# Patient Record
Sex: MALE | Race: WHITE | NOT HISPANIC OR LATINO | Employment: FULL TIME | URBAN - METROPOLITAN AREA
[De-identification: names, ages, dates, MRNs, and addresses within clinical notes are randomized per-mention and may not be internally consistent; named-entity substitution may affect disease eponyms.]

---

## 2017-01-17 ENCOUNTER — GENERIC CONVERSION - ENCOUNTER (OUTPATIENT)
Dept: OTHER | Facility: OTHER | Age: 72
End: 2017-01-17

## 2017-01-17 ENCOUNTER — APPOINTMENT (OUTPATIENT)
Dept: RADIATION ONCOLOGY | Facility: CLINIC | Age: 72
End: 2017-01-17
Attending: RADIOLOGY
Payer: COMMERCIAL

## 2017-01-17 PROCEDURE — 99213 OFFICE O/P EST LOW 20 MIN: CPT | Performed by: RADIOLOGY

## 2017-08-11 ENCOUNTER — ALLSCRIPTS OFFICE VISIT (OUTPATIENT)
Dept: OTHER | Facility: OTHER | Age: 72
End: 2017-08-11

## 2017-08-11 DIAGNOSIS — Z85.46 PERSONAL HISTORY OF MALIGNANT NEOPLASM OF PROSTATE: ICD-10-CM

## 2017-08-11 LAB
BILIRUB UR QL STRIP: NORMAL
GLUCOSE (HISTORICAL): NORMAL
HGB UR QL STRIP.AUTO: NORMAL
KETONES UR STRIP-MCNC: NORMAL MG/DL
LEUKOCYTE ESTERASE UR QL STRIP: NORMAL
NITRITE UR QL STRIP: NORMAL
PH UR STRIP.AUTO: 5.5 [PH]
PROT UR STRIP-MCNC: NORMAL MG/DL
SP GR UR STRIP.AUTO: 1.01
UROBILINOGEN UR QL STRIP.AUTO: 1

## 2018-01-13 VITALS
SYSTOLIC BLOOD PRESSURE: 142 MMHG | RESPIRATION RATE: 16 BRPM | DIASTOLIC BLOOD PRESSURE: 80 MMHG | HEART RATE: 94 BPM | BODY MASS INDEX: 29.29 KG/M2 | HEIGHT: 73 IN | WEIGHT: 221 LBS | OXYGEN SATURATION: 97 %

## 2018-01-13 VITALS — RESPIRATION RATE: 16 BRPM | WEIGHT: 215 LBS | BODY MASS INDEX: 28.49 KG/M2 | TEMPERATURE: 97.6 F | HEIGHT: 73 IN

## 2018-03-05 DIAGNOSIS — N13.9 BENIGN LOCALIZED HYPERPLASIA OF PROSTATE WITH URINARY OBSTRUCTION AND LOWER URINARY TRACT SYMPTOMS: Primary | ICD-10-CM

## 2018-03-05 DIAGNOSIS — N40.1 BENIGN LOCALIZED HYPERPLASIA OF PROSTATE WITH URINARY OBSTRUCTION AND LOWER URINARY TRACT SYMPTOMS: Primary | ICD-10-CM

## 2018-03-05 RX ORDER — SILODOSIN 8 MG/1
1 CAPSULE ORAL DAILY
Qty: 90 CAPSULE | Refills: 2 | Status: SHIPPED | OUTPATIENT
Start: 2018-03-05 | End: 2018-07-11 | Stop reason: CLARIF

## 2018-03-05 NOTE — TELEPHONE ENCOUNTER
Patient called stating his recent prescription for Rapaflo was written for 4mg instead of 8mg    A CORRECTED script was queued and forwarded to Dr Thee Brandt for approval

## 2018-03-12 ENCOUNTER — TELEPHONE (OUTPATIENT)
Dept: UROLOGY | Facility: AMBULATORY SURGERY CENTER | Age: 73
End: 2018-03-12

## 2018-03-12 NOTE — TELEPHONE ENCOUNTER
Spoke with pt who wanted to know if our office is having trouble getting gold seeds for IMRT  Told him not that we are aware of

## 2018-07-11 ENCOUNTER — OFFICE VISIT (OUTPATIENT)
Dept: CARDIOLOGY CLINIC | Facility: CLINIC | Age: 73
End: 2018-07-11
Payer: COMMERCIAL

## 2018-07-11 VITALS
HEART RATE: 72 BPM | BODY MASS INDEX: 29.03 KG/M2 | SYSTOLIC BLOOD PRESSURE: 138 MMHG | DIASTOLIC BLOOD PRESSURE: 80 MMHG | WEIGHT: 219 LBS | HEIGHT: 73 IN

## 2018-07-11 DIAGNOSIS — I48.20 CHRONIC ATRIAL FIBRILLATION (HCC): Primary | ICD-10-CM

## 2018-07-11 PROBLEM — R60.9 EDEMA: Status: ACTIVE | Noted: 2018-07-11

## 2018-07-11 PROCEDURE — 93000 ELECTROCARDIOGRAM COMPLETE: CPT | Performed by: INTERNAL MEDICINE

## 2018-07-11 PROCEDURE — 99213 OFFICE O/P EST LOW 20 MIN: CPT | Performed by: INTERNAL MEDICINE

## 2018-07-11 RX ORDER — MELOXICAM 15 MG/1
15 TABLET ORAL AS NEEDED
COMMUNITY
End: 2020-06-15

## 2018-07-11 RX ORDER — OMEPRAZOLE 20 MG/1
1 TABLET, DELAYED RELEASE ORAL DAILY
COMMUNITY

## 2018-07-11 RX ORDER — SILODOSIN 8 MG/1
8 CAPSULE ORAL DAILY
COMMUNITY
Start: 2018-06-20 | End: 2018-08-17 | Stop reason: SDUPTHER

## 2018-07-11 RX ORDER — DILTIAZEM HYDROCHLORIDE 180 MG/1
180 CAPSULE, EXTENDED RELEASE ORAL DAILY
COMMUNITY
Start: 2018-05-07 | End: 2019-01-05 | Stop reason: SDUPTHER

## 2018-07-11 NOTE — PROGRESS NOTES
Patient ID: Negrito Bryant is a 68 y o  male  Plan:      Chronic atrial fibrillation (HCC)  Heart rate controlled  Lone afib with low CHADSVASC score  Continue aspirin and diltiazem  Edema  Very mild  Always resolved by am  Likely related to diltiazem  He will call if it worsens  Follow up Plan:  1 year EKG and follow-up visit  HPI:  Bev Noble is seen in follow-up today regarding atrial fibrillation, dyspnea, and edema  To review, he has a long history of atrial fibrillation  He underwent cardioversion in 2011 but this did not stick ultimately  He is  on aspirin and rate control and is without symptoms  There is some edema at the end of the day but this resolves by the morning  Results for orders placed or performed in visit on 07/11/18   POCT ECG    Narrative    Afib  Incomplete RBBB  NSSTs  History reviewed  No pertinent surgical history  CMP: No results found for: NA, K, CL, CO2, BUN, CREATININE, GLUCOSE, EGFR    Lipid Profile: No results found for: CHOL, TRIG, HDL, LDL      Review of Systems    Complete 12  point ROS reviewed and otherwise non pertinent or negative except as per HPI or as below  Gait:  Normal        Objective:     /80 (BP Location: Left arm, Patient Position: Sitting, Cuff Size: Standard)   Pulse 72   Ht 6' 1" (1 854 m)   Wt 99 3 kg (219 lb)   BMI 28 89 kg/m²     PHYSICAL EXAM:    General:  Normal appearance in no distress  Eyes:  Anicteric  Oral mucosa:  Moist   Neck:  No JVD  Carotid upstrokes are brisk without bruits  No masses  Chest:  Clear to auscultation and percussion  Cardiac:  Normal PMI  Normal S1 and S2  No murmur gallop or rub  Irregular rate and rhythm  Abdomen:  Soft and nontender  No palpable organomegaly or aortic enlargement  Extremities:  Trace pretibial edema  Musculoskeletal:  Symmetric  Vascular:  Femoral pulses are brisk without bruits  Poplitealpulses are intact bilaterally  Pedal pulses are intact    Neuro: Grossly symmetric  Psych:  Alert and oriented x3  Current Outpatient Prescriptions:     aspirin 81 MG tablet, Take 1 tablet by mouth daily, Disp: , Rfl:     DILT- MG 24 hr capsule, Take 180 mg by mouth daily, Disp: , Rfl:     fluticasone-salmeterol (ADVAIR DISKUS) 250-50 mcg/dose inhaler, Inhale, Disp: , Rfl:     meloxicam (MOBIC) 15 mg tablet, Take 15 mg by mouth as needed, Disp: , Rfl:     omeprazole (PRILOSEC OTC) 20 MG tablet, Take 1 tablet by mouth daily, Disp: , Rfl:     PROAIR  (90 Base) MCG/ACT inhaler, , Disp: , Rfl:     RAPAFLO 8 MG CAPS, Take 8 mg by mouth daily, Disp: , Rfl:   No Known Allergies  Past Medical History:   Diagnosis Date    A[facility]fib ([facility] )     COPD (chronic obstructive pulmonary disease) ([facility] )     History of cardioversion 12/27/2011    Inital Rhythm AFIB, Final Rhythm Sinus, [facility]    History of echocardiogram 06/01/2015    Normal LV systolic function, mild concentric LV hypertrophy, mild mitral and tricuspid regurg, right atrial enlargement   EF 55%           History   Smoking Status    Former Smoker    Types: Cigarettes    Quit date: 7/11/1998   Smokeless Tobacco    Never Used

## 2018-08-09 RX ORDER — TADALAFIL 20 MG/1
TABLET ORAL
COMMUNITY
End: 2018-08-17 | Stop reason: SDUPTHER

## 2018-08-17 ENCOUNTER — OFFICE VISIT (OUTPATIENT)
Dept: UROLOGY | Facility: MEDICAL CENTER | Age: 73
End: 2018-08-17
Payer: COMMERCIAL

## 2018-08-17 VITALS
HEIGHT: 73 IN | SYSTOLIC BLOOD PRESSURE: 124 MMHG | WEIGHT: 215 LBS | BODY MASS INDEX: 28.49 KG/M2 | DIASTOLIC BLOOD PRESSURE: 74 MMHG

## 2018-08-17 DIAGNOSIS — N40.1 BENIGN PROSTATIC HYPERPLASIA WITH LOWER URINARY TRACT SYMPTOMS, SYMPTOM DETAILS UNSPECIFIED: ICD-10-CM

## 2018-08-17 DIAGNOSIS — Z92.3 PERSONAL HISTORY OF IRRADIATION: ICD-10-CM

## 2018-08-17 DIAGNOSIS — Z85.46 PERSONAL HISTORY OF MALIGNANT NEOPLASM OF PROSTATE: Primary | ICD-10-CM

## 2018-08-17 DIAGNOSIS — N52.8 OTHER MALE ERECTILE DYSFUNCTION: ICD-10-CM

## 2018-08-17 LAB
SL AMB  POCT GLUCOSE, UA: ABNORMAL
SL AMB LEUKOCYTE ESTERASE,UA: ABNORMAL
SL AMB POCT BILIRUBIN,UA: ABNORMAL
SL AMB POCT BLOOD,UA: ABNORMAL
SL AMB POCT CLARITY,UA: CLEAR
SL AMB POCT COLOR,UA: YELLOW
SL AMB POCT KETONES,UA: ABNORMAL
SL AMB POCT NITRITE,UA: ABNORMAL
SL AMB POCT PH,UA: 6.5
SL AMB POCT SPECIFIC GRAVITY,UA: 1.02
SL AMB POCT URINE PROTEIN: 30
SL AMB POCT UROBILINOGEN: 2

## 2018-08-17 PROCEDURE — 81003 URINALYSIS AUTO W/O SCOPE: CPT | Performed by: UROLOGY

## 2018-08-17 PROCEDURE — 99214 OFFICE O/P EST MOD 30 MIN: CPT | Performed by: UROLOGY

## 2018-08-17 RX ORDER — SILODOSIN 8 MG/1
8 CAPSULE ORAL DAILY
Qty: 90 CAPSULE | Refills: 3 | Status: SHIPPED | OUTPATIENT
Start: 2018-08-17 | End: 2019-07-22 | Stop reason: SDUPTHER

## 2018-08-17 RX ORDER — TADALAFIL 20 MG/1
10 TABLET ORAL AS NEEDED
Qty: 10 TABLET | Refills: 0 | Status: SHIPPED | OUTPATIENT
Start: 2018-08-17 | End: 2019-04-15

## 2018-08-17 NOTE — PROGRESS NOTES
Assessment/Plan:      Diagnoses and all orders for this visit:    Personal history of malignant neoplasm of prostate  -     POCT urine dip auto non-scope  -     RAPAFLO 8 MG CAPS; Take 1 capsule by mouth daily  -     tadalafil (CIALIS) 20 MG tablet; Take 0 5 tablets (10 mg total) by mouth as needed for erectile dysfunction    Personal history of irradiation    Benign prostatic hyperplasia with lower urinary tract symptoms, symptom details unspecified  -     RAPAFLO 8 MG CAPS; Take 1 capsule by mouth daily  -     tadalafil (CIALIS) 20 MG tablet; Take 0 5 tablets (10 mg total) by mouth as needed for erectile dysfunction    Other male erectile dysfunction  -     RAPAFLO 8 MG CAPS; Take 1 capsule by mouth daily  -     tadalafil (CIALIS) 20 MG tablet; Take 0 5 tablets (10 mg total) by mouth as needed for erectile dysfunction          Subjective:  No complaints     Patient ID: Humble Romero is a 68 y o  male  HPI  He is approaching 7 years after his radiation therapy for localized prostate cancer  He had a acceptable jax of his PSA down to below 1 0  He denies any hematuria, flank pains, weight loss, or loss of appetite  He states his bowel function is normal and not having any issues with that  He has BPH for which he takes Rapaflo, and also ED for which he takes p r n  Cialis  Review of Systems   Constitutional: Negative  HENT: Positive for hearing loss  Eyes: Negative  Respiratory: Negative  Cardiovascular: Negative  Gastrointestinal: Negative  Endocrine: Negative  Genitourinary: Negative  Musculoskeletal: Negative  Skin: Negative  Allergic/Immunologic: Negative  Neurological: Negative  Hematological: Negative  Psychiatric/Behavioral: Negative  Objective:     Physical Exam   Constitutional: He is oriented to person, place, and time  He appears well-developed and well-nourished  No distress  HENT:   Head: Normocephalic and atraumatic     Right Ear: Decreased hearing is noted  Left Ear: Decreased hearing is noted  Nose: Nose normal    Mouth/Throat: Oropharynx is clear and moist    Eyes: Conjunctivae and EOM are normal  Pupils are equal, round, and reactive to light  No scleral icterus  Neck: Normal range of motion  Neck supple  Cardiovascular: Normal rate, regular rhythm, normal heart sounds and intact distal pulses  No murmur heard  Pulmonary/Chest: Effort normal and breath sounds normal  No respiratory distress  He has no wheezes  He has no rales  Abdominal: Soft  Bowel sounds are normal  He exhibits no distension and no mass  There is no tenderness  Musculoskeletal: Normal range of motion  He exhibits no edema or tenderness  Lymphadenopathy:     He has no cervical adenopathy  Neurological: He is alert and oriented to person, place, and time  No cranial nerve deficit  Skin: Skin is warm and dry  No rash noted  No erythema  No pallor  Psychiatric: He has a normal mood and affect  His behavior is normal  Judgment and thought content normal    Nursing note and vitals reviewed        Current PSA level is pending

## 2018-08-17 NOTE — LETTER
August 17, 2018     Mike Nixon,   [address]  [address]  [address]    Patient: Alvin Em   YOB: 1945   Date of Visit: 8/17/2018       Dear Dr Franc Smallwood: Thank you for referring Denice Bullock to me for evaluation  Below are my notes for this consultation  If you have questions, please do not hesitate to call me  I look forward to following your patient along with you  Sincerely,        Aliyah Caceres MD        CC: No Recipients  Aliyah Caceres MD  8/17/2018  2:58 PM  Sign at close encounter  Assessment/Plan:      Diagnoses and all orders for this visit:    Personal history of malignant neoplasm of prostate  -     POCT urine dip auto non-scope  -     RAPAFLO 8 MG CAPS; Take 1 capsule by mouth daily  -     tadalafil (CIALIS) 20 MG tablet; Take 0 5 tablets (10 mg total) by mouth as needed for erectile dysfunction    Personal history of irradiation    Benign prostatic hyperplasia with lower urinary tract symptoms, symptom details unspecified  -     RAPAFLO 8 MG CAPS; Take 1 capsule by mouth daily  -     tadalafil (CIALIS) 20 MG tablet; Take 0 5 tablets (10 mg total) by mouth as needed for erectile dysfunction    Other male erectile dysfunction  -     RAPAFLO 8 MG CAPS; Take 1 capsule by mouth daily  -     tadalafil (CIALIS) 20 MG tablet; Take 0 5 tablets (10 mg total) by mouth as needed for erectile dysfunction          Subjective:  No complaints     Patient ID: Alvin Em is a 68 y o  male  HPI  He is approaching 7 years after his radiation therapy for localized prostate cancer  He had a acceptable jax of his PSA down to below 1 0  He denies any hematuria, flank pains, weight loss, or loss of appetite  He states his bowel function is normal and not having any issues with that  He has BPH for which he takes Rapaflo, and also ED for which he takes p r n  Cialis  Review of Systems   Constitutional: Negative      HENT: Positive for hearing loss     Eyes: Negative  Respiratory: Negative  Cardiovascular: Negative  Gastrointestinal: Negative  Endocrine: Negative  Genitourinary: Negative  Musculoskeletal: Negative  Skin: Negative  Allergic/Immunologic: Negative  Neurological: Negative  Hematological: Negative  Psychiatric/Behavioral: Negative  Objective:     Physical Exam   Constitutional: He is oriented to person, place, and time  He appears well-developed and well-nourished  No distress  HENT:   Head: Normocephalic and atraumatic  Right Ear: Decreased hearing is noted  Left Ear: Decreased hearing is noted  Nose: Nose normal    Mouth/Throat: Oropharynx is clear and moist    Eyes: Conjunctivae and EOM are normal  Pupils are equal, round, and reactive to light  No scleral icterus  Neck: Normal range of motion  Neck supple  Cardiovascular: Normal rate, regular rhythm, normal heart sounds and intact distal pulses  No murmur heard  Pulmonary/Chest: Effort normal and breath sounds normal  No respiratory distress  He has no wheezes  He has no rales  Abdominal: Soft  Bowel sounds are normal  He exhibits no distension and no mass  There is no tenderness  Musculoskeletal: Normal range of motion  He exhibits no edema or tenderness  Lymphadenopathy:     He has no cervical adenopathy  Neurological: He is alert and oriented to person, place, and time  No cranial nerve deficit  Skin: Skin is warm and dry  No rash noted  No erythema  No pallor  Psychiatric: He has a normal mood and affect  His behavior is normal  Judgment and thought content normal    Nursing note and vitals reviewed        Current PSA level is pending

## 2018-08-20 ENCOUNTER — TELEPHONE (OUTPATIENT)
Dept: UROLOGY | Facility: MEDICAL CENTER | Age: 73
End: 2018-08-20

## 2018-08-20 NOTE — TELEPHONE ENCOUNTER
Scripts for Rapaflo and Cialis were E-scribed to [facility]  Patient states he does NOT take Cialis any longer  He asked that I call the mail order pharmacy to cancel that medication so he doesn't get charged  I was able to verbally VOID the prescription for Cialis 10mg  Patient not due for refill of his Rapaflo until 8/27/18  He will NOT be charged  Patient aware of same

## 2018-12-03 ENCOUNTER — OFFICE VISIT (OUTPATIENT)
Dept: URGENT CARE | Facility: CLINIC | Age: 73
End: 2018-12-03
Payer: COMMERCIAL

## 2018-12-03 VITALS
HEART RATE: 82 BPM | WEIGHT: 215.6 LBS | TEMPERATURE: 97.6 F | RESPIRATION RATE: 16 BRPM | DIASTOLIC BLOOD PRESSURE: 82 MMHG | OXYGEN SATURATION: 95 % | HEIGHT: 73 IN | SYSTOLIC BLOOD PRESSURE: 144 MMHG | BODY MASS INDEX: 28.57 KG/M2

## 2018-12-03 DIAGNOSIS — S05.02XA ABRASION OF LEFT CORNEA, INITIAL ENCOUNTER: Primary | ICD-10-CM

## 2018-12-03 PROCEDURE — 90715 TDAP VACCINE 7 YRS/> IM: CPT

## 2018-12-03 PROCEDURE — 99203 OFFICE O/P NEW LOW 30 MIN: CPT | Performed by: PHYSICIAN ASSISTANT

## 2018-12-03 RX ORDER — MOXIFLOXACIN 5 MG/ML
1 SOLUTION/ DROPS OPHTHALMIC 3 TIMES DAILY
Qty: 3 ML | Refills: 0 | Status: SHIPPED | OUTPATIENT
Start: 2018-12-03 | End: 2019-08-16 | Stop reason: ALTCHOICE

## 2018-12-03 NOTE — PATIENT INSTRUCTIONS
Corneal Abrasion   WHAT YOU NEED TO KNOW:   A corneal abrasion is a scratch on the cornea of your eye  The cornea is the clear layer that covers the front of your eye  A small scratch may heal in 1 to 2 days  Deeper or larger scratches may take longer to heal         DISCHARGE INSTRUCTIONS:   Contact your healthcare provider if:   · Your eye pain or vision gets worse  · You have yellow or green drainage from your eye  · You have questions or concerns about your condition or care  Medicines:   · Medicines  may be given in the form of eyedrops or ointment to help prevent an eye infection  You may also be given eye drops to decrease pain  Ask how to take this medicine safely  · Take your medicine as directed  Contact your healthcare provider if you think your medicine is not helping or if you have side effects  Tell him or her if you are allergic to any medicine  Keep a list of the medicines, vitamins, and herbs you take  Include the amounts, and when and why you take them  Bring the list or the pill bottles to follow-up visits  Carry your medicine list with you in case of an emergency  Follow up with your healthcare provider as directed:  Write down your questions so you remember to ask them during your visits  Self-care:   · Do not touch or rub your eye  · Ask your healthcare provider when you can start your normal activities  · Ask your healthcare provider when you can wear your contact lenses  · Wear sunglasses in bright light until your eyes feel better  Help prevent corneal abrasions:   · Remove your contact lenses if your eyes feel dry or irritated  · Wash your hands if you need to touch your eyes or your face  · Trim your child's fingernails so he cannot scratch his eye  · Wear protective eyewear when you work with chemicals, wood, dust, or metal      · Wear protective eyewear when you play sports  · Do not wear your contacts for longer than you should       · Do not wear colored lenses or lenses with shapes on them  These lenses may cause eye damage and vision loss  · Do not wear glitter makeup  Glitter can easily get into your eyes and under contact lenses  · Do not sleep with your contacts in your eyes  © 2017 [address] [employer] Information is for End User's use only and may not be sold, redistributed or otherwise used for commercial purposes  All illustrations and images included in CareNotes® are the copyrighted property of [employer]  or Tam Andino  The above information is an  only  It is not intended as medical advice for individual conditions or treatments  Talk to your doctor, nurse or pharmacist before following any medical regimen to see if it is safe and effective for you

## 2018-12-03 NOTE — PROGRESS NOTES
[facility]        NAME: Cabrera Garcia is a 68 y o  male  : 1945    MRN: [id number]  DATE: December 3, 2018  TIME: 4:13 PM    Assessment and Plan   Abrasion of left cornea, initial encounter [S05  02XA]  1  Abrasion of left cornea, initial encounter  TDAP Vaccine greater than or equal to 8yo    moxifloxacin (VIGAMOX) 0 5 % ophthalmic solution         Patient Instructions     Patient is tetanus update in office  Use antibiotic eyedrops 3 times a day as directed  Follow up with eye doctor  Follow up with PCP in 3-5 days  Proceed to  ER if symptoms worsen  Chief Complaint     Chief Complaint   Patient presents with    Conjunctivitis     L eye redness, started Friday  States it was glued shut this morning and it watery throughout the day  Eye feels like there is something in it  History of Present Illness       This is a 71-year-old male here complaining of left eye irritation x4 days  Patient reports foreign body sensation underneath upper leg  Patient reports he does wear contacts the last 25 years  Patient denies getting anything in his eye  Patient reports his eye is pasted shut in morning  Denies any changes in his vision  Denies any history of glaucoma macular degeneration  Review of Systems   Review of Systems   Constitutional: Negative for chills, fatigue and fever  HENT: Negative for congestion, ear pain, hearing loss, postnasal drip, sinus pain, sinus pressure and sore throat  Eyes: Positive for discharge  Negative for pain  Respiratory: Negative for chest tightness and shortness of breath  Cardiovascular: Negative for chest pain  Gastrointestinal: Negative for abdominal pain, constipation, nausea and vomiting  Genitourinary: Negative for difficulty urinating  Musculoskeletal: Negative for arthralgias and myalgias  Skin: Negative for rash  Neurological: Negative for dizziness and headaches     Psychiatric/Behavioral: Negative for behavioral problems  Current Medications       Current Outpatient Prescriptions:     aspirin 81 MG tablet, Take 1 tablet by mouth daily, Disp: , Rfl:     DILT- MG 24 hr capsule, Take 180 mg by mouth daily, Disp: , Rfl:     fluticasone-salmeterol (ADVAIR DISKUS) 250-50 mcg/dose inhaler, Inhale, Disp: , Rfl:     meloxicam (MOBIC) 15 mg tablet, Take 15 mg by mouth as needed, Disp: , Rfl:     omeprazole (PRILOSEC OTC) 20 MG tablet, Take 1 tablet by mouth daily, Disp: , Rfl:     PROAIR  (90 Base) MCG/ACT inhaler, , Disp: , Rfl:     RAPAFLO 8 MG CAPS, Take 1 capsule by mouth daily, Disp: 90 capsule, Rfl: 3    moxifloxacin (VIGAMOX) 0 5 % ophthalmic solution, Administer 1 drop to both eyes 3 (three) times a day, Disp: 3 mL, Rfl: 0    tadalafil (CIALIS) 20 MG tablet, Take 0 5 tablets (10 mg total) by mouth as needed for erectile dysfunction (Patient not taking: Reported on 12/3/2018 ), Disp: 10 tablet, Rfl: 0    Current Allergies     Allergies as of 12/03/2018    (No Known Allergies)            The following portions of the patient's history were reviewed and updated as appropriate: allergies, current medications, past family history, past medical history, past social history, past surgical history and problem list      Past Medical History:   Diagnosis Date    [facility] )     BPH with obstruction/lower urinary tract symptoms     [facility]     Frequency of urination     History of cardioversion 12/27/2011    Inital Rhythm AFIB, Final Rhythm Sinus, [facility]    History of echocardiogram 06/01/2015    Normal LV systolic function, mild concentric LV hypertrophy, mild mitral and tricuspid regurg, right atrial enlargement   EF 55%    Other male erectile dysfunction     Personal history of irradiation     Personal history of malignant neoplasm of prostate        Past Surgical History:   Procedure Laterality Date    INTRAOPERATIVE RADIATION THERAPY ([facility]) 2011    PROSTATE BIOPSY      VASECTOMY  1973       Family History   Problem Relation Age of Onset    Heart disease Mother     Liver disease Father          Medications have been verified  Objective   /82   Pulse 82   Temp 97 6 °F (36 4 °C) (Temporal)   Resp 16   Ht 6' 1" (1 854 m)   Wt 97 8 kg (215 lb 9 6 oz)   SpO2 95%   BMI 28 44 kg/m²        Physical Exam     Physical Exam   Constitutional: He is oriented to person, place, and time  He appears well-developed and well-nourished  HENT:   Right Ear: Tympanic membrane and external ear normal    Left Ear: Tympanic membrane and external ear normal    Eyes: Pupils are equal, round, and reactive to light  EOM are normal  Lids are everted and swept, no foreign bodies found  Right eye exhibits no chemosis, no discharge, no exudate and no hordeolum  No foreign body present in the right eye  Left eye exhibits chemosis, discharge and exudate  Left eye exhibits no hordeolum  No foreign body present in the left eye  Left conjunctiva is injected  Fluorescein stain showed corneal abrasion at the 3:00 position measuring 1-2 mm   Neck: Normal range of motion  No edema present  Cardiovascular: Normal rate, regular rhythm, S1 normal, S2 normal and normal heart sounds  No murmur heard  Pulmonary/Chest: Effort normal and breath sounds normal  No respiratory distress  He has no wheezes  He has no rales  He exhibits no tenderness  Lymphadenopathy:     He has no cervical adenopathy  Neurological: He is alert and oriented to person, place, and time  Skin: Skin is warm, dry and intact  No rash noted  Psychiatric: He has a normal mood and affect  His speech is normal and behavior is normal    Nursing note and vitals reviewed

## 2018-12-06 ENCOUNTER — OFFICE VISIT (OUTPATIENT)
Dept: FAMILY MEDICINE CLINIC | Facility: CLINIC | Age: 73
End: 2018-12-06
Payer: COMMERCIAL

## 2018-12-06 ENCOUNTER — DOCUMENTATION (OUTPATIENT)
Dept: FAMILY MEDICINE CLINIC | Facility: CLINIC | Age: 73
End: 2018-12-06

## 2018-12-06 VITALS
BODY MASS INDEX: 28.47 KG/M2 | SYSTOLIC BLOOD PRESSURE: 122 MMHG | TEMPERATURE: 98.8 F | OXYGEN SATURATION: 98 % | HEART RATE: 82 BPM | WEIGHT: 214.8 LBS | HEIGHT: 73 IN | DIASTOLIC BLOOD PRESSURE: 80 MMHG

## 2018-12-06 DIAGNOSIS — H10.32 ACUTE BACTERIAL CONJUNCTIVITIS OF LEFT EYE: ICD-10-CM

## 2018-12-06 DIAGNOSIS — H61.23 BILATERAL IMPACTED CERUMEN: ICD-10-CM

## 2018-12-06 DIAGNOSIS — I48.20 CHRONIC ATRIAL FIBRILLATION (HCC): Primary | ICD-10-CM

## 2018-12-06 DIAGNOSIS — J45.909 MILD ASTHMA WITHOUT COMPLICATION, UNSPECIFIED WHETHER PERSISTENT: ICD-10-CM

## 2018-12-06 PROBLEM — H93.19 TINNITUS: Status: ACTIVE | Noted: 2017-01-12

## 2018-12-06 PROBLEM — K21.9 GERD (GASTROESOPHAGEAL REFLUX DISEASE): Status: ACTIVE | Noted: 2017-01-12

## 2018-12-06 PROBLEM — C61 ADENOCARCINOMA OF PROSTATE (HCC): Status: ACTIVE | Noted: 2017-01-12

## 2018-12-06 PROCEDURE — 69209 REMOVE IMPACTED EAR WAX UNI: CPT | Performed by: FAMILY MEDICINE

## 2018-12-06 PROCEDURE — 3008F BODY MASS INDEX DOCD: CPT | Performed by: FAMILY MEDICINE

## 2018-12-06 PROCEDURE — 99213 OFFICE O/P EST LOW 20 MIN: CPT | Performed by: FAMILY MEDICINE

## 2018-12-06 PROCEDURE — 1101F PT FALLS ASSESS-DOCD LE1/YR: CPT | Performed by: FAMILY MEDICINE

## 2018-12-06 NOTE — ASSESSMENT & PLAN NOTE
Patient has right cerumen impaction with underlying hearing loss ear lavaged at this time with good results

## 2018-12-06 NOTE — PATIENT INSTRUCTIONS
Hypertension   AMBULATORY CARE:   Hypertension  is high blood pressure (BP)  Your BP is the force of your blood moving against the walls of your arteries  Normal BP is less than 120/80  Prehypertension is between 120/80 and 139/89  Hypertension is 140/90 or higher  Hypertension causes your BP to get so high that your heart has to work much harder than normal  This can damage your heart  You can control hypertension with a healthy lifestyle or medicines  A controlled blood pressure helps protect your organs, such as your heart, lungs, brain, and kidneys  Common symptoms include the following:   · Headache     · Blurred vision     · Chest pain     · Dizziness or weakness     · Trouble breathing    · Nosebleeds  Call 911 for any of the following:   · You have discomfort in your chest that feels like squeezing, pressure, fullness, or pain  · You become confused or have difficulty speaking  · You suddenly feel lightheaded or have trouble breathing  · You have pain or discomfort in your back, neck, jaw, stomach, or arm  Seek care immediately if:   · You have a severe headache or vision loss  · You have weakness in an arm or leg  Contact your healthcare provider if:   · You feel faint, dizzy, confused, or drowsy  · You have been taking your BP medicine and your BP is still higher than your healthcare provider says it should be  · You have questions or concerns about your condition or care  Treatment for hypertension  may include medicine to lower your BP and lower your cholesterol level  A low cholesterol level helps prevent heart disease and makes it easier to control your blood pressure  You may also need to make lifestyle changes  Take your medicine exactly as directed  Manage hypertension:  Talk with your healthcare provider about these and other ways to manage hypertension:  · Check your BP at home  Sit and rest for 5 minutes before you take your BP   Extend your arm and support it on a flat surface  Your arm should be at the same level as your heart  Follow the directions that came with your BP monitor  If possible, take at least 2 BP readings each time  Take your BP at least twice a day at the same times each day, such as morning and evening  Keep a record of your BP readings and bring it to your follow-up visits  Ask your healthcare provider what your BP should be  · Limit sodium (salt) as directed  Too much sodium can affect your fluid balance  Check labels to find low-sodium or no-salt-added foods  Some low-sodium foods use potassium salts for flavor  Too much potassium can also cause health problems  Your healthcare provider will tell you how much sodium and potassium are safe for you to have in a day  He or she may recommend that you limit sodium to 2,300 mg a day  · Follow the meal plan recommended by your healthcare provider  A dietitian or your provider can give you more information on low-sodium plans or the DASH (Dietary Approaches to Stop Hypertension) eating plan  The DASH plan is low in sodium, unhealthy fats, and total fat  It is high in potassium, calcium, and fiber  · Exercise to maintain a healthy weight  Exercise at least 30 minutes per day, on most days of the week  This will help decrease your blood pressure  Ask your healthcare provider about the best exercise plan for you  · Decrease stress  This may help lower your BP  Learn ways to relax, such as deep breathing or listening to music  · Limit alcohol  Women should limit alcohol to 1 drink a day  Men should limit alcohol to 2 drinks a day  A drink of alcohol is 12 ounces of beer, 5 ounces of wine, or 1½ ounces of liquor  · Do not smoke  Nicotine and other chemicals in cigarettes and cigars can increase your BP and also cause lung damage  Ask your healthcare provider for information if you currently smoke and need help to quit  E-cigarettes or smokeless tobacco still contain nicotine  Talk to your healthcare provider before you use these products  · Manage any other health conditions you have  Health conditions such as diabetes can increase your risk for hypertension  Follow your healthcare provider's instructions and take all your medicines as directed  Follow up with your healthcare provider as directed: You will need to return to have your BP checked and to have other lab tests done  Write down your questions so you remember to ask them during your visits  © 2017 [employer] [employer] Information is for End User's use only and may not be sold, redistributed or otherwise used for commercial purposes  All illustrations and images included in CareNotes® are the copyrighted property of [employer]  or Tam Andino  The above information is an  only  It is not intended as medical advice for individual conditions or treatments  Talk to your doctor, nurse or pharmacist before following any medical regimen to see if it is safe and effective for you

## 2018-12-06 NOTE — PROGRESS NOTES
Per Dr Bharathi Rodrigez request contacted [address] Ashlie Oneill to see about getting patient a STAT appt for today  Per  at [facility] location she had to put a note back to the Doctor and they will call him with an appointment time for today   Patient notified

## 2018-12-06 NOTE — ASSESSMENT & PLAN NOTE
Patient has a reddened swollen left eye with conjunctival hyperemia  He has no red reflex on ophthalmological exam   He states that he started the eyedrops on Monday after the Urgent Care visit he was using a quinolone eye drop  His vision is been lost and that I had now it has become blurry  At this time we will send him directly over to Ophthalmology for urgent evaluation    He will be traveling as of tomorrow

## 2018-12-06 NOTE — PROGRESS NOTES
Assessment/Plan:       Problem List Items Addressed This Visit     Chronic atrial fibrillation ([facility] [address] ) - Primary     Patient has chronic atrial fibrillation controlled on diltiazem no symptoms of palpitations shortness of breath or chest pain he will continue current regimen         Asthma     Patient has long cysts standing history of asthma this is controlled currently without shortness of breath or wheezing no cough         Bilateral impacted cerumen     Patient has right cerumen impaction with underlying hearing loss ear lavaged at this time with good results  Acute bacterial conjunctivitis of left eye     Patient has a reddened swollen left eye with conjunctival hyperemia  He has no red reflex on ophthalmological exam   He states that he started the eyedrops on Monday after the Urgent Care visit he was using a quinolone eye drop  His vision is been lost and that I had now it has become blurry  At this time we will send him directly over to Ophthalmology for urgent evaluation  He will be traveling as of tomorrow                 Subjective:      Patient ID: Helen Esparza is a 68 y o  male  Patient has bilateral cerumen impaction underlying hearing loss  Requesting ear cleaning and lavage at this time  Recently seen at urgent care for an eye infection given eyedrops for this  Conjunctivitis    Associated symptoms include eye itching, eye pain and eye redness  Pertinent negatives include no fever, no abdominal pain, no nausea, no congestion, no headaches, no rhinorrhea, no sore throat, no cough, no rash and no eye discharge  The following portions of the patient's history were reviewed and updated as appropriate: allergies, current medications, past family history, past medical history, past social history, past surgical history and problem list     Review of Systems   Constitutional: Negative for chills, fatigue and fever     HENT: Negative for congestion, nosebleeds, rhinorrhea, sinus pressure and sore throat  Hearing loss and recent cerumen impaction wax buildup  Eyes: Positive for pain, redness and itching  Negative for discharge  Vision loss and redness and pain on the left eye since Monday  No red reflex on ophthalmological exam at this time severe conjunctivitis   Respiratory: Negative for cough and shortness of breath  Cardiovascular: Negative for chest pain, palpitations and leg swelling  Gastrointestinal: Negative for abdominal pain, blood in stool and nausea  Endocrine: Negative for cold intolerance, heat intolerance and polyuria  Genitourinary: Negative for dysuria and frequency  Musculoskeletal: Negative for arthralgias, back pain and myalgias  Skin: Negative for rash  Neurological: Negative for dizziness, weakness and headaches  Hematological: Negative for adenopathy  Psychiatric/Behavioral: Negative for behavioral problems and sleep disturbance  The patient is not nervous/anxious  Objective:      /80   Pulse 82   Temp 98 8 °F (37 1 °C)   Ht 6' 1" (1 854 m)   Wt 97 4 kg (214 lb 12 8 oz)   SpO2 98%   BMI 28 34 kg/m²        Physical Exam   Constitutional: He is oriented to person, place, and time  He appears well-developed and well-nourished  HENT:   Head: Normocephalic and atraumatic  Right Ear: External ear normal    Left Ear: External ear normal    Nose: Nose normal    Mouth/Throat: Oropharynx is clear and moist    Cerumen impaction bilaterally   Eyes: Pupils are equal, round, and reactive to light  Conjunctivae and EOM are normal  No scleral icterus  Neck: Normal range of motion  Neck supple  No JVD present  No thyromegaly present  Cardiovascular: Normal rate, regular rhythm and normal heart sounds  No murmur heard  Pulmonary/Chest: Effort normal and breath sounds normal  He has no wheezes  He has no rales  He exhibits no tenderness  Abdominal: Soft   Bowel sounds are normal  He exhibits no distension and no mass  There is no tenderness  There is no rebound and no guarding  Musculoskeletal: Normal range of motion  He exhibits no edema, tenderness or deformity  Lymphadenopathy:     He has no cervical adenopathy  Neurological: He is alert and oriented to person, place, and time  He has normal reflexes  No cranial nerve deficit  Skin: Skin is warm and dry  No rash noted  No erythema  Psychiatric: He has a normal mood and affect  His behavior is normal  Judgment and thought content normal    Nursing note and vitals reviewed  Ear cerumen removal  Date/Time: 12/6/2018 10:16 AM  Performed by: Khang Alba by: Valdez Marrero     Other Assisting Provider: No    Consent:     Consent obtained:  Verbal    Consent given by:  Patient    Risks discussed:  TM perforation    Alternatives discussed:  No treatment  Universal protocol:     Procedure explained and questions answered to patient or proxy's satisfaction: yes      Relevant documents present and verified: yes      Test results available and properly labeled: yes      Radiology Images displayed and confirmed  If images not available, report reviewed: yes      Required blood products, implants, devices and special equipment available: no      Site/side marked: yes      Immediately prior to procedure a time out was called: yes      Patient identity confirmed:  Verbally with patient  Procedure details:     Location:  L ear    Procedure type: irrigation      Approach:  External  Post-procedure details:     Complication:  TM perforation    Hearing quality:  Improved    Patient tolerance of procedure:   Tolerated well, no immediate complications      Data:    Laboratory Results:   Radiology/Other Diagnostic Testing Results:      No results found for: WBC, HGB, HCT, MCV, PLT  No results found for: NA, K, CL, CO2, ANIONGAP, BUN, CREATININE, GLUCOSE, GLUF, CALCIUM, CORRECTEDCA, AST, ALT, ALKPHOS, PROT, BILITOT, EGFR  No results found for: CHOLESTEROL  No results found for: HDL  No results found for: LDLCALC  No results found for: TRIG  No results found for: CHOLHDL  No results found for: HZQ1BCWERNLD, TSH  No results found for: HGBA1C  No results found for: PSA    Conner Lieberman, DO

## 2018-12-06 NOTE — ASSESSMENT & PLAN NOTE
Patient has long cysts standing history of asthma this is controlled currently without shortness of breath or wheezing no cough

## 2018-12-06 NOTE — ASSESSMENT & PLAN NOTE
Patient has chronic atrial fibrillation controlled on diltiazem no symptoms of palpitations shortness of breath or chest pain he will continue current regimen

## 2019-01-05 DIAGNOSIS — I48.91 ATRIAL FIBRILLATION, UNSPECIFIED TYPE (HCC): Primary | ICD-10-CM

## 2019-01-05 RX ORDER — DILTIAZEM HYDROCHLORIDE 180 MG/1
CAPSULE, EXTENDED RELEASE ORAL
Qty: 90 CAPSULE | Refills: 5 | Status: SHIPPED | OUTPATIENT
Start: 2019-01-05 | End: 2019-07-29 | Stop reason: DRUGHIGH

## 2019-02-20 ENCOUNTER — TELEPHONE (OUTPATIENT)
Dept: FAMILY MEDICINE CLINIC | Facility: CLINIC | Age: 74
End: 2019-02-20

## 2019-02-20 DIAGNOSIS — H65.01 RIGHT ACUTE SEROUS OTITIS MEDIA, RECURRENCE NOT SPECIFIED: Primary | ICD-10-CM

## 2019-02-20 RX ORDER — AMOXICILLIN AND CLAVULANATE POTASSIUM 875; 125 MG/1; MG/1
1 TABLET, FILM COATED ORAL EVERY 12 HOURS SCHEDULED
Qty: 20 TABLET | Refills: 0 | Status: SHIPPED | OUTPATIENT
Start: 2019-02-20 | End: 2019-03-02

## 2019-02-20 NOTE — TELEPHONE ENCOUNTER
I recommend an antibiotic at this point since he cannot get in because this is likely a as a recurrence of ear infections that he has had in the past notify patient antibiotic will be sent to the pharmacy

## 2019-03-14 ENCOUNTER — TELEPHONE (OUTPATIENT)
Dept: FAMILY MEDICINE CLINIC | Facility: CLINIC | Age: 74
End: 2019-03-14

## 2019-03-14 DIAGNOSIS — H66.001 ACUTE SUPPURATIVE OTITIS MEDIA OF RIGHT EAR WITHOUT SPONTANEOUS RUPTURE OF TYMPANIC MEMBRANE, RECURRENCE NOT SPECIFIED: Primary | ICD-10-CM

## 2019-03-14 RX ORDER — AMOXICILLIN AND CLAVULANATE POTASSIUM 875; 125 MG/1; MG/1
1 TABLET, FILM COATED ORAL EVERY 12 HOURS SCHEDULED
Qty: 24 TABLET | Refills: 2 | Status: SHIPPED | OUTPATIENT
Start: 2019-03-14 | End: 2019-03-24

## 2019-03-14 NOTE — TELEPHONE ENCOUNTER
Antibiotic sent in to pharmacy patient must continue the antibiotic for 2 weeks and then follow up with me after that for scheduled appointment

## 2019-04-15 ENCOUNTER — OFFICE VISIT (OUTPATIENT)
Dept: FAMILY MEDICINE CLINIC | Facility: CLINIC | Age: 74
End: 2019-04-15
Payer: COMMERCIAL

## 2019-04-15 VITALS
TEMPERATURE: 98.5 F | OXYGEN SATURATION: 96 % | DIASTOLIC BLOOD PRESSURE: 85 MMHG | WEIGHT: 219.2 LBS | SYSTOLIC BLOOD PRESSURE: 123 MMHG | HEIGHT: 73 IN | HEART RATE: 97 BPM | BODY MASS INDEX: 29.05 KG/M2

## 2019-04-15 DIAGNOSIS — R10.31 RIGHT GROIN PAIN: ICD-10-CM

## 2019-04-15 DIAGNOSIS — I48.20 CHRONIC ATRIAL FIBRILLATION (HCC): ICD-10-CM

## 2019-04-15 DIAGNOSIS — J44.9 CHRONIC OBSTRUCTIVE PULMONARY DISEASE, UNSPECIFIED COPD TYPE (HCC): ICD-10-CM

## 2019-04-15 DIAGNOSIS — H65.04 RECURRENT ACUTE SEROUS OTITIS MEDIA OF RIGHT EAR: Primary | ICD-10-CM

## 2019-04-15 PROCEDURE — 99214 OFFICE O/P EST MOD 30 MIN: CPT | Performed by: FAMILY MEDICINE

## 2019-04-15 RX ORDER — AMOXICILLIN AND CLAVULANATE POTASSIUM 562.5; 437.5; 62.5 MG/1; MG/1; MG/1
2 TABLET, FILM COATED, EXTENDED RELEASE ORAL 2 TIMES DAILY
Qty: 40 TABLET | Refills: 2 | Status: SHIPPED | OUTPATIENT
Start: 2019-04-15 | End: 2019-04-25

## 2019-04-29 ENCOUNTER — OFFICE VISIT (OUTPATIENT)
Dept: FAMILY MEDICINE CLINIC | Facility: CLINIC | Age: 74
End: 2019-04-29
Payer: COMMERCIAL

## 2019-04-29 VITALS
HEART RATE: 79 BPM | BODY MASS INDEX: 29.03 KG/M2 | OXYGEN SATURATION: 97 % | SYSTOLIC BLOOD PRESSURE: 120 MMHG | WEIGHT: 219 LBS | DIASTOLIC BLOOD PRESSURE: 80 MMHG | HEIGHT: 73 IN | TEMPERATURE: 98.3 F

## 2019-04-29 DIAGNOSIS — J44.9 CHRONIC OBSTRUCTIVE PULMONARY DISEASE, UNSPECIFIED COPD TYPE (HCC): ICD-10-CM

## 2019-04-29 DIAGNOSIS — I48.20 CHRONIC ATRIAL FIBRILLATION (HCC): ICD-10-CM

## 2019-04-29 DIAGNOSIS — H65.04 RECURRENT ACUTE SEROUS OTITIS MEDIA OF RIGHT EAR: Primary | ICD-10-CM

## 2019-04-29 DIAGNOSIS — K21.9 GASTROESOPHAGEAL REFLUX DISEASE WITHOUT ESOPHAGITIS: ICD-10-CM

## 2019-04-29 PROCEDURE — 3008F BODY MASS INDEX DOCD: CPT | Performed by: FAMILY MEDICINE

## 2019-04-29 PROCEDURE — 99214 OFFICE O/P EST MOD 30 MIN: CPT | Performed by: FAMILY MEDICINE

## 2019-04-29 RX ORDER — AMOXICILLIN AND CLAVULANATE POTASSIUM 562.5; 437.5; 62.5 MG/1; MG/1; MG/1
2 TABLET, FILM COATED, EXTENDED RELEASE ORAL 2 TIMES DAILY
Qty: 40 TABLET | Refills: 2 | Status: SHIPPED | OUTPATIENT
Start: 2019-04-29 | End: 2019-04-29 | Stop reason: SDUPTHER

## 2019-04-29 RX ORDER — AMOXICILLIN AND CLAVULANATE POTASSIUM 562.5; 437.5; 62.5 MG/1; MG/1; MG/1
2 TABLET, FILM COATED, EXTENDED RELEASE ORAL 2 TIMES DAILY
Qty: 40 TABLET | Refills: 2 | Status: SHIPPED | OUTPATIENT
Start: 2019-04-29 | End: 2019-05-09

## 2019-04-30 ENCOUNTER — TELEPHONE (OUTPATIENT)
Dept: FAMILY MEDICINE CLINIC | Facility: CLINIC | Age: 74
End: 2019-04-30

## 2019-05-01 PROCEDURE — 87186 SC STD MICRODIL/AGAR DIL: CPT | Performed by: OTOLARYNGOLOGY

## 2019-05-01 PROCEDURE — 87205 SMEAR GRAM STAIN: CPT | Performed by: OTOLARYNGOLOGY

## 2019-05-01 PROCEDURE — 88305 TISSUE EXAM BY PATHOLOGIST: CPT | Performed by: PATHOLOGY

## 2019-05-01 PROCEDURE — 87070 CULTURE OTHR SPECIMN AEROBIC: CPT | Performed by: OTOLARYNGOLOGY

## 2019-05-01 PROCEDURE — 87077 CULTURE AEROBIC IDENTIFY: CPT | Performed by: OTOLARYNGOLOGY

## 2019-07-22 DIAGNOSIS — N52.8 OTHER MALE ERECTILE DYSFUNCTION: ICD-10-CM

## 2019-07-22 DIAGNOSIS — N40.1 BENIGN PROSTATIC HYPERPLASIA WITH LOWER URINARY TRACT SYMPTOMS, SYMPTOM DETAILS UNSPECIFIED: ICD-10-CM

## 2019-07-22 DIAGNOSIS — Z85.46 PERSONAL HISTORY OF MALIGNANT NEOPLASM OF PROSTATE: ICD-10-CM

## 2019-07-22 RX ORDER — SILODOSIN 8 MG/1
8 CAPSULE ORAL DAILY
Qty: 90 CAPSULE | Refills: 3 | Status: SHIPPED | OUTPATIENT
Start: 2019-07-22 | End: 2020-04-08

## 2019-07-29 ENCOUNTER — OFFICE VISIT (OUTPATIENT)
Dept: CARDIOLOGY CLINIC | Facility: CLINIC | Age: 74
End: 2019-07-29
Payer: COMMERCIAL

## 2019-07-29 VITALS
HEART RATE: 66 BPM | HEIGHT: 73 IN | DIASTOLIC BLOOD PRESSURE: 70 MMHG | SYSTOLIC BLOOD PRESSURE: 120 MMHG | BODY MASS INDEX: 28.76 KG/M2 | WEIGHT: 217 LBS

## 2019-07-29 DIAGNOSIS — I48.20 CHRONIC ATRIAL FIBRILLATION (HCC): Primary | ICD-10-CM

## 2019-07-29 DIAGNOSIS — R00.1 BRADYCARDIA: ICD-10-CM

## 2019-07-29 PROCEDURE — 93000 ELECTROCARDIOGRAM COMPLETE: CPT | Performed by: INTERNAL MEDICINE

## 2019-07-29 PROCEDURE — 99213 OFFICE O/P EST LOW 20 MIN: CPT | Performed by: INTERNAL MEDICINE

## 2019-07-29 NOTE — PROGRESS NOTES
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  Results for orders placed or performed in visit on 07/29/19   POCT ECG    Impression    Atrial fibrillation with a slow to moderate ventricular response at 65 beats per minute  Today's date  Most recent or relevant cardiac/vascular testing:    Echocardiogram 6/1/2015:  Normal LV function  Mild LVH  Past Surgical History:   Procedure Laterality Date    INTRAOPERATIVE RADIATION THERAPY ([facility])  2011    LAMINECTOMY      three levels L3 - S1 - all at different times    PATELLA SURGERY      most of this removed after injury    PROSTATE BIOPSY      TOTAL HIP ARTHROPLASTY Bilateral     VASECTOMY  1973     CMP: No results found for: NA, K, CL, CO2, BUN, CREATININE, GLUCOSE, EGFR    Lipid Profile: No results found for: CHOL, TRIG, HDL, LDL      Review of Systems   10  point ROS  was otherwise non pertinent or negative except as per HPI or as below  Gait:  Normal         Objective:     /70   Pulse 66   Ht 6' 1" (1 854 m)   Wt 98 4 kg (217 lb)   BMI 28 63 kg/m²     PHYSICAL EXAM:    General:  Normal appearance in no distress  Eyes:  Anicteric  Oral mucosa:  Moist   Neck:  No JVD  Carotid upstrokes are brisk without bruits  No masses  Chest:  Clear to auscultation and percussion  Cardiac:  Irregularly irregular  Normal PMI  Normal S1 and S2  No murmur gallop or rub    Abdomen:  Soft and nontender  No palpable organomegaly or aortic enlargement  Extremities:  No peripheral edema  Musculoskeletal:  Symmetric  Vascular:  Femoral pulses are brisk without bruits  Popliteal pulses are intact bilaterally  Pedal pulses are intact  Neuro:  Grossly symmetric  Psych:  Alert and oriented x3  Current Outpatient Medications:     aspirin 81 MG tablet, Take 1 tablet by mouth daily, Disp: , Rfl:     fluticasone-salmeterol (ADVAIR DISKUS) 250-50 mcg/dose inhaler, Inhale, Disp: , Rfl:     meloxicam (MOBIC) 15 mg tablet, Take 15 mg by mouth as needed, Disp: , Rfl:     omeprazole (PRILOSEC OTC) 20 MG tablet, Take 1 tablet by mouth daily, Disp: , Rfl:     PROAIR  (90 Base) MCG/ACT inhaler, , Disp: , Rfl:     RAPAFLO 8 MG CAPS, TAKE 1 CAPSULE BY MOUTH  DAILY, Disp: 90 capsule, Rfl: 3    ciprofloxacin-dexamethasone (CIPRODEX) otic suspension, Administer 4 drops to the right ear 2 (two) times a day for 7 days (Patient not taking: Reported on 7/29/2019), Disp: 3 mL, Rfl: 0    diltiazem (CARDIZEM LA) 120 MG 24 hr tablet, Take 1 tablet (120 mg total) by mouth daily, Disp: 90 tablet, Rfl: 5    moxifloxacin (VIGAMOX) 0 5 % ophthalmic solution, Administer 1 drop to both eyes 3 (three) times a day (Patient not taking: Reported on 7/29/2019), Disp: 3 mL, Rfl: 0  No Known Allergies  Past Medical History:   Diagnosis Date    A-fib ([facility] )     BPH with obstruction/lower urinary tract symptoms     COPD (chronic obstructive pulmonary disease) (HCC)     Frequency of urination     History of cardioversion 12/27/2011    Inital Rhythm AFIB, Final Rhythm Sinus, [address]    History of echocardiogram 06/01/2015    Normal LV systolic function, mild concentric LV hypertrophy, mild mitral and tricuspid regurg, right atrial enlargement   EF 55%    Other male erectile dysfunction     Personal history of irradiation     Personal history of malignant neoplasm of prostate     [facility] Social History     Tobacco Use   Smoking Status Former Smoker    Types: Cigarettes    Last attempt to quit: 1998    Years since quittin 0   Smokeless Tobacco Never Used

## 2019-08-14 DIAGNOSIS — I48.20 CHRONIC A-FIB (HCC): Primary | ICD-10-CM

## 2019-08-14 RX ORDER — DILTIAZEM HYDROCHLORIDE 120 MG/1
120 CAPSULE, COATED, EXTENDED RELEASE ORAL DAILY
Qty: 90 CAPSULE | Refills: 3 | Status: SHIPPED | OUTPATIENT
Start: 2019-08-14 | End: 2020-06-03

## 2019-08-14 NOTE — TELEPHONE ENCOUNTER
Lenore ELLER  [facility]             Diltiazem 120mg was put thru [facility] on 7/29/2019 however it was put thru for the TABLET which does not come in generic form at his pharmacy   Needs to be put thru for the CAPSULE   90 with 3 refills

## 2019-08-16 ENCOUNTER — OFFICE VISIT (OUTPATIENT)
Dept: UROLOGY | Facility: MEDICAL CENTER | Age: 74
End: 2019-08-16
Payer: COMMERCIAL

## 2019-08-16 VITALS
WEIGHT: 217 LBS | SYSTOLIC BLOOD PRESSURE: 138 MMHG | HEIGHT: 73 IN | BODY MASS INDEX: 28.76 KG/M2 | DIASTOLIC BLOOD PRESSURE: 80 MMHG | HEART RATE: 85 BPM

## 2019-08-16 DIAGNOSIS — Z85.46 PERSONAL HISTORY OF MALIGNANT NEOPLASM OF PROSTATE: Primary | ICD-10-CM

## 2019-08-16 LAB
SL AMB  POCT GLUCOSE, UA: NORMAL
SL AMB LEUKOCYTE ESTERASE,UA: NORMAL
SL AMB POCT BILIRUBIN,UA: NORMAL
SL AMB POCT BLOOD,UA: NORMAL
SL AMB POCT CLARITY,UA: CLEAR
SL AMB POCT COLOR,UA: YELLOW
SL AMB POCT KETONES,UA: NORMAL
SL AMB POCT NITRITE,UA: NORMAL
SL AMB POCT PH,UA: 5.5
SL AMB POCT SPECIFIC GRAVITY,UA: 1.01
SL AMB POCT URINE PROTEIN: NORMAL
SL AMB POCT UROBILINOGEN: 0.2

## 2019-08-16 PROCEDURE — 99214 OFFICE O/P EST MOD 30 MIN: CPT | Performed by: UROLOGY

## 2019-08-16 PROCEDURE — 81003 URINALYSIS AUTO W/O SCOPE: CPT | Performed by: UROLOGY

## 2019-08-16 RX ORDER — DILTIAZEM HYDROCHLORIDE 120 MG/1
TABLET, FILM COATED ORAL
COMMUNITY
Start: 2019-08-14 | End: 2019-08-16 | Stop reason: SDUPTHER

## 2019-08-16 NOTE — PROGRESS NOTES
Assessment/Plan:      Diagnoses and all orders for this visit:    Personal history of malignant neoplasm of prostate  -     POCT urine dip auto non-scope    Other orders  -     Discontinue: diltiazem (CARDIZEM) 120 MG tablet      Personal history of irradiation     Benign prostatic hyperplasia with lower urinary tract symptoms, symptom details unspecified    Other male erectile dysfunction      Subjective:  No complaints     Patient ID: Bj Lerner is a 69 y o  male  HPI  He is approaching 7 years after his radiation therapy for localized prostate cancer  He had a acceptable jax of his PSA down to below 1 0  He denies any hematuria, flank pains, weight loss, or loss of appetite  He states his bowel function is normal and not having any issues with that      He has BPH for which he takes Rapaflo, and also ED for which he takes p r n  Cialis  Review of Systems   Constitutional: Negative  HENT: Positive for hearing loss  Eyes: Negative  Respiratory: Negative  Cardiovascular: Negative  Gastrointestinal: Negative  Endocrine: Negative  Genitourinary: Negative  Musculoskeletal: Negative  Skin: Negative  Allergic/Immunologic: Negative  Neurological: Negative  Hematological: Negative  Psychiatric/Behavioral: Negative  Objective:     Physical Exam   Constitutional: He is oriented to person, place, and time  He appears well-developed and well-nourished  No distress  HENT:   Head: Normocephalic and atraumatic  Right Ear: Decreased hearing is noted  Left Ear: Decreased hearing is noted  Nose: Nose normal    Mouth/Throat: Oropharynx is clear and moist    Eyes: Pupils are equal, round, and reactive to light  Conjunctivae and EOM are normal  No scleral icterus  Neck: Normal range of motion  Neck supple  Cardiovascular: Normal rate, regular rhythm, normal heart sounds and intact distal pulses  No murmur heard    Pulmonary/Chest: Effort normal and breath sounds normal  No respiratory distress  He has no wheezes  He has no rales  Abdominal: Soft  Bowel sounds are normal  He exhibits no distension and no mass  There is no tenderness  Musculoskeletal: Normal range of motion  He exhibits no edema or tenderness  Lymphadenopathy:     He has no cervical adenopathy  Neurological: He is alert and oriented to person, place, and time  No cranial nerve deficit  Skin: Skin is warm and dry  No rash noted  No erythema  No pallor  Psychiatric: He has a normal mood and affect  His behavior is normal  Judgment and thought content normal    Nursing note and vitals reviewed        His most recent PSA was was through the [place] and it was done 10/15/2018 and was 0 59

## 2019-08-26 ENCOUNTER — APPOINTMENT (OUTPATIENT)
Dept: RADIOLOGY | Facility: CLINIC | Age: 74
End: 2019-08-26
Payer: COMMERCIAL

## 2019-08-26 ENCOUNTER — OFFICE VISIT (OUTPATIENT)
Dept: FAMILY MEDICINE CLINIC | Facility: CLINIC | Age: 74
End: 2019-08-26
Payer: COMMERCIAL

## 2019-08-26 VITALS
OXYGEN SATURATION: 95 % | BODY MASS INDEX: 28.84 KG/M2 | DIASTOLIC BLOOD PRESSURE: 80 MMHG | SYSTOLIC BLOOD PRESSURE: 128 MMHG | HEIGHT: 73 IN | WEIGHT: 217.6 LBS | HEART RATE: 90 BPM | TEMPERATURE: 99.5 F

## 2019-08-26 DIAGNOSIS — J44.9 CHRONIC OBSTRUCTIVE PULMONARY DISEASE, UNSPECIFIED COPD TYPE (HCC): ICD-10-CM

## 2019-08-26 DIAGNOSIS — M54.2 NECK PAIN: ICD-10-CM

## 2019-08-26 DIAGNOSIS — K21.9 GASTROESOPHAGEAL REFLUX DISEASE WITHOUT ESOPHAGITIS: Primary | ICD-10-CM

## 2019-08-26 DIAGNOSIS — C61 ADENOCARCINOMA OF PROSTATE (HCC): ICD-10-CM

## 2019-08-26 DIAGNOSIS — I48.20 CHRONIC ATRIAL FIBRILLATION (HCC): ICD-10-CM

## 2019-08-26 PROCEDURE — 1036F TOBACCO NON-USER: CPT | Performed by: FAMILY MEDICINE

## 2019-08-26 PROCEDURE — 72040 X-RAY EXAM NECK SPINE 2-3 VW: CPT

## 2019-08-26 PROCEDURE — 99214 OFFICE O/P EST MOD 30 MIN: CPT | Performed by: FAMILY MEDICINE

## 2019-08-26 RX ORDER — ALBUTEROL SULFATE 90 UG/1
2 AEROSOL, METERED RESPIRATORY (INHALATION) EVERY 6 HOURS PRN
COMMUNITY

## 2019-08-26 NOTE — ASSESSMENT & PLAN NOTE
About 5 days ago the patient felt a popping sound in his neck when he looked or turned as he was walking out to the car by his garage he has had a consistent pain in that area right side of the base of the skull occipital region into the upper neck without radiation of the pain is caused some difficulty with rotation and flexion and extension  This area will need to be iced range-of-motion exercises done and physical therapy if worsening symptoms over the next 2 weeks  Order C-spine xray now

## 2019-08-26 NOTE — PATIENT INSTRUCTIONS
Neck Pain   WHAT YOU NEED TO KNOW:   You may have sudden neck pain that increases quickly  You may instead feel pain build slowly over time  Neck pain may go away in a few days or weeks, or it may continue for months  The pain may come and go, or be worse with certain movements  The pain may be only in your neck, or it may move to your arms, back, or shoulders  You may also have pain that starts in another body area and moves to your neck  Some types of neck pain are permanent  DISCHARGE INSTRUCTIONS:   Return to the emergency department if:   · You have an injury that causes neck pain and shooting pain down your arms or legs  · Your neck pain suddenly becomes severe  · You have neck pain along with numbness, tingling, or weakness in your arms or legs  · You have a stiff neck, a headache, and a fever  Contact your healthcare provider if:   · You have new or worsening symptoms  · Your symptoms continue even after treatment  · You have questions or concerns about your condition or care  Medicines: You may need any of the following:  · NSAIDs  , such as ibuprofen, help decrease swelling, pain, and fever  This medicine is available without a doctor's order  Ask your healthcare provider which medicine to take and how often to take it  Follow directions  NSAIDs can cause stomach bleeding or kidney problems if not taken correctly  If you take blood thinner medicine, always ask if NSAIDs are safe for you  · Acetaminophen  helps decrease pain and fever  Ask your healthcare provider how much to take and how often to take it  Follow directions  Acetaminophen can cause liver damage if not taken correctly  · Steroid medicine  may be used to reduce inflammation  This can help relieve pain caused by swelling  · Take your medicine as directed  Contact your healthcare provider if you think your medicine is not helping or if you have side effects  Tell him or her if you are allergic to any medicine  Keep a list of the medicines, vitamins, and herbs you take  Include the amounts, and when and why you take them  Bring the list or the pill bottles to follow-up visits  Carry your medicine list with you in case of an emergency  Manage or prevent neck pain:   · Rest your neck as directed  Do not make sudden movements, such as turning your head quickly  Your healthcare provider may recommend you wear a cervical collar for a short time  The collar will prevent you from moving your head  This will give your neck time to heal if an injury is causing your neck pain  Ask your healthcare provider when you can return to sports or other normal daily activities  · Apply heat as directed  Heat helps relieve pain and swelling  Use a heat wrap, or soak a small towel in warm water  Wring out the extra water  Apply the heat wrap or towel for 20 minutes every hour, or as directed  · Apply ice as directed  Ice helps relieve pain and swelling, and can help prevent tissue damage  Use an ice pack, or put ice in a bag  Cover the ice pack or back with a towel before you apply it to your neck  Apply the ice pack or ice for 15 minutes every hour, or as directed  Your healthcare provider can tell you how often to apply ice  · Do neck exercises as directed  Neck exercises help strengthen the muscles and increase range of motion  Your healthcare provider will tell you which exercises are right for you  He may give you instructions, or he may recommend that you work with a physical therapist  Your healthcare provider or therapist can make sure you are doing the exercises correctly  · Maintain good posture  Try to keep your head and shoulders lifted when you sit  If you work in front of a computer, make sure the monitor is at the right level  You should not need to look up down to see the screen  You should also not have to lean forward to be able to read what is on the screen   Make sure your keyboard, mouse, and other computer items are placed where you do not have to extend your shoulder to reach them  Get up often if you work in front of a computer or sit for long periods of time  Stretch or walk around to keep your neck muscles loose  Follow up with your healthcare provider as directed: Your healthcare provider may refer you to a specialist if your pain does not get better with treatment  Write down your questions so you remember to ask them during your visits  © 2017 [employer] Information is for End User's use only and may not be sold, redistributed or otherwise used for commercial purposes  All illustrations and images included in CareNotes® are the copyrighted property of A [employer]  or [employer]  The above information is an  only  It is not intended as medical advice for individual conditions or treatments  Talk to your doctor, nurse or pharmacist before following any medical regimen to see if it is safe and effective for you

## 2019-08-26 NOTE — ASSESSMENT & PLAN NOTE
Chronic atrial fibrillation continue with Cardizem CD 1 20 mg along with aspirin 81 mg and follow up with Cardiology yearly as scheduled

## 2019-08-26 NOTE — PROGRESS NOTES
Assessment/Plan:       Problem List Items Addressed This Visit        Digestive    GERD (gastroesophageal reflux disease) - Primary      GERD symptoms currently patient is taking omeprazole 20 mg he feels that it is not working as well as it did in the past I will discuss with him increasing the dose to 40 mg or change in the prescription over to another alternate product         Relevant Orders    Ambulatory referral to Gastroenterology       Respiratory    COPD (chronic obstructive pulmonary disease) ([facility] )      COPD currently controlled with Advair 250-50 inhaler along with albuterol ProAir HFA inhaler         Relevant Medications    albuterol (PROAIR HFA) 90 mcg/act inhaler       Cardiovascular and Mediastinum    Chronic atrial fibrillation (HCC)      Chronic atrial fibrillation continue with Cardizem CD 1 20 mg along with aspirin 81 mg and follow up with Cardiology yearly as scheduled            Genitourinary    Adenocarcinoma of prostate ([facility] )      Prostate cancer adenocarcinoma of the prostate follow-up with Urology continue with Rapaflo for symptomatic relief            Other    Neck pain     About 5 days ago the patient felt a popping sound in his neck when he looked or turned as he was walking out to the car by his garage he has had a consistent pain in that area right side of the base of the skull occipital region into the upper neck without radiation of the pain is caused some difficulty with rotation and flexion and extension  This area will need to be iced range-of-motion exercises done and physical therapy if worsening symptoms over the next 2 weeks  Order C-spine xray now  Relevant Orders    XR spine cervical 2 or 3 vw injury            Subjective:      Patient ID: Oscar Abraham is a 69 y o  male       Patient presents today for 4 month evaluation and recheck review of labs and medication refills and renewals he would like to discuss dyspepsia which has worsened on omeprazole it is not working as well as it did in the past he would like to possibly change the medication      The following portions of the patient's history were reviewed and updated as appropriate: allergies, current medications, past family history, past medical history, past social history, past surgical history and problem list     Review of Systems   Constitutional: Negative for chills, fatigue and fever  HENT: Negative for congestion, nosebleeds, rhinorrhea, sinus pressure and sore throat  Eyes: Negative for discharge and redness  Respiratory: Negative for cough and shortness of breath  Cardiovascular: Negative for chest pain, palpitations and leg swelling  Gastrointestinal: Positive for nausea  Negative for abdominal pain and blood in stool  Dyspepsia indigestion   Endocrine: Negative for cold intolerance, heat intolerance and polyuria  Genitourinary: Negative for dysuria and frequency  Musculoskeletal: Positive for arthralgias, back pain and neck pain  Negative for myalgias  Skin: Negative for rash  Neurological: Negative for dizziness, weakness and headaches  Hematological: Negative for adenopathy  Psychiatric/Behavioral: Negative for behavioral problems and sleep disturbance  The patient is not nervous/anxious  Objective:      /80 (BP Location: Left arm, Patient Position: Sitting)   Pulse 90   Temp 99 5 °F (37 5 °C) (Tympanic)   Ht 6' 1" (1 854 m)   Wt 98 7 kg (217 lb 9 6 oz)   SpO2 95%   BMI 28 71 kg/m²        Physical Exam   Constitutional: He is oriented to person, place, and time  He appears well-developed and well-nourished  HENT:   Head: Normocephalic and atraumatic  Right Ear: External ear normal    Left Ear: External ear normal    Nose: Nose normal    Mouth/Throat: Oropharynx is clear and moist    Eyes: Pupils are equal, round, and reactive to light  Conjunctivae and EOM are normal  No scleral icterus  Neck: Normal range of motion  Neck supple   No JVD present  No thyromegaly present  Cardiovascular: Normal rate, regular rhythm and normal heart sounds  No murmur heard  Pulmonary/Chest: Effort normal and breath sounds normal  He has no wheezes  He has no rales  He exhibits no tenderness  Abdominal: Soft  Bowel sounds are normal  He exhibits no distension and no mass  There is no tenderness  There is no rebound and no guarding  Mild epigastric pain   Musculoskeletal: Normal range of motion  He exhibits edema and tenderness  He exhibits no deformity  Lymphadenopathy:     He has no cervical adenopathy  Neurological: He is alert and oriented to person, place, and time  He has normal reflexes  He displays normal reflexes  No cranial nerve deficit  Skin: Skin is warm and dry  No rash noted  No erythema  Psychiatric: He has a normal mood and affect  His behavior is normal  Judgment and thought content normal    Nursing note and vitals reviewed  Data:    Laboratory Results: I have personally reviewed the pertinent laboratory results/reports   Radiology/Other Diagnostic Testing Results: I have personally reviewed pertinent reports         No results found for: WBC, HGB, HCT, MCV, PLT  No results found for: NA, K, CL, CO2, ANIONGAP, BUN, CREATININE, GLUCOSE, GLUF, CALCIUM, CORRECTEDCA, AST, ALT, ALKPHOS, PROT, BILITOT, EGFR  No results found for: CHOLESTEROL  No results found for: HDL  No results found for: LDLCALC  No results found for: TRIG  No results found for: CHOLHDL  No results found for: [id number], TSH  No results found for: HGBA1C  No results found for: PSA    Umair Winterss, DO

## 2019-08-26 NOTE — ASSESSMENT & PLAN NOTE
Prostate cancer adenocarcinoma of the prostate follow-up with Urology continue with Rapaflo for symptomatic relief

## 2019-08-26 NOTE — ASSESSMENT & PLAN NOTE
GERD symptoms currently patient is taking omeprazole 20 mg he feels that it is not working as well as it did in the past I will discuss with him increasing the dose to 40 mg or change in the prescription over to another alternate product

## 2019-09-23 LAB — HCV AB SER-ACNC: NEGATIVE

## 2019-10-04 ENCOUNTER — OFFICE VISIT (OUTPATIENT)
Dept: GASTROENTEROLOGY | Facility: CLINIC | Age: 74
End: 2019-10-04
Payer: COMMERCIAL

## 2019-10-04 ENCOUNTER — TELEPHONE (OUTPATIENT)
Dept: FAMILY MEDICINE CLINIC | Facility: CLINIC | Age: 74
End: 2019-10-04

## 2019-10-04 VITALS
WEIGHT: 218 LBS | HEART RATE: 69 BPM | HEIGHT: 73 IN | RESPIRATION RATE: 18 BRPM | DIASTOLIC BLOOD PRESSURE: 70 MMHG | SYSTOLIC BLOOD PRESSURE: 132 MMHG | BODY MASS INDEX: 28.89 KG/M2

## 2019-10-04 DIAGNOSIS — K21.9 GASTROESOPHAGEAL REFLUX DISEASE, ESOPHAGITIS PRESENCE NOT SPECIFIED: Primary | ICD-10-CM

## 2019-10-04 DIAGNOSIS — K21.9 GASTROESOPHAGEAL REFLUX DISEASE WITHOUT ESOPHAGITIS: ICD-10-CM

## 2019-10-04 PROCEDURE — 99204 OFFICE O/P NEW MOD 45 MIN: CPT | Performed by: INTERNAL MEDICINE

## 2019-10-04 NOTE — TELEPHONE ENCOUNTER
ALIZEI Patient bought in documentation of high dose fluzone   Faxed copy to Centralized faxing
yesterday

## 2019-10-04 NOTE — H&P (VIEW-ONLY)
[facility]    Dear Dr Maverick Jung,      I had the pleasure of seeing your patient Arnel Lyon in the office today and I thank you for this kind referral        Chief Complaint:  Reflux      HPI:  Arnel Lyon is a 69 y o  male who presents with a history of reflux going back at least 15 years  According to the patient is been taking Prilosec for that entire time  He has never had an EGD  Over the last 2-3 months he has noticed some breakthrough heartburn  He has had no change in medication or diet  There is no apparent cause of this  He increase the Prilosec to b i d  Which basically got rid of the problem  Patient denies dysphagia or odynophagia  No weight loss  No chest pain  No melena or hematochezia  No other upper GI symptomatology  No nocturnal symptomatology  No exertional symptomatology  No other definitive exacerbating or remitting factors  Patient is up-to-date with his colonoscopy  Last 1 in 2018 was done elsewhere  He has no other GI complaints         Review of Systems:   Constitutional: No fever or chills, feels well, no tiredness, no recent weight gain or weight loss  HENT: No complaints of earache, no hearing loss, no nosebleeds, no nasal discharge, no sore throat, no hoarseness  Eyes: No complaints of eye pain, no red eyes, no discharge from eyes, no itchy eyes  Cardiovascular: No complaints of slow heart rate, no fast heart rate, no chest pain, no palpitations, no leg claudication, no lower extremity edema  Respiratory: No complaints of shortness of breath, no wheezing, no cough, no SOB on exertion, no orthopnea  Gastrointestinal: As noted in HPI  Genitourinary: No complaints of dysuria, no incontinence, no hesitancy, no nocturia  Musculoskeletal: No complaints of arthralgia, no myalgias, no joint swelling or stiffness, no limb pain or swelling     Neurological: No complaints of headache, no confusion, no convulsions, no numbness or tingling, no dizziness or fainting, no limb weakness, no difficulty walking  Skin: No complaints of skin rash or skin lesions, no itching, no skin wound, no dry skin  Hematological/Lymphatic: No complaints of swollen glands, does not bleed easy  Allergic/Immunologic: No immunocompromised state  Endocrine:  No complaints of polyuria, no polydipsia  Psychiatric/Behavioral: is not suicidal, no sleep disturbances, no anxiety or depression, no change in personality, no emotional problems  Historical Information   Past Medical History:   Diagnosis Date    A-fib ([place] [address] )     BPH with obstruction/lower urinary tract symptoms     [facility]ch[facility] disease) ([facility] )     Frequency of urination     History of cardioversion 2011    Inital Rhythm AFIB, Final Rhythm Sinus, [facility]    History of echocardiogram 2015    Normal LV systolic function, mild concentric LV hypertrophy, mild mitral and tricuspid regurg, right atrial enlargement   EF 55%    Other male erectile dysfunction     Personal history of irradiation     Personal history of malignant neoplasm of prostate     Prostate cancer [facility])      Past Surgical History:   Procedure Laterality Date    INTRAOPERATIVE RADIATION THERAPY ([facility])      LAMINECTOMY      three levels L3 - S1 - all at different times    PATELLA SURGERY      most of this removed after injury    PROSTATE BIOPSY      TOTAL HIP ARTHROPLASTY Bilateral     VASECTOMY  1973     Social History   Social History     Substance and Sexual Activity   Alcohol Use Yes    Alcohol/week: 3 0 standard drinks    Types: 3 Standard drinks or equivalent per week    Frequency: 4 or more times a week     Social History     Substance and Sexual Activity   Drug Use No     Social History     Tobacco Use   Smoking Status Former Smoker    Types: Cigarettes    Last attempt to quit: 1998    Years since quittin 2   Smokeless Tobacco Never Used     Family History   Problem Relation Age of Onset    Heart disease Mother     No Known Problems Brother          Current Medications: has a current medication list which includes the following prescription(s): albuterol, aspirin, diltiazem, fluticasone-salmeterol, meloxicam, omeprazole, and rapaflo  Vital Signs: /70   Pulse 69   Resp 18   Ht 6' 1" (1 854 m)   Wt 98 9 kg (218 lb)   BMI 28 76 kg/m²     Physical Exam:   Constitutional  General Appearance: No acute distress, well appearing and well nourished  Head  Normocephalic  Eyes  Conjunctivae and lids: No swelling, erythema, or discharge  Pupils and irises: Equal, round and reactive to light  Ears, Nose, Mouth, and Throat  External inspection of ears and nose: Normal  Nasal mucosa, septum and turbinates: Normal without edema or erythema/   Oropharynx: Normal with no erythema, edema, exudate or lesions  Neck  Normal range of motion  Neck supple  Cardiovascular  Auscultation of the heart: Normal rate and rhythm, normal S1 and S2 without murmurs  Examination of the extremities for edema and/or varicosities: Normal  Pulmonary/Chest  Respiratory effort: No increased work of breathing or signs of respiratory distress  Auscultation of lungs: Clear to auscultation, equal breath sounds bilaterally, no wheezes, rales, no rhonchi  Abdomen  Abdomen: Non-tender, no masses  No succussion splash  Liver and spleen: No hepatomegaly or splenomegaly  Musculoskeletal  Gait and station: normal   Digits and Nails: normal without clubbing or cyanosis  Inspection/palpation of joints, bones, and muscles: Normal  Neurological  No nystagmus or asterixis  Skin  Skin and subcutaneous tissue: Normal without rashes or lesions  Lymphatic  Palpation of the lymph nodes in neck: No lymphadenopathy     Psychiatric  Orientation to person, place and time: Normal   Mood and affect: Normal          Labs:   No results found for: ALT, AST, BUN, CALCIUM, CL, CHOL, CO2, CREATININE, GFRAA, GFRNONAA, HDL, HCT, HGB, HGBA1C, LDL, MG, PHOS, PLT, K, PSA, NA, TRIG, WBC      X-Rays & Procedures:   No orders to display         ______________________________________________________________________      Assessment & Plan:      Sunday Giraldo was seen today for acid reflux  Diagnoses and all orders for this visit:    Gastroesophageal reflux disease, esophagitis presence not specified    Gastroesophageal reflux disease without esophagitis  -     Ambulatory referral to Gastroenterology          I have taken liberty of scheduling the patient for an EGD  He will continue his b i d  Omeprazole for the time being  Further recommendations will depend on the study results  Would like to thank you for allowing me to participate in his care  Will be happy to inform you of his results            With warmest regards,    Duarte Grant MD, [facility]

## 2019-10-04 NOTE — LETTER
October 4, 2019     Conner Lieberman DO  [address]  [address]  [address]    Patient: Jessica Nina   YOB: 1945   Date of Visit: 10/4/2019       Dear Dr Reno Aviles: Thank you for referring Donaldo Hickman to me for evaluation  Below are my notes for this consultation  If you have questions, please do not hesitate to call me  I look forward to following your patient along with you  Sincerely,        Grover Oliveira MD        CC: No Recipients  Groevr Oliveira MD  10/4/2019 10:10 AM  Incomplete  [facility]    Dear Dr Reno Aviles,      I had the pleasure of seeing your patient Jessica Nina in the office today and I thank you for this kind referral        Chief Complaint:  Reflux      HPI:  Jessica Nina is a 69 y o  male who presents with a history of reflux going back at least 15 years  According to the patient is been taking Prilosec for that entire time  He has never had an EGD  Over the last 2-3 months he has noticed some breakthrough heartburn  He has had no change in medication or diet  There is no apparent cause of this  He increase the Prilosec to b i d  Which basically got rid of the problem  Patient denies dysphagia or odynophagia  No weight loss  No chest pain  No melena or hematochezia  No other upper GI symptomatology  No nocturnal symptomatology  No exertional symptomatology  No other definitive exacerbating or remitting factors  Patient is up-to-date with his colonoscopy  Last 1 in 2018 was done elsewhere  He has no other GI complaints         Review of Systems:   Constitutional: No fever or chills, feels well, no tiredness, no recent weight gain or weight loss  HENT: No complaints of earache, no hearing loss, no nosebleeds, no nasal discharge, no sore throat, no hoarseness  Eyes: No complaints of eye pain, no red eyes, no discharge from eyes, no itchy eyes    Cardiovascular: No complaints of slow heart rate, no fast heart rate, no chest pain, no palpitations, no leg claudication, no lower extremity edema  Respiratory: No complaints of shortness of breath, no wheezing, no cough, no SOB on exertion, no orthopnea  Gastrointestinal: As noted in HPI  Genitourinary: No complaints of dysuria, no incontinence, no hesitancy, no nocturia  Musculoskeletal: No complaints of arthralgia, no myalgias, no joint swelling or stiffness, no limb pain or swelling  Neurological: No complaints of headache, no confusion, no convulsions, no numbness or tingling, no dizziness or fainting, no limb weakness, no difficulty walking  Skin: No complaints of skin rash or skin lesions, no itching, no skin wound, no dry skin  Hematological/Lymphatic: No complaints of swollen glands, does not bleed easy  Allergic/Immunologic: No immunocompromised state  Endocrine:  No complaints of polyuria, no polydipsia  Psychiatric/Behavioral: is not suicidal, no sleep disturbances, no anxiety or depression, no change in personality, no emotional problems  Historical Information   Past Medical History:   Diagnosis Date    A-fib ([place] [address] )     BPH with obstruction/lower urinary tract symptoms     [facility]ch[facility] disease) ([facility] )     Frequency of urination     History of cardioversion 12/27/2011    Inital Rhythm AFIB, Final Rhythm Sinus, [facility]    History of echocardiogram 06/01/2015    Normal LV systolic function, mild concentric LV hypertrophy, mild mitral and tricuspid regurg, right atrial enlargement   EF 55%    Other male erectile dysfunction     Personal history of irradiation     Personal history of malignant neoplasm of prostate     [facility] )      Past Surgical History:   Procedure Laterality Date    INTRAOPERATIVE RADIATION THERAPY ([facility])  2011    LAMINECTOMY      three levels L3 - S1 - all at different times    PATELLA SURGERY      most of this removed after injury    PROSTATE BIOPSY      TOTAL HIP ARTHROPLASTY Bilateral     VASECTOMY  1973     Social History   Social History     Substance and Sexual Activity   Alcohol Use Yes    Alcohol/week: 3 0 standard drinks    Types: 3 Standard drinks or equivalent per week    Frequency: 4 or more times a week     Social History     Substance and Sexual Activity   Drug Use No     Social History     Tobacco Use   Smoking Status Former Smoker    Types: Cigarettes    Last attempt to quit: 1998    Years since quittin 2   Smokeless Tobacco Never Used     Family History   Problem Relation Age of Onset    Heart disease Mother     No Known Problems Brother          Current Medications: has a current medication list which includes the following prescription(s): albuterol, aspirin, diltiazem, fluticasone-salmeterol, meloxicam, omeprazole, and rapaflo  Vital Signs: /70   Pulse 69   Resp 18   Ht 6' 1" (1 854 m)   Wt 98 9 kg (218 lb)   BMI 28 76 kg/m²      Physical Exam:   Constitutional  General Appearance: No acute distress, well appearing and well nourished  Head  Normocephalic  Eyes  Conjunctivae and lids: No swelling, erythema, or discharge  Pupils and irises: Equal, round and reactive to light  Ears, Nose, Mouth, and Throat  External inspection of ears and nose: Normal  Nasal mucosa, septum and turbinates: Normal without edema or erythema/   Oropharynx: Normal with no erythema, edema, exudate or lesions  Neck  Normal range of motion  Neck supple  Cardiovascular  Auscultation of the heart: Normal rate and rhythm, normal S1 and S2 without murmurs  Examination of the extremities for edema and/or varicosities: Normal  Pulmonary/Chest  Respiratory effort: No increased work of breathing or signs of respiratory distress  Auscultation of lungs: Clear to auscultation, equal breath sounds bilaterally, no wheezes, rales, no rhonchi  Abdomen  Abdomen: Non-tender, no masses  No succussion splash    Liver and spleen: No hepatomegaly or splenomegaly  Musculoskeletal  Gait and station: normal   Digits and Nails: normal without clubbing or cyanosis  Inspection/palpation of joints, bones, and muscles: Normal  Neurological  No nystagmus or asterixis  Skin  Skin and subcutaneous tissue: Normal without rashes or lesions  Lymphatic  Palpation of the lymph nodes in neck: No lymphadenopathy  Psychiatric  Orientation to person, place and time: Normal   Mood and affect: Normal          Labs:   No results found for: ALT, AST, BUN, CALCIUM, CL, CHOL, CO2, CREATININE, GFRAA, GFRNONAA, HDL, HCT, HGB, HGBA1C, LDL, MG, PHOS, PLT, K, PSA, NA, TRIG, WBC      X-Rays & Procedures:   No orders to display         ______________________________________________________________________      Assessment & Plan:      Sukhwinder Danielson was seen today for acid reflux  Diagnoses and all orders for this visit:    Gastroesophageal reflux disease, esophagitis presence not specified    Gastroesophageal reflux disease without esophagitis  -     Ambulatory referral to Gastroenterology          I have taken liberty of scheduling the patient for an EGD  He will continue his b i d  Omeprazole for the time being  Further recommendations will depend on the study results  Would like to thank you for allowing me to participate in his care  Will be happy to inform you of his results            With warmest regards,    Josesito Short MD, [facility]

## 2019-10-04 NOTE — PROGRESS NOTES
[facility]    Dear Dr Asad Little,      I had the pleasure of seeing your patient Get Flores in the office today and I thank you for this kind referral        Chief Complaint:  Reflux      HPI:  Get Flores is a 69 y o  male who presents with a history of reflux going back at least 15 years  According to the patient is been taking Prilosec for that entire time  He has never had an EGD  Over the last 2-3 months he has noticed some breakthrough heartburn  He has had no change in medication or diet  There is no apparent cause of this  He increase the Prilosec to b i d  Which basically got rid of the problem  Patient denies dysphagia or odynophagia  No weight loss  No chest pain  No melena or hematochezia  No other upper GI symptomatology  No nocturnal symptomatology  No exertional symptomatology  No other definitive exacerbating or remitting factors  Patient is up-to-date with his colonoscopy  Last 1 in 2018 was done elsewhere  He has no other GI complaints         Review of Systems:   Constitutional: No fever or chills, feels well, no tiredness, no recent weight gain or weight loss  HENT: No complaints of earache, no hearing loss, no nosebleeds, no nasal discharge, no sore throat, no hoarseness  Eyes: No complaints of eye pain, no red eyes, no discharge from eyes, no itchy eyes  Cardiovascular: No complaints of slow heart rate, no fast heart rate, no chest pain, no palpitations, no leg claudication, no lower extremity edema  Respiratory: No complaints of shortness of breath, no wheezing, no cough, no SOB on exertion, no orthopnea  Gastrointestinal: As noted in HPI  Genitourinary: No complaints of dysuria, no incontinence, no hesitancy, no nocturia  Musculoskeletal: No complaints of arthralgia, no myalgias, no joint swelling or stiffness, no limb pain or swelling     Neurological: No complaints of headache, no confusion, no convulsions, no numbness or tingling, no dizziness or fainting, no limb weakness, no difficulty walking  Skin: No complaints of skin rash or skin lesions, no itching, no skin wound, no dry skin  Hematological/Lymphatic: No complaints of swollen glands, does not bleed easy  Allergic/Immunologic: No immunocompromised state  Endocrine:  No complaints of polyuria, no polydipsia  Psychiatric/Behavioral: is not suicidal, no sleep disturbances, no anxiety or depression, no change in personality, no emotional problems  Historical Information   Past Medical History:   Diagnosis Date    A-fib ([place] [id number] )     BPH with obstruction/lower urinary tract symptoms     [facility]     Frequency of urination     History of cardioversion 2011    Inital Rhythm AFIB, Final Rhythm Sinus, [address]    History of echocardiogram 2015    Normal LV systolic function, mild concentric LV hypertrophy, mild mitral and tricuspid regurg, right atrial enlargement   EF 55%    Other male erectile dysfunction     Personal history of irradiation     Personal history of malignant neoplasm of prostate     Prostate cancer [facility])      Past Surgical History:   Procedure Laterality Date    INTRAOPERATIVE RADIATION THERAPY ([facility])      LAMINECTOMY      three levels L3 - S1 - all at different times    PATELLA SURGERY      most of this removed after injury    PROSTATE BIOPSY      TOTAL HIP ARTHROPLASTY Bilateral     VASECTOMY  1973     Social History   Social History     Substance and Sexual Activity   Alcohol Use Yes    Alcohol/week: 3 0 standard drinks    Types: 3 Standard drinks or equivalent per week    Frequency: 4 or more times a week     Social History     Substance and Sexual Activity   Drug Use No     Social History     Tobacco Use   Smoking Status Former Smoker    Types: Cigarettes    Last attempt to quit: 1998    Years since quittin 2   Smokeless Tobacco Never Used     Family History   Problem Relation Age of Onset    Heart disease Mother     No Known Problems Brother          Current Medications: has a current medication list which includes the following prescription(s): albuterol, aspirin, diltiazem, fluticasone-salmeterol, meloxicam, omeprazole, and rapaflo  Vital Signs: /70   Pulse 69   Resp 18   Ht 6' 1" (1 854 m)   Wt 98 9 kg (218 lb)   BMI 28 76 kg/m²     Physical Exam:   Constitutional  General Appearance: No acute distress, well appearing and well nourished  Head  Normocephalic  Eyes  Conjunctivae and lids: No swelling, erythema, or discharge  Pupils and irises: Equal, round and reactive to light  Ears, Nose, Mouth, and Throat  External inspection of ears and nose: Normal  Nasal mucosa, septum and turbinates: Normal without edema or erythema/   Oropharynx: Normal with no erythema, edema, exudate or lesions  Neck  Normal range of motion  Neck supple  Cardiovascular  Auscultation of the heart: Normal rate and rhythm, normal S1 and S2 without murmurs  Examination of the extremities for edema and/or varicosities: Normal  Pulmonary/Chest  Respiratory effort: No increased work of breathing or signs of respiratory distress  Auscultation of lungs: Clear to auscultation, equal breath sounds bilaterally, no wheezes, rales, no rhonchi  Abdomen  Abdomen: Non-tender, no masses  No succussion splash  Liver and spleen: No hepatomegaly or splenomegaly  Musculoskeletal  Gait and station: normal   Digits and Nails: normal without clubbing or cyanosis  Inspection/palpation of joints, bones, and muscles: Normal  Neurological  No nystagmus or asterixis  Skin  Skin and subcutaneous tissue: Normal without rashes or lesions  Lymphatic  Palpation of the lymph nodes in neck: No lymphadenopathy     Psychiatric  Orientation to person, place and time: Normal   Mood and affect: Normal          Labs:   No results found for: ALT, AST, BUN, CALCIUM, CL, CHOL, CO2, CREATININE, GFRAA, GFRNONAA, HDL, HCT, HGB, HGBA1C, LDL, MG, PHOS, PLT, K, PSA, NA, TRIG, WBC      X-Rays & Procedures:   No orders to display         ______________________________________________________________________      Assessment & Plan:      Essie Quezada was seen today for acid reflux  Diagnoses and all orders for this visit:    Gastroesophageal reflux disease, esophagitis presence not specified    Gastroesophageal reflux disease without esophagitis  -     Ambulatory referral to Gastroenterology          I have taken liberty of scheduling the patient for an EGD  He will continue his b i d  Omeprazole for the time being  Further recommendations will depend on the study results  Would like to thank you for allowing me to participate in his care  Will be happy to inform you of his results            With warmest regards,    Themay Collado MD, [facility]

## 2019-10-14 ENCOUNTER — TELEPHONE (OUTPATIENT)
Dept: GASTROENTEROLOGY | Facility: CLINIC | Age: 74
End: 2019-10-14

## 2019-10-15 ENCOUNTER — TELEPHONE (OUTPATIENT)
Dept: CARDIOLOGY CLINIC | Facility: CLINIC | Age: 74
End: 2019-10-15

## 2019-10-15 NOTE — TELEPHONE ENCOUNTER
Ok to hold Asprin for 5 days prior to EGD and resume day #1 post-op (day after surgery) if ok with surgeon

## 2019-10-15 NOTE — TELEPHONE ENCOUNTER
Chelsie Hernandes is having an Egd on 10/23/2019  They told him to stop his ASA a week before but he is concerned and wanted to make sure this is ok to do

## 2019-10-15 NOTE — TELEPHONE ENCOUNTER
Spoke to Yosef Davis and told him it is ok to hold the ASA 5 days prior to procedure and ok to resume day 1 after EGD as long as ok with his gastroenterologist   Yosef Davis understands the recs and thanked me

## 2019-10-20 ENCOUNTER — ANESTHESIA EVENT (OUTPATIENT)
Dept: GASTROENTEROLOGY | Facility: HOSPITAL | Age: 74
End: 2019-10-20

## 2019-10-20 RX ORDER — SODIUM CHLORIDE, SODIUM LACTATE, POTASSIUM CHLORIDE, CALCIUM CHLORIDE 600; 310; 30; 20 MG/100ML; MG/100ML; MG/100ML; MG/100ML
125 INJECTION, SOLUTION INTRAVENOUS CONTINUOUS
Status: CANCELLED | OUTPATIENT
Start: 2019-10-20

## 2019-10-20 NOTE — ANESTHESIA PREPROCEDURE EVALUATION
Review of Systems/Medical History          Cardiovascular  EKG reviewed, Hypertension , Dysrhythmias (s/p DCCV) , atrial fibrillation,   Comment: Normal LV systolic function, mild concentric LV hypertrophy, mild mitral and tricuspid regurg, right atrial enlargement  EF 55%,  Pulmonary  COPD , Asthma ,        GI/Hepatic    GERD ,             Endo/Other     GYN       Hematology   Musculoskeletal       Neurology   Psychology           Physical Exam    Airway    Mallampati score: II  TM Distance: >3 FB  Neck ROM: full     Dental       Cardiovascular  Cardiovascular exam normal    Pulmonary  Pulmonary exam normal     Other Findings        Anesthesia Plan  ASA Score- 2     Anesthesia Type- IV sedation with anesthesia with ASA Monitors  Additional Monitors:   Airway Plan:         Plan Factors-    Induction- intravenous  Postoperative Plan-     Informed Consent- Anesthetic plan and risks discussed with patient  I personally reviewed this patient with the CRNA  Discussed and agreed on the Anesthesia Plan with the CRNA  Seth Flores

## 2019-10-21 ENCOUNTER — HOSPITAL ENCOUNTER (OUTPATIENT)
Dept: GASTROENTEROLOGY | Facility: HOSPITAL | Age: 74
Setting detail: OUTPATIENT SURGERY
Discharge: HOME | End: 2019-10-21
Attending: INTERNAL MEDICINE | Admitting: INTERNAL MEDICINE
Payer: COMMERCIAL

## 2019-10-21 ENCOUNTER — ANESTHESIA (OUTPATIENT)
Dept: GASTROENTEROLOGY | Facility: HOSPITAL | Age: 74
End: 2019-10-21

## 2019-10-21 VITALS
BODY MASS INDEX: 27.87 KG/M2 | DIASTOLIC BLOOD PRESSURE: 87 MMHG | HEART RATE: 87 BPM | OXYGEN SATURATION: 94 % | HEIGHT: 73 IN | TEMPERATURE: 98.1 F | WEIGHT: 210.32 LBS | SYSTOLIC BLOOD PRESSURE: 124 MMHG | RESPIRATION RATE: 20 BRPM

## 2019-10-21 DIAGNOSIS — K21.9 GASTROESOPHAGEAL REFLUX DISEASE WITHOUT ESOPHAGITIS: ICD-10-CM

## 2019-10-21 PROCEDURE — 88305 TISSUE EXAM BY PATHOLOGIST: CPT | Performed by: PATHOLOGY

## 2019-10-21 PROCEDURE — 43239 EGD BIOPSY SINGLE/MULTIPLE: CPT | Performed by: INTERNAL MEDICINE

## 2019-10-21 RX ORDER — PROPOFOL 10 MG/ML
INJECTION, EMULSION INTRAVENOUS AS NEEDED
Status: DISCONTINUED | OUTPATIENT
Start: 2019-10-21 | End: 2019-10-21 | Stop reason: SURG

## 2019-10-21 RX ORDER — LIDOCAINE HYDROCHLORIDE 10 MG/ML
INJECTION, SOLUTION EPIDURAL; INFILTRATION; INTRACAUDAL; PERINEURAL AS NEEDED
Status: DISCONTINUED | OUTPATIENT
Start: 2019-10-21 | End: 2019-10-21 | Stop reason: SURG

## 2019-10-21 RX ORDER — SODIUM CHLORIDE, SODIUM LACTATE, POTASSIUM CHLORIDE, CALCIUM CHLORIDE 600; 310; 30; 20 MG/100ML; MG/100ML; MG/100ML; MG/100ML
INJECTION, SOLUTION INTRAVENOUS CONTINUOUS PRN
Status: DISCONTINUED | OUTPATIENT
Start: 2019-10-21 | End: 2019-10-21 | Stop reason: SURG

## 2019-10-21 RX ADMIN — LIDOCAINE HYDROCHLORIDE 50 MG: 10 INJECTION, SOLUTION EPIDURAL; INFILTRATION; INTRACAUDAL; PERINEURAL at 07:36

## 2019-10-21 RX ADMIN — SODIUM CHLORIDE, SODIUM LACTATE, POTASSIUM CHLORIDE, AND CALCIUM CHLORIDE: .6; .31; .03; .02 INJECTION, SOLUTION INTRAVENOUS at 07:33

## 2019-10-21 RX ADMIN — PROPOFOL 50 MG: 10 INJECTION, EMULSION INTRAVENOUS at 07:37

## 2019-10-21 RX ADMIN — PROPOFOL 100 MG: 10 INJECTION, EMULSION INTRAVENOUS at 07:36

## 2019-10-21 NOTE — DISCHARGE INSTRUCTIONS
Upper Endoscopy   WHAT YOU NEED TO KNOW:   An upper endoscopy is also called an upper gastrointestinal (GI) endoscopy, or an esophagogastroduodenoscopy (EGD)  You may feel bloated, gassy, or have some abdominal discomfort after your procedure  Your throat may be sore for 24 to 36 hours  You may burp or pass gas from air that is still inside your body  DISCHARGE INSTRUCTIONS:   Call 911 for any of the following:   · You have sudden chest pain or trouble breathing  Seek care immediately if:   · You feel dizzy or faint  · You have trouble swallowing  · Your bowel movements are very dark or black  · Your abdomen is hard and firm and you have severe pain  · You vomit blood  Contact your healthcare provider if:   · You feel full or bloated and cannot burp or pass gas  · You have not had a bowel movement for 3 days after your procedure  · You have neck pain  · You have a fever or chills  · You have nausea or are vomiting  · You have a rash or hives  · You have questions or concerns about your endoscopy  Relieve a sore throat:  Suck on throat lozenges or crushed ice  Gargle with a small amount of warm salt water  Mix 1 teaspoon of salt and 1 cup of warm water to make salt water  Relieve gas and discomfort from bloating:  Lie on your right side with a heating pad on your abdomen  Take short walks to help pass gas  Eat small meals until bloating is relieved  Rest after your procedure: You have been given medicine to relax you  Do not  drive or make important decisions until the day after your procedure  Return to your normal activity as directed  You can usually return to work the day after your procedure  Follow up with your healthcare provider as directed:  Write down your questions so you remember to ask them during your visits     © 2017 [address] is for End User's use only and may not be sold, redistributed or otherwise used for commercial purposes  All illustrations and images included in CareNotes® are the copyrighted property of [employer]  or [employer]  The above information is an  only  It is not intended as medical advice for individual conditions or treatments  Talk to your doctor, nurse or pharmacist before following any medical regimen to see if it is safe and effective for you  Freed Esophagus   WHAT YOU NEED TO KNOW:   Freed esophagus is a condition in which the cells that line your esophagus are damaged  The damage can cause abnormal changes in the cells  These abnormal changes increase your risk of esophageal cancer  DISCHARGE INSTRUCTIONS:   Medicines:   · Anti-reflux medicines  may be needed to help decrease the stomach acid that can irritate your esophagus and stomach  These medicines may include proton pump inhibitors (PPI) and histamine type-2 receptor (H2) blockers  You may also be given medicines to stop vomiting  · Take your medicine as directed  Contact your healthcare provider if you think your medicine is not helping or if you have side effects  Tell him if you are allergic to any medicine  Keep a list of the medicines, vitamins, and herbs you take  Include the amounts, and when and why you take them  Bring the list or the pill bottles to follow-up visits  Carry your medicine list with you in case of an emergency  Follow up with your healthcare provider as directed: Your healthcare provider may need to repeat your endoscopy and biopsy  These tests help look for early signs of esophageal cancer  Write down your questions so you remember to ask them during your visits  Nutrition:  Do not eat foods that make your symptoms worse  Examples are chocolate, garlic, onions, spicy or fatty foods, citrus fruits (oranges), and tomato-based foods (spaghetti sauce)  Do not drink alcohol, drinks that contain caffeine, or carbonated drinks, such as soda   Ask your healthcare provider if there are other foods and drinks you should not have  Maintain a healthy weight: If you are overweight, weight loss may help relieve symptoms  Ask your healthcare provider about safe ways to lose weight  Do not smoke: If you smoke, it is never too late to quit  Smoking may worsen acid reflux  Ask your healthcare provider for information if you need help quitting  Contact your healthcare provider if:   · Your symptoms do not improve with treatment  · You have questions or concerns about your condition or care  Seek care immediately or call 911 if:   · You have severe chest pain and shortness of breath  · Your bowel movements are black, bloody, or tarry  · Your vomit looks like coffee grounds or has blood in it  © 2017 [address] [facility] Information is for End User's use only and may not be sold, redistributed or otherwise used for commercial purposes  All illustrations and images included in CareNotes® are the copyrighted property of A D A M [employer] Inc  or [employer]  The above information is an  only  It is not intended as medical advice for individual conditions or treatments  Talk to your doctor, nurse or pharmacist before following any medical regimen to see if it is safe and effective for you

## 2019-10-21 NOTE — INTERVAL H&P NOTE
H&P reviewed  After examining the patient I find no changes in the patients condition since the H&P had been written      Vitals:    10/21/19 0708   BP: 132/81   Pulse: 90   Resp: 18   Temp: 98 °F (36 7 °C)   SpO2: 97%

## 2019-10-21 NOTE — ANESTHESIA POSTPROCEDURE EVALUATION
Post-Op Assessment Note    CV Status:  Stable  Pain Score: 0    Pain management: adequate     Mental Status:  Sleepy   Hydration Status:  Stable   PONV Controlled:  None   Airway Patency:  Patent and adequate   Post Op Vitals Reviewed: Yes      Staff: CRNA   Comments: 2LPM/NC          /78   Temp      Pulse  81   Resp   16   SpO2   93

## 2019-10-29 ENCOUNTER — TELEPHONE (OUTPATIENT)
Dept: GASTROENTEROLOGY | Facility: CLINIC | Age: 74
End: 2019-10-29

## 2020-01-06 ENCOUNTER — OFFICE VISIT (OUTPATIENT)
Dept: FAMILY MEDICINE CLINIC | Facility: CLINIC | Age: 75
End: 2020-01-06
Payer: COMMERCIAL

## 2020-01-06 VITALS
DIASTOLIC BLOOD PRESSURE: 84 MMHG | SYSTOLIC BLOOD PRESSURE: 120 MMHG | HEART RATE: 78 BPM | WEIGHT: 218 LBS | BODY MASS INDEX: 28.89 KG/M2 | OXYGEN SATURATION: 97 % | HEIGHT: 73 IN

## 2020-01-06 DIAGNOSIS — K21.9 GASTROESOPHAGEAL REFLUX DISEASE WITHOUT ESOPHAGITIS: Primary | ICD-10-CM

## 2020-01-06 DIAGNOSIS — I48.20 CHRONIC ATRIAL FIBRILLATION (HCC): ICD-10-CM

## 2020-01-06 DIAGNOSIS — J44.9 CHRONIC OBSTRUCTIVE PULMONARY DISEASE, UNSPECIFIED COPD TYPE (HCC): ICD-10-CM

## 2020-01-06 DIAGNOSIS — C61 ADENOCARCINOMA OF PROSTATE (HCC): ICD-10-CM

## 2020-01-06 DIAGNOSIS — H61.23 BILATERAL HEARING LOSS DUE TO CERUMEN IMPACTION: ICD-10-CM

## 2020-01-06 PROCEDURE — 1160F RVW MEDS BY RX/DR IN RCRD: CPT | Performed by: FAMILY MEDICINE

## 2020-01-06 PROCEDURE — 3008F BODY MASS INDEX DOCD: CPT | Performed by: FAMILY MEDICINE

## 2020-01-06 PROCEDURE — 69209 REMOVE IMPACTED EAR WAX UNI: CPT | Performed by: FAMILY MEDICINE

## 2020-01-06 PROCEDURE — 99214 OFFICE O/P EST MOD 30 MIN: CPT | Performed by: FAMILY MEDICINE

## 2020-01-06 NOTE — ASSESSMENT & PLAN NOTE
Bilateral cerumen impaction chronic hearing loss with hearing aids working well  Cerumen irrigated at this time with improvement in hearing loss but chronic hearing loss is persistent    Recheck in 6 months

## 2020-01-06 NOTE — ASSESSMENT & PLAN NOTE
Chronic atrial fibrillation continue with current medications at this point follow-up with Cardiology as scheduled no change in treatment plan follow up in 6 months

## 2020-01-06 NOTE — ASSESSMENT & PLAN NOTE
COPD under good control with current inhalers no flare ups or symptomatology at this time continue as directed and follow up in 6 months

## 2020-01-06 NOTE — PROGRESS NOTES
Assessment/Plan:       Problem List Items Addressed This Visit        Digestive    GERD (gastroesophageal reflux disease) - Primary     GERD symptoms under good control with current medication continue as directed and follow up with me in 6 months         Relevant Orders    Iron       Respiratory    COPD (chronic obstructive pulmonary disease) ([address] )     COPD under good control with current inhalers no flare ups or symptomatology at this time continue as directed and follow up in 6 months         Relevant Orders    Iron       Cardiovascular and Mediastinum    Chronic atrial fibrillation     Chronic atrial fibrillation continue with current medications at this point follow-up with Cardiology as scheduled no change in treatment plan follow up in 6 months         Relevant Orders    CBC and differential    Comprehensive metabolic panel    Lipid panel    Iron       Nervous and Auditory    Bilateral hearing loss due to cerumen impaction     Bilateral cerumen impaction chronic hearing loss with hearing aids working well  Cerumen irrigated at this time with improvement in hearing loss but chronic hearing loss is persistent  Recheck in 6 months         Relevant Orders    Ear cerumen removal       Genitourinary    Adenocarcinoma of prostate [facility])     Prostate cancer by history follow-up with Urology as scheduled follow-up PSA at next office visit  I ordered laboratory work to be done in accordance with the [place] lab work so that I can follow the levels as well         Relevant Orders    PSA, total and free            Subjective:      Patient ID: Yolette Cross is a 69 y o  male  Left elbow swelling bursitis chronic from driving  Iron level is low , he gets labs at the [place]  He additionally notes that is right ear has been blocked difficulty with hearing he wears hearing aids but he feels that he has another accumulation of wax building up and would like that evaluated today    He has had joint pains throughout his body which have not worsened  He notes that he sees the [place] and has his lab work done through them  His breathing has been stable with his current inhalers he is using the generic Advair along with a HFA ProAir as needed symptoms have been stable with the weather patterns this past several months      The following portions of the patient's history were reviewed and updated as appropriate: allergies, current medications, past family history, past medical history, past social history, past surgical history and problem list     Review of Systems   Constitutional: Negative for chills, fatigue and fever  HENT: Positive for hearing loss  Negative for congestion, nosebleeds, rhinorrhea, sinus pressure and sore throat  Eyes: Negative for discharge and redness  Respiratory: Positive for shortness of breath and wheezing  Negative for cough  Cardiovascular: Negative for chest pain, palpitations and leg swelling  Gastrointestinal: Negative for abdominal pain, blood in stool and nausea  Endocrine: Negative for cold intolerance, heat intolerance and polyuria  Genitourinary: Negative for dysuria and frequency  Musculoskeletal: Negative for arthralgias, back pain and myalgias  Skin: Negative for rash  Neurological: Negative for dizziness, weakness and headaches  Hematological: Negative for adenopathy  Psychiatric/Behavioral: Negative for behavioral problems and sleep disturbance  The patient is not nervous/anxious  Objective:      /84 (BP Location: Left arm, Patient Position: Sitting)   Pulse 78   Ht 6' 1" (1 854 m)   Wt 98 9 kg (218 lb)   SpO2 97%   BMI 28 76 kg/m²        Physical Exam   Constitutional: He is oriented to person, place, and time  He appears well-developed and well-nourished  HENT:   Head: Normocephalic and atraumatic     Right Ear: External ear normal    Left Ear: External ear normal    Nose: Nose normal    Mouth/Throat: Oropharynx is clear and moist    Cerumen impaction right ear moderate impaction left ear   Eyes: Pupils are equal, round, and reactive to light  Conjunctivae and EOM are normal  No scleral icterus  Neck: Normal range of motion  Neck supple  No JVD present  No thyromegaly present  Cardiovascular: Normal rate, regular rhythm and normal heart sounds  No murmur heard  Pulmonary/Chest: Effort normal and breath sounds normal  He has no wheezes  He has no rales  He exhibits no tenderness  Abdominal: Soft  Bowel sounds are normal  He exhibits no distension and no mass  There is no tenderness  There is no rebound and no guarding  Musculoskeletal: Normal range of motion  He exhibits no edema, tenderness or deformity  Lymphadenopathy:     He has no cervical adenopathy  Neurological: He is alert and oriented to person, place, and time  He has normal reflexes  He displays normal reflexes  No cranial nerve deficit  Skin: Skin is warm and dry  No rash noted  No erythema  Psychiatric: He has a normal mood and affect  His behavior is normal  Judgment and thought content normal    Nursing note and vitals reviewed  Ear cerumen removal  Date/Time: 1/6/2020 11:18 AM  Performed by: Laureen Cabral DO  Authorized by: Laureen Cabral DO     Other Assisting Provider: No    Consent:     Consent obtained:  Verbal    Consent given by:  Patient    Risks discussed:  TM perforation    Alternatives discussed:  No treatment  Universal protocol:     Procedure explained and questions answered to patient or proxy's satisfaction: yes      Relevant documents present and verified: yes      Test results available and properly labeled: yes      Radiology Images displayed and confirmed    If images not available, report reviewed: yes      Required blood products, implants, devices and special equipment available: no      Site/side marked: yes      Immediately prior to procedure a time out was called: yes      Patient identity confirmed:  Verbally with patient  Procedure details: Location:  L ear and R ear    Procedure type: irrigation only      Approach:  External  Post-procedure details:     Complication:  TM perforation    Hearing quality:  Improved    Patient tolerance of procedure: Tolerated well, no immediate complications      Data:    Laboratory Results: I have personally reviewed the pertinent laboratory results/reports   Radiology/Other Diagnostic Testing Results: I have personally reviewed pertinent reports         No results found for: WBC, HGB, HCT, MCV, PLT  No results found for: NA, K, CL, CO2, ANIONGAP, BUN, CREATININE, GLUCOSE, GLUF, CALCIUM, CORRECTEDCA, AST, ALT, ALKPHOS, PROT, BILITOT, EGFR  No results found for: CHOLESTEROL  No results found for: HDL  No results found for: LDLCALC  No results found for: TRIG  No results found for: CHOLHDL  No results found for: [id number], TSH  No results found for: HGBA1C  No results found for: PSA    Alexey Silva DO

## 2020-01-06 NOTE — ASSESSMENT & PLAN NOTE
GERD symptoms under good control with current medication continue as directed and follow up with me in 6 months

## 2020-02-10 ENCOUNTER — OFFICE VISIT (OUTPATIENT)
Dept: FAMILY MEDICINE CLINIC | Facility: CLINIC | Age: 75
End: 2020-02-10
Payer: COMMERCIAL

## 2020-02-10 VITALS
HEART RATE: 85 BPM | SYSTOLIC BLOOD PRESSURE: 128 MMHG | TEMPERATURE: 98.5 F | OXYGEN SATURATION: 95 % | HEIGHT: 73 IN | WEIGHT: 211.6 LBS | DIASTOLIC BLOOD PRESSURE: 76 MMHG | BODY MASS INDEX: 28.04 KG/M2

## 2020-02-10 DIAGNOSIS — B96.89 ACUTE BACTERIAL BRONCHITIS: ICD-10-CM

## 2020-02-10 DIAGNOSIS — I48.20 CHRONIC ATRIAL FIBRILLATION (HCC): ICD-10-CM

## 2020-02-10 DIAGNOSIS — J20.8 ACUTE BACTERIAL BRONCHITIS: ICD-10-CM

## 2020-02-10 DIAGNOSIS — J44.9 CHRONIC OBSTRUCTIVE PULMONARY DISEASE, UNSPECIFIED COPD TYPE (HCC): Primary | ICD-10-CM

## 2020-02-10 PROCEDURE — 3008F BODY MASS INDEX DOCD: CPT | Performed by: FAMILY MEDICINE

## 2020-02-10 PROCEDURE — 3074F SYST BP LT 130 MM HG: CPT | Performed by: FAMILY MEDICINE

## 2020-02-10 PROCEDURE — 99214 OFFICE O/P EST MOD 30 MIN: CPT | Performed by: FAMILY MEDICINE

## 2020-02-10 PROCEDURE — 1036F TOBACCO NON-USER: CPT | Performed by: FAMILY MEDICINE

## 2020-02-10 PROCEDURE — 3078F DIAST BP <80 MM HG: CPT | Performed by: FAMILY MEDICINE

## 2020-02-10 PROCEDURE — 4040F PNEUMOC VAC/ADMIN/RCVD: CPT | Performed by: FAMILY MEDICINE

## 2020-02-10 PROCEDURE — 1160F RVW MEDS BY RX/DR IN RCRD: CPT | Performed by: FAMILY MEDICINE

## 2020-02-10 RX ORDER — AZITHROMYCIN 500 MG/1
500 TABLET, FILM COATED ORAL DAILY
Qty: 5 TABLET | Refills: 1 | Status: SHIPPED | OUTPATIENT
Start: 2020-02-10 | End: 2020-02-15

## 2020-02-10 NOTE — ASSESSMENT & PLAN NOTE
Acute bacterial bronchitis at this time start patient on antibiotics and follow-up if not improved over 5 days will obtain chest x-ray

## 2020-02-10 NOTE — ASSESSMENT & PLAN NOTE
Atrial fibrillation stable continue current medications as directed    Patient has to avoid pseudoephedrine and other medications related to decongestants that will affect his heart we while he is sick at this time he understands this

## 2020-02-10 NOTE — PATIENT INSTRUCTIONS
Acute Cough   AMBULATORY CARE:   An acute cough  can last up to 3 weeks  Common causes of an acute cough include a cold, allergies, or a lung infection  Seek care immediately if:   · You have trouble breathing or feel short of breath  · You cough up blood, or you see blood in your mucus  · You faint or feel weak or dizzy  · You have chest pain when you cough or take a deep breath  · You have new wheezing  Contact your healthcare provider if:   · You have a fever  · Your cough lasts longer than 4 weeks  · Your symptoms do not improve with treatment  · You have questions or concerns about your condition or care  Treatment:  An acute cough usually goes away on its own  Ask your healthcare provider about medicines you can take to decrease your cough  You may need medicine to stop the cough, decrease swelling in your airways, or help open your airways  Medicine may also be given to help you cough up mucus  If you have an infection caused by bacteria, you may need antibiotics  Manage your symptoms:   · Do not smoke and stay away from others who smoke  Nicotine and other chemicals in cigarettes and cigars can cause lung damage and make your cough worse  Ask your healthcare provider for information if you currently smoke and need help to quit  E-cigarettes or smokeless tobacco still contain nicotine  Talk to your healthcare provider before you use these products  · Drink extra liquids as directed  Liquids will help thin and loosen mucus so you can cough it up  Liquids will also help prevent dehydration  Examples of good liquids to drink include water, fruit juice, and broth  Do not drink liquids that contain caffeine  Caffeine can increase your risk for dehydration  Ask your healthcare provider how much liquid to drink each day  · Rest as directed  Do not do activities that make your cough worse, such as exercise  · Use a humidifier or vaporizer    Use a cool mist humidifier or a vaporizer to increase air moisture in your home  This may make it easier for you to breathe and help decrease your cough  · Eat 2 to 5 mL of honey 2 times each day  Honey can help thin mucus and decrease your cough  · Use cough drops or lozenges  These can help decrease throat irritation and your cough  Follow up with your healthcare provider as directed:  Write down your questions so you remember to ask them during your visits  © 2017 [address] [facility] Information is for End User's use only and may not be sold, redistributed or otherwise used for commercial purposes  All illustrations and images included in CareNotes® are the copyrighted property of A D A M [employer] Inc  or Tam Andino  The above information is an  only  It is not intended as medical advice for individual conditions or treatments  Talk to your doctor, nurse or pharmacist before following any medical regimen to see if it is safe and effective for you

## 2020-02-10 NOTE — ASSESSMENT & PLAN NOTE
COPD exacerbation eggs brought out by acute bronchitis at this time patient will be placed on antibiotics and continue with his pulmonary regimen

## 2020-02-10 NOTE — PROGRESS NOTES
Assessment/Plan:       Problem List Items Addressed This Visit        Respiratory    COPD (chronic obstructive pulmonary disease) ([facility] [address] ) - Primary     COPD exacerbation eggs brought out by acute bronchitis at this time patient will be placed on antibiotics and continue with his pulmonary regimen         Acute bacterial bronchitis     Acute bacterial bronchitis at this time start patient on antibiotics and follow-up if not improved over 5 days will obtain chest x-ray         Relevant Medications    azithromycin (ZITHROMAX) 500 MG tablet       Cardiovascular and Mediastinum    Chronic atrial fibrillation     Atrial fibrillation stable continue current medications as directed  Patient has to avoid pseudoephedrine and other medications related to decongestants that will affect his heart we while he is sick at this time he understands this                 Subjective:      Patient ID: Jaguar Pisano is a 69 y o  male  Patient presents for cough and congestion along with head congestion that started several days ago worsening making it difficult to breathe in light of his COPD which is now exacerbated from the bronchitis      The following portions of the patient's history were reviewed and updated as appropriate: allergies, current medications, past family history, past medical history, past social history, past surgical history and problem list     Review of Systems   Constitutional: Negative for chills, fatigue and fever  HENT: Negative for congestion, nosebleeds, rhinorrhea, sinus pressure and sore throat  Eyes: Negative for discharge and redness  Respiratory: Positive for cough, shortness of breath and stridor  Cardiovascular: Negative for chest pain, palpitations and leg swelling  Gastrointestinal: Negative for abdominal pain, blood in stool and nausea  Endocrine: Negative for cold intolerance, heat intolerance and polyuria  Genitourinary: Negative for dysuria and frequency     Musculoskeletal: Negative for arthralgias, back pain and myalgias  Skin: Negative for rash  Neurological: Negative for dizziness, weakness and headaches  Hematological: Negative for adenopathy  Psychiatric/Behavioral: Negative for behavioral problems and sleep disturbance  The patient is not nervous/anxious  Objective:      /76   Pulse 85   Temp 98 5 °F (36 9 °C)   Ht 6' 1" (1 854 m)   Wt 96 kg (211 lb 9 6 oz)   SpO2 95%   BMI 27 92 kg/m²        Physical Exam   Constitutional: He is oriented to person, place, and time  He appears well-developed and well-nourished  HENT:   Head: Normocephalic and atraumatic  Right Ear: External ear normal    Left Ear: External ear normal    Nose: Nose normal    Mouth/Throat: Oropharynx is clear and moist    Eyes: Pupils are equal, round, and reactive to light  Conjunctivae and EOM are normal  No scleral icterus  Neck: Normal range of motion  Neck supple  No JVD present  No thyromegaly present  Cardiovascular: Normal rate, regular rhythm and normal heart sounds  No murmur heard  Pulmonary/Chest: Effort normal  Stridor present  He has wheezes  He has no rales  He exhibits no tenderness  Diffuse scattered rhonchi   Abdominal: Soft  Bowel sounds are normal  He exhibits no distension and no mass  There is no tenderness  There is no rebound and no guarding  Musculoskeletal: Normal range of motion  He exhibits no edema, tenderness or deformity  Lymphadenopathy:     He has no cervical adenopathy  Neurological: He is alert and oriented to person, place, and time  He has normal reflexes  He displays normal reflexes  No cranial nerve deficit  Skin: Skin is warm and dry  No rash noted  No erythema  Psychiatric: He has a normal mood and affect  His behavior is normal  Judgment and thought content normal    Nursing note and vitals reviewed         Data:    Laboratory Results: I have personally reviewed the pertinent laboratory results/reports   Radiology/Other Diagnostic Testing Results: I have personally reviewed pertinent reports         No results found for: WBC, HGB, HCT, MCV, PLT  No results found for: NA, K, CL, CO2, ANIONGAP, BUN, CREATININE, GLUCOSE, GLUF, CALCIUM, CORRECTEDCA, AST, ALT, ALKPHOS, PROT, BILITOT, EGFR  No results found for: CHOLESTEROL  No results found for: HDL  No results found for: LDLCALC  No results found for: TRIG  No results found for: CHOLHDL  No results found for: [id number], TSH  No results found for: HGBA1C  No results found for: PSA    Anila Mascorro DO

## 2020-02-14 ENCOUNTER — TELEPHONE (OUTPATIENT)
Dept: FAMILY MEDICINE CLINIC | Facility: CLINIC | Age: 75
End: 2020-02-14

## 2020-02-14 NOTE — TELEPHONE ENCOUNTER
Patient notified   Patient said he will call back Monday and if not better he wants to know if he should refill antibiotics

## 2020-02-14 NOTE — TELEPHONE ENCOUNTER
Patient called and was seen by you on Monday  He said he feels slightly better with antibiotic but not completely better  He wants to know if you would advise anything else

## 2020-02-14 NOTE — TELEPHONE ENCOUNTER
The recovery process may be slow in light of his underlying lung problems and I believe the antibiotic will continue to work for 5 more days after the last tablet has been taken this is the 500 mg Zithromax and should take care of the situation for him and the cough may be persistent over the next 1 or 2 weeks  As long as he is not running a fever or feeling sick with chills or a worsening productive cough then he should continue to improve over the weekend  He needs to take extra time in the shower with hot steam helping to clear out the mucus and contact me Monday if still not better I will order a chest x-ray  He can use Robitussin DM for the now    And add or change an inhaler for him if needed on Monday

## 2020-04-08 DIAGNOSIS — N13.8 BPH WITH OBSTRUCTION/LOWER URINARY TRACT SYMPTOMS: Primary | ICD-10-CM

## 2020-04-08 DIAGNOSIS — N40.1 BPH WITH OBSTRUCTION/LOWER URINARY TRACT SYMPTOMS: Primary | ICD-10-CM

## 2020-04-08 RX ORDER — ALFUZOSIN HYDROCHLORIDE 10 MG/1
10 TABLET, EXTENDED RELEASE ORAL DAILY
Qty: 90 TABLET | Refills: 3 | Status: SHIPPED | OUTPATIENT
Start: 2020-04-08 | End: 2020-04-08 | Stop reason: CLARIF

## 2020-04-08 RX ORDER — SILODOSIN 8 MG/1
8 CAPSULE ORAL DAILY
Qty: 90 CAPSULE | Refills: 3 | Status: SHIPPED | OUTPATIENT
Start: 2020-04-08 | End: 2021-04-28

## 2020-06-03 DIAGNOSIS — I48.20 CHRONIC A-FIB (HCC): ICD-10-CM

## 2020-06-03 RX ORDER — DILTIAZEM HYDROCHLORIDE 120 MG/1
CAPSULE, COATED, EXTENDED RELEASE ORAL
Qty: 90 CAPSULE | Refills: 3 | Status: SHIPPED | OUTPATIENT
Start: 2020-06-03

## 2020-06-05 ENCOUNTER — TELEPHONE (OUTPATIENT)
Dept: OBGYN CLINIC | Facility: CLINIC | Age: 75
End: 2020-06-05

## 2020-06-08 ENCOUNTER — TELEMEDICINE (OUTPATIENT)
Dept: FAMILY MEDICINE CLINIC | Facility: CLINIC | Age: 75
End: 2020-06-08
Payer: COMMERCIAL

## 2020-06-08 DIAGNOSIS — C61 ADENOCARCINOMA OF PROSTATE (HCC): ICD-10-CM

## 2020-06-08 DIAGNOSIS — K21.9 GASTROESOPHAGEAL REFLUX DISEASE WITHOUT ESOPHAGITIS: Primary | ICD-10-CM

## 2020-06-08 DIAGNOSIS — J44.9 CHRONIC OBSTRUCTIVE PULMONARY DISEASE, UNSPECIFIED COPD TYPE (HCC): ICD-10-CM

## 2020-06-08 DIAGNOSIS — I48.20 CHRONIC ATRIAL FIBRILLATION (HCC): ICD-10-CM

## 2020-06-08 DIAGNOSIS — I10 ESSENTIAL HYPERTENSION: ICD-10-CM

## 2020-06-08 PROCEDURE — 99214 OFFICE O/P EST MOD 30 MIN: CPT | Performed by: FAMILY MEDICINE

## 2020-06-08 PROCEDURE — 1160F RVW MEDS BY RX/DR IN RCRD: CPT | Performed by: FAMILY MEDICINE

## 2020-06-15 DIAGNOSIS — Z96.643 H/O TOTAL HIP ARTHROPLASTY, BILATERAL: Primary | ICD-10-CM

## 2020-06-15 RX ORDER — MELOXICAM 15 MG/1
15 TABLET ORAL DAILY
Qty: 90 TABLET | Refills: 3 | Status: SHIPPED | OUTPATIENT
Start: 2020-06-15 | End: 2021-06-21 | Stop reason: ALTCHOICE

## 2020-09-04 ENCOUNTER — OFFICE VISIT (OUTPATIENT)
Dept: UROLOGY | Facility: MEDICAL CENTER | Age: 75
End: 2020-09-04
Payer: COMMERCIAL

## 2020-09-04 ENCOUNTER — OFFICE VISIT (OUTPATIENT)
Dept: CARDIOLOGY CLINIC | Facility: CLINIC | Age: 75
End: 2020-09-04
Payer: COMMERCIAL

## 2020-09-04 VITALS
TEMPERATURE: 96.1 F | WEIGHT: 215 LBS | SYSTOLIC BLOOD PRESSURE: 128 MMHG | BODY MASS INDEX: 28.49 KG/M2 | HEIGHT: 73 IN | HEART RATE: 74 BPM | DIASTOLIC BLOOD PRESSURE: 74 MMHG

## 2020-09-04 VITALS
BODY MASS INDEX: 27.83 KG/M2 | HEART RATE: 84 BPM | WEIGHT: 210 LBS | DIASTOLIC BLOOD PRESSURE: 70 MMHG | SYSTOLIC BLOOD PRESSURE: 130 MMHG | HEIGHT: 73 IN

## 2020-09-04 DIAGNOSIS — R00.1 BRADYCARDIA: ICD-10-CM

## 2020-09-04 DIAGNOSIS — C61 PROSTATE CANCER (HCC): Primary | ICD-10-CM

## 2020-09-04 DIAGNOSIS — I48.20 CHRONIC ATRIAL FIBRILLATION (HCC): Primary | ICD-10-CM

## 2020-09-04 DIAGNOSIS — N52.8 OTHER MALE ERECTILE DYSFUNCTION: ICD-10-CM

## 2020-09-04 DIAGNOSIS — N40.1 BPH WITH OBSTRUCTION/LOWER URINARY TRACT SYMPTOMS: ICD-10-CM

## 2020-09-04 DIAGNOSIS — Z92.3 HISTORY OF EXTERNAL BEAM RADIATION THERAPY: ICD-10-CM

## 2020-09-04 DIAGNOSIS — N13.8 BPH WITH OBSTRUCTION/LOWER URINARY TRACT SYMPTOMS: ICD-10-CM

## 2020-09-04 PROCEDURE — 99213 OFFICE O/P EST LOW 20 MIN: CPT | Performed by: INTERNAL MEDICINE

## 2020-09-04 PROCEDURE — 99214 OFFICE O/P EST MOD 30 MIN: CPT | Performed by: UROLOGY

## 2020-09-04 PROCEDURE — 3078F DIAST BP <80 MM HG: CPT | Performed by: UROLOGY

## 2020-09-04 PROCEDURE — 93000 ELECTROCARDIOGRAM COMPLETE: CPT | Performed by: INTERNAL MEDICINE

## 2020-09-04 PROCEDURE — 3074F SYST BP LT 130 MM HG: CPT | Performed by: UROLOGY

## 2020-09-04 NOTE — PROGRESS NOTES
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    History of external beam radiation therapy          Subjective:  No complaints     Patient ID: Minor Lorenz is a 77 y o  male  HPI  He is approaching 8 years after his radiation therapy for localized prostate cancer  Dylan Kimble had a acceptable jax of his PSA down to below 1 0   He denies any hematuria, flank pains, weight loss, or loss of appetite   He states his bowel function is normal and not having any issues with that      He has BPH for which he takes generic Rapaflo, and also ED for which he takes p r n  Cialis      Review of Systems   Constitutional: Negative  HENT: Negative  Eyes: Negative  Respiratory: Negative  Cardiovascular: Negative  Gastrointestinal: Negative  Endocrine: Negative  Genitourinary: Negative  Musculoskeletal: Negative  Skin: Negative  Allergic/Immunologic: Negative  Neurological: Negative  Hematological: Negative  Psychiatric/Behavioral: Negative  Objective:     Physical Exam  Vitals signs and nursing note reviewed  Constitutional:       General: He is not in acute distress  Appearance: He is well-developed  HENT:      Head: Normocephalic and atraumatic  Nose: Nose normal    Eyes:      General: No scleral icterus  Conjunctiva/sclera: Conjunctivae normal       Pupils: Pupils are equal, round, and reactive to light  Neck:      Musculoskeletal: Normal range of motion and neck supple  Cardiovascular:      Rate and Rhythm: Normal rate and regular rhythm  Heart sounds: Normal heart sounds  No murmur  Pulmonary:      Effort: Pulmonary effort is normal  No respiratory distress  Breath sounds: Normal breath sounds  No wheezing or rales     Abdominal:      General: Bowel sounds are normal  There is no distension  Palpations: Abdomen is soft  There is no mass  Tenderness: There is no abdominal tenderness  Musculoskeletal: Normal range of motion  General: No tenderness  Lymphadenopathy:      Cervical: No cervical adenopathy  Skin:     General: Skin is warm and dry  Coloration: Skin is not pale  Findings: No erythema or rash  Neurological:      Mental Status: He is alert and oriented to person, place, and time  Cranial Nerves: No cranial nerve deficit  Psychiatric:         Behavior: Behavior normal          Thought Content:  Thought content normal          Judgment: Judgment normal

## 2020-09-04 NOTE — PROGRESS NOTES
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  As well he continues to donate blood as he is O negative  No recent change in exertional capacity  Results for orders placed or performed in visit on 09/04/20   POCT ECG    Impression    Atrial fibrillation with a controlled ventricular response  Leftward axis  Most recent or relevant cardiac/vascular testing:    Echocardiogram 6/1/2015:  Normal LV function  Mild LVH  Past Surgical History:   Procedure Laterality Date    BACK SURGERY      x 3    INTRAOPERATIVE RADIATION THERAPY ([facility])  2011    JOINT REPLACEMENT Bilateral     hips    LAMINECTOMY      three levels L3 - S1 - all at different times    PATELLA SURGERY      most of this removed after injury    PROSTATE BIOPSY      TOTAL HIP ARTHROPLASTY Bilateral     VASECTOMY  1973     CMP: No results found for: NA, K, CL, CO2, BUN, CREATININE, GLUCOSE, EGFR    Lipid Profile: No results found for: CHOL, TRIG, HDL, LDL      Review of Systems   10  point ROS  was otherwise non pertinent or negative except as per HPI or as below  Gait: Normal         Objective:     /70   Pulse 84   Ht 6' 1" (1 854 m)   Wt 95 3 kg (210 lb)   BMI 27 71 kg/m²     PHYSICAL EXAM:    General:  Normal appearance in no distress  Eyes:  Anicteric  Oral mucosa:  Moist   Neck:  No JVD  Carotid upstrokes are brisk without bruits  No masses    Chest:  Clear to auscultation and percussion  Cardiac:  Irregularly irregular  No palpable PMI  Normal S1 and S2  No murmur gallop or rub  Abdomen:  Soft and nontender  No palpable organomegaly or aortic enlargement  Extremities:  No peripheral edema  Musculoskeletal:  Symmetric  Vascular:  Femoral pulses are brisk without bruits  Popliteal pulses are intact bilaterally  Pedal pulses are intact  Neuro:  Grossly symmetric  Psych:  Alert and oriented x3  Current Outpatient Medications:     albuterol (PROAIR HFA) 90 mcg/act inhaler, Inhale 2 puffs every 6 (six) hours as needed for wheezing, Disp: , Rfl:     aspirin 81 MG tablet, Take 1 tablet by mouth daily, Disp: , Rfl:     diltiazem (CARDIZEM CD) 120 mg 24 hr capsule, TAKE 1 CAPSULE BY MOUTH  DAILY, Disp: 90 capsule, Rfl: 3    fluticasone-salmeterol (ADVAIR DISKUS) 250-50 mcg/dose inhaler, Inhale, Disp: , Rfl:     meloxicam (MOBIC) 15 mg tablet, Take 1 tablet (15 mg total) by mouth daily, Disp: 90 tablet, Rfl: 3    omeprazole (PRILOSEC OTC) 20 MG tablet, Take 1 tablet by mouth daily, Disp: , Rfl:     Silodosin 8 MG CAPS, Take 1 capsule by mouth daily, Disp: 90 capsule, Rfl: 3  No Known Allergies  Past Medical History:   Diagnosis Date    A-fib ([facility] )     BPH with obstruction/lower urinary tract symptoms     COPD (chronic obstructive pulmonary disease) (HCC)     Frequency of urination     GERD (gastroesophageal reflux disease)     History of cardioversion 12/27/2011    Inital Rhythm AFIB, Final Rhythm Sinus, [address]    History of echocardiogram 06/01/2015    Normal LV systolic function, mild concentric LV hypertrophy, mild mitral and tricuspid regurg, right atrial enlargement   EF 55%    Irregular heart beat     AF    Other male erectile dysfunction     Personal history of irradiation     Personal history of malignant neoplasm of prostate     Prostate cancer ([facility] [address] )            Social History     Tobacco Use   Smoking Status Former Smoker    Types: Cigarettes    Last attempt to quit: 1998    Years since quittin 1   Smokeless Tobacco Never Used

## 2020-10-12 ENCOUNTER — OFFICE VISIT (OUTPATIENT)
Dept: FAMILY MEDICINE CLINIC | Facility: CLINIC | Age: 75
End: 2020-10-12
Payer: COMMERCIAL

## 2020-10-12 VITALS
OXYGEN SATURATION: 99 % | DIASTOLIC BLOOD PRESSURE: 76 MMHG | HEIGHT: 73 IN | BODY MASS INDEX: 28.04 KG/M2 | HEART RATE: 88 BPM | SYSTOLIC BLOOD PRESSURE: 128 MMHG | WEIGHT: 211.6 LBS | TEMPERATURE: 97.5 F

## 2020-10-12 DIAGNOSIS — K58.0 IRRITABLE BOWEL SYNDROME WITH DIARRHEA: ICD-10-CM

## 2020-10-12 DIAGNOSIS — I10 ESSENTIAL HYPERTENSION: ICD-10-CM

## 2020-10-12 DIAGNOSIS — K21.9 GASTROESOPHAGEAL REFLUX DISEASE WITHOUT ESOPHAGITIS: ICD-10-CM

## 2020-10-12 DIAGNOSIS — N52.9 ERECTILE DYSFUNCTION, UNSPECIFIED ERECTILE DYSFUNCTION TYPE: ICD-10-CM

## 2020-10-12 DIAGNOSIS — I48.20 CHRONIC ATRIAL FIBRILLATION (HCC): ICD-10-CM

## 2020-10-12 DIAGNOSIS — J44.9 CHRONIC OBSTRUCTIVE PULMONARY DISEASE, UNSPECIFIED COPD TYPE (HCC): Primary | ICD-10-CM

## 2020-10-12 PROCEDURE — 1160F RVW MEDS BY RX/DR IN RCRD: CPT | Performed by: FAMILY MEDICINE

## 2020-10-12 PROCEDURE — 3288F FALL RISK ASSESSMENT DOCD: CPT | Performed by: FAMILY MEDICINE

## 2020-10-12 PROCEDURE — 99397 PER PM REEVAL EST PAT 65+ YR: CPT | Performed by: FAMILY MEDICINE

## 2020-10-12 PROCEDURE — 1101F PT FALLS ASSESS-DOCD LE1/YR: CPT | Performed by: FAMILY MEDICINE

## 2020-10-12 PROCEDURE — 1036F TOBACCO NON-USER: CPT | Performed by: FAMILY MEDICINE

## 2020-10-12 PROCEDURE — 3725F SCREEN DEPRESSION PERFORMED: CPT | Performed by: FAMILY MEDICINE

## 2020-10-12 RX ORDER — TADALAFIL 20 MG/1
20 TABLET ORAL DAILY PRN
Qty: 10 TABLET | Refills: 5 | Status: SHIPPED | OUTPATIENT
Start: 2020-10-12 | End: 2021-06-21 | Stop reason: ALTCHOICE

## 2020-10-27 ENCOUNTER — TELEPHONE (OUTPATIENT)
Dept: FAMILY MEDICINE CLINIC | Facility: CLINIC | Age: 75
End: 2020-10-27

## 2020-11-18 ENCOUNTER — TELEPHONE (OUTPATIENT)
Dept: FAMILY MEDICINE CLINIC | Facility: CLINIC | Age: 75
End: 2020-11-18

## 2020-11-18 DIAGNOSIS — E03.9 HYPOTHYROIDISM, UNSPECIFIED TYPE: Primary | ICD-10-CM

## 2020-11-20 ENCOUNTER — LAB (OUTPATIENT)
Dept: LAB | Facility: CLINIC | Age: 75
End: 2020-11-20
Payer: COMMERCIAL

## 2020-11-20 DIAGNOSIS — E03.9 HYPOTHYROIDISM, UNSPECIFIED TYPE: ICD-10-CM

## 2020-11-20 LAB
T4 FREE SERPL-MCNC: 0.78 NG/DL (ref 0.76–1.46)
TSH SERPL DL<=0.05 MIU/L-ACNC: 4.08 UIU/ML (ref 0.36–3.74)

## 2020-11-20 PROCEDURE — 84439 ASSAY OF FREE THYROXINE: CPT

## 2020-11-20 PROCEDURE — 84443 ASSAY THYROID STIM HORMONE: CPT

## 2020-11-20 PROCEDURE — 36415 COLL VENOUS BLD VENIPUNCTURE: CPT

## 2020-12-02 ENCOUNTER — TELEPHONE (OUTPATIENT)
Dept: FAMILY MEDICINE CLINIC | Facility: CLINIC | Age: 75
End: 2020-12-02

## 2021-02-22 ENCOUNTER — OFFICE VISIT (OUTPATIENT)
Dept: FAMILY MEDICINE CLINIC | Facility: CLINIC | Age: 76
End: 2021-02-22
Payer: COMMERCIAL

## 2021-02-22 VITALS
TEMPERATURE: 97.7 F | SYSTOLIC BLOOD PRESSURE: 124 MMHG | HEART RATE: 83 BPM | WEIGHT: 217 LBS | BODY MASS INDEX: 28.76 KG/M2 | HEIGHT: 73 IN | DIASTOLIC BLOOD PRESSURE: 76 MMHG | OXYGEN SATURATION: 99 %

## 2021-02-22 DIAGNOSIS — I48.20 CHRONIC ATRIAL FIBRILLATION (HCC): ICD-10-CM

## 2021-02-22 DIAGNOSIS — J44.9 CHRONIC OBSTRUCTIVE PULMONARY DISEASE, UNSPECIFIED COPD TYPE (HCC): ICD-10-CM

## 2021-02-22 DIAGNOSIS — I10 ESSENTIAL HYPERTENSION: ICD-10-CM

## 2021-02-22 DIAGNOSIS — K21.9 GASTROESOPHAGEAL REFLUX DISEASE WITHOUT ESOPHAGITIS: Primary | ICD-10-CM

## 2021-02-22 DIAGNOSIS — C61 ADENOCARCINOMA OF PROSTATE (HCC): ICD-10-CM

## 2021-02-22 PROCEDURE — 99214 OFFICE O/P EST MOD 30 MIN: CPT | Performed by: FAMILY MEDICINE

## 2021-02-22 PROCEDURE — 3008F BODY MASS INDEX DOCD: CPT | Performed by: FAMILY MEDICINE

## 2021-02-22 PROCEDURE — 3074F SYST BP LT 130 MM HG: CPT | Performed by: FAMILY MEDICINE

## 2021-02-22 PROCEDURE — 3078F DIAST BP <80 MM HG: CPT | Performed by: FAMILY MEDICINE

## 2021-02-22 PROCEDURE — 1036F TOBACCO NON-USER: CPT | Performed by: FAMILY MEDICINE

## 2021-02-22 PROCEDURE — 1160F RVW MEDS BY RX/DR IN RCRD: CPT | Performed by: FAMILY MEDICINE

## 2021-02-22 RX ORDER — BUDESONIDE AND FORMOTEROL FUMARATE DIHYDRATE 160; 4.5 UG/1; UG/1
AEROSOL RESPIRATORY (INHALATION)
COMMUNITY
Start: 2020-10-23

## 2021-02-22 NOTE — ASSESSMENT & PLAN NOTE
Chronic atrial fibrillation with stable controlled ventricular response continue with Cardizem 120 mg capsules continue aspirin 81 mg follow-up with Cardiology as scheduled

## 2021-02-22 NOTE — PROGRESS NOTES
Assessment/Plan:       Problem List Items Addressed This Visit        Digestive    GERD (gastroesophageal reflux disease) - Primary     GERD symptoms stable continue with omeprazole 20 mg tablets         Relevant Orders    CBC and differential    Comprehensive metabolic panel    Lipid panel    TSH, 3rd generation with Free T4 reflex    PSA, total and free       Respiratory    COPD (chronic obstructive pulmonary disease) (HCC)      COPD stable currently with Symbicort continue same dosage as needed continue with ProAir albuterol inhaler p r n  for breakthrough exacerbations         Relevant Medications    budesonide-formoterol (SYMBICORT) 160-4 5 mcg/act inhaler    Other Relevant Orders    CBC and differential    Comprehensive metabolic panel    Lipid panel    TSH, 3rd generation with Free T4 reflex    PSA, total and free       Cardiovascular and Mediastinum    Chronic atrial fibrillation (HCC)      Chronic atrial fibrillation with stable controlled ventricular response continue with Cardizem 120 mg capsules continue aspirin 81 mg follow-up with Cardiology as scheduled         Relevant Orders    CBC and differential    Comprehensive metabolic panel    Lipid panel    TSH, 3rd generation with Free T4 reflex    PSA, total and free    Essential hypertension      Hypertension under stable control with diltiazem continue current medication as directed         Relevant Orders    CBC and differential    Comprehensive metabolic panel    Lipid panel    TSH, 3rd generation with Free T4 reflex    PSA, total and free       Genitourinary    Adenocarcinoma of prostate [facility])      Follow-up with Urology         Relevant Orders    CBC and differential    Comprehensive metabolic panel    Lipid panel    TSH, 3rd generation with Free T4 reflex    PSA, total and free            Subjective:      Patient ID: Itzel Armando is a 77 y o  male       Patient presents for general checkup evaluation and review of medications doing relatively well currently  Patient had first Covid shot through the [facility]  The following portions of the patient's history were reviewed and updated as appropriate: allergies, current medications, past family history, past medical history, past social history, past surgical history and problem list     Review of Systems   Constitutional: Negative for chills, fatigue and fever  HENT: Negative for congestion, nosebleeds, rhinorrhea, sinus pressure and sore throat  Eyes: Negative for discharge and redness  Respiratory: Positive for cough and shortness of breath  Cardiovascular: Negative for chest pain, palpitations and leg swelling  Gastrointestinal: Negative for abdominal pain, blood in stool and nausea  Endocrine: Negative for cold intolerance, heat intolerance and polyuria  Genitourinary: Negative for dysuria and frequency  Musculoskeletal: Negative for arthralgias, back pain and myalgias  Skin: Negative for rash  Neurological: Negative for dizziness, weakness and headaches  Hematological: Negative for adenopathy  Psychiatric/Behavioral: Negative for behavioral problems and sleep disturbance  The patient is not nervous/anxious  Objective:      /76   Pulse 83   Temp 97 7 °F (36 5 °C)   Ht 6' 1" (1 854 m)   Wt 98 4 kg (217 lb)   SpO2 99%   BMI 28 63 kg/m²        Physical Exam  Vitals signs and nursing note reviewed  Constitutional:       Appearance: Normal appearance  He is well-developed and normal weight  HENT:      Head: Normocephalic and atraumatic  Right Ear: Tympanic membrane and external ear normal       Left Ear: Tympanic membrane and external ear normal       Nose: Nose normal       Mouth/Throat:      Mouth: Mucous membranes are moist       Pharynx: Oropharynx is clear  Eyes:      General: No scleral icterus  Conjunctiva/sclera: Conjunctivae normal       Pupils: Pupils are equal, round, and reactive to light     Neck:      Musculoskeletal: Normal range of motion and neck supple  Thyroid: No thyromegaly  Vascular: No JVD  Cardiovascular:      Rate and Rhythm: Normal rate and regular rhythm  Heart sounds: Normal heart sounds  No murmur  Pulmonary:      Effort: Pulmonary effort is normal       Breath sounds: Normal breath sounds  No wheezing or rales  Chest:      Chest wall: No tenderness  Abdominal:      General: Bowel sounds are normal  There is no distension  Palpations: Abdomen is soft  There is no mass  Tenderness: There is no abdominal tenderness  There is no guarding or rebound  Musculoskeletal: Normal range of motion  General: No tenderness or deformity  Lymphadenopathy:      Cervical: No cervical adenopathy  Skin:     General: Skin is warm and dry  Findings: No erythema or rash  Neurological:      Mental Status: He is alert and oriented to person, place, and time  Cranial Nerves: No cranial nerve deficit  Deep Tendon Reflexes: Reflexes are normal and symmetric  Reflexes normal    Psychiatric:         Mood and Affect: Mood normal          Behavior: Behavior normal          Thought Content: Thought content normal          Judgment: Judgment normal           Data:    Laboratory Results: I have personally reviewed the pertinent laboratory results/reports   Radiology/Other Diagnostic Testing Results: I have personally reviewed pertinent reports         No results found for: WBC, HGB, HCT, MCV, PLT  No results found for: NA, K, CL, CO2, ANIONGAP, BUN, CREATININE, GLUCOSE, GLUF, CALCIUM, CORRECTEDCA, AST, ALT, ALKPHOS, PROT, BILITOT, EGFR  No results found for: CHOLESTEROL  No results found for: HDL  No results found for: LDLCALC  No results found for: TRIG  No results found for: [place], [place]  Lab Results   Component Value Date    [id number] 4 080 (H) 11/20/2020     No results found for: HGBA1C  No results found for: PSA    Kahlil Peralta DO

## 2021-02-22 NOTE — PATIENT INSTRUCTIONS
Heart Healthy Diet   WHAT YOU NEED TO KNOW:   A heart healthy diet is an eating plan low in unhealthy fats and sodium (salt)  The plan is high in healthy fats and fiber  A heart healthy diet helps improve your cholesterol levels and lowers your risk for heart disease and stroke  A dietitian will teach you how to read and understand food labels  DISCHARGE INSTRUCTIONS:   Heart healthy diet guidelines to follow:   · Choose foods that contain healthy fats  ? Unsaturated fats  include monounsaturated and polyunsaturated fats  Unsaturated fat is found in foods such as soybean, canola, olive, corn, and safflower oils  It is also found in soft tub margarine that is made with liquid vegetable oil  ? Omega-3 fat  is found in certain fish, such as salmon, tuna, and trout, and in walnuts and flaxseed  Eat fish high in omega-3 fats at least 2 times a week  · Get 20 to 30 grams of fiber each day  Fruits, vegetables, whole-grain foods, and legumes (cooked beans) are good sources of fiber  · Limit or do not have unhealthy fats  ? Cholesterol  is found in animal foods, such as eggs and lobster, and in dairy products made from whole milk  Limit cholesterol to less than 200 mg each day  ? Saturated fat  is found in meats, such as de leon and hamburger  It is also found in chicken or turkey skin, whole milk, and butter  Limit saturated fat to less than 7% of your total daily calories  ? Trans fat  is found in packaged foods, such as potato chips and cookies  It is also in hard margarine, some fried foods, and shortening  Do not eat foods that contain trans fats  · Limit sodium as directed  You may be told to limit sodium to 2,000 to 2,300 mg each day  Choose low-sodium or no-salt-added foods  Add little or no salt to food you prepare  Use herbs and spices in place of salt         Include the following in your heart healthy plan:  Ask your dietitian or healthcare provider how many servings to have from each of the following food groups:  · Grains:      ? Whole-wheat breads, cereals, and pastas, and brown rice    ? Low-fat, low-sodium crackers and chips    · Vegetables:      ? Broccoli, green beans, green peas, and spinach    ? Collards, kale, and lima beans    ? Carrots, sweet potatoes, tomatoes, and peppers    ? Canned vegetables with no salt added    · Fruits:      ? Bananas, peaches, pears, and pineapple    ? Grapes, raisins, and dates    ? Oranges, tangerines, grapefruit, orange juice, and grapefruit juice    ? Apricots, mangoes, melons, and papaya    ? Raspberries and strawberries    ? Canned fruit with no added sugar    · Low-fat dairy:      ? Nonfat (skim) milk, 1% milk, and low-fat almond, cashew, or soy milks fortified with calcium    ? Low-fat cheese, regular or frozen yogurt, and cottage cheese    · Meats and proteins:      ? Lean cuts of beef and pork (loin, leg, round), skinless chicken and turkey    ? Legumes, soy products, egg whites, or nuts    Limit or do not include the following in your heart healthy plan:   · Unhealthy fats and oils:      ? Whole or 2% milk, cream cheese, sour cream, or cheese    ? High-fat cuts of beef (T-bone steaks, ribs), chicken or turkey with skin, and organ meats such as liver    ? Butter, stick margarine, shortening, and cooking oils such as coconut or palm oil    · Foods and liquids high in sodium:      ? Packaged foods, such as frozen dinners, cookies, macaroni and cheese, and cereals with more than 300 mg of sodium per serving    ? Vegetables with added sodium, such as instant potatoes, vegetables with added sauces, or regular canned vegetables    ? Cured or smoked meats, such as hot dogs, de leon, and sausage    ? High-sodium ketchup, barbecue sauce, salad dressing, pickles, olives, soy sauce, or miso    · Foods and liquids high in sugar:      ? Candy, cake, cookies, pies, or doughnuts    ? Soft drinks (soda), sports drinks, or sweetened tea    ?  Canned or dry mixes for cakes, soups, sauces, or gravies    Other healthy heart guidelines:   · Do not smoke  Nicotine and other chemicals in cigarettes and cigars can cause lung and heart damage  Ask your healthcare provider for information if you currently smoke and need help to quit  E-cigarettes or smokeless tobacco still contain nicotine  Talk to your healthcare provider before you use these products  · Limit or do not drink alcohol as directed  Alcohol can damage your heart and raise your blood pressure  Your healthcare provider may give you specific daily and weekly limits  The general recommended limit is 1 drink a day for women 21 or older and for men 72 or older  Do not have more than 3 drinks in a day or 7 in a week  The recommended limit is 2 drinks a day for men 24to 59years of age  Do not have more than 4 drinks in a day or 14 in a week  A drink of alcohol is 12 ounces of beer, 5 ounces of wine, or 1½ ounces of liquor  · Exercise regularly  Exercise can help you maintain a healthy weight and improve your blood pressure and cholesterol levels  Regular exercise can also decrease your risk for heart problems  Ask your healthcare provider about the best exercise plan for you  Do not start an exercise program without asking your healthcare provider  Follow up with your doctor or cardiologist as directed:  Write down your questions so you remember to ask them during your visits  © Copyright [address] Information is for End User's use only and may not be sold, redistributed or otherwise used for commercial purposes  All illustrations and images included in CareNotes® are the copyrighted property of A D A M [employer] Inc  or [address] [place] [address]  The above information is an  only  It is not intended as medical advice for individual conditions or treatments  Talk to your doctor, nurse or pharmacist before following any medical regimen to see if it is safe and effective for you

## 2021-02-22 NOTE — ASSESSMENT & PLAN NOTE
COPD stable currently with Symbicort continue same dosage as needed continue with ProAir albuterol inhaler p r n  for breakthrough exacerbations

## 2021-04-28 DIAGNOSIS — N13.8 BPH WITH OBSTRUCTION/LOWER URINARY TRACT SYMPTOMS: ICD-10-CM

## 2021-04-28 DIAGNOSIS — N40.1 BPH WITH OBSTRUCTION/LOWER URINARY TRACT SYMPTOMS: ICD-10-CM

## 2021-04-28 RX ORDER — SILODOSIN 8 MG/1
8 CAPSULE ORAL DAILY
Qty: 90 CAPSULE | Refills: 3 | Status: SHIPPED | OUTPATIENT
Start: 2021-04-28

## 2021-06-21 ENCOUNTER — OFFICE VISIT (OUTPATIENT)
Dept: FAMILY MEDICINE CLINIC | Facility: CLINIC | Age: 76
End: 2021-06-21
Payer: COMMERCIAL

## 2021-06-21 VITALS
WEIGHT: 213 LBS | SYSTOLIC BLOOD PRESSURE: 128 MMHG | BODY MASS INDEX: 28.23 KG/M2 | OXYGEN SATURATION: 98 % | HEIGHT: 73 IN | DIASTOLIC BLOOD PRESSURE: 80 MMHG | TEMPERATURE: 97.6 F | HEART RATE: 91 BPM

## 2021-06-21 DIAGNOSIS — J44.9 CHRONIC OBSTRUCTIVE PULMONARY DISEASE, UNSPECIFIED COPD TYPE (HCC): Primary | ICD-10-CM

## 2021-06-21 DIAGNOSIS — I10 ESSENTIAL HYPERTENSION: ICD-10-CM

## 2021-06-21 DIAGNOSIS — K21.9 GASTROESOPHAGEAL REFLUX DISEASE WITHOUT ESOPHAGITIS: ICD-10-CM

## 2021-06-21 DIAGNOSIS — I48.20 CHRONIC ATRIAL FIBRILLATION (HCC): ICD-10-CM

## 2021-06-21 PROCEDURE — 3725F SCREEN DEPRESSION PERFORMED: CPT | Performed by: FAMILY MEDICINE

## 2021-06-21 PROCEDURE — 99214 OFFICE O/P EST MOD 30 MIN: CPT | Performed by: FAMILY MEDICINE

## 2021-06-21 NOTE — PROGRESS NOTES
Assessment/Plan:       Problem List Items Addressed This Visit        Digestive    GERD (gastroesophageal reflux disease)      GERD symptoms stable with omeprazole 20 mg tablets no change            Respiratory    COPD (chronic obstructive pulmonary disease) ([facility] ) - Primary      COPD stable continue with Symbicort and albuterol inhaler as needed for p r n  flare ups pollen and weather now starting the summer has caused some issues for him and he is out of breath with physical exertion but generally remains about the same overall            Cardiovascular and Mediastinum    Chronic atrial fibrillation ([facility] )      Atrial fibrillation stable controlled ventricular response follow-up next visit continue diltiazem 120 mg capsule and follow-up with Cardiology         Essential hypertension      Hypertension stable doing well continue current medication no change                 Subjective:      Patient ID: Helen Esparza is a 77 y o  male  Patient is here for 4 month follow-up evaluation review of general health and lab work he is doing well overall continues to work      The following portions of the patient's history were reviewed and updated as appropriate: allergies, current medications, past family history, past medical history, past social history, past surgical history and problem list     Review of Systems   Constitutional: Negative for chills, fatigue and fever  HENT: Negative for congestion, nosebleeds, rhinorrhea, sinus pressure and sore throat  Eyes: Negative for discharge and redness  Respiratory: Negative for cough and shortness of breath  Cardiovascular: Negative for chest pain, palpitations and leg swelling  Gastrointestinal: Negative for abdominal pain, blood in stool and nausea  Endocrine: Negative for cold intolerance, heat intolerance and polyuria  Genitourinary: Negative for dysuria and frequency  Musculoskeletal: Negative for arthralgias, back pain and myalgias     Skin: Negative for rash  Neurological: Negative for dizziness, weakness and headaches  Hematological: Negative for adenopathy  Psychiatric/Behavioral: Negative for behavioral problems and sleep disturbance  The patient is not nervous/anxious  Objective:      /80 (BP Location: Left arm, Patient Position: Sitting)   Pulse 91   Temp 97 6 °F (36 4 °C)   Ht 6' 1" (1 854 m)   Wt 96 6 kg (213 lb)   SpO2 98%   BMI 28 10 kg/m²        Physical Exam  Vitals and nursing note reviewed  Constitutional:       Appearance: Normal appearance  He is well-developed and normal weight  HENT:      Head: Normocephalic and atraumatic  Right Ear: Tympanic membrane, ear canal and external ear normal       Left Ear: Tympanic membrane, ear canal and external ear normal       Nose: Nose normal       Mouth/Throat:      Mouth: Mucous membranes are moist       Pharynx: Oropharynx is clear  Eyes:      General: No scleral icterus  Conjunctiva/sclera: Conjunctivae normal       Pupils: Pupils are equal, round, and reactive to light  Neck:      Thyroid: No thyromegaly  Vascular: No JVD  Cardiovascular:      Rate and Rhythm: Normal rate and regular rhythm  Pulses: Normal pulses  Heart sounds: Normal heart sounds  No murmur heard  Pulmonary:      Effort: Pulmonary effort is normal       Breath sounds: Normal breath sounds  No wheezing or rales  Chest:      Chest wall: No tenderness  Abdominal:      General: Bowel sounds are normal  There is no distension  Palpations: Abdomen is soft  There is no mass  Tenderness: There is no abdominal tenderness  There is no guarding or rebound  Musculoskeletal:         General: No tenderness or deformity  Normal range of motion  Cervical back: Normal range of motion and neck supple  Lymphadenopathy:      Cervical: No cervical adenopathy  Skin:     General: Skin is warm and dry        Capillary Refill: Capillary refill takes less than 2 seconds  Findings: No erythema or rash  Neurological:      General: No focal deficit present  Mental Status: He is alert and oriented to person, place, and time  Mental status is at baseline  Cranial Nerves: No cranial nerve deficit  Deep Tendon Reflexes: Reflexes are normal and symmetric  Reflexes normal    Psychiatric:         Mood and Affect: Mood normal          Behavior: Behavior normal          Thought Content: Thought content normal          Judgment: Judgment normal           Data:    Laboratory Results: I have personally reviewed the pertinent laboratory results/reports   Radiology/Other Diagnostic Testing Results: I have personally reviewed pertinent reports         No results found for: WBC, HGB, HCT, MCV, PLT  No results found for: NA, K, CL, CO2, ANIONGAP, BUN, CREATININE, GLUCOSE, GLUF, CALCIUM, CORRECTEDCA, AST, ALT, ALKPHOS, PROT, BILITOT, EGFR  No results found for: CHOLESTEROL  No results found for: HDL  No results found for: LDLCALC  No results found for: TRIG  No results found for: [place], [place]  Lab Results   Component Value Date    [id number] 4 080 (H) 11/20/2020     No results found for: HGBA1C  No results found for: PSA    Conner O'Niru, DO

## 2021-06-21 NOTE — ASSESSMENT & PLAN NOTE
Atrial fibrillation stable controlled ventricular response follow-up next visit continue diltiazem 120 mg capsule and follow-up with Cardiology

## 2021-06-21 NOTE — PATIENT INSTRUCTIONS
Heart Healthy Diet   WHAT YOU NEED TO KNOW:   A heart healthy diet is an eating plan low in unhealthy fats and sodium (salt)  The plan is high in healthy fats and fiber  A heart healthy diet helps improve your cholesterol levels and lowers your risk for heart disease and stroke  A dietitian will teach you how to read and understand food labels  DISCHARGE INSTRUCTIONS:   Heart healthy diet guidelines to follow:   · Choose foods that contain healthy fats  ? Unsaturated fats  include monounsaturated and polyunsaturated fats  Unsaturated fat is found in foods such as soybean, canola, olive, corn, and safflower oils  It is also found in soft tub margarine that is made with liquid vegetable oil  ? Omega-3 fat  is found in certain fish, such as salmon, tuna, and trout, and in walnuts and flaxseed  Eat fish high in omega-3 fats at least 2 times a week  · Get 20 to 30 grams of fiber each day  Fruits, vegetables, whole-grain foods, and legumes (cooked beans) are good sources of fiber  · Limit or do not have unhealthy fats  ? Cholesterol  is found in animal foods, such as eggs and lobster, and in dairy products made from whole milk  Limit cholesterol to less than 200 mg each day  ? Saturated fat  is found in meats, such as de leon and hamburger  It is also found in chicken or turkey skin, whole milk, and butter  Limit saturated fat to less than 7% of your total daily calories  ? Trans fat  is found in packaged foods, such as potato chips and cookies  It is also in hard margarine, some fried foods, and shortening  Do not eat foods that contain trans fats  · Limit sodium as directed  You may be told to limit sodium to 2,000 to 2,300 mg each day  Choose low-sodium or no-salt-added foods  Add little or no salt to food you prepare  Use herbs and spices in place of salt         Include the following in your heart healthy plan:  Ask your dietitian or healthcare provider how many servings to have from each of the following food groups:  · Grains:      ? Whole-wheat breads, cereals, and pastas, and brown rice    ? Low-fat, low-sodium crackers and chips    · Vegetables:      ? Broccoli, green beans, green peas, and spinach    ? Collards, kale, and lima beans    ? Carrots, sweet potatoes, tomatoes, and peppers    ? Canned vegetables with no salt added    · Fruits:      ? Bananas, peaches, pears, and pineapple    ? Grapes, raisins, and dates    ? Oranges, tangerines, grapefruit, orange juice, and grapefruit juice    ? Apricots, mangoes, melons, and papaya    ? Raspberries and strawberries    ? Canned fruit with no added sugar    · Low-fat dairy:      ? Nonfat (skim) milk, 1% milk, and low-fat almond, cashew, or soy milks fortified with calcium    ? Low-fat cheese, regular or frozen yogurt, and cottage cheese    · Meats and proteins:      ? Lean cuts of beef and pork (loin, leg, round), skinless chicken and turkey    ? Legumes, soy products, egg whites, or nuts    Limit or do not include the following in your heart healthy plan:   · Unhealthy fats and oils:      ? Whole or 2% milk, cream cheese, sour cream, or cheese    ? High-fat cuts of beef (T-bone steaks, ribs), chicken or turkey with skin, and organ meats such as liver    ? Butter, stick margarine, shortening, and cooking oils such as coconut or palm oil    · Foods and liquids high in sodium:      ? Packaged foods, such as frozen dinners, cookies, macaroni and cheese, and cereals with more than 300 mg of sodium per serving    ? Vegetables with added sodium, such as instant potatoes, vegetables with added sauces, or regular canned vegetables    ? Cured or smoked meats, such as hot dogs, de leon, and sausage    ? High-sodium ketchup, barbecue sauce, salad dressing, pickles, olives, soy sauce, or miso    · Foods and liquids high in sugar:      ? Candy, cake, cookies, pies, or doughnuts    ? Soft drinks (soda), sports drinks, or sweetened tea    ?  Canned or dry mixes for cakes, soups, sauces, or gravies    Other healthy heart guidelines:   · Do not smoke  Nicotine and other chemicals in cigarettes and cigars can cause lung and heart damage  Ask your healthcare provider for information if you currently smoke and need help to quit  E-cigarettes or smokeless tobacco still contain nicotine  Talk to your healthcare provider before you use these products  · Limit or do not drink alcohol as directed  Alcohol can damage your heart and raise your blood pressure  Your healthcare provider may give you specific daily and weekly limits  The general recommended limit is 1 drink a day for women 21 or older and for men 72 or older  Do not have more than 3 drinks in a day or 7 in a week  The recommended limit is 2 drinks a day for men 24to 59years of age  Do not have more than 4 drinks in a day or 14 in a week  A drink of alcohol is 12 ounces of beer, 5 ounces of wine, or 1½ ounces of liquor  · Exercise regularly  Exercise can help you maintain a healthy weight and improve your blood pressure and cholesterol levels  Regular exercise can also decrease your risk for heart problems  Ask your healthcare provider about the best exercise plan for you  Do not start an exercise program without asking your healthcare provider  Follow up with your doctor or cardiologist as directed:  Write down your questions so you remember to ask them during your visits  © Copyright [address] Information is for End User's use only and may not be sold, redistributed or otherwise used for commercial purposes  All illustrations and images included in CareNotes® are the copyrighted property of [employer] D [employer] M [employer] Inc  or [address] [place] [address]  The above information is an  only  It is not intended as medical advice for individual conditions or treatments  Talk to your doctor, nurse or pharmacist before following any medical regimen to see if it is safe and effective for you

## 2021-06-21 NOTE — ASSESSMENT & PLAN NOTE
COPD stable continue with Symbicort and albuterol inhaler as needed for p r n  flare ups pollen and weather now starting the summer has caused some issues for him and he is out of breath with physical exertion but generally remains about the same overall

## 2021-06-21 NOTE — PROGRESS NOTES
BMI Counseling: Body mass index is 28 1 kg/m²  The BMI is above normal  Nutrition recommendations include reducing portion sizes, decreasing overall calorie intake, 3-5 servings of fruits/vegetables daily, reducing fast food intake, consuming healthier snacks, decreasing soda and/or juice intake, moderation in carbohydrate intake, increasing intake of lean protein, reducing intake of saturated fat and trans fat and reducing intake of cholesterol  Exercise recommendations include exercising 3-5 times per week

## 2021-07-07 ENCOUNTER — TELEPHONE (OUTPATIENT)
Dept: OBGYN CLINIC | Facility: HOSPITAL | Age: 76
End: 2021-07-07

## 2021-07-07 NOTE — TELEPHONE ENCOUNTER
Patient is returning a missed call from Dr Verenice Aguilar a few minutes ago  Theresa So #[phone]

## 2021-07-09 ENCOUNTER — OFFICE VISIT (OUTPATIENT)
Dept: OBGYN CLINIC | Facility: CLINIC | Age: 76
End: 2021-07-09
Payer: COMMERCIAL

## 2021-07-09 VITALS
HEART RATE: 74 BPM | SYSTOLIC BLOOD PRESSURE: 127 MMHG | DIASTOLIC BLOOD PRESSURE: 80 MMHG | BODY MASS INDEX: 28.23 KG/M2 | WEIGHT: 213 LBS | HEIGHT: 73 IN

## 2021-07-09 DIAGNOSIS — Z96.641 HISTORY OF TOTAL HIP REPLACEMENT, RIGHT: Primary | ICD-10-CM

## 2021-07-09 DIAGNOSIS — Z96.642 HISTORY OF TOTAL HIP REPLACEMENT, LEFT: ICD-10-CM

## 2021-07-09 PROCEDURE — 3079F DIAST BP 80-89 MM HG: CPT | Performed by: ORTHOPAEDIC SURGERY

## 2021-07-09 PROCEDURE — 3008F BODY MASS INDEX DOCD: CPT | Performed by: ORTHOPAEDIC SURGERY

## 2021-07-09 PROCEDURE — 1036F TOBACCO NON-USER: CPT | Performed by: ORTHOPAEDIC SURGERY

## 2021-07-09 PROCEDURE — 1160F RVW MEDS BY RX/DR IN RCRD: CPT | Performed by: ORTHOPAEDIC SURGERY

## 2021-07-09 PROCEDURE — 99213 OFFICE O/P EST LOW 20 MIN: CPT | Performed by: ORTHOPAEDIC SURGERY

## 2021-07-09 PROCEDURE — 3074F SYST BP LT 130 MM HG: CPT | Performed by: ORTHOPAEDIC SURGERY

## 2021-07-09 NOTE — PROGRESS NOTES
Assessment/Plan:    No problem-specific Assessment & Plan notes found for this encounter  Diagnoses and all orders for this visit:    History of total hip replacement, right  -     Cancel: XR hip/pelv 2-3 vws right if performed; Future    History of total hip replacement, left  -     XR hip/pelv 2-3 vws left if performed; Future           the patient is doing quite well  Continue home exercise program   Continue stretching  Follow up on an as-needed basis  Subjective:      Patient ID: Marium Christianson is a 77 y o  male  HPI      The patient has a history of bilateral total hip replacements  He is here for an annual examination  He offers no complaints of pain  He is working and flying a lot for business  He is very pleased with his results  He is walking in the office without any assistive devices and without an antalgic gait    The following portions of the patient's history were reviewed and updated as appropriate: allergies, current medications, past family history, past medical history, past social history, past surgical history and problem list     Review of Systems   Constitutional: Negative for chills, fever and unexpected weight change  HENT: Negative for hearing loss, nosebleeds and sore throat  Eyes: Negative for pain, redness and visual disturbance  Respiratory: Negative for cough, shortness of breath and wheezing  Cardiovascular: Negative for chest pain, palpitations and leg swelling  Gastrointestinal: Negative for abdominal pain, nausea and vomiting  Endocrine: Negative for polydipsia and polyuria  Genitourinary: Negative for dysuria and hematuria  Musculoskeletal: Negative for arthralgias, back pain, gait problem, joint swelling, myalgias, neck pain and neck stiffness  As noted in HPI   Skin: Negative for rash and wound  Neurological: Negative for dizziness, numbness and headaches     Psychiatric/Behavioral: Negative for decreased concentration and suicidal ideas  The patient is not nervous/anxious  Objective:      /80   Pulse 74   Ht 6' 1" (1 854 m)   Wt 96 6 kg (213 lb)   BMI 28 10 kg/m²          Physical Exam       bilateral lower extremities are neurovascular intact  Toes are pink and mobile  Compartments are soft  Incisions are clean, dry, intact  Negative Homans  There is full range of motion along both his hips  No groin pain  No trochanteric pain  Leg lengths intact  Good quadriceps strength, hip flexor strength, knee extensor strength, as well as ankle and foot strength  Neurologically intact distally     Pulses intact     x-rays show a well-seated bilateral total hip replacements without any loosening of the prosthesis

## 2021-10-19 ENCOUNTER — OFFICE VISIT (OUTPATIENT)
Dept: CARDIOLOGY CLINIC | Facility: CLINIC | Age: 76
End: 2021-10-19
Payer: COMMERCIAL

## 2021-10-19 VITALS
HEART RATE: 61 BPM | BODY MASS INDEX: 28.1 KG/M2 | WEIGHT: 212 LBS | DIASTOLIC BLOOD PRESSURE: 80 MMHG | SYSTOLIC BLOOD PRESSURE: 130 MMHG | HEIGHT: 73 IN

## 2021-10-19 DIAGNOSIS — I48.20 CHRONIC ATRIAL FIBRILLATION (HCC): Primary | ICD-10-CM

## 2021-10-19 DIAGNOSIS — R00.1 BRADYCARDIA: ICD-10-CM

## 2021-10-19 PROCEDURE — 3008F BODY MASS INDEX DOCD: CPT | Performed by: INTERNAL MEDICINE

## 2021-10-19 PROCEDURE — 1160F RVW MEDS BY RX/DR IN RCRD: CPT | Performed by: INTERNAL MEDICINE

## 2021-10-19 PROCEDURE — 3079F DIAST BP 80-89 MM HG: CPT | Performed by: INTERNAL MEDICINE

## 2021-10-19 PROCEDURE — 93000 ELECTROCARDIOGRAM COMPLETE: CPT | Performed by: INTERNAL MEDICINE

## 2021-10-19 PROCEDURE — 99214 OFFICE O/P EST MOD 30 MIN: CPT | Performed by: INTERNAL MEDICINE

## 2021-10-19 PROCEDURE — 3075F SYST BP GE 130 - 139MM HG: CPT | Performed by: INTERNAL MEDICINE

## 2021-10-19 PROCEDURE — 1036F TOBACCO NON-USER: CPT | Performed by: INTERNAL MEDICINE

## 2021-11-01 ENCOUNTER — OFFICE VISIT (OUTPATIENT)
Dept: FAMILY MEDICINE CLINIC | Facility: CLINIC | Age: 76
End: 2021-11-01
Payer: COMMERCIAL

## 2021-11-01 VITALS
DIASTOLIC BLOOD PRESSURE: 70 MMHG | SYSTOLIC BLOOD PRESSURE: 138 MMHG | TEMPERATURE: 98 F | WEIGHT: 210.2 LBS | OXYGEN SATURATION: 98 % | BODY MASS INDEX: 27.86 KG/M2 | HEIGHT: 73 IN | HEART RATE: 70 BPM | RESPIRATION RATE: 20 BRPM

## 2021-11-01 DIAGNOSIS — I48.20 CHRONIC ATRIAL FIBRILLATION (HCC): ICD-10-CM

## 2021-11-01 DIAGNOSIS — J44.9 CHRONIC OBSTRUCTIVE PULMONARY DISEASE, UNSPECIFIED COPD TYPE (HCC): ICD-10-CM

## 2021-11-01 DIAGNOSIS — C61 ADENOCARCINOMA OF PROSTATE (HCC): ICD-10-CM

## 2021-11-01 DIAGNOSIS — K21.9 GASTROESOPHAGEAL REFLUX DISEASE WITHOUT ESOPHAGITIS: Primary | ICD-10-CM

## 2021-11-01 PROCEDURE — 3078F DIAST BP <80 MM HG: CPT | Performed by: FAMILY MEDICINE

## 2021-11-01 PROCEDURE — 3075F SYST BP GE 130 - 139MM HG: CPT | Performed by: FAMILY MEDICINE

## 2021-11-01 PROCEDURE — 1160F RVW MEDS BY RX/DR IN RCRD: CPT | Performed by: FAMILY MEDICINE

## 2021-11-01 PROCEDURE — 1036F TOBACCO NON-USER: CPT | Performed by: FAMILY MEDICINE

## 2021-11-01 PROCEDURE — 1101F PT FALLS ASSESS-DOCD LE1/YR: CPT | Performed by: FAMILY MEDICINE

## 2021-11-01 PROCEDURE — 3288F FALL RISK ASSESSMENT DOCD: CPT | Performed by: FAMILY MEDICINE

## 2021-11-01 PROCEDURE — 99397 PER PM REEVAL EST PAT 65+ YR: CPT | Performed by: FAMILY MEDICINE

## 2021-11-01 PROCEDURE — 3725F SCREEN DEPRESSION PERFORMED: CPT | Performed by: FAMILY MEDICINE

## 2021-11-12 ENCOUNTER — OFFICE VISIT (OUTPATIENT)
Dept: UROLOGY | Facility: MEDICAL CENTER | Age: 76
End: 2021-11-12
Payer: COMMERCIAL

## 2021-11-12 VITALS — WEIGHT: 210 LBS | HEIGHT: 73 IN | BODY MASS INDEX: 27.83 KG/M2

## 2021-11-12 DIAGNOSIS — C61 PROSTATE CANCER (HCC): Primary | ICD-10-CM

## 2021-11-12 PROCEDURE — 3008F BODY MASS INDEX DOCD: CPT | Performed by: FAMILY MEDICINE

## 2021-11-12 PROCEDURE — 99214 OFFICE O/P EST MOD 30 MIN: CPT | Performed by: PHYSICIAN ASSISTANT

## 2021-11-12 RX ORDER — TAMSULOSIN HYDROCHLORIDE 0.4 MG/1
CAPSULE ORAL
COMMUNITY
Start: 2021-10-29

## 2022-05-02 ENCOUNTER — OFFICE VISIT (OUTPATIENT)
Dept: FAMILY MEDICINE CLINIC | Facility: CLINIC | Age: 77
End: 2022-05-02
Payer: COMMERCIAL

## 2022-05-02 VITALS
TEMPERATURE: 96.2 F | WEIGHT: 207.8 LBS | BODY MASS INDEX: 27.54 KG/M2 | SYSTOLIC BLOOD PRESSURE: 130 MMHG | HEIGHT: 73 IN | RESPIRATION RATE: 18 BRPM | DIASTOLIC BLOOD PRESSURE: 80 MMHG | OXYGEN SATURATION: 96 % | HEART RATE: 71 BPM

## 2022-05-02 DIAGNOSIS — I48.20 CHRONIC ATRIAL FIBRILLATION (HCC): ICD-10-CM

## 2022-05-02 DIAGNOSIS — K21.9 GASTROESOPHAGEAL REFLUX DISEASE WITHOUT ESOPHAGITIS: Primary | ICD-10-CM

## 2022-05-02 DIAGNOSIS — Z12.11 SCREENING FOR COLON CANCER: ICD-10-CM

## 2022-05-02 DIAGNOSIS — H61.23 BILATERAL HEARING LOSS DUE TO CERUMEN IMPACTION: ICD-10-CM

## 2022-05-02 DIAGNOSIS — J44.9 CHRONIC OBSTRUCTIVE PULMONARY DISEASE, UNSPECIFIED COPD TYPE (HCC): ICD-10-CM

## 2022-05-02 DIAGNOSIS — C61 ADENOCARCINOMA OF PROSTATE (HCC): ICD-10-CM

## 2022-05-02 PROCEDURE — 69210 REMOVE IMPACTED EAR WAX UNI: CPT | Performed by: FAMILY MEDICINE

## 2022-05-02 PROCEDURE — 3725F SCREEN DEPRESSION PERFORMED: CPT | Performed by: FAMILY MEDICINE

## 2022-05-02 PROCEDURE — 99214 OFFICE O/P EST MOD 30 MIN: CPT | Performed by: FAMILY MEDICINE

## 2022-05-02 NOTE — PROGRESS NOTES
Assessment/Plan:       Problem List Items Addressed This Visit        Digestive    GERD (gastroesophageal reflux disease) - Primary     GERD symptoms stable overall continue on omeprazole 20 milligrams         Relevant Orders    CBC and differential    Comprehensive metabolic panel    Lipid panel    TSH, 3rd generation with Free T4 reflex       Respiratory    COPD (chronic obstructive pulmonary disease) (HCC)     COPD stable continue with albuterol and use Advair twice daily as directed         Relevant Orders    CBC and differential    Comprehensive metabolic panel    Lipid panel    TSH, 3rd generation with Free T4 reflex       Cardiovascular and Mediastinum    Chronic atrial fibrillation (HCC)     Chronic atrial fibrillation stable on Cardizem 120 milligram capsules         Relevant Orders    CBC and differential    Comprehensive metabolic panel    Lipid panel    TSH, 3rd generation with Free T4 reflex       Nervous and Auditory    Bilateral hearing loss due to cerumen impaction    Relevant Orders    Ear cerumen removal       Genitourinary    Adenocarcinoma of prostate (HCC)     Stable PSA followed by Urology annually continue with Flomax 0 4 milligrams         Relevant Orders    CBC and differential    Comprehensive metabolic panel    Lipid panel    TSH, 3rd generation with Free T4 reflex      Other Visit Diagnoses     Screening for colon cancer        Relevant Orders    Ambulatory referral to Gastroenterology            Subjective:      Patient ID: Ascencion Amin is a 69 y o  male  Patient presents for six-month checkup      The following portions of the patient's history were reviewed and updated as appropriate: allergies, current medications, past family history, past medical history, past social history, past surgical history and problem list     Review of Systems   Constitutional: Negative for chills, fatigue and fever     HENT: Negative for congestion, nosebleeds, rhinorrhea, sinus pressure and sore throat  Eyes: Negative for discharge and redness  Respiratory: Negative for cough and shortness of breath  Cardiovascular: Negative for chest pain, palpitations and leg swelling  Gastrointestinal: Negative for abdominal pain, blood in stool and nausea  Endocrine: Negative for cold intolerance, heat intolerance and polyuria  Genitourinary: Negative for dysuria and frequency  Musculoskeletal: Negative for arthralgias, back pain and myalgias  Skin: Negative for rash  Neurological: Negative for dizziness, weakness and headaches  Hematological: Negative for adenopathy  Psychiatric/Behavioral: Negative for behavioral problems and sleep disturbance  The patient is not nervous/anxious  Objective:      /80 (BP Location: Left arm, Patient Position: Sitting)   Pulse 71   Temp (!) 96 2 °F (35 7 °C)   Resp 18   Ht 6' 1" (1 854 m)   Wt 94 3 kg (207 lb 12 8 oz)   SpO2 96%   BMI 27 42 kg/m²        Physical Exam  Vitals and nursing note reviewed  Constitutional:       Appearance: Normal appearance  He is well-developed and normal weight  HENT:      Head: Normocephalic and atraumatic  Right Ear: Tympanic membrane, ear canal and external ear normal       Left Ear: Tympanic membrane, ear canal and external ear normal       Nose: Nose normal       Mouth/Throat:      Mouth: Mucous membranes are moist    Eyes:      General: No scleral icterus  Conjunctiva/sclera: Conjunctivae normal       Pupils: Pupils are equal, round, and reactive to light  Neck:      Thyroid: No thyromegaly  Vascular: No JVD  Cardiovascular:      Rate and Rhythm: Normal rate and regular rhythm  Heart sounds: Normal heart sounds  No murmur heard  Pulmonary:      Effort: Pulmonary effort is normal       Breath sounds: Normal breath sounds  No wheezing or rales  Chest:      Chest wall: No tenderness  Abdominal:      General: Bowel sounds are normal  There is no distension        Palpations: Abdomen is soft  There is no mass  Tenderness: There is no abdominal tenderness  There is no guarding or rebound  Musculoskeletal:         General: No tenderness or deformity  Normal range of motion  Cervical back: Normal range of motion and neck supple  Lymphadenopathy:      Cervical: No cervical adenopathy  Skin:     General: Skin is warm and dry  Findings: No erythema or rash  Neurological:      General: No focal deficit present  Mental Status: He is alert and oriented to person, place, and time  Mental status is at baseline  Cranial Nerves: No cranial nerve deficit  Deep Tendon Reflexes: Reflexes are normal and symmetric  Reflexes normal    Psychiatric:         Mood and Affect: Mood normal          Behavior: Behavior normal          Thought Content: Thought content normal          Judgment: Judgment normal        Ear cerumen removal    Date/Time: 5/2/2022 4:19 PM  Performed by: Rose Mary Marshall DO  Authorized by: Rose Mary Marshall DO   [place] Protocol:  Consent: Verbal consent obtained  Consent given by: patient  Patient understanding: patient states understanding of the procedure being performed  Patient identity confirmed: verbally with patient      Patient location:  Clinic  Procedure details:     Local anesthetic:  None    Location:  L ear and R ear    Procedure type: irrigation with instrumentation      Instrumentation: curette      Approach:  External  Post-procedure details:     Complication:  None    Hearing quality:  Improved    Patient tolerance of procedure: Tolerated well, no immediate complications          Data:    Laboratory Results: I have personally reviewed the pertinent laboratory results/reports   Radiology/Other Diagnostic Testing Results: I have personally reviewed pertinent reports         No results found for: WBC, HGB, HCT, MCV, PLT  No results found for: NA, K, CL, CO2, ANIONGAP, BUN, CREATININE, GLUCOSE, GLUF, CALCIUM, CORRECTEDCA, AST, ALT, ALKPHOS, PROT, BILITOT, EGFR  No results found for: CHOLESTEROL  No results found for: HDL  No results found for: LDLCALC  No results found for: TRIG  No results found for: [place], [place]  Lab Results   Component Value Date    [id number] 4 080 (H) 11/20/2020     Lab Results   Component Value Date    HGBA1C 5 9 10/25/2021     No results found for: PSA    Kellen Marquis DO

## 2022-05-02 NOTE — PROGRESS NOTES
BMI Counseling: Body mass index is 27 42 kg/m²  The BMI is above normal  Nutrition recommendations include reducing portion sizes, decreasing overall calorie intake, 3-5 servings of fruits/vegetables daily, reducing fast food intake, consuming healthier snacks, decreasing soda and/or juice intake, moderation in carbohydrate intake, increasing intake of lean protein, reducing intake of saturated fat and trans fat and reducing intake of cholesterol  Exercise recommendations include exercising 3-5 times per week

## 2022-05-27 ENCOUNTER — OFFICE VISIT (OUTPATIENT)
Dept: GASTROENTEROLOGY | Facility: CLINIC | Age: 77
End: 2022-05-27
Payer: COMMERCIAL

## 2022-05-27 VITALS
SYSTOLIC BLOOD PRESSURE: 132 MMHG | DIASTOLIC BLOOD PRESSURE: 80 MMHG | BODY MASS INDEX: 28.31 KG/M2 | HEIGHT: 72 IN | WEIGHT: 209 LBS

## 2022-05-27 DIAGNOSIS — Z86.010 HISTORY OF COLON POLYPS: ICD-10-CM

## 2022-05-27 DIAGNOSIS — K22.70 BARRETT'S ESOPHAGUS WITHOUT DYSPLASIA: Primary | ICD-10-CM

## 2022-05-27 PROCEDURE — 99214 OFFICE O/P EST MOD 30 MIN: CPT | Performed by: INTERNAL MEDICINE

## 2022-05-27 PROCEDURE — 1036F TOBACCO NON-USER: CPT | Performed by: INTERNAL MEDICINE

## 2022-05-27 PROCEDURE — 1160F RVW MEDS BY RX/DR IN RCRD: CPT | Performed by: INTERNAL MEDICINE

## 2022-05-27 NOTE — PROGRESS NOTES
[facility]      Chief Complaint:  Barretts esophagus, history of polyps    HPI:  Steven Mayberry is a 69 y o   male who presents with 2 separate GI issues  The 1st is a history of Barretts esophagus  This is a short-segment at 39 cm with no dysplasia  Last EGD was 3 years ago  His GERD is under very good control on his present medical management  He has no upper GI complaints  No heartburn  No dysphagia or odynophagia  No hoarseness  Second is a history of colon polyps his last colonoscopy being 5 years ago  He is due for repeat  He denies any abdominal pain, change in bowel habits, change in stool caliber, melena, hematochezia, rectal bleeding, tenesmus, or weight loss  He has chronic atrial fibrillation and COPD  He has chronic exertional dyspnea  He has no chest pain, dizziness, loss of consciousness, seizures, or any other problems         Review of Systems:   Constitutional: No fever or chills, feels well, no tiredness, no recent weight gain or weight loss  HENT: No complaints of earache, no hearing loss, no nosebleeds, no nasal discharge, no sore throat, no hoarseness  Eyes: No complaints of eye pain, no red eyes, no discharge from eyes, no itchy eyes  Cardiovascular: No complaints of slow heart rate, no fast heart rate, no chest pain, no palpitations, no leg claudication, no lower extremity edema  Respiratory: No complaints of shortness of breath, no wheezing, no cough, positive SOB on exertion, no orthopnea  Gastrointestinal: As noted in HPI  Genitourinary: No complaints of dysuria, no incontinence, no hesitancy, no nocturia  Musculoskeletal: No complaints of arthralgia, no myalgias, no joint swelling or stiffness, no limb pain or swelling  Neurological: No complaints of headache, no confusion, no convulsions, no numbness or tingling, no dizziness or fainting, no limb weakness, no difficulty walking      Skin: No complaints of skin rash or skin lesions, no itching, no skin wound, no dry skin  Hematological/Lymphatic: No complaints of swollen glands, does not bleed easy  Allergic/Immunologic: No immunocompromised state  Endocrine:  No complaints of polyuria, no polydipsia  Psychiatric/Behavioral: is not suicidal, no sleep disturbances, no anxiety or depression, no change in personality, no emotional problems  Historical Information   Past Medical History:   Diagnosis Date    A-fib ([place] [address] )     BPH with obstruction/lower urinary tract symptoms     COPD (chronic obstructive pulmonary disease) (HCC)     Frequency of urination     GERD (gastroesophageal reflux disease)     History of cardioversion 12/27/2011    Inital Rhythm AFIB, Final Rhythm Sinus, Max Joules 75    History of echocardiogram 06/01/2015    Normal LV systolic function, mild concentric LV hypertrophy, mild mitral and tricuspid regurg, right atrial enlargement   EF 55%    Irregular heart beat     AF    Other male erectile dysfunction     Personal history of irradiation     Personal history of malignant neoplasm of prostate     Prostate cancer ([place] [address] )      Past Surgical History:   Procedure Laterality Date    BACK SURGERY      x 3    INTRAOPERATIVE RADIATION THERAPY ([facility])  2011    JOINT REPLACEMENT Bilateral     hips    LAMINECTOMY      three levels L3 - S1 - all at different times    PATELLA SURGERY      most of this removed after injury    PROSTATE BIOPSY      TOTAL HIP ARTHROPLASTY Bilateral     VASECTOMY  1973     Social History   Social History     Substance and Sexual Activity   Alcohol Use Yes    Alcohol/week: 3 0 standard drinks    Types: 3 Standard drinks or equivalent per week    Comment: 1/2 glass of wine daily     Social History     Substance and Sexual Activity   Drug Use No     Social History     Tobacco Use   Smoking Status Former Smoker    Packs/day: 2 00    Years: 35 00    Pack years: 70 00    Types: Cigarettes    Start date: 1965    Quit date: 1998    Years since quittin 8   Smokeless Tobacco Never Used     Family History   Problem Relation Age of Onset    Heart disease Mother     No Known Problems Brother          Current Medications: has a current medication list which includes the following prescription(s): albuterol, aspirin, cholecalciferol, diltiazem, fluticasone-salmeterol, omeprazole, tamsulosin, budesonide-formoterol, and silodosin  Vital Signs: /80   Ht 6' (1 829 m)   Wt 94 8 kg (209 lb)   BMI 28 35 kg/m²       Physical Exam:   Constitutional  General Appearance: No acute distress, well appearing and well nourished  Head  Normocephalic  Eyes  Conjunctivae and lids: No swelling, erythema, or discharge  Pupils and irises: Equal, round and reactive to light  Ears, Nose, Mouth, and Throat  External inspection of ears and nose: Normal  Nasal mucosa, septum and turbinates: Normal without edema or erythema/   Oropharynx: Normal with no erythema, edema, exudate or lesions  Neck  Normal range of motion  Neck supple  Cardiovascular  Auscultation of the heart: Normal rate and rhythm, irregularly irregular S1 and S2 without murmurs  Examination of the extremities for edema and/or varicosities: Normal  Pulmonary/Chest  Respiratory effort: No increased work of breathing or signs of respiratory distress  Auscultation of lungs: Clear to auscultation, equal breath sounds bilaterally, no wheezes, rales, no rhonchi  Abdomen  Abdomen: Non-tender, no masses  Liver and spleen: No hepatomegaly or splenomegaly  Musculoskeletal  Gait and station: normal   Digits and Nails: normal without clubbing or cyanosis  Inspection/palpation of joints, bones, and muscles: Normal  Neurological  No nystagmus or asterixis  Skin  Skin and subcutaneous tissue: Normal without rashes or lesions  Lymphatic  Palpation of the lymph nodes in neck: No lymphadenopathy     Psychiatric  Orientation to person, place and time: Normal   Mood and affect: Normal          Labs:  Lab Results   Component Value Date    HGBA1C 5 9 10/25/2021         X-Rays & Procedures:   No orders to display           ______________________________________________________________________      Assessment & Plan:     Diagnoses and all orders for this visit:    Freed's esophagus without dysplasia  -     EGD; Future    History of colon polyps  -     Colonoscopy; Future      Patient will be scheduled for both EGD and colonoscopy    Further recommendations will depend on study results

## 2022-05-27 NOTE — LETTER
May 27, 2022     [facility], [address] [place], [place] [id number]    Patient: Vivian Vasques   YOB: 1945   Date of Visit: 5/27/2022       Dear Dr Boyer Chol: Thank you for referring Murali Logan to me for evaluation  Below are my notes for this consultation  If you have questions, please do not hesitate to call me  I look forward to following your patient along with you  Sincerely,        Ian Gaona MD        CC: No Recipients  Ian Gaona MD  5/27/2022 10:53 AM  Incomplete  [facility]      Chief Complaint:  Barretts esophagus, history of polyps    HPI:  Vivian Vasques is a 69 y o   male who presents with 2 separate GI issues  The 1st is a history of Barretts esophagus  This is a short-segment at 39 cm with no dysplasia  Last EGD was 3 years ago  His GERD is under very good control on his present medical management  He has no upper GI complaints  No heartburn  No dysphagia or odynophagia  No hoarseness  Second is a history of colon polyps his last colonoscopy being 5 years ago  He is due for repeat  He denies any abdominal pain, change in bowel habits, change in stool caliber, melena, hematochezia, rectal bleeding, tenesmus, or weight loss  He has chronic atrial fibrillation and COPD  He has chronic exertional dyspnea  He has no chest pain, dizziness, loss of consciousness, seizures, or any other problems         Review of Systems:   Constitutional: No fever or chills, feels well, no tiredness, no recent weight gain or weight loss  HENT: No complaints of earache, no hearing loss, no nosebleeds, no nasal discharge, no sore throat, no hoarseness  Eyes: No complaints of eye pain, no red eyes, no discharge from eyes, no itchy eyes  Cardiovascular: No complaints of slow heart rate, no fast heart rate, no chest pain, no palpitations, no leg claudication, no lower extremity edema     Respiratory: No complaints of shortness of breath, no wheezing, no cough, positive SOB on exertion, no orthopnea  Gastrointestinal: As noted in HPI  Genitourinary: No complaints of dysuria, no incontinence, no hesitancy, no nocturia  Musculoskeletal: No complaints of arthralgia, no myalgias, no joint swelling or stiffness, no limb pain or swelling  Neurological: No complaints of headache, no confusion, no convulsions, no numbness or tingling, no dizziness or fainting, no limb weakness, no difficulty walking  Skin: No complaints of skin rash or skin lesions, no itching, no skin wound, no dry skin  Hematological/Lymphatic: No complaints of swollen glands, does not bleed easy  Allergic/Immunologic: No immunocompromised state  Endocrine:  No complaints of polyuria, no polydipsia  Psychiatric/Behavioral: is not suicidal, no sleep disturbances, no anxiety or depression, no change in personality, no emotional problems  Historical Information   Past Medical History:   Diagnosis Date    A-fib ([place] [address] )     BPH with obstruction/lower urinary tract symptoms     COPD (chronic obstructive pulmonary disease) (HCC)     Frequency of urination     GERD (gastroesophageal reflux disease)     History of cardioversion 12/27/2011    Inital Rhythm AFIB, Final Rhythm Sinus, Max Joules 75    History of echocardiogram 06/01/2015    Normal LV systolic function, mild concentric LV hypertrophy, mild mitral and tricuspid regurg, right atrial enlargement   EF 55%    Irregular heart beat     AF    Other male erectile dysfunction     Personal history of irradiation     Personal history of malignant neoplasm of prostate     Prostate cancer ([facility] )      Past Surgical History:   Procedure Laterality Date    BACK SURGERY      x 3    INTRAOPERATIVE RADIATION THERAPY ([facility])  2011    JOINT REPLACEMENT Bilateral     hips    LAMINECTOMY      three levels L3 - S1 - all at different times    PATELLA SURGERY      most of this removed after injury    PROSTATE BIOPSY      TOTAL HIP ARTHROPLASTY Bilateral     VASECTOMY       Social History   Social History     Substance and Sexual Activity   Alcohol Use Yes    Alcohol/week: 3 0 standard drinks    Types: 3 Standard drinks or equivalent per week    Comment: 1/2 glass of wine daily     Social History     Substance and Sexual Activity   Drug Use No     Social History     Tobacco Use   Smoking Status Former Smoker    Packs/day: 2 00    Years: 35 00    Pack years: 70 00    Types: Cigarettes    Start date:     Quit date: 1998    Years since quittin 8   Smokeless Tobacco Never Used     Family History   Problem Relation Age of Onset    Heart disease Mother     No Known Problems Brother          Current Medications: has a current medication list which includes the following prescription(s): albuterol, aspirin, cholecalciferol, diltiazem, fluticasone-salmeterol, omeprazole, tamsulosin, budesonide-formoterol, and silodosin  Vital Signs: /80   Ht 6' (1 829 m)   Wt 94 8 kg (209 lb)   BMI 28 35 kg/m²       Physical Exam:   Constitutional  General Appearance: No acute distress, well appearing and well nourished  Head  Normocephalic  Eyes  Conjunctivae and lids: No swelling, erythema, or discharge  Pupils and irises: Equal, round and reactive to light  Ears, Nose, Mouth, and Throat  External inspection of ears and nose: Normal  Nasal mucosa, septum and turbinates: Normal without edema or erythema/   Oropharynx: Normal with no erythema, edema, exudate or lesions  Neck  Normal range of motion  Neck supple  Cardiovascular  Auscultation of the heart: Normal rate and rhythm, irregularly irregular S1 and S2 without murmurs  Examination of the extremities for edema and/or varicosities: Normal  Pulmonary/Chest  Respiratory effort: No increased work of breathing or signs of respiratory distress     Auscultation of lungs: Clear to auscultation, equal breath sounds bilaterally, no wheezes, rales, no rhonchi  Abdomen  Abdomen: Non-tender, no masses  Liver and spleen: No hepatomegaly or splenomegaly  Musculoskeletal  Gait and station: normal   Digits and Nails: normal without clubbing or cyanosis  Inspection/palpation of joints, bones, and muscles: Normal  Neurological  No nystagmus or asterixis  Skin  Skin and subcutaneous tissue: Normal without rashes or lesions  Lymphatic  Palpation of the lymph nodes in neck: No lymphadenopathy  Psychiatric  Orientation to person, place and time: Normal   Mood and affect: Normal          Labs:  Lab Results   Component Value Date    [facility] 5 9 10/25/2021         X-Rays & Procedures:   No orders to display           ______________________________________________________________________      Assessment & Plan:     Diagnoses and all orders for this visit:    Freed's esophagus without dysplasia  -     EGD; Future    History of colon polyps  -     Colonoscopy; Future      Patient will be scheduled for both EGD and colonoscopy    Further recommendations will depend on study results

## 2022-05-27 NOTE — PATIENT INSTRUCTIONS
Scheduled date of EGD/colonoscopy (as of today): 7/25  Physician performing EGD/colonoscopy: [facility]   Location of EGD/colonoscopy: [facility]  Desired bowel prep reviewed with patient:MIRALAX  Instructions reviewed with patient by:LAURA  Clearances:

## 2022-07-22 ENCOUNTER — OFFICE VISIT (OUTPATIENT)
Dept: OBGYN CLINIC | Facility: CLINIC | Age: 77
End: 2022-07-22
Payer: COMMERCIAL

## 2022-07-22 VITALS
BODY MASS INDEX: 27.7 KG/M2 | DIASTOLIC BLOOD PRESSURE: 74 MMHG | SYSTOLIC BLOOD PRESSURE: 134 MMHG | HEART RATE: 76 BPM | WEIGHT: 209 LBS | HEIGHT: 73 IN

## 2022-07-22 DIAGNOSIS — Z96.642 HISTORY OF TOTAL HIP REPLACEMENT, LEFT: ICD-10-CM

## 2022-07-22 DIAGNOSIS — Z96.641 HISTORY OF TOTAL HIP REPLACEMENT, RIGHT: Primary | ICD-10-CM

## 2022-07-22 PROCEDURE — 99213 OFFICE O/P EST LOW 20 MIN: CPT | Performed by: ORTHOPAEDIC SURGERY

## 2022-07-22 PROCEDURE — 1160F RVW MEDS BY RX/DR IN RCRD: CPT | Performed by: ORTHOPAEDIC SURGERY

## 2022-07-22 NOTE — PROGRESS NOTES
Patient Name:  Margarita Mancini  MRN:  [id number]    Assessment & Plan     History of bilateral total hip arthroplasty   1  X-rays were performed in the office and reviewed  2  Overall Bob Tobin is doing well, ROM and strength is full, he is not having any issues with his hips  3  Activities to tolerance  4  Follow up in 2 years with repeat bilateral hip x-rays        The patient is doing quite well from his bilateral total hip replacements  He has full strength full motion  Continue home exercise program   Continue stretching  Follow up in 2 years re-evaluation with new x-rays of bilateral hips-two views each  If his condition changes, he will not hesitate to let us know    History of the Present Illness     69 y o  male presents to the office today for a follow up regarding bilateral total hip arthroplasty  He is here today for his annual visit  Overall Bob Tobin is doing well  He denies any hip pain or issues with his hip  He is happy and continues to work without issue  Physical Exam     /74   Pulse 76   Ht 6' 1" (1 854 m)   Wt 94 8 kg (209 lb)   BMI 27 57 kg/m²     Bilateral hip:   No erythema, ecchymosis or edema  Well healed surgical incision   Full hip ROM bilaterally  No pain with hip ROM   Strength is 5/5  Non tender to palpation   Leg lengths intact   Neurovascularly intact   Ambulates without assistance       Data Review     I have personally reviewed pertinent films in PACS, and my interpretation follows  X-rays of the left and right hips performed in the office today demonstrate well-seated bilateral total hip replacements without any loosening of the prosthesis       Procedures    Scribe Attestation    I,:  Kyle Gtz am acting as a scribe while in the presence of the attending physician :       I,:  Jeb Barragan DO personally performed the services described in this documentation    as scribed in my presence :

## 2022-07-25 ENCOUNTER — ANESTHESIA EVENT (OUTPATIENT)
Dept: GASTROENTEROLOGY | Facility: HOSPITAL | Age: 77
End: 2022-07-25

## 2022-07-25 ENCOUNTER — HOSPITAL ENCOUNTER (OUTPATIENT)
Dept: GASTROENTEROLOGY | Facility: HOSPITAL | Age: 77
Setting detail: OUTPATIENT SURGERY
Discharge: HOME | End: 2022-07-25
Attending: INTERNAL MEDICINE | Admitting: INTERNAL MEDICINE
Payer: COMMERCIAL

## 2022-07-25 ENCOUNTER — ANESTHESIA (OUTPATIENT)
Dept: GASTROENTEROLOGY | Facility: HOSPITAL | Age: 77
End: 2022-07-25

## 2022-07-25 VITALS
HEART RATE: 80 BPM | OXYGEN SATURATION: 93 % | BODY MASS INDEX: 26.76 KG/M2 | HEIGHT: 73 IN | WEIGHT: 201.94 LBS | RESPIRATION RATE: 18 BRPM | DIASTOLIC BLOOD PRESSURE: 86 MMHG | TEMPERATURE: 97.2 F | SYSTOLIC BLOOD PRESSURE: 127 MMHG

## 2022-07-25 DIAGNOSIS — K22.70 BARRETT'S ESOPHAGUS WITHOUT DYSPLASIA: ICD-10-CM

## 2022-07-25 DIAGNOSIS — Z86.010 HISTORY OF COLON POLYPS: ICD-10-CM

## 2022-07-25 PROBLEM — E03.9 HYPOTHYROIDISM: Status: ACTIVE | Noted: 2022-07-25

## 2022-07-25 PROCEDURE — 43239 EGD BIOPSY SINGLE/MULTIPLE: CPT | Performed by: INTERNAL MEDICINE

## 2022-07-25 PROCEDURE — 88305 TISSUE EXAM BY PATHOLOGIST: CPT | Performed by: PATHOLOGY

## 2022-07-25 PROCEDURE — 45378 DIAGNOSTIC COLONOSCOPY: CPT | Performed by: INTERNAL MEDICINE

## 2022-07-25 RX ORDER — SODIUM CHLORIDE, SODIUM LACTATE, POTASSIUM CHLORIDE, CALCIUM CHLORIDE 600; 310; 30; 20 MG/100ML; MG/100ML; MG/100ML; MG/100ML
125 INJECTION, SOLUTION INTRAVENOUS CONTINUOUS
Status: CANCELLED | OUTPATIENT
Start: 2022-07-25

## 2022-07-25 RX ORDER — LIDOCAINE HYDROCHLORIDE 20 MG/ML
INJECTION, SOLUTION EPIDURAL; INFILTRATION; INTRACAUDAL; PERINEURAL AS NEEDED
Status: DISCONTINUED | OUTPATIENT
Start: 2022-07-25 | End: 2022-07-25

## 2022-07-25 RX ORDER — SODIUM CHLORIDE, SODIUM LACTATE, POTASSIUM CHLORIDE, CALCIUM CHLORIDE 600; 310; 30; 20 MG/100ML; MG/100ML; MG/100ML; MG/100ML
125 INJECTION, SOLUTION INTRAVENOUS CONTINUOUS
Status: DISCONTINUED | OUTPATIENT
Start: 2022-07-25 | End: 2022-07-29 | Stop reason: HOSPADM

## 2022-07-25 RX ORDER — PROPOFOL 10 MG/ML
INJECTION, EMULSION INTRAVENOUS AS NEEDED
Status: DISCONTINUED | OUTPATIENT
Start: 2022-07-25 | End: 2022-07-25

## 2022-07-25 RX ORDER — SODIUM CHLORIDE, SODIUM LACTATE, POTASSIUM CHLORIDE, CALCIUM CHLORIDE 600; 310; 30; 20 MG/100ML; MG/100ML; MG/100ML; MG/100ML
INJECTION, SOLUTION INTRAVENOUS CONTINUOUS PRN
Status: DISCONTINUED | OUTPATIENT
Start: 2022-07-25 | End: 2022-07-25

## 2022-07-25 RX ADMIN — PROPOFOL 50 MG: 10 INJECTION, EMULSION INTRAVENOUS at 09:50

## 2022-07-25 RX ADMIN — SODIUM CHLORIDE, SODIUM LACTATE, POTASSIUM CHLORIDE, AND CALCIUM CHLORIDE 125 ML/HR: .6; .31; .03; .02 INJECTION, SOLUTION INTRAVENOUS at 08:39

## 2022-07-25 RX ADMIN — PROPOFOL 80 MG: 10 INJECTION, EMULSION INTRAVENOUS at 09:39

## 2022-07-25 RX ADMIN — PROPOFOL 50 MG: 10 INJECTION, EMULSION INTRAVENOUS at 09:43

## 2022-07-25 RX ADMIN — SODIUM CHLORIDE, SODIUM LACTATE, POTASSIUM CHLORIDE, AND CALCIUM CHLORIDE: .6; .31; .03; .02 INJECTION, SOLUTION INTRAVENOUS at 09:32

## 2022-07-25 RX ADMIN — LIDOCAINE HYDROCHLORIDE 100 MG: 20 INJECTION, SOLUTION EPIDURAL; INFILTRATION; INTRACAUDAL; PERINEURAL at 09:39

## 2022-07-25 NOTE — H&P
History and Physical - [facility]  Aleta Kolb 69 y o  male MRN: [id number]                  HPI: Aleta Kolb is a 69y o  year old male who presents for EGD and colonoscopy for a history of Barretts esophagus, history of colon polyps  Last colonoscopy 5 years ago      REVIEW OF SYSTEMS: Per the HPI, and otherwise unremarkable  Historical Information   Past Medical History:   Diagnosis Date    A-fib ([facility] )     BPH with obstruction/lower urinary tract symptoms     Colon polyp     COPD (chronic obstructive pulmonary disease) (HCC)     Frequency of urination     GERD (gastroesophageal reflux disease)     History of cardioversion 12/27/2011    Inital Rhythm AFIB, Final Rhythm Sinus, [facility] [address]    History of echocardiogram 06/01/2015    Normal LV systolic function, mild concentric LV hypertrophy, mild mitral and tricuspid regurg, right atrial enlargement   EF 55%    Irregular heart beat     AF    Other male erectile dysfunction     Personal history of irradiation     Personal history of malignant neoplasm of prostate     Prostate cancer ([facility] )      Past Surgical History:   Procedure Laterality Date    BACK SURGERY      x 3    INTRAOPERATIVE RADIATION THERAPY ([facility])  2011    JOINT REPLACEMENT Bilateral     hips    LAMINECTOMY      three levels L3 - S1 - all at different times    PATELLA SURGERY      most of this removed after injury    PROSTATE BIOPSY      TOTAL HIP ARTHROPLASTY Bilateral     VASECTOMY  1973     Social History   Social History     Substance and Sexual Activity   Alcohol Use Yes    Alcohol/week: 3 0 standard drinks    Types: 3 Standard drinks or equivalent per week    Comment: 1/2 glass of wine daily     Social History     Substance and Sexual Activity   Drug Use No     Social History     Tobacco Use   Smoking Status Former Smoker    Packs/day: 2 00    Years: 35 00    Pack years: 70 00    Types: Cigarettes    Start date: 1965    Quit date: 1998    Years since quittin 0   Smokeless Tobacco Never Used     Family History   Problem Relation Age of Onset    Heart disease Mother     No Known Problems Brother        Meds/Allergies     (Not in a hospital admission)      No Known Allergies    Objective     Blood pressure 129/91, pulse 95, temperature 97 6 °F (36 4 °C), temperature source Temporal, resp  rate 20, height 6' 1" (1 854 m), weight 91 6 kg (201 lb 15 1 oz), SpO2 97 %        PHYSICAL EXAM    Gen: NAD  CV: RRR  CHEST: Clear  ABD: soft, NT/ND  EXT: no edema  Neuro: AAO      ASSESSMENT/PLAN:  This is a 69y o  year old male here for Freed's esophagus, history of polyps    PLAN:   Procedure:  EGD and colonoscopy

## 2022-07-25 NOTE — DISCHARGE INSTRUCTIONS
Colonoscopy   WHAT YOU NEED TO KNOW:   A colonoscopy is a procedure to examine the inside of your colon (intestine) with a scope  Polyps or tissue growths may have been removed during your colonoscopy  It is normal to feel bloated and to have some abdominal discomfort  You should be passing gas  If you have hemorrhoids or you had polyps removed, you may have a small amount of bleeding  DISCHARGE INSTRUCTIONS:   Seek care immediately if:   You have sudden, severe abdominal pain  You have problems swallowing  You have a large amount of black, sticky bowel movements or blood in your bowel movements  You have sudden trouble breathing  You feel weak, lightheaded, or faint or your heart beats faster than normal for you  Contact your healthcare provider if:   You have a fever and chills  You have nausea or are vomiting  Your abdomen is bloated or feels full and hard  You have abdominal pain  You have black, sticky bowel movements or blood in your bowel movements  You have not had a bowel movement for 3 days after your procedure  You have rash or hives  You have questions or concerns about your procedure  Activity:   Do not lift, strain, or run for 24 hours after your procedure  Rest after your procedure  You have been given medicine to relax you  Do not drive or make important decisions until the day after your procedure  Return to your normal activity as directed  Relieve gas and discomfort from bloating by lying on your right side with a heating pad on your abdomen  You may need to take short walks to help the gas move out  Eat small meals until bloating is relieved  Follow up with your healthcare provider as directed: Write down your questions so you remember to ask them during your visits  If you take a blood thinner, please review the specific instructions from your endoscopist about when you should resume it   These can be found in the Recommendation and Your Medication list sections of this After Visit Summary      Flor Jimenez

## 2022-07-25 NOTE — ANESTHESIA PREPROCEDURE EVALUATION
Procedure:  COLONOSCOPY  EGD    Relevant Problems   CARDIO   (+) Chronic atrial fibrillation (HCC)      ENDO   (+) Hypothyroidism      GI/HEPATIC   (+) GERD (gastroesophageal reflux disease)      /RENAL   (+) Adenocarcinoma of prostate (HCC)      PULMONARY   (+) Asthma   (+) COPD (chronic obstructive pulmonary disease) (HCC)        Physical Exam    Airway    Mallampati score: II  TM Distance: >3 FB  Neck ROM: full     Dental   Comment: Denies loose teeth,     Cardiovascular  Cardiovascular exam normal    Pulmonary  Pulmonary exam normal     Other Findings  Portions of exam deferred due to low yield and/or unknown COVID status      Anesthesia Plan  ASA Score- 3     Anesthesia Type- IV sedation with anesthesia with ASA Monitors  Additional Monitors:   Airway Plan:           Plan Factors-Exercise tolerance (METS): >4 METS  Chart reviewed  Existing labs reviewed  Patient summary reviewed  Patient is not a current smoker  Induction- intravenous  Postoperative Plan-     Informed Consent- Anesthetic plan and risks discussed with patient  I personally reviewed this patient with the CRNA  Discussed and agreed on the Anesthesia Plan with the CRNA  Robert Pantoja

## 2022-07-25 NOTE — ANESTHESIA POSTPROCEDURE EVALUATION
Post-Op Assessment Note    CV Status:  Stable    Pain management: adequate     Mental Status:  Alert and awake   Hydration Status:  Euvolemic   PONV Controlled:  Controlled   Airway Patency:  Patent      Post Op Vitals Reviewed: Yes      Staff: CRNA         No complications documented      BP   116/80   Temp      Pulse  78   Resp   19   SpO2 97
Spontaneous, unlabored and symmetrical

## 2022-08-16 ENCOUNTER — TELEPHONE (OUTPATIENT)
Dept: GASTROENTEROLOGY | Facility: CLINIC | Age: 77
End: 2022-08-16

## 2022-10-22 ENCOUNTER — OFFICE VISIT (OUTPATIENT)
Dept: URGENT CARE | Facility: CLINIC | Age: 77
End: 2022-10-22
Payer: COMMERCIAL

## 2022-10-22 VITALS — HEART RATE: 59 BPM | TEMPERATURE: 97 F | OXYGEN SATURATION: 95 % | RESPIRATION RATE: 18 BRPM

## 2022-10-22 DIAGNOSIS — R42 VERTIGO: Primary | ICD-10-CM

## 2022-10-22 PROBLEM — H91.90 HEARING LOSS: Status: ACTIVE | Noted: 2022-10-22

## 2022-10-22 PROBLEM — Z86.010 HISTORY OF COLONIC POLYPS: Status: ACTIVE | Noted: 2022-10-22

## 2022-10-22 PROBLEM — H90.3 SENSORINEURAL HEARING LOSS, BILATERAL: Status: ACTIVE | Noted: 2022-10-22

## 2022-10-22 PROBLEM — Z85.45: Status: ACTIVE | Noted: 2022-10-22

## 2022-10-22 PROBLEM — Z86.0100 HISTORY OF COLONIC POLYPS: Status: ACTIVE | Noted: 2022-10-22

## 2022-10-22 PROBLEM — J41.0 SIMPLE CHRONIC BRONCHITIS (HCC): Status: ACTIVE | Noted: 2019-05-20

## 2022-10-22 PROBLEM — G47.33 OSA (OBSTRUCTIVE SLEEP APNEA): Status: ACTIVE | Noted: 2019-05-20

## 2022-10-22 PROBLEM — M16.9 OSTEOARTHRITIS OF HIP: Status: ACTIVE | Noted: 2022-10-22

## 2022-10-22 PROCEDURE — 93005 ELECTROCARDIOGRAM TRACING: CPT

## 2022-10-22 PROCEDURE — 99213 OFFICE O/P EST LOW 20 MIN: CPT

## 2022-10-22 RX ORDER — ONDANSETRON 4 MG/1
4 TABLET, ORALLY DISINTEGRATING ORAL EVERY 6 HOURS PRN
Qty: 20 TABLET | Refills: 0 | Status: SHIPPED | OUTPATIENT
Start: 2022-10-22

## 2022-10-22 RX ORDER — MECLIZINE HYDROCHLORIDE 25 MG/1
25 TABLET ORAL 3 TIMES DAILY PRN
Qty: 30 TABLET | Refills: 0 | Status: SHIPPED | OUTPATIENT
Start: 2022-10-22

## 2022-10-22 NOTE — PROGRESS NOTES
[address] [facility]        NAME: Tere Diaz is a 69 y o  male  : 1945    MRN: [id number]  DATE: 2022  TIME: 2:19 PM    Assessment and Plan   Vertigo [R42]  1  Vertigo  ECG 12 lead    meclizine (ANTIVERT) 25 mg tablet    ondansetron (Zofran ODT) 4 mg disintegrating tablet     Normal exam, start on antivert for symptoms and zofran as needed  Follow up with primary care in 3-5 days if symptoms do not resolve for further workup  Go to ER if symptoms get worse  Patient Instructions     Normal exam, start on antivert for symptoms and zofran as needed  Follow up with primary care in 3-5 days if symptoms do not resolve for further workup  Follow up with PCP in 3-5 days  Proceed to ER if symptoms worsen  Chief Complaint     Chief Complaint   Patient presents with   • Dizziness     States dizziness starts spinning when he goes to lay down  C/o nausea while its happening  States when he is standing he is fine  When he attempts to lazy down its gets bad  History of Present Illness       Presents with room spinning symptoms that started today  Symptoms are worse when lying down improved when standing  He does get nausea with the dizziness symptoms  PMH includes COPD, A fib, and tinnitus  He is usually in a fib controlled  No history of vertigo previously  No recent illness  No symptoms at all as long as standing/up in recliner  No confusion, AMS, chest pain, palpations, or change in shortness of breath  Review of Systems   Review of Systems   Constitutional: Negative for chills and fever  HENT: Negative for ear pain and sore throat  Respiratory: Negative for cough and shortness of breath  Cardiovascular: Negative for chest pain and palpitations  Gastrointestinal: Positive for nausea  Negative for abdominal pain, constipation, diarrhea and vomiting  Genitourinary: Negative for dysuria  Musculoskeletal: Negative for myalgias     Skin: Negative for color change and rash  Neurological: Positive for dizziness  Psychiatric/Behavioral: Negative for confusion  All other systems reviewed and are negative  Current Medications       Current Outpatient Medications:   •  albuterol (PROVENTIL HFA,VENTOLIN HFA) 90 mcg/act inhaler, Inhale 2 puffs every 6 (six) hours as needed for wheezing, Disp: , Rfl:   •  aspirin 81 MG tablet, Take 1 tablet by mouth daily, Disp: , Rfl:   •  budesonide-formoterol (SYMBICORT) 160-4 5 mcg/act inhaler, INHALE 2 PUFFS BY MOUTH TWICE A DAY *RINSE MOUTH WITH WATER AND SPIT*, Disp: , Rfl:   •  diltiazem (CARDIZEM CD) 120 mg 24 hr capsule, TAKE 1 CAPSULE BY MOUTH  DAILY, Disp: 90 capsule, Rfl: 3  •  meclizine (ANTIVERT) 25 mg tablet, Take 1 tablet (25 mg total) by mouth 3 (three) times a day as needed for dizziness, Disp: 30 tablet, Rfl: 0  •  omeprazole (PriLOSEC OTC) 20 MG tablet, Take 1 tablet by mouth daily, Disp: , Rfl:   •  ondansetron (Zofran ODT) 4 mg disintegrating tablet, Take 1 tablet (4 mg total) by mouth every 6 (six) hours as needed for nausea or vomiting, Disp: 20 tablet, Rfl: 0  •  tamsulosin (FLOMAX) 0 4 mg, TAKE ONE CAPSULE BY MOUTH DAILY FOR PROSTATE/NIGHTTIME URINATION (BPH), Disp: , Rfl:   •  Cholecalciferol 50 MCG (2000 UT) TABS, TAKE ONE TABLET BY MOUTH DAILY (FOR LOW VITAMIN D) (Patient not taking: Reported on 7/22/2022), Disp: , Rfl:   •  fluticasone-salmeterol (Advair) 500-50 mcg/dose inhaler, Inhale 1 puff 2 (two) times a day Rinse mouth after use   (Patient not taking: Reported on 10/22/2022), Disp: , Rfl:   •  Silodosin 8 MG CAPS, TAKE 1 CAPSULE BY MOUTH  DAILY (Patient not taking: No sig reported), Disp: 90 capsule, Rfl: 3    Current Allergies     Allergies as of 10/22/2022   • (No Known Allergies)            The following portions of the patient's history were reviewed and updated as appropriate: allergies, current medications, past family history, past medical history, past social history, past surgical history and problem list      Past Medical History:   Diagnosis Date   • A-fib ([facility] )    • BPH with obstruction/lower urinary tract symptoms    • Colon polyp    • COPD (chronic obstructive pulmonary disease) (HCC)    • Frequency of urination    • GERD (gastroesophageal reflux disease)    • History of cardioversion 12/27/2011    Inital Rhythm AFIB, Final Rhythm Sinus, [facility] [address]   • History of echocardiogram 06/01/2015    Normal LV systolic function, mild concentric LV hypertrophy, mild mitral and tricuspid regurg, right atrial enlargement  EF 55%   • Irregular heart beat     AF   • Other male erectile dysfunction    • Personal history of irradiation    • Personal history of malignant neoplasm of prostate    • Prostate cancer [facility])        Past Surgical History:   Procedure Laterality Date   • BACK SURGERY      x 3   • INTRAOPERATIVE RADIATION THERAPY ([facility])  2011   • JOINT REPLACEMENT Bilateral     hips   • LAMINECTOMY      three levels L3 - S1 - all at different times   • PATELLA SURGERY      most of this removed after injury   • PROSTATE BIOPSY     • TOTAL HIP ARTHROPLASTY Bilateral    • VASECTOMY  1973       Family History   Problem Relation Age of Onset   • Heart disease Mother    • No Known Problems Brother          Medications have been verified  Objective   Pulse 59   Temp (!) 97 °F (36 1 °C)   Resp 18   SpO2 95%        Physical Exam     Physical Exam  Vitals reviewed  Constitutional:       General: He is not in acute distress  Appearance: Normal appearance  HENT:      Right Ear: Tympanic membrane, ear canal and external ear normal  There is no impacted cerumen  Left Ear: Tympanic membrane, ear canal and external ear normal  There is no impacted cerumen  Nose: Nose normal       Mouth/Throat:      Mouth: Mucous membranes are moist       Pharynx: No posterior oropharyngeal erythema     Eyes:      Conjunctiva/sclera: Conjunctivae normal    Cardiovascular:      Rate and Rhythm: Normal rate and regular rhythm  Pulses: Normal pulses  Heart sounds: Normal heart sounds  No murmur heard  Pulmonary:      Effort: Pulmonary effort is normal  No respiratory distress  Breath sounds: Normal breath sounds  Musculoskeletal:         General: Normal range of motion  Skin:     General: Skin is warm and dry  Neurological:      General: No focal deficit present  Mental Status: He is alert and oriented to person, place, and time  GCS: GCS eye subscore is 4  GCS verbal subscore is 5  GCS motor subscore is 6  Cranial Nerves: Cranial nerves are intact  Sensory: Sensation is intact  Motor: Motor function is intact  No weakness  Coordination: Coordination is intact  Gait: Gait is intact     Psychiatric:         Mood and Affect: Mood normal          Behavior: Behavior normal

## 2022-10-23 LAB
ATRIAL RATE: 66 BPM
QRS AXIS: -51 DEGREES
QRSD INTERVAL: 104 MS
QT INTERVAL: 400 MS
QTC INTERVAL: 422 MS
T WAVE AXIS: -8 DEGREES
VENTRICULAR RATE: 67 BPM

## 2022-10-23 PROCEDURE — 93010 ELECTROCARDIOGRAM REPORT: CPT | Performed by: INTERNAL MEDICINE

## 2022-10-31 LAB — HBA1C MFR BLD HPLC: 5.5 %

## 2022-11-14 ENCOUNTER — OFFICE VISIT (OUTPATIENT)
Dept: FAMILY MEDICINE CLINIC | Facility: CLINIC | Age: 77
End: 2022-11-14

## 2022-11-14 VITALS
WEIGHT: 208.2 LBS | BODY MASS INDEX: 27.59 KG/M2 | OXYGEN SATURATION: 98 % | TEMPERATURE: 97.8 F | DIASTOLIC BLOOD PRESSURE: 70 MMHG | HEART RATE: 62 BPM | RESPIRATION RATE: 20 BRPM | HEIGHT: 73 IN | SYSTOLIC BLOOD PRESSURE: 116 MMHG

## 2022-11-14 DIAGNOSIS — K21.9 GASTROESOPHAGEAL REFLUX DISEASE WITHOUT ESOPHAGITIS: ICD-10-CM

## 2022-11-14 DIAGNOSIS — R42 VERTIGO: ICD-10-CM

## 2022-11-14 DIAGNOSIS — E03.9 HYPOTHYROIDISM, UNSPECIFIED TYPE: ICD-10-CM

## 2022-11-14 DIAGNOSIS — I48.20 CHRONIC ATRIAL FIBRILLATION (HCC): ICD-10-CM

## 2022-11-14 DIAGNOSIS — M47.16 OSTEOARTHRITIS OF LUMBAR SPINE WITH MYELOPATHY: ICD-10-CM

## 2022-11-14 DIAGNOSIS — J44.9 CHRONIC OBSTRUCTIVE PULMONARY DISEASE, UNSPECIFIED COPD TYPE (HCC): Primary | ICD-10-CM

## 2022-11-14 DIAGNOSIS — C61 ADENOCARCINOMA OF PROSTATE (HCC): ICD-10-CM

## 2022-11-14 NOTE — ASSESSMENT & PLAN NOTE
Patient will continue with Symbicort and use Ventolin rescue inhaler p r n   Cough recently mildly productive no fever he will follow-up with Pulmonary Medicine

## 2022-11-14 NOTE — ASSESSMENT & PLAN NOTE
Benign positional vertigo with labyrinth itis symptoms and this is been ongoing for about 2 months    I do recommend physical therapy for balance

## 2022-11-14 NOTE — ASSESSMENT & PLAN NOTE
Patient has difficulty walking distances  He needs to stop and rest due to pain and weakness in his legs he will now obtain a disability 's placard    Additionally COPD affects his breathing

## 2022-11-14 NOTE — PROGRESS NOTES
[facility]    NAME: Natalya Hill  AGE: 69 y o  SEX: male  : 1945     DATE: 2022     Assessment and Plan:     Problem List Items Addressed This Visit        Digestive    GERD (gastroesophageal reflux disease)     Stable currently with omeprazole continue same dosage without change            Endocrine    Hypothyroidism     Hypothyroidism stable continue same laboratory surveillance         Relevant Orders    CBC and differential    Comprehensive metabolic panel    Lipid panel    TSH, 3rd generation with Free T4 reflex       Respiratory    COPD (chronic obstructive pulmonary disease) ([facility] [address] ) - Primary     Patient will continue with Symbicort and use Ventolin rescue inhaler p r n  Cough recently mildly productive no fever he will follow-up with Pulmonary Medicine            Cardiovascular and Mediastinum    Chronic atrial fibrillation (HCC)     Stable atrial fibrillation continue same medication regimen diltiazem         Relevant Orders    CBC and differential    Comprehensive metabolic panel    Lipid panel    TSH, 3rd generation with Free T4 reflex       Nervous and Auditory    Osteoarthritis of lumbar spine with myelopathy     Patient has difficulty walking distances  He needs to stop and rest due to pain and weakness in his legs he will now obtain a disability 's placard  Additionally COPD affects his breathing            Genitourinary    Adenocarcinoma of prostate ([facility] [address] )     Monitor PSA follow-up with Urology stable currently no worsening urinary tract symptoms            Other    Vertigo     Benign positional vertigo with labyrinth itis symptoms and this is been ongoing for about 2 months  I do recommend physical therapy for balance         Relevant Orders    Ambulatory Referral to Physical Therapy          Immunizations and preventive care screenings were discussed with patient today   Appropriate education was printed on patient's after visit summary  Discussed risks and benefits of prostate cancer screening  We discussed the controversial history of PSA screening for prostate cancer in the [place] as well as the risk of over detection and over treatment of prostate cancer by way of PSA screening  The patient understands that PSA blood testing is an imperfect way to screen for prostate cancer and that elevated PSA levels in the blood may also be caused by infection, inflammation, prostatic trauma or manipulation, urological procedures, or by benign prostatic enlargement  The role of the digital rectal examination in prostate cancer screening was also discussed and I discussed with him that there is large interobserver variability in the findings of digital rectal examination  Counseling:  Alcohol/drug use: discussed moderation in alcohol intake, the recommendations for healthy alcohol use, and avoidance of illicit drug use  Dental Health: discussed importance of regular tooth brushing, flossing, and dental visits  Injury prevention: discussed safety/seat belts, safety helmets, smoke detectors, carbon dioxide detectors, and smoking near bedding or upholstery  Sexual health: discussed sexually transmitted diseases, partner selection, use of condoms, avoidance of unintended pregnancy, and contraceptive alternatives  · Exercise: the importance of regular exercise/physical activity was discussed  Recommend exercise 3-5 times per week for at least 30 minutes  Return in about 6 months (around 5/14/2023)       Chief Complaint:     Chief Complaint   Patient presents with   • Follow-up     6 months with labs, Oct 20th morning patient stated he experienced bad vertigo with vomiting and then for the next 3 months was having trouble with this while laying down only in the last week has he been able to lay down to sleep but still having trouble when moving head around   • Physical Exam     With paperwork      History of Present Illness:     Adult Annual Physical   Patient here for a comprehensive physical exam  The patient reports no problems  Diet and Physical Activity  · Diet/Nutrition: well balanced diet  · Exercise: no formal exercise  Depression Screening  PHQ-2/9 Depression Screening         General Health  · Sleep: sleeps well  · Hearing: normal - bilateral   · Vision: no vision problems  · Dental: regular dental visits   Health  · Symptoms include: none     Review of Systems:     Review of Systems   Constitutional: Negative for chills, fatigue and fever  HENT: Negative for congestion, nosebleeds, rhinorrhea, sinus pressure and sore throat  Eyes: Negative for discharge and redness  Respiratory: Negative for cough and shortness of breath  Cardiovascular: Negative for chest pain, palpitations and leg swelling  Gastrointestinal: Negative for abdominal pain, blood in stool and nausea  Endocrine: Negative for cold intolerance, heat intolerance and polyuria  Genitourinary: Negative for dysuria and frequency  Musculoskeletal: Negative for arthralgias, back pain and myalgias  Skin: Negative for rash  Neurological: Positive for dizziness  Negative for weakness and headaches  Hematological: Negative for adenopathy  Psychiatric/Behavioral: Negative for behavioral problems and sleep disturbance  The patient is not nervous/anxious         Past Medical History:     Past Medical History:   Diagnosis Date   • A-fib ([facility] [address] )    • BPH with obstruction/lower urinary tract symptoms    • Colon polyp    • COPD (chronic obstructive pulmonary disease) (HCC)    • Frequency of urination    • GERD (gastroesophageal reflux disease)    • History of cardioversion 12/27/2011    Inital Rhythm AFIB, Final Rhythm Sinus, [address]   • History of echocardiogram 06/01/2015    Normal LV systolic function, mild concentric LV hypertrophy, mild mitral and tricuspid regurg, right atrial enlargement  EF 55%   • Irregular heart beat     AF   • Other male erectile dysfunction    • Personal history of irradiation    • Personal history of malignant neoplasm of prostate    • Prostate cancer [facility])       Past Surgical History:     Past Surgical History:   Procedure Laterality Date   • BACK SURGERY      x 3   • INTRAOPERATIVE RADIATION THERAPY ([facility])     • JOINT REPLACEMENT Bilateral     hips   • LAMINECTOMY      three levels L3 - S1 - all at different times   • PATELLA SURGERY      most of this removed after injury   • PROSTATE BIOPSY     • TOTAL HIP ARTHROPLASTY Bilateral    • VASECTOMY        Family History:     Family History   Problem Relation Age of Onset   • Heart disease Mother    • No Known Problems Brother       Social History:     Social History     Socioeconomic History   • Marital status: /Civil Union     Spouse name: None   • Number of children: None   • Years of education: None   • Highest education level: None   Occupational History   • None   Tobacco Use   • Smoking status: Former Smoker     Packs/day: 2 00     Years: 35 00     Pack years: 70 00     Types: Cigarettes     Start date:      Quit date: 1998     Years since quittin 3   • Smokeless tobacco: Never Used   Vaping Use   • Vaping Use: Never used   Substance and Sexual Activity   • Alcohol use:  Yes     Alcohol/week: 7 0 standard drinks     Types: 7 Standard drinks or equivalent per week     Comment: 1/2 glass of wine daily   • Drug use: No   • Sexual activity: None   Other Topics Concern   • None   Social History Narrative    Daily caffeine use- 2 cups of coffee, 1-2 bottles of green tea     Social Determinants of Health     Financial Resource Strain: Not on file   Food Insecurity: Not on file   Transportation Needs: Not on file   Physical Activity: Not on file   Stress: Not on file   Social Connections: Not on file   Intimate Partner Violence: Not on file   Housing Stability: Not on file      Current Medications:     Current Outpatient Medications   Medication Sig Dispense Refill   • albuterol (PROVENTIL HFA,VENTOLIN HFA) 90 mcg/act inhaler Inhale 2 puffs every 6 (six) hours as needed for wheezing     • aspirin 81 MG tablet Take 1 tablet by mouth daily     • diltiazem (CARDIZEM CD) 120 mg 24 hr capsule TAKE 1 CAPSULE BY MOUTH  DAILY 90 capsule 3   • fluticasone-salmeterol (Advair) 500-50 mcg/dose inhaler Inhale 1 puff 2 (two) times a day Rinse mouth after use  • omeprazole (PriLOSEC OTC) 20 MG tablet Take 1 tablet by mouth daily     • budesonide-formoterol (SYMBICORT) 160-4 5 mcg/act inhaler INHALE 2 PUFFS BY MOUTH TWICE A DAY *RINSE MOUTH WITH WATER AND SPIT*     • Cholecalciferol 50 MCG (2000 UT) TABS TAKE ONE TABLET BY MOUTH DAILY (FOR LOW VITAMIN D) (Patient not taking: No sig reported)     • meclizine (ANTIVERT) 25 mg tablet Take 1 tablet (25 mg total) by mouth 3 (three) times a day as needed for dizziness (Patient not taking: Reported on 11/14/2022) 30 tablet 0   • ondansetron (Zofran ODT) 4 mg disintegrating tablet Take 1 tablet (4 mg total) by mouth every 6 (six) hours as needed for nausea or vomiting (Patient not taking: Reported on 11/14/2022) 20 tablet 0   • Silodosin 8 MG CAPS TAKE 1 CAPSULE BY MOUTH  DAILY (Patient not taking: No sig reported) 90 capsule 3   • tamsulosin (FLOMAX) 0 4 mg TAKE ONE CAPSULE BY MOUTH DAILY FOR PROSTATE/NIGHTTIME URINATION (BPH) (Patient not taking: Reported on 11/14/2022)       No current facility-administered medications for this visit  Allergies:     No Known Allergies   Physical Exam:     /70 (BP Location: Left arm, Patient Position: Sitting, Cuff Size: Standard)   Pulse 62   Temp 97 8 °F (36 6 °C)   Resp 20   Ht 6' 1" (1 854 m)   Wt 94 4 kg (208 lb 3 2 oz)   SpO2 98%   BMI 27 47 kg/m²     Physical Exam  Vitals and nursing note reviewed  Constitutional:       Appearance: Normal appearance  He is well-developed and normal weight     HENT: Head: Normocephalic and atraumatic  Right Ear: Tympanic membrane, ear canal and external ear normal       Left Ear: Tympanic membrane, ear canal and external ear normal       Nose: Nose normal       Mouth/Throat:      Mouth: Mucous membranes are moist       Pharynx: Oropharynx is clear  Eyes:      Extraocular Movements: Extraocular movements intact  Conjunctiva/sclera: Conjunctivae normal       Pupils: Pupils are equal, round, and reactive to light  Cardiovascular:      Rate and Rhythm: Normal rate and regular rhythm  Pulses: Normal pulses  Heart sounds: Normal heart sounds  No murmur heard  Pulmonary:      Effort: Pulmonary effort is normal  No respiratory distress  Breath sounds: Normal breath sounds  Abdominal:      General: Bowel sounds are normal       Palpations: Abdomen is soft  Tenderness: There is no abdominal tenderness  Musculoskeletal:         General: Normal range of motion  Cervical back: Normal range of motion and neck supple  Skin:     General: Skin is warm and dry  Capillary Refill: Capillary refill takes less than 2 seconds  Neurological:      General: No focal deficit present  Mental Status: He is alert  Mental status is at baseline  Psychiatric:         Mood and Affect: Mood normal          Thought Content:  Thought content normal          Judgment: Judgment normal           DO NATANAEL [address]

## 2022-11-15 ENCOUNTER — EVALUATION (OUTPATIENT)
Dept: PHYSICAL THERAPY | Age: 77
End: 2022-11-15

## 2022-11-15 DIAGNOSIS — H81.13 BENIGN PAROXYSMAL VERTIGO OF BOTH EARS: ICD-10-CM

## 2022-11-15 DIAGNOSIS — R42 VERTIGO: ICD-10-CM

## 2022-11-15 DIAGNOSIS — R42 DIZZINESS: Primary | ICD-10-CM

## 2022-11-15 NOTE — PROGRESS NOTES
PT Evaluation     Today's date: 11/15/2022  Patient name: Minor Lorenz  : [birth year]  MRN: [id number]  Referring provider: Janee Poole DO  Dx:   Encounter Diagnosis     ICD-10-CM    1  Dizziness  R42    2  Benign paroxysmal vertigo of both ears  H81 13    3  Vertigo  R42 Ambulatory Referral to Physical Therapy       Start Time: 0645  Stop Time: 0740  Total time in clinic (min): 55 minutes    Assessment  Assessment details: Minor Lorenz is a 69 y o  male who presents with decreased ROM and dizziness  Due to these impairments, Patient has difficulty performing a/iadls and recreational activities  Patient's clinical presentation is consistent with their referring diagnosis of vertigo  Patient c/o being "woozy" with head turns  He is able to sleep in bed now but still gets head symptoms  Patient had no observable nystagmus with testing but subjective c/o's of head "woosy"  Patient felt better after session  Patient would benefit from skilled physical therapy to address their aforementioned impairments, improve their level of function and to improve their overall quality of life  Impairments: abnormal or restricted ROM, activity intolerance and lacks appropriate home exercise program  Other impairment: dizziness    Symptom irritability: moderateUnderstanding of Dx/Px/POC: good   Prognosis: good    Goals    Goals  STG 1-2 weeks  1  Decrease c/o dizziness/vertigo by 50%  2  transfers with ease from supine-sit- stand without symptoms of dizziness  LTG 2-4 weeks  1  Resolve symptoms of vertigo  2  Independent HEP for home Epley maneuver/self management  3  DHI<10%  4  Pt able to move in/out of bed and roll over without onset of symptoms  5  Able to return to prior level of function without medication for dizziness  Goals  LT-6 WEEKS  1  Patient to be independent with a/iadls  2  Increase functional activities for leisure and home activities to previous LOF    3  Independent with HEP and/or fitness program     Plan  Patient would benefit from: skilled physical therapy  Planned modality interventions: cryotherapy, thermotherapy: hydrocollator packs and unattended electrical stimulation  Planned therapy interventions: activity modification, behavior modification, body mechanics training, functional ROM exercises, home exercise program, manual therapy, neuromuscular re-education, patient education, postural training, therapeutic exercise, canalith repositioning and IADL retraining  Frequency: 2-3x week  Duration in weeks: 12  Plan of Care beginning date: 11/15/2022  Plan of Care expiration date: 2/15/2023  Treatment plan discussed with: patient        Subjective Evaluation    History of Present Illness  Mechanism of injury: Patient reports on October 20 he got out of bed to use rest room, then when he went back to bed he had a sudden episode of dizziness with vomiting  Patient went to urgent department and given dramamine  He states he still feels "woozy" in the head when he turns it too far or too quick  He slept in recliner for 2 days and now able to sleep in his bed again  Saw PCP for well visit and was referred to [facility] for vertigo  Not a recurrent problem   Quality of life: excellent    Pain  No pain reported  Progression: improved    Social Support  Lives with: spouse    Employment status: working (aircraft maintence, travels)    Diagnostic Tests  No diagnostic tests performed    FCE comments: EKG at urgent center was negative/ptPatient Goals  Patient goals for therapy: improved balance  Patient goal: no dizziness        Objective     Concurrent Complaints  Positive for tinnitus, history of cancer and hearing loss  Negative for headaches, nausea/motion sickness (inially ), visual change, memory loss, aural fullness, poor concentration and peripheral neuropathy    Static Posture     Head  Forward      Postural Observations  Seated posture: fair  Standing posture: fair        Neurological Testing     Sensation   Cervical/Thoracic   Left   Intact: light touch    Right   Intact: light touch    Active Range of Motion   Cervical/Thoracic Spine       Cervical    Flexion:  WFL  Extension:  WFL  Left lateral flexion:  WFL  Right lateral flexion:  WFL  Left rotation:  WFL  Right rotation:  [facility]    Additional Active Range of Motion Details  C/s is WFL but tight end ranges    Ambulation   Weight-Bearing Status   Assistive device used: none    Ambulation: Level Surfaces   Ambulation without assistive device: independent    General Comments:    Upper quarter screen   Shoulder: unremarkable  Elbow: unremarkable  Neuro Exam:     Dizziness  Positive for vertigo and rocking or swaying  Negative for disequilibrium, oscillopsia, motion sickness (initally he had), light-headedness, diplopia and floating or swimming  Exacerbating factors  Positive for bending over, rolling in bed, looking up, turning head, supine to/from sitting and optokinetic movement  Negative for walking and walking in busy environment  Headaches   Patient reports headaches: No      Cervical exam   Modified VBI   Left: asymptomatic  Right: asymptomatic  Seated posture: forward head posture    Oculomotor exam   Oculomotor ROM: WNL  Resting nystagmus: not present   Gaze holding nystagmus: present left and present right  Smooth pursuits: within normal limits  Vertical saccades: normal  Horizontal saccades: hypometric   Head thrust: left normal    Positional testing   Thomas-Hallpike   Left posterior canal: symptomatic  Right posterior canal: symptomatic  Positional testing comment: No observable nystagmus during testing but c/o head being "woosy" with both right and left head turns of testing            Flowsheet Rows    Flowsheet Row Most Recent Value   PT/OT G-Codes    Current Score 24  [dizzy]   Projected Score 0   Assessment Type Evaluation             Precautions: B THR, A-fib, L/s sx x3, prostate CA      Manuals 11/15            R Epley 1x L Epley 1x                                      Neuro Re-Ed                                                                                                        Ther Ex             HEP 10                                                                                                       Ther Activity                                       Gait Training                                       Modalities

## 2022-11-22 ENCOUNTER — OFFICE VISIT (OUTPATIENT)
Dept: PHYSICAL THERAPY | Age: 77
End: 2022-11-22

## 2022-11-22 DIAGNOSIS — H81.13 BENIGN PAROXYSMAL VERTIGO OF BOTH EARS: ICD-10-CM

## 2022-11-22 DIAGNOSIS — R42 DIZZINESS: Primary | ICD-10-CM

## 2022-11-22 DIAGNOSIS — R42 VERTIGO: ICD-10-CM

## 2022-11-22 NOTE — PROGRESS NOTES
Daily Note     Today's date: 2022  Patient name: Ascencion Amin  :   MRN: [id number]  Referring provider: Magdalena Jain DO  Dx:   Encounter Diagnosis     ICD-10-CM    1  Dizziness  R42       2  Benign paroxysmal vertigo of both ears  H81 13       3  Vertigo  R42           Start Time: 738  Stop Time: 1415  Total time in clinic (min): 30 minutes    Subjective: Patient reports he was 90% better after first session  He still feels a little woozy at times, but not all the time  Able to sleep in bed  Objective: See treatment diary below      Assessment: Tolerated treatment well  Patient would benefit from continued PT  Patient has subjective c/o's of dizzy with right Meriam Oar today lasting 5 seconds and better on 2nd maneuver  Left side was unsystematic  Will see patient as needed  Reviewed post treatment positions to avoid  Plan: Continue per plan of care        Precautions: B THR, A-fib, L/s sx x3, prostate CA      Manuals 11/15 11/22           R Epley 1x 2x           L Epley 1x 1x                                     Neuro Re-Ed                                                                                                        Ther Ex             HEP 10 8                                                                                                      Ther Activity                                       Gait Training                                       Modalities

## 2022-11-29 ENCOUNTER — OFFICE VISIT (OUTPATIENT)
Dept: PHYSICAL THERAPY | Age: 77
End: 2022-11-29

## 2022-11-29 DIAGNOSIS — H81.13 BENIGN PAROXYSMAL VERTIGO OF BOTH EARS: ICD-10-CM

## 2022-11-29 DIAGNOSIS — R42 DIZZINESS: Primary | ICD-10-CM

## 2022-11-29 DIAGNOSIS — R42 VERTIGO: ICD-10-CM

## 2022-11-29 NOTE — PROGRESS NOTES
Daily Note     Today's date: 2022  Patient name: Carla Huynh  :   MRN: [id number]  Referring provider: Zahraa Ramsey DO  Dx:   Encounter Diagnosis     ICD-10-CM    1  Dizziness  R42       2  Benign paroxysmal vertigo of both ears  H81 13       3  Vertigo  R42           Start Time:   Stop Time: 171  Total time in clinic (min): 50 minutes    Subjective: Patient report after last session he got very dizzy again and feeling loopy right after and still lasted all week  Patient states he is not able to sleep on his back or right side  Putting drops in his eyes made his dizzy  Objective: See treatment diary below      Assessment: Tolerated treatment well  Patient would benefit from continued PT  R juan manuel hallpike was positive with up beating lasting 45 seconds, then 50 seconds on 2nd, 40 seconds on 3rd and less aggressive  On 4th maneuver he only felt woozy and no aggressive nystagmus but still noted slight upbeating lasting 20 seconds  Overall better on 4th maneuver  Advised patient to stay upright for next 4-5 hours before going to bed  Will follow up with patient as needed  Plan: Continue per plan of care        Precautions: B THR, A-fib, L/s sx x3, prostate CA      Manuals 11/15 11/22 11/29          R Epley 1x 2x 4x          L Epley 1x 1x                                     Neuro Re-Ed                                                                                                        Ther Ex             HEP 10 8                                                                                                      Ther Activity                                       Gait Training                                       Modalities

## 2023-01-03 ENCOUNTER — OFFICE VISIT (OUTPATIENT)
Dept: CARDIOLOGY CLINIC | Facility: CLINIC | Age: 78
End: 2023-01-03

## 2023-01-03 VITALS
HEART RATE: 76 BPM | SYSTOLIC BLOOD PRESSURE: 110 MMHG | HEIGHT: 73 IN | WEIGHT: 208.8 LBS | BODY MASS INDEX: 27.67 KG/M2 | DIASTOLIC BLOOD PRESSURE: 60 MMHG

## 2023-01-03 DIAGNOSIS — R00.1 BRADYCARDIA: ICD-10-CM

## 2023-01-03 DIAGNOSIS — I48.20 CHRONIC ATRIAL FIBRILLATION (HCC): Primary | ICD-10-CM

## 2023-01-03 NOTE — LETTER
January 3, 2023     Patient: Flash Castellanos  YOB: 1945  Date of Visit: 1/3/2023      To Whom it May Concern:    Jesus Watkins is under my professional care  Ronan Arvizu was seen in my office on 1/3/2023  Ronan Arvizu   He is able to donate blood despite his chronic atrial fibrillation from my perspective  If you have any questions or concerns, please don't hesitate to call           Sincerely,          Goldy Powell MD        CC: No Recipients

## 2023-01-03 NOTE — PROGRESS NOTES
Patient ID: Janette Quijano is a 69 y o  male  Plan:      Chronic atrial fibrillation (HCC)  Lone atrial fibrillation  Continue aspirin and rate control  Bradycardia  Heart rate now appropriate on a lower dose of diltiazem when compared to last year  Follow up Plan/Other summary comments:  Return in about 1 year (around 1/3/2024)  HPI: Patient is seen in follow-up today regarding the above  Since the last visit he has continued to do well  Hearing is fair  He works routinely in aircraft maintenance [place]  No change in exertional capacity  Most recent or relevant cardiac/vascular testing:    Echocardiogram 6/1/2015:  Normal LV function  Mild LVH  Past Surgical History:   Procedure Laterality Date   • BACK SURGERY      x 3   • INTRAOPERATIVE RADIATION THERAPY ([facility])  2011   • JOINT REPLACEMENT Bilateral     hips   • LAMINECTOMY      three levels L3 - S1 - all at different times   • PATELLA SURGERY      most of this removed after injury   • PROSTATE BIOPSY     • TOTAL HIP ARTHROPLASTY Bilateral    • VASECTOMY  1973       Lipid Profile: No results found for: CHOL, TRIG, HDL, LDL      Review of Systems   10  point ROS  was otherwise non pertinent or negative except as per HPI or as below  Gait: Normal         Objective:     /60   Pulse 76   Ht 6' 1" (1 854 m)   Wt 94 7 kg (208 lb 12 8 oz)   BMI 27 55 kg/m²     PHYSICAL EXAM:    General:  Normal appearance in no distress  Eyes:  Anicteric  Oral mucosa:  Moist   Neck:  No JVD  Carotid upstrokes are brisk without bruits  No masses  Chest:  Clear to auscultation  Cardiac: Irregularly irregular  No palpable PMI  Normal S1 and S2  No murmur gallop or rub  Abdomen:  Soft and nontender  No palpable organomegaly or aortic enlargement  Extremities:  No peripheral edema  Musculoskeletal:  Symmetric  Vascular:  Femoral pulses are brisk without bruits  Popliteal pulses are intact bilaterally     Pedal pulses are intact  Neuro:  Grossly symmetric  Psych:  Alert and oriented x3          Current Outpatient Medications:   •  albuterol (PROVENTIL HFA,VENTOLIN HFA) 90 mcg/act inhaler, Inhale 2 puffs every 6 (six) hours as needed for wheezing, Disp: , Rfl:   •  aspirin 81 MG tablet, Take 1 tablet by mouth daily, Disp: , Rfl:   •  diltiazem (CARDIZEM CD) 120 mg 24 hr capsule, TAKE 1 CAPSULE BY MOUTH  DAILY, Disp: 90 capsule, Rfl: 3  •  fluticasone-salmeterol (Advair) 500-50 mcg/dose inhaler, Inhale 1 puff 2 (two) times a day Rinse mouth after use , Disp: , Rfl:   •  omeprazole (PriLOSEC OTC) 20 MG tablet, Take 1 tablet by mouth daily, Disp: , Rfl:   •  tamsulosin (FLOMAX) 0 4 mg, , Disp: , Rfl:   •  budesonide-formoterol (SYMBICORT) 160-4 5 mcg/act inhaler, INHALE 2 PUFFS BY MOUTH TWICE A DAY *RINSE MOUTH WITH WATER AND SPIT* (Patient not taking: Reported on 1/3/2023), Disp: , Rfl:   •  Cholecalciferol 50 MCG (2000 UT) TABS, TAKE ONE TABLET BY MOUTH DAILY (FOR LOW VITAMIN D) (Patient not taking: Reported on 7/22/2022), Disp: , Rfl:   •  meclizine (ANTIVERT) 25 mg tablet, Take 1 tablet (25 mg total) by mouth 3 (three) times a day as needed for dizziness (Patient not taking: Reported on 11/14/2022), Disp: 30 tablet, Rfl: 0  •  ondansetron (Zofran ODT) 4 mg disintegrating tablet, Take 1 tablet (4 mg total) by mouth every 6 (six) hours as needed for nausea or vomiting (Patient not taking: Reported on 11/14/2022), Disp: 20 tablet, Rfl: 0  •  Silodosin 8 MG CAPS, TAKE 1 CAPSULE BY MOUTH  DAILY (Patient not taking: Reported on 11/12/2021), Disp: 90 capsule, Rfl: 3  No Known Allergies  Past Medical History:   Diagnosis Date   • A-fib ([facility])    • BPH with obstruction/lower urinary tract symptoms    • Colon polyp    • COPD (chronic obstructive pulmonary disease) ([facility])    • Frequency of urination    • GERD (gastroesophageal reflux disease)    • History of cardioversion 12/27/2011    Inital Rhythm AFIB, Final Rhythm Sinus, [address]   • History of echocardiogram 2015    Normal LV systolic function, mild concentric LV hypertrophy, mild mitral and tricuspid regurg, right atrial enlargement   EF 55%   • Irregular heart beat     AF   • Other male erectile dysfunction    • Personal history of irradiation    • Personal history of malignant neoplasm of prostate    • Prostate cancer ([facility] [address] [address] )            Social History     Tobacco Use   Smoking Status Former   • Packs/day: 2 00   • Years: 35 00   • Pack years: 70 00   • Types: Cigarettes   • Start date: 66   • Quit date: 1998   • Years since quittin 4   Smokeless Tobacco Never

## 2023-01-09 ENCOUNTER — OFFICE VISIT (OUTPATIENT)
Dept: FAMILY MEDICINE CLINIC | Facility: CLINIC | Age: 78
End: 2023-01-09

## 2023-01-09 VITALS — OXYGEN SATURATION: 95 % | HEART RATE: 60 BPM | TEMPERATURE: 97.9 F | RESPIRATION RATE: 20 BRPM

## 2023-01-09 DIAGNOSIS — J44.9 CHRONIC OBSTRUCTIVE PULMONARY DISEASE, UNSPECIFIED COPD TYPE (HCC): ICD-10-CM

## 2023-01-09 DIAGNOSIS — J01.00 ACUTE NON-RECURRENT MAXILLARY SINUSITIS: ICD-10-CM

## 2023-01-09 DIAGNOSIS — H90.3 SENSORINEURAL HEARING LOSS, BILATERAL: ICD-10-CM

## 2023-01-09 DIAGNOSIS — R09.81 SINUS CONGESTION: Primary | ICD-10-CM

## 2023-01-09 DIAGNOSIS — I48.20 CHRONIC ATRIAL FIBRILLATION (HCC): ICD-10-CM

## 2023-01-09 LAB
SARS-COV-2 AG UPPER RESP QL IA: NEGATIVE
VALID CONTROL: NORMAL

## 2023-01-09 RX ORDER — AMOXICILLIN AND CLAVULANATE POTASSIUM 875; 125 MG/1; MG/1
1 TABLET, FILM COATED ORAL EVERY 12 HOURS SCHEDULED
Qty: 20 TABLET | Refills: 0 | Status: SHIPPED | OUTPATIENT
Start: 2023-01-09 | End: 2023-01-19

## 2023-01-09 RX ORDER — AMOXICILLIN AND CLAVULANATE POTASSIUM 875; 125 MG/1; MG/1
1 TABLET, FILM COATED ORAL EVERY 12 HOURS SCHEDULED
Qty: 20 TABLET | Refills: 0 | Status: SHIPPED | OUTPATIENT
Start: 2023-01-09 | End: 2023-01-09 | Stop reason: RX

## 2023-01-09 NOTE — PROGRESS NOTES
Assessment/Plan:       Problem List Items Addressed This Visit        Respiratory    COPD (chronic obstructive pulmonary disease) ([facility] [address] )     COPD is stable current episode of sinusitis has not developed into bronchitis he will watch for any sign or symptom of change continue with inhalers and start antibiotics for sinus infection and notify me if not improved in 1 week         Acute non-recurrent maxillary sinusitis     Patient is here for sinus pressure and pain into his upper teeth over the last 10 days with thick mucus production  This did not resolve on its own and I will start him on antibiotics now and follow-up if not improved after 1 week         Relevant Medications    amoxicillin-clavulanate (AUGMENTIN) 875-125 mg per tablet       Cardiovascular and Mediastinum    Chronic atrial fibrillation (HCC)     Stable at this time reviewed cardiology report and he will continue with current medication regimen avoiding pseudoephedrine for his sinus infection now in light of heart rate and atrial fibrillation            Nervous and Auditory    Sensorineural hearing loss, bilateral     Both ear canals and tympanic membranes intact patient will continue with hearing aids        Other Visit Diagnoses     Sinus congestion    -  Primary    Relevant Orders    POCT Rapid Covid Ag (Completed)            Subjective:      Patient ID: Jl Ray is a 69 y o  male  Sinus congestion with thick mucus production and pressure over upper teeth for the last 2 weeks    Sinus Problem  Associated symptoms include congestion and sinus pressure  Pertinent negatives include no chills, coughing, headaches, shortness of breath or sore throat         The following portions of the patient's history were reviewed and updated as appropriate: allergies, current medications, past family history, past medical history, past social history, past surgical history and problem list     Review of Systems   Constitutional: Negative for chills, fatigue and fever  HENT: Positive for congestion, sinus pressure and sinus pain  Negative for nosebleeds, rhinorrhea and sore throat  Eyes: Negative for discharge and redness  Respiratory: Negative for cough and shortness of breath  Cardiovascular: Negative for chest pain, palpitations and leg swelling  Gastrointestinal: Negative for abdominal pain, blood in stool and nausea  Endocrine: Negative for cold intolerance, heat intolerance and polyuria  Genitourinary: Negative for dysuria and frequency  Musculoskeletal: Negative for arthralgias, back pain and myalgias  Skin: Negative for rash  Neurological: Negative for dizziness, weakness and headaches  Hematological: Negative for adenopathy  Psychiatric/Behavioral: Negative for behavioral problems and sleep disturbance  The patient is not nervous/anxious  Objective:      Pulse 60   Temp 97 9 °F (36 6 °C)   Resp 20   SpO2 95%        Physical Exam  Vitals and nursing note reviewed  Constitutional:       Appearance: Normal appearance  He is well-developed  HENT:      Head: Normocephalic and atraumatic  Right Ear: External ear normal       Left Ear: External ear normal       Nose: Congestion present  Eyes:      General: No scleral icterus  Conjunctiva/sclera: Conjunctivae normal       Pupils: Pupils are equal, round, and reactive to light  Neck:      Thyroid: No thyromegaly  Vascular: No JVD  Cardiovascular:      Rate and Rhythm: Normal rate and regular rhythm  Heart sounds: Normal heart sounds  No murmur heard  Pulmonary:      Effort: Pulmonary effort is normal       Breath sounds: Wheezing present  No rales  Chest:      Chest wall: No tenderness  Abdominal:      General: Bowel sounds are normal  There is no distension  Palpations: Abdomen is soft  There is no mass  Tenderness: There is no abdominal tenderness  There is no guarding or rebound     Musculoskeletal:         General: No tenderness or deformity  Normal range of motion  Cervical back: Normal range of motion and neck supple  Lymphadenopathy:      Cervical: No cervical adenopathy  Skin:     General: Skin is warm and dry  Findings: No erythema or rash  Neurological:      Mental Status: He is alert and oriented to person, place, and time  Cranial Nerves: No cranial nerve deficit  Deep Tendon Reflexes: Reflexes are normal and symmetric  Reflexes normal    Psychiatric:         Behavior: Behavior normal          Thought Content: Thought content normal          Judgment: Judgment normal           Data:    Laboratory Results: I have personally reviewed the pertinent laboratory results/reports   Radiology/Other Diagnostic Testing Results: I have personally reviewed pertinent reports         No results found for: WBC, HGB, HCT, MCV, PLT  No results found for: NA, K, CL, CO2, ANIONGAP, BUN, CREATININE, GLUCOSE, GLUF, CALCIUM, CORRECTEDCA, AST, ALT, ALKPHOS, PROT, BILITOT, EGFR  No results found for: CHOLESTEROL  No results found for: HDL  No results found for: LDLCALC  No results found for: TRIG  No results found for: [place], [place]  Lab Results   Component Value Date    [id number] 4 080 (H) 11/20/2020     Lab Results   Component Value Date    HGBA1C 5 5 10/31/2022     No results found for: MAYTE Christianson DO

## 2023-01-09 NOTE — ASSESSMENT & PLAN NOTE
Stable at this time reviewed cardiology report and he will continue with current medication regimen avoiding pseudoephedrine for his sinus infection now in light of heart rate and atrial fibrillation

## 2023-01-09 NOTE — ASSESSMENT & PLAN NOTE
Patient is here for sinus pressure and pain into his upper teeth over the last 10 days with thick mucus production    This did not resolve on its own and I will start him on antibiotics now and follow-up if not improved after 1 week

## 2023-01-09 NOTE — ASSESSMENT & PLAN NOTE
COPD is stable current episode of sinusitis has not developed into bronchitis he will watch for any sign or symptom of change continue with inhalers and start antibiotics for sinus infection and notify me if not improved in 1 week

## 2023-01-09 NOTE — PROGRESS NOTES
BMI Counseling: There is no height or weight on file to calculate BMI  The BMI is above normal  Nutrition recommendations include reducing portion sizes, reducing fast food intake, moderation in carbohydrate intake, increasing intake of lean protein and reducing intake of saturated fat and trans fat  Exercise recommendations include exercising 3-5 times per week

## 2023-03-10 PROBLEM — J01.00 ACUTE NON-RECURRENT MAXILLARY SINUSITIS: Status: RESOLVED | Noted: 2023-01-09 | Resolved: 2023-03-10

## 2023-04-06 NOTE — ASSESSMENT & PLAN NOTE
Prostate cancer by history follow-up with Urology as scheduled follow-up PSA at next office visit    I ordered laboratory work to be done in accordance with the [place] lab work so that I can follow the levels as well Bexarotene Counseling:  I discussed with the patient the risks of bexarotene including but not limited to hair loss, dry lips/skin/eyes, liver abnormalities, hyperlipidemia, pancreatitis, depression/suicidal ideation, photosensitivity, drug rash/allergic reactions, hypothyroidism, anemia, leukopenia, infection, cataracts, and teratogenicity.  Patient understands that they will need regular blood tests to check lipid profile, liver function tests, white blood cell count, thyroid function tests and pregnancy test if applicable.

## 2023-05-15 ENCOUNTER — OFFICE VISIT (OUTPATIENT)
Dept: FAMILY MEDICINE CLINIC | Facility: CLINIC | Age: 78
End: 2023-05-15

## 2023-05-15 VITALS
DIASTOLIC BLOOD PRESSURE: 78 MMHG | BODY MASS INDEX: 27.7 KG/M2 | WEIGHT: 209 LBS | SYSTOLIC BLOOD PRESSURE: 132 MMHG | HEART RATE: 78 BPM | HEIGHT: 73 IN | TEMPERATURE: 98 F | OXYGEN SATURATION: 97 %

## 2023-05-15 DIAGNOSIS — K21.9 GASTROESOPHAGEAL REFLUX DISEASE WITHOUT ESOPHAGITIS: ICD-10-CM

## 2023-05-15 DIAGNOSIS — J44.9 CHRONIC OBSTRUCTIVE PULMONARY DISEASE, UNSPECIFIED COPD TYPE (HCC): Primary | ICD-10-CM

## 2023-05-15 DIAGNOSIS — I48.20 CHRONIC ATRIAL FIBRILLATION (HCC): ICD-10-CM

## 2023-05-15 DIAGNOSIS — H61.23 BILATERAL HEARING LOSS DUE TO CERUMEN IMPACTION: ICD-10-CM

## 2023-05-15 DIAGNOSIS — E03.9 HYPOTHYROIDISM, UNSPECIFIED TYPE: ICD-10-CM

## 2023-05-15 DIAGNOSIS — C61 ADENOCARCINOMA OF PROSTATE (HCC): ICD-10-CM

## 2023-05-15 NOTE — ASSESSMENT & PLAN NOTE
Longstanding COPD has been stable with Advair at this time he is doing relatively well he notices some worsening symptoms in the spring with the pollen camping up recently but is feeling better today with the cooler morning weather

## 2023-05-15 NOTE — ASSESSMENT & PLAN NOTE
Chronic atrial fibrillation continuing with diltiazem 120 mg daily continue with albuterol and low-dose aspirin

## 2023-05-15 NOTE — PROGRESS NOTES
Assessment/Plan:       Problem List Items Addressed This Visit        Digestive    GERD (gastroesophageal reflux disease)     GERD symptoms are stable continue omeprazole 20 mg daily            Endocrine    Hypothyroidism     Hypothyroidism stable continue with monitoring TSH T4            Respiratory    COPD (chronic obstructive pulmonary disease) ([facility] [address] ) - Primary     Longstanding COPD has been stable with Advair at this time he is doing relatively well he notices some worsening symptoms in the spring with the pollen camping up recently but is feeling better today with the cooler morning weather            Cardiovascular and Mediastinum    Chronic atrial fibrillation ([facility] )     Chronic atrial fibrillation continuing with diltiazem 120 mg daily continue with albuterol and low-dose aspirin            Genitourinary    Adenocarcinoma of prostate [facility])     Patient stable PSA tract yearly follow-up by urology yearly              Subjective:      Patient ID: Lio Diaz is a 69 y o  male  Follow-up evaluation 6-month checkup  Patient goes to the [address] [place] [address] annually in November      The following portions of the patient's history were reviewed and updated as appropriate: allergies, current medications, past family history, past medical history, past social history, past surgical history and problem list     Review of Systems   Constitutional: Negative for chills, fatigue and fever  HENT: Negative for congestion, nosebleeds, rhinorrhea, sinus pressure and sore throat  Eyes: Negative for discharge and redness  Respiratory: Negative for cough and shortness of breath  Cardiovascular: Negative for chest pain, palpitations and leg swelling  Gastrointestinal: Negative for abdominal pain, blood in stool and nausea  Endocrine: Negative for cold intolerance, heat intolerance and polyuria  Genitourinary: Negative for dysuria and frequency  Musculoskeletal: Negative for arthralgias, back pain and myalgias     Skin: "Negative for rash  Neurological: Negative for dizziness, weakness and headaches  Hematological: Negative for adenopathy  Psychiatric/Behavioral: Negative for behavioral problems and sleep disturbance  The patient is not nervous/anxious  Objective:      /78   Pulse 78   Temp 98 °F (36 7 °C)   Ht 6' 1\" (1 854 m)   Wt 94 8 kg (209 lb)   SpO2 97%   BMI 27 57 kg/m²        Physical Exam  Vitals and nursing note reviewed  Constitutional:       Appearance: Normal appearance  He is well-developed  HENT:      Head: Normocephalic and atraumatic  Right Ear: Tympanic membrane, ear canal and external ear normal       Left Ear: Tympanic membrane, ear canal and external ear normal       Nose: Nose normal       Mouth/Throat:      Mouth: Mucous membranes are moist    Eyes:      General: No scleral icterus  Conjunctiva/sclera: Conjunctivae normal       Pupils: Pupils are equal, round, and reactive to light  Neck:      Thyroid: No thyromegaly  Vascular: No JVD  Cardiovascular:      Rate and Rhythm: Normal rate and regular rhythm  Heart sounds: Normal heart sounds  No murmur heard  Pulmonary:      Effort: Pulmonary effort is normal       Breath sounds: Normal breath sounds  No wheezing or rales  Chest:      Chest wall: No tenderness  Abdominal:      General: Bowel sounds are normal  There is no distension  Palpations: Abdomen is soft  There is no mass  Tenderness: There is no abdominal tenderness  There is no guarding or rebound  Musculoskeletal:         General: No tenderness or deformity  Normal range of motion  Cervical back: Normal range of motion and neck supple  Lymphadenopathy:      Cervical: No cervical adenopathy  Skin:     General: Skin is warm and dry  Capillary Refill: Capillary refill takes less than 2 seconds  Findings: No erythema or rash  Neurological:      General: No focal deficit present        Mental Status: He is alert and " oriented to person, place, and time  Mental status is at baseline  Cranial Nerves: No cranial nerve deficit  Deep Tendon Reflexes: Reflexes are normal and symmetric  Reflexes normal    Psychiatric:         Mood and Affect: Mood normal          Behavior: Behavior normal          Thought Content: Thought content normal          Judgment: Judgment normal        Ear cerumen removal    Date/Time: 5/15/2023 10:20 AM  Performed by: Torito Limon DO  Authorized by: Torito Limon DO   Universal Protocol:  Consent: Verbal consent obtained  Consent given by: patient  Patient understanding: patient states understanding of the procedure being performed  Patient identity confirmed: verbally with patient      Patient location:  Clinic  Procedure details:     Local anesthetic:  None    Location:  L ear and R ear    Procedure type: curette      Approach:  External  Post-procedure details:     Complication:  None    Hearing quality:  Improved    Patient tolerance of procedure: Tolerated well, no immediate complications          Data:    Laboratory Results: I have personally reviewed the pertinent laboratory results/reports   Radiology/Other Diagnostic Testing Results: I have personally reviewed pertinent reports         No results found for: WBC, HGB, HCT, MCV, PLT  No results found for: NA, K, CL, CO2, ANIONGAP, BUN, CREATININE, GLUCOSE, GLUF, CALCIUM, CORRECTEDCA, AST, ALT, ALKPHOS, PROT, BILITOT, EGFR  No results found for: CHOLESTEROL  No results found for: HDL  No results found for: LDLCALC  No results found for: TRIG  No results found for: [address], [place]  Lab Results   Component Value Date    [id number] 4 080 (H) 11/20/2020     Lab Results   Component Value Date    HGBA1C 5 5 10/31/2022     No results found for: PSA    Torito Limon DO

## 2023-09-05 LAB — HBA1C MFR BLD HPLC: 5.5 %

## 2023-11-13 ENCOUNTER — NEW PATIENT COMPREHENSIVE (OUTPATIENT)
Dept: URBAN - METROPOLITAN AREA CLINIC 6 | Facility: CLINIC | Age: 78
End: 2023-11-13

## 2023-11-13 DIAGNOSIS — H25.813: ICD-10-CM

## 2023-11-13 DIAGNOSIS — H02.831: ICD-10-CM

## 2023-11-13 DIAGNOSIS — H35.3131: ICD-10-CM

## 2023-11-13 DIAGNOSIS — H17.822: ICD-10-CM

## 2023-11-13 DIAGNOSIS — H02.834: ICD-10-CM

## 2023-11-13 DIAGNOSIS — H35.40: ICD-10-CM

## 2023-11-13 PROCEDURE — 99204 OFFICE O/P NEW MOD 45 MIN: CPT

## 2023-11-13 ASSESSMENT — VISUAL ACUITY
OS_CC: 20/70+1
OD_GLARE: 20/40-1
OD_CC: 20/50-1
OS_GLARE: 20/70-1
OS_PH: 20/40-1
OD_PH: 20/40-2

## 2023-11-13 ASSESSMENT — TONOMETRY
OD_IOP_MMHG: 13
OS_IOP_MMHG: 18

## 2023-11-14 ENCOUNTER — HOSPITAL ENCOUNTER (OUTPATIENT)
Dept: VASCULAR ULTRASOUND | Facility: HOSPITAL | Age: 78
Discharge: HOME | End: 2023-11-14
Payer: COMMERCIAL

## 2023-11-14 ENCOUNTER — OFFICE VISIT (OUTPATIENT)
Dept: FAMILY MEDICINE CLINIC | Facility: CLINIC | Age: 78
End: 2023-11-14
Payer: COMMERCIAL

## 2023-11-14 VITALS
HEART RATE: 79 BPM | BODY MASS INDEX: 27.7 KG/M2 | WEIGHT: 209 LBS | SYSTOLIC BLOOD PRESSURE: 122 MMHG | OXYGEN SATURATION: 98 % | DIASTOLIC BLOOD PRESSURE: 74 MMHG | TEMPERATURE: 97.2 F | HEIGHT: 73 IN

## 2023-11-14 DIAGNOSIS — M79.89 PAIN AND SWELLING OF RIGHT LOWER LEG: Primary | ICD-10-CM

## 2023-11-14 DIAGNOSIS — M79.661 PAIN AND SWELLING OF RIGHT LOWER LEG: ICD-10-CM

## 2023-11-14 DIAGNOSIS — I48.20 CHRONIC ATRIAL FIBRILLATION (HCC): Primary | ICD-10-CM

## 2023-11-14 DIAGNOSIS — M79.89 PAIN AND SWELLING OF RIGHT LOWER LEG: ICD-10-CM

## 2023-11-14 DIAGNOSIS — M79.661 PAIN AND SWELLING OF RIGHT LOWER LEG: Primary | ICD-10-CM

## 2023-11-14 DIAGNOSIS — J44.9 CHRONIC OBSTRUCTIVE PULMONARY DISEASE, UNSPECIFIED COPD TYPE (HCC): ICD-10-CM

## 2023-11-14 PROCEDURE — 99214 OFFICE O/P EST MOD 30 MIN: CPT | Performed by: NURSE PRACTITIONER

## 2023-11-14 PROCEDURE — 93971 EXTREMITY STUDY: CPT

## 2023-11-14 RX ORDER — CEPHALEXIN 500 MG/1
500 CAPSULE ORAL 4 TIMES DAILY
Qty: 28 CAPSULE | Refills: 0 | Status: SHIPPED | OUTPATIENT
Start: 2023-11-14 | End: 2023-11-21

## 2023-11-14 RX ORDER — LEVOTHYROXINE SODIUM 0.03 MG/1
25 TABLET ORAL
COMMUNITY
Start: 2023-09-07

## 2023-11-14 RX ORDER — FUROSEMIDE 20 MG/1
20 TABLET ORAL DAILY
Qty: 10 TABLET | Refills: 0 | Status: SHIPPED | OUTPATIENT
Start: 2023-11-14

## 2023-11-14 NOTE — PROGRESS NOTES
OFFICE VISIT  Orlando Roberts 69 y.o. male MRN: [id number]          Assessment / Plan:  Problem List Items Addressed This Visit          Respiratory    COPD (chronic obstructive pulmonary disease) ([address])     No reported exacerbations, currently on Advair twice daily and albuterol as needed. Does follow with pulmonology and has a follow-up appointment next week. Cardiovascular and Mediastinum    Chronic atrial fibrillation ([address]) - Primary     On daily aspirin, on diltiazem 120. Remains irregular today. Heart rate controlled            Other    Pain and swelling of right lower leg     Stat Doppler ordered for right lower extremity edema and pain. Recent travel long distance. Relevant Orders    VAS lower limb venous duplex study, unilateral/limited         Reason For Visit / Chief Complaint  Chief Complaint   Patient presents with    Foot Swelling     Pt presents to the office with right foot and ankle swell some day and not others, has been going on for about 2 weeks        HPI:  Orlando Roberts is a 69 y.o. male wjho presents today for right leg swelling. Swelling to right leg started two weeks ago, swelling worsening. He reports pain, foot, ankle and into knee. He does report an old injuiry to right knee, needing surgery. He reports pain when walking and sitting. He reports no worsening sob, does cough, mucous production. He denies any recent injury or falls. He also reports doing a lot of driving, long distances 10 to 12 hours.   Historical Information   Past Medical History:   Diagnosis Date    A-fib [facility])     BPH with obstruction/lower urinary tract symptoms     Colon polyp     COPD (chronic obstructive pulmonary disease) (HCC)     Frequency of urination     GERD (gastroesophageal reflux disease)     History of cardioversion 12/27/2011    Inital Rhythm AFIB, Final Rhythm Sinus, Max Joules 75    History of echocardiogram 06/01/2015    Normal LV systolic function, mild concentric LV hypertrophy, mild mitral and tricuspid regurg, right atrial enlargement.  EF 55%    Irregular heart beat     AF    Other male erectile dysfunction     Personal history of irradiation     Personal history of malignant neoplasm of prostate     Prostate cancer ([address])      Past Surgical History:   Procedure Laterality Date    BACK SURGERY      x 3    INTRAOPERATIVE RADIATION THERAPY ([facility])  2011    JOINT REPLACEMENT Bilateral     hips    LAMINECTOMY      three levels L3 - S1 - all at different times    PATELLA SURGERY      most of this removed after injury    PROSTATE BIOPSY      TOTAL HIP ARTHROPLASTY Bilateral     VASECTOMY  1973     Social History   Social History     Substance and Sexual Activity   Alcohol Use Yes    Alcohol/week: 7.0 standard drinks of alcohol    Types: 7 Standard drinks or equivalent per week    Comment: 1/2 glass of wine daily     Social History     Substance and Sexual Activity   Drug Use No     Social History     Tobacco Use   Smoking Status Former    Packs/day: 2.00    Years: 35.00    Total pack years: 70.00    Types: Cigarettes    Start date:     Quit date: 1998    Years since quittin.3   Smokeless Tobacco Never     Family History   Problem Relation Age of Onset    Heart disease Mother     No Known Problems Brother        Meds/Allergies   No Known Allergies    Meds:    Current Outpatient Medications:     albuterol (PROVENTIL HFA,VENTOLIN HFA) 90 mcg/act inhaler, Inhale 2 puffs every 6 (six) hours as needed for wheezing, Disp: , Rfl:     aspirin 81 MG tablet, Take 1 tablet by mouth daily, Disp: , Rfl:     diltiazem (CARDIZEM CD) 120 mg 24 hr capsule, TAKE 1 CAPSULE BY MOUTH  DAILY, Disp: 90 capsule, Rfl: 3    fluticasone-salmeterol (Advair) 500-50 mcg/dose inhaler, Inhale 1 puff 2 (two) times a day Rinse mouth after use., Disp: , Rfl:     levothyroxine (Synthroid) 25 mcg tablet, 25 mcg, Disp: , Rfl:     omeprazole (PriLOSEC OTC) 20 MG tablet, Take 1 tablet by mouth daily, Disp: , Rfl:     tamsulosin (FLOMAX) 0.4 mg, , Disp: , Rfl:       REVIEW OF SYSTEMS  Review of Systems   Constitutional:  Negative for activity change, chills, fatigue and fever. HENT:  Negative for congestion, ear discharge, ear pain, sinus pressure, sinus pain, sore throat, tinnitus and trouble swallowing. Eyes:  Negative for photophobia, pain, discharge, itching and visual disturbance. Respiratory:  Negative for cough, chest tightness, shortness of breath and wheezing. Cardiovascular:  Positive for leg swelling. Negative for chest pain. Gastrointestinal:  Negative for abdominal distention, abdominal pain, constipation, diarrhea, nausea and vomiting. Endocrine: Negative for polydipsia, polyphagia and polyuria. Genitourinary:  Negative for dysuria and frequency. Musculoskeletal:  Negative for arthralgias, myalgias, neck pain and neck stiffness. Skin:  Negative for color change. Neurological:  Negative for dizziness, syncope, weakness, numbness and headaches. Hematological:  Does not bruise/bleed easily. Psychiatric/Behavioral:  Negative for behavioral problems, confusion, self-injury, sleep disturbance and suicidal ideas. The patient is not nervous/anxious. Current Vitals:   Blood Pressure: 122/74 (11/14/23 0814)  Pulse: 79 (11/14/23 0814)  Temperature: (!) 97.2 °F (36.2 °C) (11/14/23 0814)  Height: 6' 1" (185.4 cm) (11/14/23 0814)  Weight - Scale: 94.8 kg (209 lb) (11/14/23 0814)  SpO2: 98 % (11/14/23 0814)  [unfilled]    PHYSICAL EXAMS:  Physical Exam  Vitals and nursing note reviewed. Constitutional:       Appearance: Normal appearance. He is well-developed. HENT:      Head: Normocephalic and atraumatic. Nose: Nose normal. No congestion or rhinorrhea. Mouth/Throat:      Mouth: Mucous membranes are moist.      Pharynx: No oropharyngeal exudate or posterior oropharyngeal erythema. Eyes:      General:         Right eye: No discharge. Left eye: No discharge. Conjunctiva/sclera: Conjunctivae normal.      Pupils: Pupils are equal, round, and reactive to light. Neck:      Thyroid: No thyromegaly. Cardiovascular:      Rate and Rhythm: Normal rate and regular rhythm. Pulses: Normal pulses. Heart sounds: Normal heart sounds. Pulmonary:      Effort: Pulmonary effort is normal.      Breath sounds: Normal breath sounds. No wheezing or rhonchi. Abdominal:      General: Bowel sounds are normal. There is no distension. Palpations: Abdomen is soft. Tenderness: There is no abdominal tenderness. Musculoskeletal:         General: Swelling present. No tenderness or deformity. Normal range of motion. Cervical back: Normal range of motion and neck supple. Right lower le+ Edema present. Left lower leg: No edema. Skin:     General: Skin is warm and dry. Findings: Erythema present. No rash. Comments: Right lower extremity   Neurological:      General: No focal deficit present. Mental Status: He is alert and oriented to person, place, and time. Psychiatric:         Mood and Affect: Mood normal.         Behavior: Behavior normal.         Thought Content: Thought content normal.         Judgment: Judgment normal.             Lab, imaging and other studies: I have personally reviewed pertinent reports. [facility]

## 2023-11-16 PROCEDURE — 93971 EXTREMITY STUDY: CPT | Performed by: SURGERY

## 2023-11-19 ENCOUNTER — HOSPITAL ENCOUNTER (EMERGENCY)
Facility: HOSPITAL | Age: 78
Discharge: HOME | End: 2023-11-19
Attending: EMERGENCY MEDICINE
Payer: COMMERCIAL

## 2023-11-19 VITALS
SYSTOLIC BLOOD PRESSURE: 154 MMHG | HEIGHT: 73 IN | RESPIRATION RATE: 18 BRPM | HEART RATE: 90 BPM | DIASTOLIC BLOOD PRESSURE: 74 MMHG | TEMPERATURE: 98.1 F | WEIGHT: 205 LBS | OXYGEN SATURATION: 98 % | BODY MASS INDEX: 27.17 KG/M2

## 2023-11-19 DIAGNOSIS — L03.90 CELLULITIS: ICD-10-CM

## 2023-11-19 DIAGNOSIS — M79.604 RIGHT LEG PAIN: Primary | ICD-10-CM

## 2023-11-19 LAB
ANION GAP SERPL CALCULATED.3IONS-SCNC: 5 MMOL/L
BASOPHILS # BLD AUTO: 0.05 THOUSANDS/ÂΜL (ref 0–0.1)
BASOPHILS NFR BLD AUTO: 1 % (ref 0–1)
BUN SERPL-MCNC: 19 MG/DL (ref 5–25)
CALCIUM SERPL-MCNC: 9.9 MG/DL (ref 8.4–10.2)
CHLORIDE SERPL-SCNC: 103 MMOL/L (ref 96–108)
CO2 SERPL-SCNC: 28 MMOL/L (ref 21–32)
CREAT SERPL-MCNC: 1.09 MG/DL (ref 0.6–1.3)
D DIMER PPP FEU-MCNC: 0.34 UG/ML FEU
EOSINOPHIL # BLD AUTO: 0.12 THOUSAND/ÂΜL (ref 0–0.61)
EOSINOPHIL NFR BLD AUTO: 1 % (ref 0–6)
ERYTHROCYTE [DISTWIDTH] IN BLOOD BY AUTOMATED COUNT: 24.5 % (ref 11.6–15.1)
GFR SERPL CREATININE-BSD FRML MDRD: 65 ML/MIN/1.73SQ M
GLUCOSE SERPL-MCNC: 102 MG/DL (ref 65–140)
HCT VFR BLD AUTO: 37.1 % (ref 36.5–49.3)
HGB BLD-MCNC: 12.9 G/DL (ref 12–17)
IMM GRANULOCYTES # BLD AUTO: 0.03 THOUSAND/UL (ref 0–0.2)
IMM GRANULOCYTES NFR BLD AUTO: 0 % (ref 0–2)
LYMPHOCYTES # BLD AUTO: 1.24 THOUSANDS/ÂΜL (ref 0.6–4.47)
LYMPHOCYTES NFR BLD AUTO: 14 % (ref 14–44)
MCH RBC QN AUTO: 35.2 PG (ref 26.8–34.3)
MCHC RBC AUTO-ENTMCNC: 34.8 G/DL (ref 31.4–37.4)
MCV RBC AUTO: 101 FL (ref 82–98)
MONOCYTES # BLD AUTO: 0.81 THOUSAND/ÂΜL (ref 0.17–1.22)
MONOCYTES NFR BLD AUTO: 9 % (ref 4–12)
NEUTROPHILS # BLD AUTO: 6.6 THOUSANDS/ÂΜL (ref 1.85–7.62)
NEUTS SEG NFR BLD AUTO: 75 % (ref 43–75)
NRBC BLD AUTO-RTO: 1 /100 WBCS
PLATELET # BLD AUTO: 222 THOUSANDS/UL (ref 149–390)
PMV BLD AUTO: 10.6 FL (ref 8.9–12.7)
POTASSIUM SERPL-SCNC: 4.5 MMOL/L (ref 3.5–5.3)
RBC # BLD AUTO: 3.66 MILLION/UL (ref 3.88–5.62)
SODIUM SERPL-SCNC: 136 MMOL/L (ref 135–147)
WBC # BLD AUTO: 8.85 THOUSAND/UL (ref 4.31–10.16)

## 2023-11-19 PROCEDURE — 96374 THER/PROPH/DIAG INJ IV PUSH: CPT

## 2023-11-19 PROCEDURE — 80048 BASIC METABOLIC PNL TOTAL CA: CPT | Performed by: EMERGENCY MEDICINE

## 2023-11-19 PROCEDURE — 99283 EMERGENCY DEPT VISIT LOW MDM: CPT

## 2023-11-19 PROCEDURE — 36415 COLL VENOUS BLD VENIPUNCTURE: CPT | Performed by: EMERGENCY MEDICINE

## 2023-11-19 PROCEDURE — 85379 FIBRIN DEGRADATION QUANT: CPT | Performed by: EMERGENCY MEDICINE

## 2023-11-19 PROCEDURE — 85025 COMPLETE CBC W/AUTO DIFF WBC: CPT | Performed by: EMERGENCY MEDICINE

## 2023-11-19 PROCEDURE — 99284 EMERGENCY DEPT VISIT MOD MDM: CPT | Performed by: EMERGENCY MEDICINE

## 2023-11-19 RX ORDER — CLINDAMYCIN HYDROCHLORIDE 150 MG/1
450 CAPSULE ORAL EVERY 6 HOURS SCHEDULED
Qty: 120 CAPSULE | Refills: 0 | Status: SHIPPED | OUTPATIENT
Start: 2023-11-19 | End: 2023-11-29

## 2023-11-19 RX ORDER — CLINDAMYCIN HYDROCHLORIDE 150 MG/1
450 CAPSULE ORAL EVERY 6 HOURS SCHEDULED
Qty: 120 CAPSULE | Refills: 0 | Status: SHIPPED | OUTPATIENT
Start: 2023-11-19 | End: 2023-11-19 | Stop reason: SDUPTHER

## 2023-11-19 RX ORDER — NAPROXEN 500 MG/1
500 TABLET ORAL 2 TIMES DAILY WITH MEALS
Qty: 30 TABLET | Refills: 0 | Status: SHIPPED | OUTPATIENT
Start: 2023-11-19

## 2023-11-19 RX ORDER — NAPROXEN 500 MG/1
500 TABLET ORAL 2 TIMES DAILY WITH MEALS
Qty: 30 TABLET | Refills: 0 | Status: SHIPPED | OUTPATIENT
Start: 2023-11-19 | End: 2023-11-19 | Stop reason: SDUPTHER

## 2023-11-19 RX ORDER — CLINDAMYCIN HYDROCHLORIDE 150 MG/1
450 CAPSULE ORAL ONCE
Status: COMPLETED | OUTPATIENT
Start: 2023-11-19 | End: 2023-11-19

## 2023-11-19 RX ORDER — KETOROLAC TROMETHAMINE 30 MG/ML
15 INJECTION, SOLUTION INTRAMUSCULAR; INTRAVENOUS ONCE
Status: COMPLETED | OUTPATIENT
Start: 2023-11-19 | End: 2023-11-19

## 2023-11-19 RX ADMIN — CLINDAMYCIN HYDROCHLORIDE 450 MG: 150 CAPSULE ORAL at 12:33

## 2023-11-19 RX ADMIN — KETOROLAC TROMETHAMINE 15 MG: 30 INJECTION, SOLUTION INTRAMUSCULAR at 12:34

## 2023-11-19 NOTE — ED PROVIDER NOTES
History  Chief Complaint   Patient presents with    Leg Pain     Presents with right lower leg/ankle swelling and pain for about 3 wks, was placed on abx for it recently. 69 y.o. M presents w RLE pain, redness, and swelling, has been on OP ABX w Keflex for 4 days to tx cellulitis. No signs of systemic illness - no F/C, no N/V, presents w stable VS.   Had negative DVT study 5 days ago. Prior to Admission Medications   Prescriptions Last Dose Informant Patient Reported? Taking? albuterol (PROVENTIL HFA,VENTOLIN HFA) 90 mcg/act inhaler  Self Yes No   Sig: Inhale 2 puffs every 6 (six) hours as needed for wheezing   aspirin 81 MG tablet  Self Yes No   Sig: Take 1 tablet by mouth daily   cephalexin (KEFLEX) 500 mg capsule   No No   Sig: Take 1 capsule (500 mg total) by mouth 4 (four) times a day for 7 days   diltiazem (CARDIZEM CD) 120 mg 24 hr capsule  Self No No   Sig: TAKE 1 CAPSULE BY MOUTH  DAILY   fluticasone-salmeterol (Advair) 500-50 mcg/dose inhaler   Yes No   Sig: Inhale 1 puff 2 (two) times a day Rinse mouth after use. furosemide (LASIX) 20 mg tablet   No No   Sig: Take 1 tablet (20 mg total) by mouth daily   levothyroxine (Synthroid) 25 mcg tablet   Yes No   Si mcg   omeprazole (PriLOSEC OTC) 20 MG tablet  Self Yes No   Sig: Take 1 tablet by mouth daily   tamsulosin (FLOMAX) 0.4 mg  Self Yes No      Facility-Administered Medications: None       Past Medical History:   Diagnosis Date    A-fib ([facility])     BPH with obstruction/lower urinary tract symptoms     Colon polyp     COPD (chronic obstructive pulmonary disease) ([facility])     Frequency of urination     GERD (gastroesophageal reflux disease)     History of cardioversion 2011    Inital Rhythm AFIB, Final Rhythm Sinus, Max Joules 75    History of echocardiogram 2015    Normal LV systolic function, mild concentric LV hypertrophy, mild mitral and tricuspid regurg, right atrial enlargement.  EF 55%    Irregular heart beat     AF Other male erectile dysfunction     Personal history of irradiation     Personal history of malignant neoplasm of prostate     Prostate cancer [facility])        Past Surgical History:   Procedure Laterality Date    BACK SURGERY      x 3    INTRAOPERATIVE RADIATION THERAPY ([facility])  2011    JOINT REPLACEMENT Bilateral     hips    LAMINECTOMY      three levels L3 - S1 - all at different times    PATELLA SURGERY      most of this removed after injury    PROSTATE BIOPSY      TOTAL HIP ARTHROPLASTY Bilateral     VASECTOMY  1973       Family History   Problem Relation Age of Onset    Heart disease Mother     No Known Problems Brother      I have reviewed and agree with the history as documented. E-Cigarette/Vaping    E-Cigarette Use Never User      E-Cigarette/Vaping Substances    Nicotine No     THC No     CBD No     Flavoring No     Other No     Unknown No      Social History     Tobacco Use    Smoking status: Former     Packs/day: 2.00     Years: 35.00     Total pack years: 70.00     Types: Cigarettes     Start date:      Quit date: 1998     Years since quittin.3    Smokeless tobacco: Never   Vaping Use    Vaping Use: Never used   Substance Use Topics    Alcohol use: Yes     Alcohol/week: 7.0 standard drinks of alcohol     Types: 7 Standard drinks or equivalent per week     Comment: 1/2 glass of wine daily    Drug use: No       Review of Systems   Constitutional:  Negative for chills and fever. Respiratory:  Negative for shortness of breath. Cardiovascular:  Negative for chest pain. Musculoskeletal:         Swelling to right lower extremity, calf and foot. Pain to right lower extremity, calf and foot   Skin:  Positive for color change. Erythema to right lower extremity   All other systems reviewed and are negative. Physical Exam  Physical Exam  Vitals reviewed. Constitutional:       General: He is not in acute distress. Appearance: He is well-developed. He is not diaphoretic.    HENT: Head: Normocephalic and atraumatic. Eyes:      Conjunctiva/sclera: Conjunctivae normal.   Pulmonary:      Effort: Pulmonary effort is normal. No respiratory distress. Breath sounds: Normal breath sounds. Musculoskeletal:         General: Swelling (RLE) and tenderness (RLE) present. Normal range of motion. Cervical back: Normal range of motion and neck supple. Skin:     General: Skin is warm and dry. Coloration: Skin is not pale. Findings: Erythema present. Comments: Right lower extremity, distal to knee   Neurological:      General: No focal deficit present. Mental Status: He is alert and oriented to person, place, and time. Cranial Nerves: No cranial nerve deficit. Psychiatric:         Behavior: Behavior normal.         Vital Signs  ED Triage Vitals   Temperature Pulse Respirations Blood Pressure SpO2   11/19/23 1020 11/19/23 1020 11/19/23 1020 11/19/23 1020 11/19/23 1020   98.1 °F (36.7 °C) 90 18 154/74 98 %      Temp Source Heart Rate Source Patient Position - Orthostatic VS BP Location FiO2 (%)   11/19/23 1020 11/19/23 1020 11/19/23 1020 11/19/23 1020 --   Temporal Monitor Standing Left arm       Pain Score       11/19/23 1234       8           Vitals:    11/19/23 1020   BP: 154/74   Pulse: 90   Patient Position - Orthostatic VS: Standing         Visual Acuity      ED Medications  Medications   ketorolac (TORADOL) injection 15 mg (15 mg Intravenous Given 11/19/23 1234)   clindamycin (CLEOCIN) capsule 450 mg (450 mg Oral Given 11/19/23 1233)       Diagnostic Studies  Results Reviewed       Procedure Component Value Units Date/Time    D-dimer, quantitative [[id number]]  (Normal) Collected: 11/19/23 1103    Lab Status: Final result Specimen: Blood from Arm, Left Updated: 11/19/23 1140     D-Dimer, Quant 0.34 ug/ml FEU     Narrative:       In the evaluation for possible pulmonary embolism, in the appropriate (Well's Score of 4 or less) patient, the age adjusted d-dimer cutoff for this patient can be calculated as:    Age x 0.01 (in ug/mL) for Age-adjusted D-dimer exclusion threshold for a patient over 50 years.     Basic metabolic panel [[id number]] Collected: 11/19/23 1103    Lab Status: Final result Specimen: Blood from Arm, Left Updated: 11/19/23 1136     Sodium 136 mmol/L      Potassium 4.5 mmol/L      Chloride 103 mmol/L      CO2 28 mmol/L      ANION GAP 5 mmol/L      BUN 19 mg/dL      Creatinine 1.09 mg/dL      Glucose 102 mg/dL      Calcium 9.9 mg/dL      eGFR 65 ml/min/1.73sq m     Narrative:      [place] guidelines for Chronic Kidney Disease (CKD):     Stage 1 with normal or high GFR (GFR > 90 mL/min/1.73 square meters)    Stage 2 Mild CKD (GFR = 60-89 mL/min/1.73 square meters)    Stage 3A Moderate CKD (GFR = 45-59 mL/min/1.73 square meters)    Stage 3B Moderate CKD (GFR = 30-44 mL/min/1.73 square meters)    Stage 4 Severe CKD (GFR = 15-29 mL/min/1.73 square meters)    Stage 5 End Stage CKD (GFR <15 mL/min/1.73 square meters)  Note: GFR calculation is accurate only with a steady state creatinine    CBC and differential [[id number]]  (Abnormal) Collected: 11/19/23 1103    Lab Status: Final result Specimen: Blood from Arm, Left Updated: 11/19/23 1113     WBC 8.85 Thousand/uL      RBC 3.66 Million/uL      Hemoglobin 12.9 g/dL      Hematocrit 37.1 %       fL      MCH 35.2 pg      MCHC 34.8 g/dL      RDW 24.5 %      MPV 10.6 fL      Platelets 765 Thousands/uL      nRBC 1 /100 WBCs      Neutrophils Relative 75 %      Immat GRANS % 0 %      Lymphocytes Relative 14 %      Monocytes Relative 9 %      Eosinophils Relative 1 %      Basophils Relative 1 %      Neutrophils Absolute 6.60 Thousands/µL      Immature Grans Absolute 0.03 Thousand/uL      Lymphocytes Absolute 1.24 Thousands/µL      Monocytes Absolute 0.81 Thousand/µL      Eosinophils Absolute 0.12 Thousand/µL      Basophils Absolute 0.05 Thousands/µL                    No orders to display Procedures  Procedures         ED Course  ED Course as of 11/19/23 1517   Sun Nov 19, 2023   1129 WBC: 8.85               Identification of Seniors at [address] Most Recent Value   (ISAR) Identification of Seniors at Risk    Before the illness or injury that brought you to the Emergency, did you need someone to help you on a regular basis? 0 Filed at: 11/19/2023 1020   In the last 24 hours, have you needed more help than usual? 0 Filed at: 11/19/2023 1020   Have you been hospitalized for one or more nights during the past 6 months? 0 Filed at: 11/19/2023 1020   In general, do you see well? 0 Filed at: 11/19/2023 1020   In general, do you have serious problems with your memory? 0 Filed at: 11/19/2023 1020   Do you take more than three different medications every day? 0 Filed at: 11/19/2023 1020   ISAR Score 0 Filed at: 11/19/2023 1020                        SBIRT 20yo+      Flowsheet Row Most Recent Value   Initial Alcohol Screen: US AUDIT-C     1. How often do you have a drink containing alcohol? 0 Filed at: 11/19/2023 1020   2. How many drinks containing alcohol do you have on a typical day you are drinking? 0 Filed at: 11/19/2023 1020   3a. Male UNDER 65: How often do you have five or more drinks on one occasion? 0 Filed at: 11/19/2023 1020   3b. FEMALE Any Age, or MALE 65+: How often do you have 4 or more drinks on one occassion? 0 Filed at: 11/19/2023 1020   Audit-C Score 0 Filed at: 11/19/2023 1020   MANI: How many times in the past year have you. .. Used an illegal drug or used a prescription medication for non-medical reasons? Never Filed at: 11/19/2023 1020                      Medical Decision Making  D-dimer to eval for DVT considering negative study 5 days ago - normal.   Basic labwork is non-concerning, low suspicion for systemic illness. We will change antibiotics to clindamycin out of concern for antibiotic failure.   Advise follow-up with primary care provider for reevaluation as patient does have an appointment in 2 days. Advise return if worsening condition, fever, worsening pain, shortness of breath, etc.  Discharged in stable condition with advice for pain control. Amount and/or Complexity of Data Reviewed  Labs: ordered. Decision-making details documented in ED Course. Risk  Prescription drug management. Disposition  Final diagnoses:   Right leg pain   Cellulitis     Time reflects when diagnosis was documented in both MDM as applicable and the Disposition within this note       Time User Action Codes Description Comment    11/19/2023 12:25 PM Jackquline Bellow Add [K58.616] Right leg pain     11/19/2023 12:26 PM Jackquline Bellow Add [L03.90] Cellulitis           ED Disposition       ED Disposition   Discharge    Condition   Stable    Date/Time   [place] Nov 19, 2023 6420 [facility] discharge to home/self care.                    Follow-up Information       Follow up With Specialties Details Why Contact Info Additional Information    Erin Phan DO Family Medicine Schedule an appointment as soon as possible for a visit  For follow up to ensure improvement, and for further testing and treatment as needed [address] [facility] [id number]  [address] [facility] Emergency Medicine  If symptoms worsen [address] [facility] [address] [facility] Emergency [facility], [place], [address], [address]            Discharge Medication List as of 11/19/2023 12:28 PM        START taking these medications    Details   clindamycin (CLEOCIN) 150 mg capsule Take 3 capsules (450 mg total) by mouth every 6 (six) hours for 10 days, Starting Sun 11/19/2023, Until Wed 11/29/2023, Normal      naproxen (Naprosyn) 500 mg tablet Take 1 tablet (500 mg total) by mouth 2 (two) times a day with meals, Starting Sun 11/19/2023, Normal           CONTINUE these medications which have NOT CHANGED    Details   albuterol (PROVENTIL HFA,VENTOLIN HFA) 90 mcg/act inhaler Inhale 2 puffs every 6 (six) hours as needed for wheezing, Historical Med      aspirin 81 MG tablet Take 1 tablet by mouth daily, Historical Med      cephalexin (KEFLEX) 500 mg capsule Take 1 capsule (500 mg total) by mouth 4 (four) times a day for 7 days, Starting Tue 11/14/2023, Until Tue 11/21/2023, Normal      diltiazem (CARDIZEM CD) 120 mg 24 hr capsule TAKE 1 CAPSULE BY MOUTH  DAILY, Normal      fluticasone-salmeterol (Advair) 500-50 mcg/dose inhaler Inhale 1 puff 2 (two) times a day Rinse mouth after use., Historical Med      furosemide (LASIX) 20 mg tablet Take 1 tablet (20 mg total) by mouth daily, Starting Tue 11/14/2023, Normal      levothyroxine (Synthroid) 25 mcg tablet 25 mcg, Starting Thu 9/7/2023, Historical Med      omeprazole (PriLOSEC OTC) 20 MG tablet Take 1 tablet by mouth daily, Historical Med      tamsulosin (FLOMAX) 0.4 mg Historical Med             No discharge procedures on file.     PDMP Review       None            ED Provider  Electronically Signed by             Milagro Severino DO  11/19/23 [id number]

## 2023-11-19 NOTE — DISCHARGE INSTRUCTIONS
Take naproxen every 12 hours, Tylenol every 4 hours for pain control  Finish entire course of Antibiotics.

## 2023-11-21 ENCOUNTER — OFFICE VISIT (OUTPATIENT)
Dept: FAMILY MEDICINE CLINIC | Facility: CLINIC | Age: 78
End: 2023-11-21
Payer: COMMERCIAL

## 2023-11-21 VITALS
DIASTOLIC BLOOD PRESSURE: 80 MMHG | RESPIRATION RATE: 18 BRPM | TEMPERATURE: 98.2 F | HEART RATE: 60 BPM | OXYGEN SATURATION: 94 % | WEIGHT: 210.2 LBS | SYSTOLIC BLOOD PRESSURE: 138 MMHG | BODY MASS INDEX: 27.86 KG/M2 | HEIGHT: 73 IN

## 2023-11-21 DIAGNOSIS — C61 ADENOCARCINOMA OF PROSTATE (HCC): ICD-10-CM

## 2023-11-21 DIAGNOSIS — J44.9 CHRONIC OBSTRUCTIVE PULMONARY DISEASE, UNSPECIFIED COPD TYPE (HCC): ICD-10-CM

## 2023-11-21 DIAGNOSIS — R60.0 LOCALIZED EDEMA: Primary | ICD-10-CM

## 2023-11-21 DIAGNOSIS — E03.9 HYPOTHYROIDISM, UNSPECIFIED TYPE: ICD-10-CM

## 2023-11-21 DIAGNOSIS — K21.9 GASTROESOPHAGEAL REFLUX DISEASE WITHOUT ESOPHAGITIS: ICD-10-CM

## 2023-11-21 PROCEDURE — 99397 PER PM REEVAL EST PAT 65+ YR: CPT | Performed by: FAMILY MEDICINE

## 2023-11-21 NOTE — PROGRESS NOTES
[address] [place] [address] PRACTICE    NAME: Tanner Valencia  AGE: 69 y.o. SEX: male  : 1945     DATE: 2023     Assessment and Plan:     Problem List Items Addressed This Visit          Digestive    GERD (gastroesophageal reflux disease)     Stable GERD symptoms continue with omeprazole as needed basis            Endocrine    Hypothyroidism     Hypothyroidism is stable continue same medication regimen levothyroxine 25 mcg daily            Respiratory    COPD (chronic obstructive pulmonary disease) ([address])     Stable follow-up with pulmonary medicine next week continue same regimen of medications Advair has been helping            Genitourinary    Adenocarcinoma of prostate ([address])     Follow-up PSA annually patient stable no change in urinary status recently after stopping diuretics            Other    Edema - Primary     Right lower extremity with pain swelling decreased erythema inflammation now on 2 antibiotics after emergency room appointment and subsequent ultrasound revealing no DVT or vascular damage. Left lower extremity normal without pain or swelling. I wrapped his right lower extremity with Coban and this should help in applying some mild compression to reduce swelling            Immunizations and preventive care screenings were discussed with patient today. Appropriate education was printed on patient's after visit summary. Discussed risks and benefits of prostate cancer screening. We discussed the controversial history of PSA screening for prostate cancer in the [facility] as well as the risk of over detection and over treatment of prostate cancer by way of PSA screening.   The patient understands that PSA blood testing is an imperfect way to screen for prostate cancer and that elevated PSA levels in the blood may also be caused by infection, inflammation, prostatic trauma or manipulation, urological procedures, or by benign prostatic enlargement. The role of the digital rectal examination in prostate cancer screening was also discussed and I discussed with him that there is large interobserver variability in the findings of digital rectal examination. Counseling:  Alcohol/drug use: discussed moderation in alcohol intake, the recommendations for healthy alcohol use, and avoidance of illicit drug use. Dental Health: discussed importance of regular tooth brushing, flossing, and dental visits. Injury prevention: discussed safety/seat belts, safety helmets, smoke detectors, carbon dioxide detectors, and smoking near bedding or upholstery. Sexual health: discussed sexually transmitted diseases, partner selection, use of condoms, avoidance of unintended pregnancy, and contraceptive alternatives. Exercise: the importance of regular exercise/physical activity was discussed. Recommend exercise 3-5 times per week for at least 30 minutes. Return for Recheck, Next scheduled follow up. Chief Complaint:     Chief Complaint   Patient presents with    Foot Swelling    Joint Swelling     Started 3-4 weeks ago- right       History of Present Illness:     Adult Annual Physical   Patient here for a comprehensive physical exam. The patient reports no problems. Diet and Physical Activity  Diet/Nutrition: well balanced diet. Exercise: no formal exercise. Depression Screening  PHQ-2/9 Depression Screening    Little interest or pleasure in doing things: 0 - not at all       General Health  Sleep: sleeps well. Hearing: normal - bilateral.  Vision: no vision problems. Dental: regular dental visits.  Health  Symptoms include: none    Advanced Care Planning  Do you have an advanced directive? yes  Do you have a durable medical power of ? yes     Review of Systems:     Review of Systems   Constitutional:  Negative for chills, fatigue and fever.    HENT:  Negative for congestion, nosebleeds, rhinorrhea, sinus pressure and sore throat. Eyes:  Negative for discharge and redness. Respiratory:  Negative for cough and shortness of breath. Cardiovascular:  Positive for leg swelling. Negative for chest pain and palpitations. Gastrointestinal:  Negative for abdominal pain, blood in stool and nausea. Endocrine: Negative for cold intolerance, heat intolerance and polyuria. Genitourinary:  Negative for dysuria and frequency. Musculoskeletal:  Negative for arthralgias, back pain and myalgias. Skin:  Negative for rash. Neurological:  Negative for dizziness, weakness and headaches. Hematological:  Negative for adenopathy. Psychiatric/Behavioral:  Negative for behavioral problems and sleep disturbance. The patient is not nervous/anxious. Past Medical History:     Past Medical History:   Diagnosis Date    A-fib ([address])     BPH with obstruction/lower urinary tract symptoms     Colon polyp     COPD (chronic obstructive pulmonary disease) (HCC)     Frequency of urination     GERD (gastroesophageal reflux disease)     History of cardioversion 12/27/2011    Inital Rhythm AFIB, Final Rhythm Sinus, Max Joules 75    History of echocardiogram 06/01/2015    Normal LV systolic function, mild concentric LV hypertrophy, mild mitral and tricuspid regurg, right atrial enlargement.  EF 55%    Irregular heart beat     AF    Other male erectile dysfunction     Personal history of irradiation     Personal history of malignant neoplasm of prostate     Prostate cancer ([address])       Past Surgical History:     Past Surgical History:   Procedure Laterality Date    BACK SURGERY      x 3    INTRAOPERATIVE RADIATION THERAPY ([facility])  2011    JOINT REPLACEMENT Bilateral     hips    LAMINECTOMY      three levels L3 - S1 - all at different times    PATELLA SURGERY      most of this removed after injury    PROSTATE BIOPSY      TOTAL HIP ARTHROPLASTY Bilateral     VASECTOMY  1973      Family History:     Family History   Problem Relation Age of Onset    Heart disease Mother     No Known Problems Brother       Social History:     Social History     Socioeconomic History    Marital status: /Civil Union     Spouse name: None    Number of children: None    Years of education: None    Highest education level: None   Occupational History    None   Tobacco Use    Smoking status: Former     Packs/day: 2.00     Years: 35.00     Total pack years: 70.00     Types: Cigarettes     Start date:      Quit date: 1998     Years since quittin.3    Smokeless tobacco: Never   Vaping Use    Vaping Use: Never used   Substance and Sexual Activity    Alcohol use: Yes     Alcohol/week: 7.0 standard drinks of alcohol     Types: 7 Standard drinks or equivalent per week     Comment: 1/2 glass of wine daily    Drug use: No    Sexual activity: None   Other Topics Concern    None   Social History Narrative    Daily caffeine use- 2 cups of coffee, 1-2 bottles of green tea     Social Determinants of Health     Financial Resource Strain: Not on file   Food Insecurity: Not on file   Transportation Needs: Not on file   Physical Activity: Not on file   Stress: Not on file   Social Connections: Not on file   Intimate Partner Violence: Not on file   Housing Stability: Not on file      Current Medications:     Current Outpatient Medications   Medication Sig Dispense Refill    albuterol (PROVENTIL HFA,VENTOLIN HFA) 90 mcg/act inhaler Inhale 2 puffs every 6 (six) hours as needed for wheezing      aspirin 81 MG tablet Take 1 tablet by mouth daily      diltiazem (CARDIZEM CD) 120 mg 24 hr capsule TAKE 1 CAPSULE BY MOUTH  DAILY 90 capsule 3    fluticasone-salmeterol (Advair) 500-50 mcg/dose inhaler Inhale 1 puff 2 (two) times a day Rinse mouth after use.       levothyroxine (Synthroid) 25 mcg tablet 25 mcg      tamsulosin (FLOMAX) 0.4 mg       cephalexin (KEFLEX) 500 mg capsule Take 1 capsule (500 mg total) by mouth 4 (four) times a day for 7 days (Patient not taking: Reported on 11/21/2023) 28 capsule 0    clindamycin (CLEOCIN) 150 mg capsule Take 3 capsules (450 mg total) by mouth every 6 (six) hours for 10 days (Patient not taking: Reported on 11/21/2023) 120 capsule 0    furosemide (LASIX) 20 mg tablet Take 1 tablet (20 mg total) by mouth daily (Patient not taking: Reported on 11/21/2023) 10 tablet 0    naproxen (Naprosyn) 500 mg tablet Take 1 tablet (500 mg total) by mouth 2 (two) times a day with meals (Patient not taking: Reported on 11/21/2023) 30 tablet 0    omeprazole (PriLOSEC OTC) 20 MG tablet Take 1 tablet by mouth daily (Patient not taking: Reported on 11/21/2023)       No current facility-administered medications for this visit. Allergies:     No Known Allergies   Physical Exam:     /80 (BP Location: Left arm, Patient Position: Sitting, Cuff Size: Standard)   Pulse 60   Temp 98.2 °F (36.8 °C) (Tympanic)   Resp 18   Ht 6' 1" (1.854 m)   Wt 95.3 kg (210 lb 3.2 oz)   SpO2 94%   BMI 27.73 kg/m²     Physical Exam  Vitals and nursing note reviewed. Constitutional:       General: He is not in acute distress. Appearance: Normal appearance. He is well-developed and normal weight. HENT:      Head: Normocephalic and atraumatic. Right Ear: Tympanic membrane, ear canal and external ear normal.      Left Ear: External ear normal.      Nose: Nose normal.      Mouth/Throat:      Mouth: Mucous membranes are moist.      Pharynx: Oropharynx is clear. Eyes:      Extraocular Movements: Extraocular movements intact. Conjunctiva/sclera: Conjunctivae normal.   Cardiovascular:      Rate and Rhythm: Normal rate and regular rhythm. Pulses: Normal pulses. Heart sounds: Normal heart sounds. No murmur heard. Pulmonary:      Effort: Pulmonary effort is normal. No respiratory distress. Breath sounds: Normal breath sounds. Abdominal:      Palpations: Abdomen is soft. Tenderness: There is no abdominal tenderness.    Musculoskeletal:         General: No swelling. Normal range of motion. Cervical back: Normal range of motion and neck supple. Skin:     General: Skin is warm and dry. Capillary Refill: Capillary refill takes less than 2 seconds. Neurological:      General: No focal deficit present. Mental Status: He is alert and oriented to person, place, and time. Mental status is at baseline. Psychiatric:         Mood and Affect: Mood normal.         Behavior: Behavior normal.         Thought Content:  Thought content normal.         Judgment: Judgment normal.          Luis Miguel France DO  [id number] [facility]

## 2023-11-21 NOTE — ASSESSMENT & PLAN NOTE
Stable follow-up with pulmonary medicine next week continue same regimen of medications Advair has been helping

## 2023-11-21 NOTE — ASSESSMENT & PLAN NOTE
Right lower extremity with pain swelling decreased erythema inflammation now on 2 antibiotics after emergency room appointment and subsequent ultrasound revealing no DVT or vascular damage. Left lower extremity normal without pain or swelling.   I wrapped his right lower extremity with Coban and this should help in applying some mild compression to reduce swelling

## 2023-11-22 ENCOUNTER — OFFICE VISIT (OUTPATIENT)
Dept: CARDIOLOGY CLINIC | Facility: CLINIC | Age: 78
End: 2023-11-22
Payer: COMMERCIAL

## 2023-11-22 VITALS
RESPIRATION RATE: 14 BRPM | HEART RATE: 62 BPM | BODY MASS INDEX: 27.57 KG/M2 | SYSTOLIC BLOOD PRESSURE: 137 MMHG | WEIGHT: 208 LBS | HEIGHT: 73 IN | DIASTOLIC BLOOD PRESSURE: 82 MMHG

## 2023-11-22 DIAGNOSIS — Z01.810 PRE-OPERATIVE CARDIOVASCULAR EXAMINATION: ICD-10-CM

## 2023-11-22 DIAGNOSIS — H25.813 COMBINED FORMS OF AGE-RELATED CATARACT OF BOTH EYES: ICD-10-CM

## 2023-11-22 DIAGNOSIS — R60.0 LOCALIZED EDEMA: ICD-10-CM

## 2023-11-22 DIAGNOSIS — I48.20 CHRONIC ATRIAL FIBRILLATION (HCC): Primary | ICD-10-CM

## 2023-11-22 PROCEDURE — 99214 OFFICE O/P EST MOD 30 MIN: CPT | Performed by: INTERNAL MEDICINE

## 2023-11-22 PROCEDURE — 93000 ELECTROCARDIOGRAM COMPLETE: CPT | Performed by: INTERNAL MEDICINE

## 2023-11-22 RX ORDER — SILODOSIN 8 MG/1
1 CAPSULE ORAL
COMMUNITY

## 2023-11-22 NOTE — ASSESSMENT & PLAN NOTE
Lone atrial fibrillation. Low [id number] score. On this basis as well as patient preference, continue aspirin and rate control.

## 2023-11-22 NOTE — PROGRESS NOTES
Patient ID: Henry Ash is a 69 y.o. male. Plan:      Chronic atrial fibrillation (HCC)  Lone atrial fibrillation. Low [id number] score. On this basis as well as patient preference, continue aspirin and rate control. Edema  Mild bilateral and chronic. Pre-operative cardiovascular examination  Okay to proceed with cataract surgery at reasonable risk without further testing. Also okay to hold aspirin perioperatively if desired by ophthalmology. Follow up Plan/Other summary comments:  Return in about 1 year (around 11/22/2024). HPI: Patient is seen today in follow-up regarding the above issues. No recent chest pain. No shortness of breath syncope or near syncope. He had a recent ED visit for ankle discomfort. Patient is to have cataract surgery in the near term and okay from my perspective to proceed. He continues to work in aircraft maintenance. Results for orders placed or performed in visit on 11/22/23   POCT ECG    Impression    Atrial fibrillation with controlled ventricular response. Otherwise normal.         Most recent or relevant cardiac/vascular testing:    Echocardiogram 6/1/2015:  Normal LV function. Mild LVH. Past Surgical History:   Procedure Laterality Date    BACK SURGERY      x 3    INTRAOPERATIVE RADIATION THERAPY ([facility])  2011    JOINT REPLACEMENT Bilateral     hips    LAMINECTOMY      three levels L3 - S1 - all at different times    PATELLA SURGERY      most of this removed after injury    PROSTATE BIOPSY      TOTAL HIP ARTHROPLASTY Bilateral     VASECTOMY  1973       Lipid Profile: No results found for: "CHOL", "TRIG", "HDL", "LDL"      Review of Systems   10  point ROS  was otherwise non pertinent or negative except as per HPI or as below. Gait: Normal.  Hard of hearing.       Objective:     /82   Pulse 62   Resp 14   Ht 6' 1" (1.854 m)   Wt 94.3 kg (208 lb)   BMI 27.44 kg/m²     PHYSICAL EXAM:    General:  Normal appearance in no distress. Eyes:  Anicteric. Oral mucosa:  Moist.  Neck:  No JVD. Carotid upstrokes are brisk without bruits. No masses. Chest:  Clear to auscultation. Cardiac: Irregularly irregular. No palpable PMI. Normal S1 and S2. No murmur gallop or rub. Abdomen:  Soft and nontender. No palpable organomegaly or aortic enlargement. Extremities: Trace to 1+ chronic pretibial edema. Musculoskeletal:  Symmetric. Vascular:  Femoral pulses are brisk without bruits. Popliteal pulses are intact bilaterally. Pedal pulses are intact. Neuro:  Grossly symmetric. Psych:  Alert and oriented x3.         Current Outpatient Medications:     albuterol (PROVENTIL HFA,VENTOLIN HFA) 90 mcg/act inhaler, Inhale 2 puffs every 6 (six) hours as needed for wheezing, Disp: , Rfl:     aspirin 81 MG tablet, Take 1 tablet by mouth daily, Disp: , Rfl:     clindamycin (CLEOCIN) 150 mg capsule, Take 3 capsules (450 mg total) by mouth every 6 (six) hours for 10 days, Disp: 120 capsule, Rfl: 0    diltiazem (CARDIZEM CD) 120 mg 24 hr capsule, TAKE 1 CAPSULE BY MOUTH  DAILY, Disp: 90 capsule, Rfl: 3    fluticasone-salmeterol (Advair) 500-50 mcg/dose inhaler, Inhale 1 puff 2 (two) times a day Rinse mouth after use., Disp: , Rfl:     levothyroxine (Synthroid) 25 mcg tablet, 25 mcg, Disp: , Rfl:     naproxen (Naprosyn) 500 mg tablet, Take 1 tablet (500 mg total) by mouth 2 (two) times a day with meals, Disp: 30 tablet, Rfl: 0    omeprazole (PriLOSEC OTC) 20 MG tablet, Take 1 tablet by mouth daily, Disp: , Rfl:     Silodosin (Rapaflo) 8 MG CAPS, Take 1 capsule by mouth, Disp: , Rfl:     furosemide (LASIX) 20 mg tablet, Take 1 tablet (20 mg total) by mouth daily (Patient not taking: Reported on 11/21/2023), Disp: 10 tablet, Rfl: 0    tamsulosin (FLOMAX) 0.4 mg, , Disp: , Rfl:   No Known Allergies  Past Medical History:   Diagnosis Date    A-fib ([facility])     BPH with obstruction/lower urinary tract symptoms     Colon polyp     COPD (chronic obstructive pulmonary disease) ([address])     Frequency of urination     GERD (gastroesophageal reflux disease)     History of cardioversion 2011    Inital Rhythm AFIB, Final Rhythm Sinus, Max Joules 75    History of echocardiogram 2015    Normal LV systolic function, mild concentric LV hypertrophy, mild mitral and tricuspid regurg, right atrial enlargement.  EF 55%    Irregular heart beat     AF    Other male erectile dysfunction     Personal history of irradiation     Personal history of malignant neoplasm of prostate     Prostate cancer ([address] )            Social History     Tobacco Use   Smoking Status Former    Packs/day: 2.00    Years: 35.00    Total pack years: 70.00    Types: Cigarettes    Start date: 66    Quit date: 1998    Years since quittin.3   Smokeless Tobacco Never

## 2023-11-22 NOTE — ASSESSMENT & PLAN NOTE
Okay to proceed with cataract surgery at reasonable risk without further testing. Also okay to hold aspirin perioperatively if desired by ophthalmology.

## 2023-12-11 ENCOUNTER — PRE-OP CATARACT MEASUREMENTS (OUTPATIENT)
Dept: URBAN - METROPOLITAN AREA CLINIC 6 | Facility: CLINIC | Age: 78
End: 2023-12-11

## 2023-12-11 DIAGNOSIS — H02.831: ICD-10-CM

## 2023-12-11 DIAGNOSIS — H35.3131: ICD-10-CM

## 2023-12-11 DIAGNOSIS — H02.834: ICD-10-CM

## 2023-12-11 DIAGNOSIS — H35.40: ICD-10-CM

## 2023-12-11 DIAGNOSIS — H25.813: ICD-10-CM

## 2023-12-11 DIAGNOSIS — H17.822: ICD-10-CM

## 2023-12-11 PROCEDURE — 92012 INTRM OPH EXAM EST PATIENT: CPT

## 2023-12-11 PROCEDURE — 92136 OPHTHALMIC BIOMETRY: CPT

## 2023-12-11 ASSESSMENT — TONOMETRY
OS_IOP_MMHG: 18
OD_IOP_MMHG: 17

## 2023-12-11 ASSESSMENT — VISUAL ACUITY
OS_PH: 20/50+2
OD_CC: 20/50+1
OS_CC: 20/200
OD_GLARE: 20/60+2
OU_SC: J2

## 2023-12-29 ENCOUNTER — OFFICE VISIT (OUTPATIENT)
Dept: FAMILY MEDICINE CLINIC | Facility: CLINIC | Age: 78
End: 2023-12-29
Payer: COMMERCIAL

## 2023-12-29 VITALS
TEMPERATURE: 97.8 F | RESPIRATION RATE: 18 BRPM | WEIGHT: 208 LBS | BODY MASS INDEX: 27.57 KG/M2 | HEIGHT: 73 IN | SYSTOLIC BLOOD PRESSURE: 120 MMHG | HEART RATE: 80 BPM | DIASTOLIC BLOOD PRESSURE: 80 MMHG | OXYGEN SATURATION: 96 %

## 2023-12-29 DIAGNOSIS — E03.9 HYPOTHYROIDISM, UNSPECIFIED TYPE: ICD-10-CM

## 2023-12-29 DIAGNOSIS — J44.9 CHRONIC OBSTRUCTIVE PULMONARY DISEASE, UNSPECIFIED COPD TYPE (HCC): ICD-10-CM

## 2023-12-29 DIAGNOSIS — I48.20 CHRONIC ATRIAL FIBRILLATION (HCC): ICD-10-CM

## 2023-12-29 DIAGNOSIS — K21.9 GASTROESOPHAGEAL REFLUX DISEASE WITHOUT ESOPHAGITIS: ICD-10-CM

## 2023-12-29 DIAGNOSIS — M47.16 OSTEOARTHRITIS OF LUMBAR SPINE WITH MYELOPATHY: ICD-10-CM

## 2023-12-29 DIAGNOSIS — C61 ADENOCARCINOMA OF PROSTATE (HCC): ICD-10-CM

## 2023-12-29 DIAGNOSIS — G47.33 OSA (OBSTRUCTIVE SLEEP APNEA): ICD-10-CM

## 2023-12-29 DIAGNOSIS — Z01.818 PREOPERATIVE EXAMINATION: Primary | ICD-10-CM

## 2023-12-29 DIAGNOSIS — H90.3 SENSORINEURAL HEARING LOSS, BILATERAL: ICD-10-CM

## 2023-12-29 DIAGNOSIS — J41.0 SIMPLE CHRONIC BRONCHITIS (HCC): ICD-10-CM

## 2023-12-29 DIAGNOSIS — H25.813 COMBINED FORMS OF AGE-RELATED CATARACT OF BOTH EYES: ICD-10-CM

## 2023-12-29 PROCEDURE — 99214 OFFICE O/P EST MOD 30 MIN: CPT | Performed by: FAMILY MEDICINE

## 2023-12-29 RX ORDER — AMOXICILLIN 500 MG/1
1000 TABLET, FILM COATED ORAL 2 TIMES DAILY
Qty: 28 TABLET | Refills: 1 | Status: SHIPPED | OUTPATIENT
Start: 2023-12-29 | End: 2024-01-05

## 2023-12-29 NOTE — PROGRESS NOTES
BMI Counseling: Body mass index is 27.44 kg/m². The BMI is above normal. Nutrition recommendations include reducing portion sizes, 3-5 servings of fruits/vegetables daily, and decreasing soda and/or juice intake. Exercise recommendations include exercising 3-5 times per week.

## 2023-12-29 NOTE — LETTER
2023     Benjy Church DO  [address] [address]    Patient: Rosalio Anguiano   YOB: 1945   Date of Visit: 2023       Dear Dr. Church:    Thank you for referring Rosalio Anguiano to me for evaluation. Below are my notes for this consultation.    If you have questions, please do not hesitate to call me. I look forward to following your patient along with you.         Sincerely,        Jona Calzada DO        CC: No Recipients    Jona Calzada DO  2023  1:23 PM  Incomplete  FAMILY MEDICINE PRE-OPERATIVE EVALUATION  [facility]    NAME: Rosalio Anguiano  AGE: 78 y.o. SEX: male  : 1945     DATE: 2023     Family Medicine Pre-Operative Evaluation:     Chief Complaint: Pre-operative Evaluation     Surgery: Bilateral cataract surgery  Anticipated Date of Surgery: Left eye on 2024 followed by right eye cataract surgery on 2024  Referring Provider: Benjy Church MD    History of Present Illness:     Rosalio Anguiano is a 78 y.o. male who presents to the office today for a preoperative consultation at the request of surgeon, Benjy Church MD, who plans on performing left eye cataract surgery on 2024 followed by right eye cataract surgery on 2024. planned anesthesia is IV sedation. Patient has a bleeding risk of: no recent abnormal bleeding. Patient does not have objections to receiving blood products if needed. Current anti-platelet/anti-coagulation medications that the patient is prescribed includes: None.      Assessment of Chronic Conditions:   COPD atrial fibrillation hypothyroidism gastroesophageal reflux disease hearing loss osteoarthritis of lumbar spine with myelopathy adenocarcinoma of prostate by history.  All medical conditions reviewed patient medically stable         Review of Systems:     Review of Systems    Constitutional:  Negative for chills, fatigue and fever.   HENT:  Negative for congestion, nosebleeds, rhinorrhea, sinus pressure and sore throat.    Eyes:  Negative for discharge and redness.   Respiratory:  Negative for cough and shortness of breath.    Cardiovascular:  Negative for chest pain, palpitations and leg swelling.   Gastrointestinal:  Negative for abdominal pain, blood in stool and nausea.   Endocrine: Negative for cold intolerance, heat intolerance and polyuria.   Genitourinary:  Negative for dysuria and frequency.   Musculoskeletal:  Negative for arthralgias, back pain and myalgias.   Skin:  Negative for rash.   Neurological:  Negative for dizziness, weakness and headaches.   Hematological:  Negative for adenopathy.   Psychiatric/Behavioral:  Negative for behavioral problems and sleep disturbance. The patient is not nervous/anxious.         Problem List:     Patient Active Problem List   Diagnosis   • Chronic atrial fibrillation ([facility])   • Edema   • Adenocarcinoma of prostate ([facility])   • Asthma   • GERD (gastroesophageal reflux disease)   • Tinnitus   • Bilateral impacted cerumen   • Acute bacterial conjunctivitis of left eye   • Recurrent acute serous otitis media of right ear   • COPD (chronic obstructive pulmonary disease) ([facility])   • Right groin pain   • Bradycardia   • Neck pain   • Bilateral hearing loss due to cerumen impaction   • Acute bacterial bronchitis   • Hypothyroidism   • 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   • Hearing loss   • History of colonic polyps   • Osteoarthritis of hip   • Sensorineural hearing loss, bilateral   • Simple chronic bronchitis ([facility])   • LAUREL (obstructive sleep apnea)   • Vertigo   • Osteoarthritis of lumbar spine with myelopathy   • Pain and swelling of right lower leg   • Preoperative examination   • Combined forms of age-related cataract of both eyes        Allergies:     No Known Allergies     Current Medications:       Current Outpatient Medications:   •   albuterol (PROVENTIL HFA,VENTOLIN HFA) 90 mcg/act inhaler, Inhale 2 puffs every 6 (six) hours as needed for wheezing, Disp: , Rfl:   •  aspirin 81 MG tablet, Take 1 tablet by mouth daily, Disp: , Rfl:   •  diltiazem (CARDIZEM CD) 120 mg 24 hr capsule, TAKE 1 CAPSULE BY MOUTH  DAILY, Disp: 90 capsule, Rfl: 3  •  fluticasone-salmeterol (Advair) 500-50 mcg/dose inhaler, Inhale 1 puff 2 (two) times a day Rinse mouth after use., Disp: , Rfl:   •  levothyroxine (Synthroid) 25 mcg tablet, 25 mcg, Disp: , Rfl:   •  naproxen (Naprosyn) 500 mg tablet, Take 1 tablet (500 mg total) by mouth 2 (two) times a day with meals, Disp: 30 tablet, Rfl: 0  •  omeprazole (PriLOSEC OTC) 20 MG tablet, Take 1 tablet by mouth daily, Disp: , Rfl:   •  Silodosin (Rapaflo) 8 MG CAPS, Take 1 capsule by mouth, Disp: , Rfl:   •  furosemide (LASIX) 20 mg tablet, Take 1 tablet (20 mg total) by mouth daily (Patient not taking: Reported on 11/21/2023), Disp: 10 tablet, Rfl: 0  •  tamsulosin (FLOMAX) 0.4 mg, , Disp: , Rfl:      Past History:     Past Medical History:   Diagnosis Date   • A-fib (HCC)    • BPH with obstruction/lower urinary tract symptoms    • Colon polyp    • COPD (chronic obstructive pulmonary disease) (HCC)    • Frequency of urination    • GERD (gastroesophageal reflux disease)    • History of cardioversion 12/27/2011    Inital Rhythm AFIB, Final Rhythm Sinus, Max Joules 75   • History of echocardiogram 06/01/2015    Normal LV systolic function, mild concentric LV hypertrophy, mild mitral and tricuspid regurg, right atrial enlargement. EF 55%   • Irregular heart beat     AF   • Other male erectile dysfunction    • Personal history of irradiation    • Personal history of malignant neoplasm of prostate    • Prostate cancer (HCC)         Past Surgical History:   Procedure Laterality Date   • BACK SURGERY      x 3   • INTRAOPERATIVE RADIATION THERAPY (IORT)  2011   • JOINT REPLACEMENT Bilateral     hips   • LAMINECTOMY      three levels  "L3 - S1 - all at different times   • PATELLA SURGERY      most of this removed after injury   • PROSTATE BIOPSY     • TOTAL HIP ARTHROPLASTY Bilateral    • VASECTOMY          Family History   Problem Relation Age of Onset   • Heart disease Mother    • No Known Problems Brother         Social History     Socioeconomic History   • Marital status: /Civil Union     Spouse name: Not on file   • Number of children: Not on file   • Years of education: Not on file   • Highest education level: Not on file   Occupational History   • Not on file   Tobacco Use   • Smoking status: Former     Current packs/day: 0.00     Average packs/day: 2.0 packs/day for 35.0 years (70.0 ttl pk-yrs)     Types: Cigarettes     Start date:      Quit date: 1998     Years since quittin.4   • Smokeless tobacco: Never   Vaping Use   • Vaping status: Never Used   Substance and Sexual Activity   • Alcohol use: Yes     Alcohol/week: 7.0 standard drinks of alcohol     Types: 7 Standard drinks or equivalent per week     Comment: 1/2 glass of wine daily   • Drug use: No   • Sexual activity: Not on file   Other Topics Concern   • Not on file   Social History Narrative    Daily caffeine use- 2 cups of coffee, 1-2 bottles of green tea     Social Determinants of Health     Financial Resource Strain: Not on file   Food Insecurity: Not on file   Transportation Needs: Not on file   Physical Activity: Not on file   Stress: Not on file   Social Connections: Not on file   Intimate Partner Violence: Not on file   Housing Stability: Not on file        Physical Exam:      /80 (BP Location: Left arm, Patient Position: Sitting, Cuff Size: Standard)   Pulse 80   Temp 97.8 °F (36.6 °C) (Tympanic)   Resp 18   Ht 6' 1\" (1.854 m)   Wt 94.3 kg (208 lb)   SpO2 96%   BMI 27.44 kg/m²     Physical Exam  Vitals and nursing note reviewed.   Constitutional:       Appearance: Normal appearance. He is well-developed.   HENT:      Head: Normocephalic " and atraumatic.      Right Ear: Tympanic membrane, ear canal and external ear normal.      Left Ear: Tympanic membrane, ear canal and external ear normal.      Nose: Nose normal.      Mouth/Throat:      Mouth: Mucous membranes are moist.      Pharynx: Oropharynx is clear.   Eyes:      General: No scleral icterus.     Conjunctiva/sclera: Conjunctivae normal.      Pupils: Pupils are equal, round, and reactive to light.   Neck:      Thyroid: No thyromegaly.      Vascular: No JVD.   Cardiovascular:      Rate and Rhythm: Normal rate and regular rhythm.      Heart sounds: Normal heart sounds. No murmur heard.  Pulmonary:      Effort: Pulmonary effort is normal.      Breath sounds: Normal breath sounds. No wheezing or rales.   Chest:      Chest wall: No tenderness.   Abdominal:      General: Bowel sounds are normal. There is no distension.      Palpations: Abdomen is soft. There is no mass.      Tenderness: There is no abdominal tenderness. There is no guarding or rebound.   Musculoskeletal:         General: No tenderness or deformity. Normal range of motion.      Cervical back: Normal range of motion and neck supple.   Lymphadenopathy:      Cervical: No cervical adenopathy.   Skin:     General: Skin is warm and dry.      Findings: No erythema or rash.   Neurological:      Mental Status: He is alert and oriented to person, place, and time.      Cranial Nerves: No cranial nerve deficit.      Deep Tendon Reflexes: Reflexes are normal and symmetric. Reflexes normal.   Psychiatric:         Mood and Affect: Mood normal.         Behavior: Behavior normal.         Thought Content: Thought content normal.         Judgment: Judgment normal.           Data:     Pre-operative work-up    Laboratory Results: I have personally reviewed the pertinent laboratory results/reports     EKG: I have personally reviewed pertinent reports.      Chest x-ray: I have personally reviewed pertinent reports.      Previous cardiopulmonary studies within  the past year:  Echocardiogram: Reviewed  Stress Test: Reviewed  Pulmonary Function Testing: Reviewed patient medically stable       Assessment:     1. Preoperative examination        2. Combined forms of age-related cataract of both eyes        3. Simple chronic bronchitis (HCC)        4. Chronic obstructive pulmonary disease, unspecified COPD type (HCC)        5. Gastroesophageal reflux disease without esophagitis        6. Hypothyroidism, unspecified type        7. LAUREL (obstructive sleep apnea)        8. Sensorineural hearing loss, bilateral        9. Chronic atrial fibrillation (HCC)        10. Osteoarthritis of lumbar spine with myelopathy        11. Adenocarcinoma of prostate (HCC)             Plan:     78 y.o. male with planned surgery: Bilateral cataract surgery left eye initially followed by right eye 2 weeks later.      Cardiac Risk Estimation: per the Revised Cardiac Risk Index (Circ. 100:1043, 1999), the patient's risk factors for cardiac complications include COPD obstructive sleep apnea, putting him in: RCI RISK CLASS II (1 risk factor, risk of major cardiac compl. appr. 1.3%).    1. Further preoperative workup as follows:   - None; no further preoperative work-up is required    2. Medication Management/Recommendations:   - None, continue medication regimen including morning of surgery, with sip of water    3. Prophylaxis for cardiac events with perioperative beta-blockers: not indicated.    4. Patient requires further consultation with: None    Clearance  Patient is CLEARED for surgery without any additional cardiac testing.     Jona Calzada DO  [address] [address]  Phone#  [phone]  Fax#  [phone]

## 2023-12-29 NOTE — PROGRESS NOTES
FAMILY MEDICINE PRE-OPERATIVE EVALUATION  [facility] - [facility]    NAME: Rosalio Anguiano  AGE: 78 y.o. SEX: male  : 1945     DATE: 2023     Family Medicine Pre-Operative Evaluation:     Chief Complaint: Pre-operative Evaluation     Surgery: Bilateral cataract surgery  Anticipated Date of Surgery: Left eye on 2024 followed by right eye cataract surgery on 2024  Referring Provider: Benjy Church MD    History of Present Illness:     Rosalio Anguiano is a 78 y.o. male who presents to the office today for a preoperative consultation at the request of surgeon, Benjy Church MD, who plans on performing left eye cataract surgery on 2024 followed by right eye cataract surgery on 2024. planned anesthesia is IV sedation. Patient has a bleeding risk of: no recent abnormal bleeding. Patient does not have objections to receiving blood products if needed. Current anti-platelet/anti-coagulation medications that the patient is prescribed includes: None.      Assessment of Chronic Conditions:   COPD atrial fibrillation hypothyroidism gastroesophageal reflux disease hearing loss osteoarthritis of lumbar spine with myelopathy adenocarcinoma of prostate by history.  All medical conditions reviewed patient medically stable         Review of Systems:     Review of Systems   Constitutional:  Negative for chills, fatigue and fever.   HENT:  Negative for congestion, nosebleeds, rhinorrhea, sinus pressure and sore throat.    Eyes:  Negative for discharge and redness.   Respiratory:  Negative for cough and shortness of breath.    Cardiovascular:  Negative for chest pain, palpitations and leg swelling.   Gastrointestinal:  Negative for abdominal pain, blood in stool and nausea.   Endocrine: Negative for cold intolerance, heat intolerance and polyuria.   Genitourinary:  Negative for dysuria and frequency.   Musculoskeletal:   Negative for arthralgias, back pain and myalgias.   Skin:  Negative for rash.   Neurological:  Negative for dizziness, weakness and headaches.   Hematological:  Negative for adenopathy.   Psychiatric/Behavioral:  Negative for behavioral problems and sleep disturbance. The patient is not nervous/anxious.         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    Hearing loss    History of colonic polyps    Osteoarthritis of hip    Sensorineural hearing loss, bilateral    Simple chronic bronchitis (HCC)    LAUREL (obstructive sleep apnea)    Vertigo    Osteoarthritis of lumbar spine with myelopathy    Pain and swelling of right lower leg    Preoperative examination    Combined forms of age-related cataract of both eyes        Allergies:     No Known Allergies     Current Medications:       Current Outpatient Medications:     albuterol (PROVENTIL HFA,VENTOLIN HFA) 90 mcg/act inhaler, Inhale 2 puffs every 6 (six) hours as needed for wheezing, Disp: , Rfl:     aspirin 81 MG tablet, Take 1 tablet by mouth daily, Disp: , Rfl:     diltiazem (CARDIZEM CD) 120 mg 24 hr capsule, TAKE 1 CAPSULE BY MOUTH  DAILY, Disp: 90 capsule, Rfl: 3    fluticasone-salmeterol (Advair) 500-50 mcg/dose inhaler, Inhale 1 puff 2 (two) times a day Rinse mouth after use., Disp: , Rfl:     levothyroxine (Synthroid) 25 mcg tablet, 25 mcg, Disp: , Rfl:     naproxen (Naprosyn) 500 mg tablet, Take 1 tablet (500 mg total) by mouth 2  (two) times a day with meals, Disp: 30 tablet, Rfl: 0    omeprazole (PriLOSEC OTC) 20 MG tablet, Take 1 tablet by mouth daily, Disp: , Rfl:     Silodosin (Rapaflo) 8 MG CAPS, Take 1 capsule by mouth, Disp: , Rfl:     furosemide (LASIX) 20 mg tablet, Take 1 tablet (20 mg total) by mouth daily (Patient not taking: Reported on 11/21/2023), Disp: 10 tablet, Rfl: 0    tamsulosin (FLOMAX) 0.4 mg, , Disp: , Rfl:      Past History:     Past Medical History:   Diagnosis Date    A-fib (HCC)     BPH with obstruction/lower urinary tract symptoms     Colon polyp     COPD (chronic obstructive pulmonary disease) (HCC)     Frequency of urination     GERD (gastroesophageal reflux disease)     History of cardioversion 12/27/2011    Inital Rhythm AFIB, Final Rhythm Sinus, Max Joules 75    History of echocardiogram 06/01/2015    Normal LV systolic function, mild concentric LV hypertrophy, mild mitral and tricuspid regurg, right atrial enlargement. EF 55%    Irregular heart beat     AF    Other male erectile dysfunction     Personal history of irradiation     Personal history of malignant neoplasm of prostate     Prostate cancer (HCC)         Past Surgical History:   Procedure Laterality Date    BACK SURGERY      x 3    INTRAOPERATIVE RADIATION THERAPY (IORT)  2011    JOINT REPLACEMENT Bilateral     hips    LAMINECTOMY      three levels L3 - S1 - all at different times    PATELLA SURGERY      most of this removed after injury    PROSTATE BIOPSY      TOTAL HIP ARTHROPLASTY Bilateral     VASECTOMY  1973        Family History   Problem Relation Age of Onset    Heart disease Mother     No Known Problems Brother         Social History     Socioeconomic History    Marital status: /Civil Union     Spouse name: Not on file    Number of children: Not on file    Years of education: Not on file    Highest education level: Not on file   Occupational History    Not on file   Tobacco Use    Smoking status: Former     Current packs/day: 0.00     " Average packs/day: 2.0 packs/day for 35.0 years (70.0 ttl pk-yrs)     Types: Cigarettes     Start date:      Quit date: 1998     Years since quittin.4    Smokeless tobacco: Never   Vaping Use    Vaping status: Never Used   Substance and Sexual Activity    Alcohol use: Yes     Alcohol/week: 7.0 standard drinks of alcohol     Types: 7 Standard drinks or equivalent per week     Comment: 1/2 glass of wine daily    Drug use: No    Sexual activity: Not on file   Other Topics Concern    Not on file   Social History Narrative    Daily caffeine use- 2 cups of coffee, 1-2 bottles of green tea     Social Determinants of Health     Financial Resource Strain: Not on file   Food Insecurity: Not on file   Transportation Needs: Not on file   Physical Activity: Not on file   Stress: Not on file   Social Connections: Not on file   Intimate Partner Violence: Not on file   Housing Stability: Not on file        Physical Exam:      /80 (BP Location: Left arm, Patient Position: Sitting, Cuff Size: Standard)   Pulse 80   Temp 97.8 °F (36.6 °C) (Tympanic)   Resp 18   Ht 6' 1\" (1.854 m)   Wt 94.3 kg (208 lb)   SpO2 96%   BMI 27.44 kg/m²     Physical Exam  Vitals and nursing note reviewed.   Constitutional:       Appearance: Normal appearance. He is well-developed.   HENT:      Head: Normocephalic and atraumatic.      Right Ear: Tympanic membrane, ear canal and external ear normal.      Left Ear: Tympanic membrane, ear canal and external ear normal.      Nose: Nose normal.      Mouth/Throat:      Mouth: Mucous membranes are moist.      Pharynx: Oropharynx is clear.   Eyes:      General: No scleral icterus.     Conjunctiva/sclera: Conjunctivae normal.      Pupils: Pupils are equal, round, and reactive to light.   Neck:      Thyroid: No thyromegaly.      Vascular: No JVD.   Cardiovascular:      Rate and Rhythm: Normal rate and regular rhythm.      Heart sounds: Normal heart sounds. No murmur heard.  Pulmonary:      " Effort: Pulmonary effort is normal.      Breath sounds: Normal breath sounds. No wheezing or rales.   Chest:      Chest wall: No tenderness.   Abdominal:      General: Bowel sounds are normal. There is no distension.      Palpations: Abdomen is soft. There is no mass.      Tenderness: There is no abdominal tenderness. There is no guarding or rebound.   Musculoskeletal:         General: No tenderness or deformity. Normal range of motion.      Cervical back: Normal range of motion and neck supple.   Lymphadenopathy:      Cervical: No cervical adenopathy.   Skin:     General: Skin is warm and dry.      Findings: No erythema or rash.   Neurological:      Mental Status: He is alert and oriented to person, place, and time.      Cranial Nerves: No cranial nerve deficit.      Deep Tendon Reflexes: Reflexes are normal and symmetric. Reflexes normal.   Psychiatric:         Mood and Affect: Mood normal.         Behavior: Behavior normal.         Thought Content: Thought content normal.         Judgment: Judgment normal.     I have spent a total time of 55 minutes on 12/29/23 in caring for this patient including Diagnostic results, Prognosis, Risks and benefits of tx options, Instructions for management, Patient and family education, Importance of tx compliance, Risk factor reductions, Impressions, Counseling / Coordination of care, Documenting in the medical record, Reviewing / ordering tests, medicine, procedures  , Obtaining or reviewing history  , and Communicating with other healthcare professionals .        Data:     Pre-operative work-up    Laboratory Results: I have personally reviewed the pertinent laboratory results/reports     EKG: I have personally reviewed pertinent reports.      Chest x-ray: I have personally reviewed pertinent reports.      Previous cardiopulmonary studies within the past year:  Echocardiogram: Reviewed  Stress Test: Reviewed  Pulmonary Function Testing: Reviewed patient medically stable        Assessment:     1. Preoperative examination        2. Combined forms of age-related cataract of both eyes        3. Simple chronic bronchitis (HCC)        4. Chronic obstructive pulmonary disease, unspecified COPD type (HCC)        5. Gastroesophageal reflux disease without esophagitis        6. Hypothyroidism, unspecified type        7. LAUREL (obstructive sleep apnea)        8. Sensorineural hearing loss, bilateral        9. Chronic atrial fibrillation (HCC)        10. Osteoarthritis of lumbar spine with myelopathy        11. Adenocarcinoma of prostate (HCC)             Plan:     78 y.o. male with planned surgery: Bilateral cataract surgery left eye initially followed by right eye 2 weeks later.      Cardiac Risk Estimation: per the Revised Cardiac Risk Index (Circ. 100:1043, 1999), the patient's risk factors for cardiac complications include COPD obstructive sleep apnea, putting him in: RCI RISK CLASS II (1 risk factor, risk of major cardiac compl. appr. 1.3%).    1. Further preoperative workup as follows:   - None; no further preoperative work-up is required    2. Medication Management/Recommendations:   - None, continue medication regimen including morning of surgery, with sip of water    3. Prophylaxis for cardiac events with perioperative beta-blockers: not indicated.    4. Patient requires further consultation with: None    Clearance  Patient is CLEARED for surgery without any additional cardiac testing.     Jona Calzada DO  [address] [address]  Phone#  [phone]  Fax#  [phone]

## 2024-01-09 ENCOUNTER — SURGERY/PROCEDURE (OUTPATIENT)
Dept: URBAN - METROPOLITAN AREA SURGICAL CENTER 6 | Facility: SURGICAL CENTER | Age: 79
End: 2024-01-09

## 2024-01-09 DIAGNOSIS — H25.812: ICD-10-CM

## 2024-01-09 PROCEDURE — 66984 XCAPSL CTRC RMVL W/O ECP: CPT

## 2024-01-10 ENCOUNTER — 1 DAY POST-OP (OUTPATIENT)
Dept: URBAN - METROPOLITAN AREA CLINIC 6 | Facility: CLINIC | Age: 79
End: 2024-01-10

## 2024-01-10 DIAGNOSIS — Z96.1: ICD-10-CM

## 2024-01-10 DIAGNOSIS — H25.811: ICD-10-CM

## 2024-01-10 PROCEDURE — 92136 OPHTHALMIC BIOMETRY: CPT | Mod: 26,RT

## 2024-01-10 PROCEDURE — 99024 POSTOP FOLLOW-UP VISIT: CPT

## 2024-01-10 ASSESSMENT — TONOMETRY
OS_IOP_MMHG: 30
OD_IOP_MMHG: 17

## 2024-01-10 ASSESSMENT — VISUAL ACUITY: OS_SC: 20/25-1

## 2024-01-15 ENCOUNTER — 1 WEEK POST-OP (OUTPATIENT)
Dept: URBAN - METROPOLITAN AREA CLINIC 6 | Facility: CLINIC | Age: 79
End: 2024-01-15

## 2024-01-15 DIAGNOSIS — Z96.1: ICD-10-CM

## 2024-01-15 PROCEDURE — 99024 POSTOP FOLLOW-UP VISIT: CPT

## 2024-01-15 ASSESSMENT — TONOMETRY
OD_IOP_MMHG: 14
OS_IOP_MMHG: 10

## 2024-01-15 ASSESSMENT — VISUAL ACUITY: OS_SC: 20/20

## 2024-01-23 ENCOUNTER — SURGERY/PROCEDURE (OUTPATIENT)
Dept: URBAN - METROPOLITAN AREA SURGICAL CENTER 6 | Facility: SURGICAL CENTER | Age: 79
End: 2024-01-23

## 2024-01-23 DIAGNOSIS — H25.811: ICD-10-CM

## 2024-01-23 PROCEDURE — 66984 XCAPSL CTRC RMVL W/O ECP: CPT | Mod: 79,RT

## 2024-01-24 ENCOUNTER — 1 DAY POST-OP (OUTPATIENT)
Dept: URBAN - METROPOLITAN AREA CLINIC 6 | Facility: CLINIC | Age: 79
End: 2024-01-24

## 2024-01-24 DIAGNOSIS — Z96.1: ICD-10-CM

## 2024-01-24 PROCEDURE — 99024 POSTOP FOLLOW-UP VISIT: CPT

## 2024-01-24 ASSESSMENT — VISUAL ACUITY
OS_SC: 20/30+2
OD_SC: 20/20-1

## 2024-01-24 ASSESSMENT — TONOMETRY
OS_IOP_MMHG: 10
OD_IOP_MMHG: 25

## 2024-01-29 ENCOUNTER — 1 WEEK POST-OP (OUTPATIENT)
Dept: URBAN - METROPOLITAN AREA CLINIC 6 | Facility: CLINIC | Age: 79
End: 2024-01-29

## 2024-01-29 DIAGNOSIS — Z96.1: ICD-10-CM

## 2024-01-29 PROCEDURE — 99024 POSTOP FOLLOW-UP VISIT: CPT

## 2024-01-29 ASSESSMENT — VISUAL ACUITY
OS_SC: 20/30
OD_SC: 20/20

## 2024-01-29 ASSESSMENT — TONOMETRY
OS_IOP_MMHG: 12
OD_IOP_MMHG: 16

## 2024-02-21 PROBLEM — H65.04 RECURRENT ACUTE SEROUS OTITIS MEDIA OF RIGHT EAR: Status: RESOLVED | Noted: 2019-04-15 | Resolved: 2024-02-21

## 2024-02-21 PROBLEM — H61.23 BILATERAL IMPACTED CERUMEN: Status: RESOLVED | Noted: 2018-12-06 | Resolved: 2024-02-21

## 2024-02-21 PROBLEM — H61.23 BILATERAL HEARING LOSS DUE TO CERUMEN IMPACTION: Status: RESOLVED | Noted: 2020-01-06 | Resolved: 2024-02-21

## 2024-02-21 PROBLEM — H10.32 ACUTE BACTERIAL CONJUNCTIVITIS OF LEFT EYE: Status: RESOLVED | Noted: 2018-12-06 | Resolved: 2024-02-21

## 2024-02-21 PROBLEM — B96.89 ACUTE BACTERIAL BRONCHITIS: Status: RESOLVED | Noted: 2020-02-10 | Resolved: 2024-02-21

## 2024-02-21 PROBLEM — J20.8 ACUTE BACTERIAL BRONCHITIS: Status: RESOLVED | Noted: 2020-02-10 | Resolved: 2024-02-21

## 2024-02-26 ENCOUNTER — 1 MONTH POST-OP (OUTPATIENT)
Dept: URBAN - METROPOLITAN AREA CLINIC 6 | Facility: CLINIC | Age: 79
End: 2024-02-26

## 2024-02-26 DIAGNOSIS — Z96.1: ICD-10-CM

## 2024-02-26 PROCEDURE — 99024 POSTOP FOLLOW-UP VISIT: CPT

## 2024-02-26 ASSESSMENT — VISUAL ACUITY
OU_CC: J1
OD_SC: 20/25+2
OU_SC: J5
OS_SC: 20/20-2

## 2024-02-26 ASSESSMENT — TONOMETRY
OD_IOP_MMHG: 11
OS_IOP_MMHG: 11

## 2024-04-29 ENCOUNTER — OFFICE VISIT (OUTPATIENT)
Dept: FAMILY MEDICINE CLINIC | Facility: CLINIC | Age: 79
End: 2024-04-29
Payer: COMMERCIAL

## 2024-04-29 VITALS
RESPIRATION RATE: 20 BRPM | HEART RATE: 65 BPM | HEIGHT: 73 IN | WEIGHT: 210.6 LBS | SYSTOLIC BLOOD PRESSURE: 118 MMHG | OXYGEN SATURATION: 96 % | DIASTOLIC BLOOD PRESSURE: 72 MMHG | TEMPERATURE: 98.3 F | BODY MASS INDEX: 27.91 KG/M2

## 2024-04-29 DIAGNOSIS — M47.16 OSTEOARTHRITIS OF LUMBAR SPINE WITH MYELOPATHY: ICD-10-CM

## 2024-04-29 DIAGNOSIS — H61.23 BILATERAL HEARING LOSS DUE TO CERUMEN IMPACTION: ICD-10-CM

## 2024-04-29 DIAGNOSIS — J44.9 CHRONIC OBSTRUCTIVE PULMONARY DISEASE, UNSPECIFIED COPD TYPE (HCC): ICD-10-CM

## 2024-04-29 DIAGNOSIS — K21.9 GASTROESOPHAGEAL REFLUX DISEASE WITHOUT ESOPHAGITIS: ICD-10-CM

## 2024-04-29 DIAGNOSIS — E03.9 HYPOTHYROIDISM, UNSPECIFIED TYPE: Primary | ICD-10-CM

## 2024-04-29 DIAGNOSIS — G47.33 OSA (OBSTRUCTIVE SLEEP APNEA): ICD-10-CM

## 2024-04-29 DIAGNOSIS — H90.3 SENSORINEURAL HEARING LOSS, BILATERAL: ICD-10-CM

## 2024-04-29 DIAGNOSIS — I48.20 CHRONIC ATRIAL FIBRILLATION (HCC): ICD-10-CM

## 2024-04-29 PROCEDURE — 1160F RVW MEDS BY RX/DR IN RCRD: CPT | Performed by: FAMILY MEDICINE

## 2024-04-29 PROCEDURE — 1159F MED LIST DOCD IN RCRD: CPT | Performed by: FAMILY MEDICINE

## 2024-04-29 PROCEDURE — 99214 OFFICE O/P EST MOD 30 MIN: CPT | Performed by: FAMILY MEDICINE

## 2024-04-29 PROCEDURE — 69209 REMOVE IMPACTED EAR WAX UNI: CPT | Performed by: FAMILY MEDICINE

## 2024-04-29 PROCEDURE — 3725F SCREEN DEPRESSION PERFORMED: CPT | Performed by: FAMILY MEDICINE

## 2024-04-29 NOTE — ASSESSMENT & PLAN NOTE
Stable hypothyroid now with [facility] taking 50 mcg daily repeat checked lab work from [facility] now patient will continue the same dosing

## 2024-04-29 NOTE — ASSESSMENT & PLAN NOTE
COPD stable with current inhalers continue same dosing Advair twice daily albuterol as needed basis

## 2024-04-29 NOTE — ASSESSMENT & PLAN NOTE
Patient is having difficulty with hearing loss and [facility] not fitting properly will be following up with them and they are readjusting them this will be the third time over the last 2 months each time it takes 2 weeks for them to fix them and send them back.  He is now dealing with ear issues from soreness in his ear canals

## 2024-04-29 NOTE — PROGRESS NOTES
Assessment/Plan:       Problem List Items Addressed This Visit          Cardiovascular and Mediastinum    Chronic atrial fibrillation (HCC)     Chronic atrial fibrillation stable continuing on Cardizem 120            Respiratory    COPD (chronic obstructive pulmonary disease) (HCC)     COPD stable with current inhalers continue same dosing Advair twice daily albuterol as needed basis         LAUREL (obstructive sleep apnea)     Sleep apnea stable continue with CPAP            Digestive    GERD (gastroesophageal reflux disease)     GERD symptoms are stable continue with omeprazole 20 mg daily            Endocrine    Hypothyroidism - Primary     Stable hypothyroid now with [facility] mcg daily repeat checked lab work from [facility] now patient will continue the same dosing            Nervous and Auditory    Bilateral hearing loss due to cerumen impaction    Relevant Orders    Ear cerumen removal    Sensorineural hearing loss, bilateral     Patient is having difficulty with hearing loss and [facility] not [facility] will be following up with them and they are readjusting them this will be the third time over the last 2 months each time it takes 2 weeks for them to fix them and send them back.  He is now dealing with ear issues from soreness in his ear canals         Osteoarthritis of lumbar spine with myelopathy     Continue core exercises and daily strengthening follow-up in 4 months              Subjective:      Patient ID: Rosalio Anguiano is a 79 y.o. male.    Follow-up evaluation and review lab work and discuss hearing loss and new hearing aids not fitting properly [facility]        The following portions of the patient's history were reviewed and updated as appropriate: allergies, current medications, past family history, past medical history, past social history, past surgical history and problem  "list.    Review of Systems   Constitutional:  Negative for chills, fatigue and fever.   HENT:  Negative for congestion, nosebleeds, rhinorrhea, sinus pressure and sore throat.    Eyes:  Negative for discharge and redness.   Respiratory:  Negative for cough and shortness of breath.    Cardiovascular:  Negative for chest pain, palpitations and leg swelling.   Gastrointestinal:  Negative for abdominal pain, blood in stool and nausea.   Endocrine: Negative for cold intolerance, heat intolerance and polyuria.   Genitourinary:  Negative for dysuria and frequency.   Musculoskeletal:  Negative for arthralgias, back pain and myalgias.   Skin:  Negative for rash.   Neurological:  Negative for dizziness, weakness and headaches.   Hematological:  Negative for adenopathy.   Psychiatric/Behavioral:  Negative for behavioral problems and sleep disturbance. The patient is not nervous/anxious.          Objective:      /72 (BP Location: Right arm, Patient Position: Sitting, Cuff Size: Standard)   Pulse 65   Temp 98.3 °F (36.8 °C) (Tympanic)   Resp 20   Ht 6' 1\" (1.854 m)   Wt 95.5 kg (210 lb 9.6 oz)   SpO2 96%   BMI 27.79 kg/m²        Physical Exam  Vitals and nursing note reviewed.   Constitutional:       Appearance: Normal appearance. He is well-developed and normal weight.   HENT:      Head: Normocephalic and atraumatic.      Right Ear: Tympanic membrane, ear canal and external ear normal.      Left Ear: Tympanic membrane, ear canal and external ear normal.      Nose: Nose normal.      Mouth/Throat:      Mouth: Mucous membranes are moist.      Pharynx: Oropharynx is clear.   Eyes:      General: No scleral icterus.     Conjunctiva/sclera: Conjunctivae normal.      Pupils: Pupils are equal, round, and reactive to light.   Neck:      Thyroid: No thyromegaly.      Vascular: No JVD.   Cardiovascular:      Rate and Rhythm: Normal rate and regular rhythm.      Pulses: Normal pulses.      Heart sounds: Normal heart sounds. No " murmur heard.  Pulmonary:      Effort: Pulmonary effort is normal.      Breath sounds: Normal breath sounds. No wheezing or rales.   Chest:      Chest wall: No tenderness.   Abdominal:      General: Bowel sounds are normal. There is no distension.      Palpations: Abdomen is soft. There is no mass.      Tenderness: There is no abdominal tenderness. There is no guarding or rebound.   Musculoskeletal:         General: No tenderness or deformity. Normal range of motion.      Cervical back: Normal range of motion and neck supple.   Lymphadenopathy:      Cervical: No cervical adenopathy.   Skin:     General: Skin is warm and dry.      Capillary Refill: Capillary refill takes less than 2 seconds.      Findings: No erythema or rash.   Neurological:      General: No focal deficit present.      Mental Status: He is alert and oriented to person, place, and time.      Cranial Nerves: No cranial nerve deficit.      Deep Tendon Reflexes: Reflexes are normal and symmetric. Reflexes normal.   Psychiatric:         Mood and Affect: Mood normal.         Behavior: Behavior normal.         Thought Content: Thought content normal.         Judgment: Judgment normal.        Ear cerumen removal    Date/Time: 4/29/2024 1:20 PM    Performed by: Jona Calzada DO  Authorized by: Jona Calzada DO  Universal Protocol:  Consent: Verbal consent obtained.  Consent given by: patient  Patient understanding: patient states understanding of the procedure being performed  Patient identity confirmed: verbally with patient    Patient location:  Clinic  Procedure details:     Local anesthetic:  None    Location:  L ear and R ear    Procedure type: irrigation only      Approach:  External  Post-procedure details:     Complication:  None    Hearing quality:  Improved    Patient tolerance of procedure:  Tolerated well, no immediate complications      Data:    Laboratory Results: I have personally reviewed the pertinent laboratory results/reports  "  Radiology/Other Diagnostic Testing Results: I have personally reviewed pertinent reports.       Lab Results   Component Value Date    WBC 8.85 11/19/2023    HGB 12.9 11/19/2023    HCT 37.1 11/19/2023     (H) 11/19/2023     11/19/2023     Lab Results   Component Value Date    K 4.5 11/19/2023     11/19/2023    CO2 28 11/19/2023    BUN 19 11/19/2023    CREATININE 1.09 11/19/2023    CALCIUM 9.9 11/19/2023    EGFR 65 11/19/2023     No results found for: \"CHOLESTEROL\"  No results found for: \"HDL\"  No results found for: \"LDLCALC\"  No results found for: \"TRIG\"  No results found for: \"CHOLHDL\"  Lab Results   Component Value Date    TSO8KEARRUUF 4.080 (H) 11/20/2020     Lab Results   Component Value Date    HGBA1C 5.5 09/05/2023     No results found for: \"PSA\"    Jona Calzada, DO      "

## 2024-05-29 PROBLEM — H61.23 BILATERAL HEARING LOSS DUE TO CERUMEN IMPACTION: Status: RESOLVED | Noted: 2020-01-06 | Resolved: 2024-05-29

## 2024-06-10 ENCOUNTER — OFFICE VISIT (OUTPATIENT)
Dept: OBGYN CLINIC | Facility: CLINIC | Age: 79
End: 2024-06-10
Payer: COMMERCIAL

## 2024-06-10 VITALS
HEIGHT: 73 IN | HEART RATE: 89 BPM | DIASTOLIC BLOOD PRESSURE: 64 MMHG | BODY MASS INDEX: 27.83 KG/M2 | WEIGHT: 210 LBS | SYSTOLIC BLOOD PRESSURE: 123 MMHG

## 2024-06-10 DIAGNOSIS — Z96.643 STATUS POST TOTAL REPLACEMENT OF BOTH HIPS: Primary | ICD-10-CM

## 2024-06-10 PROCEDURE — 99213 OFFICE O/P EST LOW 20 MIN: CPT | Performed by: ORTHOPAEDIC SURGERY

## 2024-06-10 NOTE — PROGRESS NOTES
ASSESSMENT/PLAN:    Diagnoses and all orders for this visit:    Status post total replacement of both hips  -     XR hips bilateral 2 vw w pelvis if performed; Future        X-rays of both of the patient's hips are consistent with bilateral total hip replacements.  They are well-seated with no signs of loosening.  He may continue home exercise program for strengthening, stretching and range of motion.  He will follow-up with our office in 2 years with new x-rays of both hips 2 views each.  He is acceptable to this plan.    Return in about 2 years (around 6/10/2026).    The patient is doing quite well from his bilateral total hip replacements.  There is full strength full motion.  Leg lengths intact.  Strength intact.  Incision is clean and dry.  X-rays show anatomic placement the prosthesis.  Continue home exercise program.  Follow-up in 2 years evaluation with new x-rays of bilateral hips-2 views each      _____________________________________________________  CHIEF COMPLAINT:  Chief Complaint   Patient presents with    Left Hip - Follow-up    Right Hip - Follow-up         SUBJECTIVE:  Rosalio Anguiano is a 79 y.o. male who presents to our office for a follow-up visit.  The patient has a history of bilateral total hip replacements.  The patient is very pleased with the results from his surgeries.  He denies any significant bilateral hip pain.  Denies any numbness or tingling.  He denies any fever or chills.  He is ambulating without an antalgic gait or an assist device.    The following portions of the patient's history were reviewed and updated as appropriate: allergies, current medications, past family history, past medical history, past social history, past surgical history and problem list.    PAST MEDICAL HISTORY:  Past Medical History:   Diagnosis Date    A-fib (HCC)     BPH with obstruction/lower urinary tract symptoms     Colon polyp     COPD (chronic obstructive pulmonary disease) ([facility])     Frequency of  urination     GERD (gastroesophageal reflux disease)     History of cardioversion 2011    Inital Rhythm AFIB, Final Rhythm Sinus, Max Joules 75    History of echocardiogram 2015    Normal LV systolic function, mild concentric LV hypertrophy, mild mitral and tricuspid regurg, right atrial enlargement. EF 55%    Irregular heart beat     AF    Other male erectile dysfunction     Personal history of irradiation     Personal history of malignant neoplasm of prostate     Prostate cancer (HCC)        PAST SURGICAL HISTORY:  Past Surgical History:   Procedure Laterality Date    BACK SURGERY      x 3    CATARACT EXTRACTION, BILATERAL Bilateral 2024    INTRAOPERATIVE RADIATION THERAPY ([facility])      JOINT REPLACEMENT Bilateral     hips    LAMINECTOMY      three levels L3 - S1 - all at different times    PATELLA SURGERY      most of this removed after injury    PROSTATE BIOPSY      TOTAL HIP ARTHROPLASTY Bilateral     VASECTOMY         FAMILY HISTORY:  Family History   Problem Relation Age of Onset    Heart disease Mother     No Known Problems Brother        SOCIAL HISTORY:  Social History     Tobacco Use    Smoking status: Former     Current packs/day: 0.00     Average packs/day: 2.0 packs/day for 33.5 years (67.0 ttl pk-yrs)     Types: Cigarettes     Start date:      Quit date: 1998     Years since quittin.9    Smokeless tobacco: Never   Vaping Use    Vaping status: Never Used   Substance Use Topics    Alcohol use: Yes     Alcohol/week: 7.0 standard drinks of alcohol     Types: 7 Standard drinks or equivalent per week     Comment: 1/2 glass of wine daily    Drug use: No       MEDICATIONS:    Current Outpatient Medications:     albuterol (PROVENTIL HFA,VENTOLIN HFA) 90 mcg/act inhaler, Inhale 2 puffs every 6 (six) hours as needed for wheezing, Disp: , Rfl:     aspirin 81 MG tablet, Take 1 tablet by mouth daily, Disp: , Rfl:     diltiazem (CARDIZEM CD) 120 mg 24 hr capsule, TAKE 1 CAPSULE BY  "MOUTH  DAILY, Disp: 90 capsule, Rfl: 3    fluticasone-salmeterol (Advair) 500-50 mcg/dose inhaler, Inhale 1 puff 2 (two) times a day Rinse mouth after use., Disp: , Rfl:     levothyroxine (Synthroid) 25 mcg tablet, 25 mcg, Disp: , Rfl:     omeprazole (PriLOSEC OTC) 20 MG tablet, Take 1 tablet by mouth daily, Disp: , Rfl:     tamsulosin (FLOMAX) 0.4 mg, , Disp: , Rfl:     furosemide (LASIX) 20 mg tablet, Take 1 tablet (20 mg total) by mouth daily (Patient not taking: Reported on 11/21/2023), Disp: 10 tablet, Rfl: 0    naproxen (Naprosyn) 500 mg tablet, Take 1 tablet (500 mg total) by mouth 2 (two) times a day with meals (Patient not taking: Reported on 4/29/2024), Disp: 30 tablet, Rfl: 0    Silodosin (Rapaflo) 8 MG CAPS, Take 1 capsule by mouth (Patient not taking: Reported on 4/29/2024), Disp: , Rfl:     ALLERGIES:  No Known Allergies    ROS:  Review of Systems     Constitutional: Negative for fatigue, fever or loss of appetite.   HENT: Negative.    Respiratory: Negative for shortness of breath, dyspnea.    Cardiovascular: Negative for chest pain/tightness.   Gastrointestinal: Negative for abdominal pain, N/V.   Endocrine: Negative for cold/heat intolerance, unexplained weight loss/gain.   Genitourinary: Negative for flank pain, dysuria, hematuria.   Musculoskeletal: Negative for arthralgia   Skin: Negative for rash.    Neurological: Negative for numbness or tingling  Psychiatric/Behavioral: Negative for agitation.  _____________________________________________________  PHYSICAL EXAMINATION:    Blood pressure 123/64, pulse 89, height 6' 1\" (1.854 m), weight 95.3 kg (210 lb).    Constitutional: Oriented to person, place, and time. Appears well-developed and well-nourished. No distress.   HENT:   Head: Normocephalic.   Eyes: Conjunctivae are normal. Right eye exhibits no discharge. Left eye exhibits no discharge. No scleral icterus.   Cardiovascular: Normal rate.    Pulmonary/Chest: Effort normal.   Neurological: " "Alert and oriented to person, place, and time.   Skin: Skin is warm and dry. No rash noted. Not diaphoretic. No erythema. No pallor.   Psychiatric: Normal mood and affect. Behavior is normal. Judgment and thought content normal.      MUSCULOSKELETAL EXAMINATION:   Physical Exam  Ortho Exam    Bilateral lower extremities neurovascular intact  Toes are pink and mobile  Compartments are soft  Good range of motion of both hips  Brisk cap refill and sensation intact  No warmth erythema  No ecchymosis  Objective:  BP Readings from Last 1 Encounters:   06/10/24 123/64      Wt Readings from Last 1 Encounters:   06/10/24 95.3 kg (210 lb)        BMI:   Estimated body mass index is 27.71 kg/m² as calculated from the following:    Height as of this encounter: 6' 1\" (1.854 m).    Weight as of this encounter: 95.3 kg (210 lb).      Scribe Attestation      I,:  Ricardo House PA-C am acting as a scribe while in the presence of the attending physician.:       I,:  Peyman Castillo DO personally performed the services described in this documentation    as scribed in my presence.:            "

## 2024-10-08 ENCOUNTER — OFFICE VISIT (OUTPATIENT)
Dept: FAMILY MEDICINE CLINIC | Facility: CLINIC | Age: 79
End: 2024-10-08
Payer: COMMERCIAL

## 2024-10-08 VITALS
BODY MASS INDEX: 27.93 KG/M2 | RESPIRATION RATE: 18 BRPM | TEMPERATURE: 97.3 F | SYSTOLIC BLOOD PRESSURE: 118 MMHG | WEIGHT: 206.2 LBS | OXYGEN SATURATION: 100 % | HEIGHT: 72 IN | DIASTOLIC BLOOD PRESSURE: 70 MMHG | HEART RATE: 61 BPM

## 2024-10-08 DIAGNOSIS — G89.29 CHRONIC PAIN OF BOTH SHOULDERS: ICD-10-CM

## 2024-10-08 DIAGNOSIS — M25.511 CHRONIC PAIN OF BOTH SHOULDERS: ICD-10-CM

## 2024-10-08 DIAGNOSIS — Z00.00 ANNUAL PHYSICAL EXAM: ICD-10-CM

## 2024-10-08 DIAGNOSIS — H90.3 SENSORINEURAL HEARING LOSS, BILATERAL: ICD-10-CM

## 2024-10-08 DIAGNOSIS — E03.9 HYPOTHYROIDISM, UNSPECIFIED TYPE: ICD-10-CM

## 2024-10-08 DIAGNOSIS — L82.1 SEBORRHEIC KERATOSIS: ICD-10-CM

## 2024-10-08 DIAGNOSIS — K21.9 GASTROESOPHAGEAL REFLUX DISEASE WITHOUT ESOPHAGITIS: ICD-10-CM

## 2024-10-08 DIAGNOSIS — M25.512 CHRONIC PAIN OF BOTH SHOULDERS: ICD-10-CM

## 2024-10-08 DIAGNOSIS — J41.0 SIMPLE CHRONIC BRONCHITIS (HCC): ICD-10-CM

## 2024-10-08 DIAGNOSIS — H83.03 LABYRINTHITIS OF BOTH EARS: ICD-10-CM

## 2024-10-08 DIAGNOSIS — I48.20 CHRONIC ATRIAL FIBRILLATION (HCC): Primary | ICD-10-CM

## 2024-10-08 PROCEDURE — 99397 PER PM REEVAL EST PAT 65+ YR: CPT | Performed by: FAMILY MEDICINE

## 2024-10-08 NOTE — ASSESSMENT & PLAN NOTE
Chronic atrial fibrillation stable on same medication regimen now continuing on diltiazem and low-dose aspirin follow-up with me as scheduled in 6 months

## 2024-10-08 NOTE — ASSESSMENT & PLAN NOTE
Stable no worsening shortness of breath continuing with inhalers as needed basis reevaluate follow-up 6 months.  Follow[facility]up with pulmonary as scheduled [facility]

## 2024-10-08 NOTE — PROGRESS NOTES
Adult Annual Physical  Name: Rosalio Anguiano      : 1945      MRN: [id number]  Encounter Provider: Jona Calzada DO  Encounter Date: 10/8/2024   Encounter department: [facility]    Assessment & Plan  Chronic atrial fibrillation (HCC)  Chronic atrial fibrillation stable on same medication regimen now continuing on diltiazem and low-dose aspirin follow-up with me as scheduled in 6 months         Simple chronic bronchitis (HCC)  Stable no worsening shortness of breath continuing with inhalers as needed basis reevaluate follow-up 6 months.  Follow-up with pulmonary as scheduled [facility]         Gastroesophageal reflux disease without esophagitis  Stable GERD symptoms continue on same medication regimen now omeprazole 20 mg daily         Hypothyroidism, unspecified type  Stable TSH T4 reviewed continue levothyroxine 25 mcg daily         Sensorineural hearing loss, bilateral  Use hearing aids as directed no change at this time         Annual physical exam         Labyrinthitis of both ears  Patient will begin meclizine now and refer to physical therapy if symptoms worsen    Orders:    Ambulatory Referral to Physical Therapy; Future    Chronic pain of both shoulders  Stiffness most likely calcific tendinitis in both shoulders he will need range of motion exercising and I do recommend physical therapy he will start some stretching at home         Seborrheic keratosis         Immunizations and preventive care screenings were discussed with patient today. Appropriate education was printed on patient's after visit summary.    Discussed risks and benefits of prostate cancer screening. We discussed the controversial history of PSA screening for prostate cancer in the [place] as well as the risk of over detection and over treatment of prostate cancer by way of PSA screening.  The patient understands that PSA blood testing is an imperfect way to screen  for prostate cancer and that elevated PSA levels in the blood may also be caused by infection, inflammation, prostatic trauma or manipulation, urological procedures, or by benign prostatic enlargement.    The role of the digital rectal examination in prostate cancer screening was also discussed and I discussed with him that there is large interobserver variability in the findings of digital rectal examination.    Counseling:  Alcohol/drug use: discussed moderation in alcohol intake, the recommendations for healthy alcohol use, and avoidance of illicit drug use.  Dental Health: discussed importance of regular tooth brushing, flossing, and dental visits.  Injury prevention: discussed safety/seat belts, safety helmets, smoke detectors, carbon monoxide detectors, and smoking near bedding or upholstery.  Sexual health: discussed sexually transmitted diseases, partner selection, use of condoms, avoidance of unintended pregnancy, and contraceptive alternatives.  Exercise: the importance of regular exercise/physical activity was discussed. Recommend exercise 3-5 times per week for at least 30 minutes.          History of Present Illness     Adult Annual Physical:  Patient presents for annual physical.     Diet and Physical Activity:  - Diet/Nutrition: well balanced diet.  - Exercise: no formal exercise.    General Health:  - Sleep: sleeps well.  - Hearing: decreased hearing bilateral ears.  - Vision: no vision problems.  - Dental: regular dental visits.    Review of Systems   Constitutional:  Negative for chills, fatigue and fever.   HENT:  Positive for hearing loss. Negative for congestion, nosebleeds, rhinorrhea, sinus pressure and sore throat.    Eyes:  Negative for discharge and redness.   Respiratory:  Negative for cough and shortness of breath.    Cardiovascular:  Negative for chest pain, palpitations and leg swelling.   Gastrointestinal:  Negative for abdominal pain, blood in stool and nausea.   Endocrine: Negative  "for cold intolerance, heat intolerance and polyuria.   Genitourinary:  Negative for dysuria and frequency.   Musculoskeletal:  Negative for arthralgias, back pain and myalgias.   Skin:  Negative for rash.   Neurological:  Negative for dizziness, weakness and headaches.   Hematological:  Negative for adenopathy.   Psychiatric/Behavioral:  Negative for behavioral problems and sleep disturbance. The patient is not nervous/anxious.          Objective     /70 (BP Location: Left arm, Patient Position: Sitting, Cuff Size: Standard)   Pulse 61   Temp (!) 97.3 °F (36.3 °C) (Tympanic)   Resp 18   Ht 5' 11.54\" (1.817 m)   Wt 93.5 kg (206 lb 3.2 oz)   SpO2 100%   BMI 28.33 kg/m²     Physical Exam  Vitals and nursing note reviewed.   Constitutional:       Appearance: Normal appearance. He is well-developed.   HENT:      Head: Normocephalic and atraumatic.      Right Ear: Tympanic membrane and external ear normal.      Left Ear: Tympanic membrane and external ear normal.      Nose: Nose normal.      Mouth/Throat:      Mouth: Mucous membranes are moist.   Eyes:      General: No scleral icterus.     Conjunctiva/sclera: Conjunctivae normal.      Pupils: Pupils are equal, round, and reactive to light.   Neck:      Thyroid: No thyromegaly.      Vascular: No JVD.   Cardiovascular:      Rate and Rhythm: Normal rate and regular rhythm.      Heart sounds: Normal heart sounds. No murmur heard.  Pulmonary:      Effort: Pulmonary effort is normal.      Breath sounds: Normal breath sounds. No wheezing or rales.   Chest:      Chest wall: No tenderness.   Abdominal:      General: Bowel sounds are normal. There is no distension.      Palpations: Abdomen is soft. There is no mass.      Tenderness: There is no abdominal tenderness. There is no guarding or rebound.   Musculoskeletal:         General: No tenderness or deformity. Normal range of motion.      Cervical back: Normal range of motion and neck supple.   Lymphadenopathy:      " Cervical: No cervical adenopathy.   Skin:     General: Skin is warm and dry.      Findings: No erythema or rash.   Neurological:      General: No focal deficit present.      Mental Status: He is alert and oriented to person, place, and time.      Cranial Nerves: No cranial nerve deficit.      Deep Tendon Reflexes: Reflexes are normal and symmetric. Reflexes normal.   Psychiatric:         Mood and Affect: Mood normal.         Behavior: Behavior normal.         Thought Content: Thought content normal.         Judgment: Judgment normal.       Lesion Destruction    Date/Time: 10/8/2024 10:40 AM    Performed by: Jona Calzada DO  Authorized by: Jona Calzada DO  Universal Protocol:  Consent: Verbal consent obtained.  Consent given by: patient  Patient understanding: patient states understanding of the procedure being performed  Patient identity confirmed: verbally with patient    Procedure Details - Lesion Destruction:     Number of Lesions:  1  Lesion 1:     Body area:  Head/neck    Head/neck location:  Forehead    Malignancy: benign hyperkeratotic lesion      Destruction method: cryotherapy

## 2024-10-08 NOTE — ASSESSMENT & PLAN NOTE
Stiffness most likely calcific tendinitis in both shoulders he will need range of motion exercising and I do recommend physical therapy he will start some stretching at home

## 2024-10-08 NOTE — PATIENT INSTRUCTIONS
Patient Education     Frozen Shoulder Exercises   About this topic   A frozen shoulder is a condition where you cannot move your shoulder because of pain and stiffness. The area around your shoulder joint becomes tight and the capsule around the joint thickens. You may have a frozen shoulder if you are not able to move your arm or shoulder for a long time. This may be because you have a broken arm or from shoulder surgery. Sometimes a frozen shoulder happens for no apparent reason.  There may be some movements that are harder for you to do than others. You may have problems when you:  Rotate your arm outward  Lift your shoulder to the side or front  Move your arm behind your back  Exercises can help ease the pain and make this problem better.  General   Before starting with a program, ask your doctor if you are healthy enough to do these exercises. Your doctor may have you work with a  or physical therapist to make a safe exercise program to meet your needs.  Stretching Exercises   Stretching exercises keep your muscles flexible. They also stop them from getting tight. Start by doing each of these stretches 2 to 3 times. In order for your body to make changes, you will need to hold these stretches for 20 to 30 seconds. Try to do the stretches 2 to 3 times each day. Do all exercises slowly. Stretches may be slightly uncomfortable. If you have any sharp pain, back off a little and do not push as far.  Table stretches ? Sitting on an office chair with wheels works well for these exercises. If you don’t have a chair with wheels, use a regular chair and place a towel under your arm. Sit about a foot away from the table or desk.  Forward ? Put your arm on the table. Keep your elbow straight and lean forward until you feel a stretch. Now, lean further forward or push the chair backwards to get more of a stretch.  Sideways ? Turn the chair sideways. Put your arm on the table. Make sure your thumb is pointed up. Keep  your elbow straight and lean towards the table until you feel a stretch. Now, lean further or push the chair away from the table to get more of a stretch.  Bent arm rotations ? Turn the chair sideways. Put your lower arm on the table with your elbow bent in a 90 degree angle. Lean forward until you feel a stretch.  External rotation door stretches ?  an open doorway. Bend your elbow to 90 degrees and put your hand on the door frame. Turn your body outwards until you feel a stretch in the shoulder.  Back shoulder stretches ? Stand up straight with legs wide apart or sit up tall on a chair. Cross your arm across your body at shoulder level. Using the other arm, pull the crossed arm towards the other shoulder. Repeat using the other arm.  Internal rotation towel stretches ? To stretch your left shoulder, put your left hand behind your back at your waist. Toss a towel over your right shoulder. Grab the bottom of the towel with your left hand. Gently, pull the towel up with your right hand, allowing your left hand to slide up your spine. Pull until you feel a good stretch at the front of your shoulder. Switch hands to stretch your right shoulder.  Cane shoulder exercises:  Overhead flexions ? Lie down or stand and grab the cane with both hands. Start with your arms straight and the cane resting on your upper legs. Keep your arms straight and lift the cane over your head.  Abductions or side-to-side motion ? Stand and grab the cane with both hands. Turn your thumbs to the right to stretch your right shoulder. Start with your arms straight and the cane resting on your upper legs. Push the cane to the right, raising your right arm. Keep your right elbow straight. Keep pushing until you feel a good stretch. Switch the position of your hands to point your thumbs to the left and push the cane left to stretch your left shoulder.  External rotations or side-to-side rotations ? Lie down or stand and grab the cane with  both hands. Start with your elbows bent and touching your body. Put the tip of the cane in the palm of your right hand to stretch your right shoulder. Grab the cane at the other end with your left hand. Push the cane sideways into your right palm until you feel a stretch in your shoulder. Be sure to keep your right elbow close to your body while you are stretching. Switch hands to stretch the left shoulder.  Your doctor may also want you to use an overhead pulley for exercises.             What will the results be?   Less pain and stiffness  Better range of motion and flexibility  Easier to do daily activities   Helpful tips   Stay active and work out to keep your muscles strong and flexible.  Eat a healthy diet to keep your muscles healthy.  Be sure you do not hold your breath when exercising. This can raise your blood pressure. If you tend to hold your breath, try counting out loud when exercising. If any exercise bothers you, stop right away.  Always warm up before stretching. Heated muscles stretch much easier than cool muscles. Stretching cool muscles can lead to injury.  Try swinging your arms at an easy pace for a few minutes to warm up your muscles. Do this again after exercising.  Never bounce when doing stretches.  After exercising, it is a good idea to use ice. Place an ice pack or a bag of frozen peas wrapped in a towel over the painful part. Never put ice right on the skin. Do not leave the ice on more than 10 to 15 minutes at a time. Ice after activity may help decrease pain and swelling. Never ice before stretching.  Doing exercises before a meal may be a good way to get into a routine.  Exercise may be slightly uncomfortable, but you should not have sharp pains. If you do get sharp pains, stop what you are doing. If the sharp pains continue, call your doctor.  Water exercises or swimming may help ease shoulder stiffness.  Last Reviewed Date   2021-06-28  Consumer Information Use and Disclaimer   This  generalized information is a limited summary of diagnosis, treatment, and/or medication information. It is not meant to be comprehensive and should be used as a tool to help the user understand and/or assess potential diagnostic and treatment options. It does NOT include all information about conditions, treatments, medications, side effects, or risks that may apply to a specific patient. It is not intended to be medical advice or a substitute for the medical advice, diagnosis, or treatment of a health care provider based on the health care provider's examination and assessment of a patient’s specific and unique circumstances. Patients must speak with a health care provider for complete information about their health, medical questions, and treatment options, including any risks or benefits regarding use of medications. This information does not endorse any treatments or medications as safe, effective, or approved for treating a specific patient. [employer] and its affiliates disclaim any warranty or liability relating to this information or the use thereof. The use of this information is governed by the Terms of Use, available at https://www.wolterskluwer.com/en/know/clinical-effectiveness-terms   Copyright   Copyright © 2024 [employer] and its affiliates and/or licensors. All rights reserved.

## 2024-10-08 NOTE — ASSESSMENT & PLAN NOTE
Patient will begin meclizine now and refer to physical therapy if symptoms worsen    Orders:    Ambulatory Referral to Physical Therapy; Future

## 2024-10-22 ENCOUNTER — EVALUATION (OUTPATIENT)
Dept: PHYSICAL THERAPY | Age: 79
End: 2024-10-22
Payer: COMMERCIAL

## 2024-10-22 DIAGNOSIS — H83.03 LABYRINTHITIS OF BOTH EARS: ICD-10-CM

## 2024-10-22 DIAGNOSIS — H81.13 BENIGN PAROXYSMAL VERTIGO OF BOTH EARS: Primary | ICD-10-CM

## 2024-10-22 PROCEDURE — 95992 CANALITH REPOSITIONING PROC: CPT | Performed by: PHYSICAL THERAPIST

## 2024-10-22 PROCEDURE — 97162 PT EVAL MOD COMPLEX 30 MIN: CPT | Performed by: PHYSICAL THERAPIST

## 2024-10-22 PROCEDURE — 97110 THERAPEUTIC EXERCISES: CPT | Performed by: PHYSICAL THERAPIST

## 2024-10-22 NOTE — PROGRESS NOTES
"PT Evaluation     Today's date: 10/22/2024  Patient name: Rosalio Anguiano  : 1945  MRN: [id number]  Referring provider: Jona Calzada DO  Dx:   Encounter Diagnosis     ICD-10-CM    1. Benign paroxysmal vertigo of both ears  H81.13       2. Labyrinthitis of both ears  H83.03 Ambulatory Referral to Physical Therapy          Start Time: 0900  Stop Time: 957  Total time in clinic (min): 57 minutes    Assessment  Impairments: abnormal or restricted ROM, activity intolerance and lacks appropriate home exercise program  Other impairment: dizziness  Symptom irritability: low    Assessment details: Rosalio Anguiano is a 77 y.o. male who presents with decreased ROM and dizziness. Due to these impairments, Patient has difficulty performing a/iadls and recreational activities. Patient's clinical presentation is consistent with their referring diagnosis of vertigo. Patient c/o being \"woozy\" with looking up, or laying down supine or to his right.  He has had it in the past but worse and wants to get it better before it gets bad. Patient was positive for right juan manuel hallpike and treated with right Epley. Latency was 8 seconds and duration was 8 seconds. Performed 2nd maneuver and was better but still nystagmus noted. Patient was instructed in post session precautions.   Patient would benefit from skilled physical therapy to address their aforementioned impairments, improve their level of function and to improve their overall quality of life.  Understanding of Dx/Px/POC: good     Prognosis: good    Goals  Goals  STG 1-2 weeks  1. Decrease c/o dizziness/vertigo by 50%  2. transfers with ease from supine-sit- stand without symptoms of dizziness.     LTG 2-4 weeks  1. Resolve symptoms of vertigo  2. Independent HEP for home Epley maneuver/self management  3. Pt able to move in/out of bed and roll over without onset of symptoms.  4. Able to return to prior level of function without medication for dizziness  Goals  LTG: " 4-6 WEEKS  1. Patient to be independent with a/iadls.  2. Increase functional activities for leisure and home activities to previous LOF.  3. Independent with HEP and/or fitness program.    Plan  Patient would benefit from: skilled physical therapy  Planned modality interventions: cryotherapy, thermotherapy: hydrocollator packs and unattended electrical stimulation    Planned therapy interventions: activity modification, behavior modification, body mechanics training, functional ROM exercises, home exercise program, manual therapy, neuromuscular re-education, patient education, postural training, therapeutic exercise, canalith repositioning and IADL retraining    Frequency: 2-3x week.  Duration in weeks: 8  Plan of Care beginning date: 10/22/2024  Plan of Care expiration date: 12/22/2024  Treatment plan discussed with: patient      Subjective Evaluation    History of Present Illness  Mechanism of injury: Patient reports he has been noticing vertigo starting with looking up or laying down, or rolling onto right side. He saw PCP and ordered PT.           Recurrent probem    Quality of life: excellent    Patient Goals  Patient goal: no dizziness  Pain  No pain reported  Progression: no change    Social Support  Lives with: spouse    Employment status: working (aircraft maintence, travels)    Diagnostic Tests  No diagnostic tests performed      Objective     Concurrent Complaints  Positive for tinnitus, history of cancer and hearing loss. Negative for headaches, nausea/motion sickness (inially ), visual change, memory loss, aural fullness, poor concentration and peripheral neuropathy    Static Posture     Head  Forward.    Comments  BP: 121/71 HR 77    Postural Observations  Seated posture: fair  Standing posture: fair      Neurological Testing     Sensation   Cervical/Thoracic   Left   Intact: light touch    Right   Intact: light touch    Active Range of Motion   Cervical/Thoracic Spine       Cervical    Flexion:   WFL  Extension:  WFL  Left lateral flexion:  WFL  Right lateral flexion:  WFL  Left rotation:  WFL  Right rotation:  WFL    Additional Active Range of Motion Details  C/s is WFL but tight end ranges    Ambulation   Weight-Bearing Status   Assistive device used: none    Ambulation: Level Surfaces   Ambulation without assistive device: independent    General Comments:    Upper quarter screen   Shoulder: unremarkable  Elbow: unremarkable  Neuro Exam:     Dizziness  Positive for vertigo.   Negative for disequilibrium, oscillopsia, motion sickness (initally he had), light-headedness, rocking or swaying, diplopia and floating or swimming.     Exacerbating factors  Positive for rolling in bed, looking up and supine to/from sitting.   Negative for bending over, walking, turning head, optokinetic movement and walking in busy environment.     Headaches   Patient reports headaches: No.     Cervical exam   Modified VBI   Left: asymptomatic  Right: asymptomatic  Seated posture: forward head posture    Oculomotor exam   Oculomotor ROM: WNL  Resting nystagmus: not present   Gaze holding nystagmus: not present left  and not present right  Smooth pursuits: within normal limits  Vertical saccades: normal  Horizontal saccades: normal  Convergence: normal  Head thrust: left normal    Positional testing   Idabel-Hallpike   Right posterior canal: symptomatic  Duration: 8 (seconds)  Idabel-Hallpike comment: 8 second latency  Positional testing comment:       Flowsheet Rows      Flowsheet Row Most Recent Value   PT/OT G-Codes    Current Score 56   Projected Score 0               POC expires Unit limit Auth Expiration date PT/OT + Visit Limit?   10/22 na 12/31 24                           Visit/Unit Tracking  AUTH Status:  Date 10/22              Auth after visit 24 Used 1               Remaining  23               FOTO 58/61                   Precautions: B THR, A-fib, L/s sx x3, prostate CA, R LE issues, COPD    Daily Treatment Diary:  Manuals  10/22            R Epley 2x                                                   Neuro Re-Ed                                                                                                        Ther Ex             HEP 10                                                                                                       Ther Activity                                       Gait Training                                       Modalities

## 2024-10-24 ENCOUNTER — APPOINTMENT (OUTPATIENT)
Dept: PHYSICAL THERAPY | Age: 79
End: 2024-10-24
Payer: COMMERCIAL

## 2024-11-22 ENCOUNTER — RESULTS FOLLOW-UP (OUTPATIENT)
Dept: CARDIOLOGY CLINIC | Facility: CLINIC | Age: 79
End: 2024-11-22

## 2024-11-22 ENCOUNTER — OFFICE VISIT (OUTPATIENT)
Dept: CARDIOLOGY CLINIC | Facility: CLINIC | Age: 79
End: 2024-11-22
Payer: COMMERCIAL

## 2024-11-22 VITALS
DIASTOLIC BLOOD PRESSURE: 62 MMHG | HEIGHT: 71 IN | BODY MASS INDEX: 28.84 KG/M2 | WEIGHT: 206 LBS | HEART RATE: 71 BPM | SYSTOLIC BLOOD PRESSURE: 118 MMHG

## 2024-11-22 DIAGNOSIS — R60.0 BILATERAL LEG EDEMA: ICD-10-CM

## 2024-11-22 DIAGNOSIS — I48.20 CHRONIC ATRIAL FIBRILLATION (HCC): Primary | ICD-10-CM

## 2024-11-22 PROCEDURE — 93000 ELECTROCARDIOGRAM COMPLETE: CPT | Performed by: INTERNAL MEDICINE

## 2024-11-22 PROCEDURE — 99213 OFFICE O/P EST LOW 20 MIN: CPT | Performed by: INTERNAL MEDICINE

## 2024-11-22 NOTE — PROGRESS NOTES
" Patient ID: Rosalio Anguiano is a 79 y.o. male.        Plan:      Assessment & Plan  Chronic atrial fibrillation (HCC)  Rate controlled.  Low [id number] score.  Going to check an echocardiogram in the near term.  After that hoping to stop aspirin or if there are high risk features switch to Eliquis.  More likely the former.  Bilateral leg edema  Resolved.      Follow up Plan/Other summary comments:  Return in about 1 year (around 11/22/2025).    HPI: Patient is seen in follow-up today regarding the above.  Since last visit he has felt well.  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  No recent change in exertional capacity.  No palpitations chest pain syncope or near syncope.      Results for orders placed or performed in visit on 11/22/24   POCT ECG    Impression    Atrial fibrillation with controlled ventricular response.  Leftward axis.  No change from prior.         Most recent or relevant cardiac/vascular testing:    Echocardiogram 6/1/2015:  Normal LV function.  Mild LVH.      Past Surgical History:   Procedure Laterality Date    BACK SURGERY      x 3    CATARACT EXTRACTION, BILATERAL Bilateral 01/2024    INTRAOPERATIVE RADIATION THERAPY ([facility]ORT)  2011    JOINT REPLACEMENT Bilateral     hips    LAMINECTOMY      three levels L3 - S1 - all at different times    PATELLA SURGERY      most of this removed after injury    PROSTATE BIOPSY      TOTAL HIP ARTHROPLASTY Bilateral     VASECTOMY  1973       Lipid Profile: Reviewed      Review of Systems   10  point ROS  was otherwise non pertinent or negative except as per HPI or as below.   Gait:  Normal.        Objective:     /62   Pulse 71   Ht 5' 11\" (1.803 m)   Wt 93.4 kg (206 lb)   BMI 28.73 kg/m²     PHYSICAL EXAM:    General:  Normal appearance in no distress.  Eyes:  Anicteric.  Oral mucosa:  Moist.  Neck:  No JVD. Carotid upstrokes are brisk without bruits.  No masses.  Chest:  Clear to auscultation.  Cardiac: " Irregularly irregular.  No palpable PMI.  Normal S1 and S2.  No murmur gallop or rub.  Abdomen:  Soft and nontender. No palpable organomegaly or aortic enlargement.  Extremities:  No peripheral edema.  Musculoskeletal:  Symmetric.   Vascular:  Femoral pulses are brisk without bruits.  Popliteal pulses are intact bilaterally.   Pedal pulses are intact.  Neuro:  Grossly symmetric.  Psych:  Alert and oriented x3.      Meds reviewed.    Past Medical History:   Diagnosis Date    A-fib (HCC)     BPH with obstruction/lower urinary tract symptoms     Colon polyp     COPD (chronic obstructive pulmonary disease) (HCC)     Frequency of urination     GERD (gastroesophageal reflux disease)     History of cardioversion 2011    Inital Rhythm AFIB, Final Rhythm Sinus, Max Joules 75    History of echocardiogram 2015    Normal LV systolic function, mild concentric LV hypertrophy, mild mitral and tricuspid regurg, right atrial enlargement. EF 55%    Irregular heart beat     AF    Other male erectile dysfunction     Personal history of irradiation     Personal history of malignant neoplasm of prostate     Prostate cancer (HCC)            Social History     Tobacco Use   Smoking Status Former    Current packs/day: 0.00    Average packs/day: 2.0 packs/day for 33.5 years (67.0 ttl pk-yrs)    Types: Cigarettes    Start date:     Quit date: 1998    Years since quittin.3   Smokeless Tobacco Never

## 2024-11-22 NOTE — ASSESSMENT & PLAN NOTE
Rate controlled.  Low [id number] score.  Going to check an echocardiogram in the near term.  After that hoping to stop aspirin or if there are high risk features switch to Eliquis.  More likely the former.

## 2025-01-13 ENCOUNTER — HOSPITAL ENCOUNTER (OUTPATIENT)
Dept: NON INVASIVE DIAGNOSTICS | Facility: HOSPITAL | Age: 80
Discharge: HOME | End: 2025-01-13
Attending: INTERNAL MEDICINE
Payer: COMMERCIAL

## 2025-01-13 VITALS
SYSTOLIC BLOOD PRESSURE: 130 MMHG | DIASTOLIC BLOOD PRESSURE: 78 MMHG | BODY MASS INDEX: 28.84 KG/M2 | HEIGHT: 71 IN | HEART RATE: 87 BPM | WEIGHT: 206 LBS

## 2025-01-13 DIAGNOSIS — I48.20 CHRONIC ATRIAL FIBRILLATION (HCC): ICD-10-CM

## 2025-01-13 LAB
AORTIC ROOT: 4.1 CM
AV LVOT MEAN GRADIENT: 1 MMHG
AV LVOT PEAK GRADIENT: 3 MMHG
BSA FOR ECHO PROCEDURE: 2.14 M2
DOP CALC LVOT PEAK VEL VTI: 16.99 CM
DOP CALC LVOT PEAK VEL: 0.83 M/S
FRACTIONAL SHORTENING: 34 (ref 28–44)
INTERVENTRICULAR SEPTUM IN DIASTOLE (PARASTERNAL SHORT AXIS VIEW): 0.8 CM
INTERVENTRICULAR SEPTUM: 0.8 CM (ref 0.6–1.1)
LAAS-AP2: 38.6 CM2
LAAS-AP4: 34.1 CM2
LEFT ATRIUM SIZE: 5.3 CM
LEFT ATRIUM VOLUME (MOD BIPLANE): 142 ML
LEFT ATRIUM VOLUME INDEX (MOD BIPLANE): 66.4 ML/M2
LEFT INTERNAL DIMENSION IN SYSTOLE: 3.9 CM (ref 2.1–4)
LEFT VENTRICULAR INTERNAL DIMENSION IN DIASTOLE: 5.9 CM (ref 3.5–6)
LEFT VENTRICULAR POSTERIOR WALL IN END DIASTOLE: 1.1 CM
LEFT VENTRICULAR STROKE VOLUME: 104 ML
LV EF US.2D.A4C+ESTIMATED: 58 %
LVSV (TEICH): 104 ML
RA PRESSURE ESTIMATED: 15 MMHG
RIGHT ATRIUM AREA SYSTOLE A4C: 41.5 CM2
RIGHT VENTRICLE ID DIMENSION: 4.7 CM
RV PSP: 48 MMHG
SL CV LEFT ATRIUM LENGTH A2C: 7.4 CM
SL CV LV EF: 55
SL CV PED ECHO LEFT VENTRICLE DIASTOLIC VOLUME (MOD BIPLANE) 2D: 171 ML
SL CV PED ECHO LEFT VENTRICLE SYSTOLIC VOLUME (MOD BIPLANE) 2D: 67 ML
TR MAX PG: 33 MMHG
TR PEAK VELOCITY: 2.9 M/S
TRICUSPID ANNULAR PLANE SYSTOLIC EXCURSION: 1.6 CM
TRICUSPID VALVE PEAK REGURGITATION VELOCITY: 2.88 M/S

## 2025-01-13 PROCEDURE — 93306 TTE W/DOPPLER COMPLETE: CPT | Performed by: INTERNAL MEDICINE

## 2025-01-13 PROCEDURE — 93306 TTE W/DOPPLER COMPLETE: CPT

## 2025-01-14 DIAGNOSIS — I48.20 CHRONIC ATRIAL FIBRILLATION (HCC): Primary | ICD-10-CM

## 2025-01-14 NOTE — TELEPHONE ENCOUNTER
Caller: Rosalio Anguiano    Doctor: Dr. Markson    Call back #: [phone]    Reason for call: Patient called and stated that he would like to speak to Yokasta regarding their most recent conversation about starting a new medication. Spoke to Rosalio in [place] and stated that Yokasta will call patient back shortly. Thank you!

## 2025-01-14 NOTE — RESULT ENCOUNTER NOTE
Called Rosalio back. He wanted to make sure his script is for a year supply of eliquis. I did inform him I have the script for 90 day supply with 3 refills to last a year then. He appreciates it.

## 2025-01-14 NOTE — RESULT ENCOUNTER NOTE
Spoke to patient and dr dexter.  Patient is stopping aspirin and starting eliquis 5mg BID per dr dexter because of his atrium being very enlarged and at risk for clot/stroke.  [facility]

## 2025-01-14 NOTE — TELEPHONE ENCOUNTER
Spoke to Rosalio. [facility]. So he needs his eliquis script printed, signed and sent to the [facility].    He is aware that Dr. Edwards wants him to stop his aspirin and start eliquis because of his atrium being very big on his recent echo, which puts him at risk for clots that can lead to a stroke.    Patient understands.

## 2025-02-17 ENCOUNTER — RESULTS FOLLOW-UP (OUTPATIENT)
Dept: FAMILY MEDICINE CLINIC | Facility: CLINIC | Age: 80
End: 2025-02-17

## 2025-02-17 ENCOUNTER — OFFICE VISIT (OUTPATIENT)
Dept: FAMILY MEDICINE CLINIC | Facility: CLINIC | Age: 80
End: 2025-02-17
Payer: COMMERCIAL

## 2025-02-17 ENCOUNTER — APPOINTMENT (OUTPATIENT)
Dept: RADIOLOGY | Facility: CLINIC | Age: 80
End: 2025-02-17
Payer: COMMERCIAL

## 2025-02-17 VITALS
BODY MASS INDEX: 28.56 KG/M2 | WEIGHT: 204 LBS | SYSTOLIC BLOOD PRESSURE: 120 MMHG | OXYGEN SATURATION: 98 % | RESPIRATION RATE: 22 BRPM | TEMPERATURE: 98.5 F | HEIGHT: 71 IN | DIASTOLIC BLOOD PRESSURE: 80 MMHG | HEART RATE: 87 BPM

## 2025-02-17 DIAGNOSIS — C61 ADENOCARCINOMA OF PROSTATE (HCC): ICD-10-CM

## 2025-02-17 DIAGNOSIS — R91.1 PULMONARY NODULE: Primary | ICD-10-CM

## 2025-02-17 DIAGNOSIS — J41.0 SIMPLE CHRONIC BRONCHITIS (HCC): Primary | ICD-10-CM

## 2025-02-17 DIAGNOSIS — J44.9 CHRONIC OBSTRUCTIVE PULMONARY DISEASE, UNSPECIFIED COPD TYPE (HCC): ICD-10-CM

## 2025-02-17 DIAGNOSIS — J41.0 SIMPLE CHRONIC BRONCHITIS (HCC): ICD-10-CM

## 2025-02-17 PROCEDURE — 71046 X-RAY EXAM CHEST 2 VIEWS: CPT

## 2025-02-17 PROCEDURE — 99214 OFFICE O/P EST MOD 30 MIN: CPT | Performed by: NURSE PRACTITIONER

## 2025-02-17 RX ORDER — PREDNISONE 10 MG/1
TABLET ORAL
Qty: 18 TABLET | Refills: 0 | Status: SHIPPED | OUTPATIENT
Start: 2025-02-17

## 2025-02-17 RX ORDER — AZITHROMYCIN 250 MG/1
TABLET, FILM COATED ORAL
Qty: 6 TABLET | Refills: 0 | Status: SHIPPED | OUTPATIENT
Start: 2025-02-17 | End: 2025-02-21

## 2025-02-17 NOTE — PROGRESS NOTES
Name: Rosalio Anguiano      : 1945      MRN: [id number]  Encounter Provider: Marium Suárez DNP  Encounter Date: 2025   Encounter department: [facility]  :  Assessment & Plan  Simple chronic bronchitis (HCC)    Orders:    XR chest pa and lateral; Future    azithromycin (ZITHROMAX) 250 mg tablet; Take 2 tablets today then 1 tablet daily x 4 days    predniSONE 10 mg tablet; 30 mg by mouth daily for 3 days, then 20 mg by mouth daily for 3 days, then 10 mg by mouth daily for 3 days, then stop    Adenocarcinoma of prostate (HCC)  Annual psa by PCP       Chronic obstructive pulmonary disease, unspecified COPD type (HCC)  Following pulmonary               History of Present Illness   Cough  Associated symptoms include myalgias, rhinorrhea and shortness of breath. Pertinent negatives include no chest pain, chills, ear pain, fever, headaches, sore throat or wheezing.     Review of Systems   Constitutional:  Negative for activity change, chills, fatigue and fever.   HENT:  Positive for congestion and rhinorrhea. Negative for ear discharge, ear pain, sinus pressure, sinus pain, sore throat, tinnitus and trouble swallowing.    Eyes:  Negative for photophobia, pain, discharge, itching and visual disturbance.   Respiratory:  Positive for cough and shortness of breath. Negative for chest tightness and wheezing.    Cardiovascular:  Negative for chest pain and leg swelling.   Gastrointestinal:  Negative for abdominal distention, abdominal pain, constipation, diarrhea, nausea and vomiting.   Endocrine: Negative for polydipsia, polyphagia and polyuria.   Genitourinary:  Negative for dysuria and frequency.   Musculoskeletal:  Positive for myalgias. Negative for arthralgias, neck pain and neck stiffness.   Skin:  Negative for color change.   Neurological:  Negative for dizziness, syncope, weakness, numbness and headaches.   Hematological:  Does not bruise/bleed easily.  "  Psychiatric/Behavioral:  Negative for behavioral problems, confusion, self-injury, sleep disturbance and suicidal ideas. The patient is not nervous/anxious.        Objective   /80 (BP Location: Left arm, Patient Position: Sitting, Cuff Size: Standard)   Pulse 87   Temp 98.5 °F (36.9 °C) (Tympanic)   Resp 22   Ht 5' 11\" (1.803 m)   Wt 92.5 kg (204 lb)   SpO2 98%   BMI 28.45 kg/m²      Physical Exam  Vitals and nursing note reviewed.   Constitutional:       Appearance: Normal appearance.   HENT:      Head: Normocephalic and atraumatic.      Right Ear: Tympanic membrane, ear canal and external ear normal.      Left Ear: Tympanic membrane, ear canal and external ear normal.      Nose: Congestion present.   Eyes:      Extraocular Movements: Extraocular movements intact.      Conjunctiva/sclera: Conjunctivae normal.      Pupils: Pupils are equal, round, and reactive to light.   Cardiovascular:      Rate and Rhythm: Normal rate and regular rhythm.      Pulses: Normal pulses.      Heart sounds: Normal heart sounds.   Pulmonary:      Effort: Pulmonary effort is normal.      Breath sounds: Normal breath sounds. Decreased air movement present.   Skin:     General: Skin is warm.   Neurological:      General: No focal deficit present.      Mental Status: He is alert and oriented to person, place, and time.         "

## 2025-02-17 NOTE — RESULT ENCOUNTER NOTE
Xray resulted, no infection present. Does show a small nodule in left lung. Radiology does suggest a follow up in three months. Will place order for imaging. Can also discuss with his pulmonologist

## 2025-02-17 NOTE — ASSESSMENT & PLAN NOTE
Orders:    XR chest pa and lateral; Future    azithromycin (ZITHROMAX) 250 mg tablet; Take 2 tablets today then 1 tablet daily x 4 days    predniSONE 10 mg tablet; 30 mg by mouth daily for 3 days, then 20 mg by mouth daily for 3 days, then 10 mg by mouth daily for 3 days, then stop

## 2025-04-07 NOTE — TELEPHONE ENCOUNTER
----- Message from Jona Calzada DO sent at 4/7/2025  2:58 PM EDT -----  Patient has a CT ordered by his last appointment here when he saw Marium and he needs to get this done prior to his appointment coming up here next week on the 14th this will show better detail than a chest x-ray  ----- Message -----  From: Krysten Cueto MA  Sent: 4/7/2025  10:50 AM EDT  To: Jona Calzada DO    See message.  ----- Message -----  From: Molly Patel  Sent: 4/7/2025  10:02 AM EDT  To: [facility]

## 2025-04-07 NOTE — TELEPHONE ENCOUNTER
Due to the small nodule found in his previous xray, patient is asking if another script for an xray can be placed. He would like to have it done before his appointment on 4/14/25

## 2025-04-10 ENCOUNTER — HOSPITAL ENCOUNTER (OUTPATIENT)
Dept: CT IMAGING | Facility: HOSPITAL | Age: 80
End: 2025-04-10
Payer: COMMERCIAL

## 2025-04-10 DIAGNOSIS — R91.1 PULMONARY NODULE: ICD-10-CM

## 2025-04-10 PROCEDURE — 71250 CT THORAX DX C-: CPT

## 2025-04-14 ENCOUNTER — OFFICE VISIT (OUTPATIENT)
Dept: FAMILY MEDICINE CLINIC | Facility: CLINIC | Age: 80
End: 2025-04-14
Payer: COMMERCIAL

## 2025-04-14 ENCOUNTER — RESULTS FOLLOW-UP (OUTPATIENT)
Dept: FAMILY MEDICINE CLINIC | Facility: CLINIC | Age: 80
End: 2025-04-14

## 2025-04-14 VITALS
DIASTOLIC BLOOD PRESSURE: 72 MMHG | OXYGEN SATURATION: 98 % | BODY MASS INDEX: 29.06 KG/M2 | HEART RATE: 83 BPM | RESPIRATION RATE: 20 BRPM | TEMPERATURE: 96.8 F | WEIGHT: 207.6 LBS | SYSTOLIC BLOOD PRESSURE: 110 MMHG | HEIGHT: 71 IN

## 2025-04-14 DIAGNOSIS — H90.3 SENSORINEURAL HEARING LOSS, BILATERAL: ICD-10-CM

## 2025-04-14 DIAGNOSIS — I48.20 CHRONIC ATRIAL FIBRILLATION (HCC): ICD-10-CM

## 2025-04-14 DIAGNOSIS — E03.9 HYPOTHYROIDISM, UNSPECIFIED TYPE: ICD-10-CM

## 2025-04-14 DIAGNOSIS — R91.1 PULMONARY NODULE: Primary | ICD-10-CM

## 2025-04-14 DIAGNOSIS — J41.0 SIMPLE CHRONIC BRONCHITIS (HCC): ICD-10-CM

## 2025-04-14 DIAGNOSIS — L57.0 KERATOSIS: ICD-10-CM

## 2025-04-14 DIAGNOSIS — M47.16 OSTEOARTHRITIS OF LUMBAR SPINE WITH MYELOPATHY: ICD-10-CM

## 2025-04-14 DIAGNOSIS — R91.1 NODULE OF APEX OF RIGHT LUNG: Primary | ICD-10-CM

## 2025-04-14 DIAGNOSIS — J44.9 CHRONIC OBSTRUCTIVE PULMONARY DISEASE, UNSPECIFIED COPD TYPE (HCC): ICD-10-CM

## 2025-04-14 PROCEDURE — 99214 OFFICE O/P EST MOD 30 MIN: CPT | Performed by: FAMILY MEDICINE

## 2025-04-14 NOTE — PROGRESS NOTES
Name: Rosalio Anguiano      : 1945      MRN: [id number]  Encounter Provider: Jona Calzada DO  Encounter Date: 2025   Encounter department: [facility]  :  Assessment & Plan  Nodule of apex of right lung  1 cm in size will need repeat CT or PET scan for this he is going to see Dr. Lama next month for further workup and evaluation I will reserve imaging studies to his discretion.         Simple chronic bronchitis (HCC)  Stable continuing on same inhaler regimen now no worsening shortness of breath follow-up with pulmonary medicine as scheduled         Chronic atrial fibrillation (HCC)  Chronic atrial fibrillation has been stable continuing on Eliquis for stroke prevention rate control is managed with diltiazem now patient denies worsening palpitations or shortness of breath no change in medication regimen for now and follow-up with me in 6 months         Chronic obstructive pulmonary disease, unspecified COPD type (HCC)  Stable currently no worsening breathing issues with the use of Advair 500 mg         Hypothyroidism, unspecified type  Levothyroxine is helping now at current dosage and follow up labs in 6 months.         Osteoarthritis of lumbar spine with myelopathy  Patient will continue with daily physical activity recommend stretching in the beginning and end of each day and we will add additional medication when flareups occur         Sensorineural hearing loss, bilateral  Both ears evaluated for cerumen impaction stable today and will reevaluate in 6 months                History of Present Illness   Follow-up evaluation and discussion regarding lung CT for pulmonary nodule      Review of Systems   Constitutional:  Negative for chills, fatigue and fever.   HENT:  Negative for congestion, nosebleeds, rhinorrhea, sinus pressure and sore throat.    Eyes:  Negative for discharge and redness.   Respiratory:  Negative for cough and shortness of breath.   "  Cardiovascular:  Negative for chest pain, palpitations and leg swelling.   Gastrointestinal:  Negative for abdominal pain, blood in stool and nausea.   Endocrine: Negative for cold intolerance, heat intolerance and polyuria.   Genitourinary:  Negative for dysuria and frequency.   Musculoskeletal:  Negative for arthralgias, back pain and myalgias.   Skin:  Negative for rash.   Neurological:  Negative for dizziness, weakness and headaches.   Hematological:  Negative for adenopathy.   Psychiatric/Behavioral:  Negative for behavioral problems and sleep disturbance. The patient is not nervous/anxious.        Objective   /72 (BP Location: Left arm, Patient Position: Sitting, Cuff Size: Large)   Pulse 83   Temp (!) 96.8 °F (36 °C) (Tympanic)   Resp 20   Ht 5' 11\" (1.803 m)   Wt 94.2 kg (207 lb 9.6 oz)   SpO2 98%   BMI 28.95 kg/m²      Physical Exam  Vitals and nursing note reviewed.   Constitutional:       Appearance: Normal appearance. He is well-developed.   HENT:      Head: Normocephalic and atraumatic.      Right Ear: Tympanic membrane and external ear normal.      Left Ear: Tympanic membrane and external ear normal.      Nose: Nose normal.      Mouth/Throat:      Mouth: Mucous membranes are moist.   Eyes:      General: No scleral icterus.     Conjunctiva/sclera: Conjunctivae normal.      Pupils: Pupils are equal, round, and reactive to light.   Neck:      Thyroid: No thyromegaly.      Vascular: No JVD.   Cardiovascular:      Rate and Rhythm: Normal rate and regular rhythm.      Heart sounds: Normal heart sounds. No murmur heard.  Pulmonary:      Effort: Pulmonary effort is normal.      Breath sounds: Normal breath sounds. No wheezing or rales.   Chest:      Chest wall: No tenderness.   Abdominal:      General: Bowel sounds are normal. There is no distension.      Palpations: Abdomen is soft. There is no mass.      Tenderness: There is no abdominal tenderness. There is no guarding or rebound. "   Musculoskeletal:         General: No tenderness or deformity. Normal range of motion.      Cervical back: Normal range of motion and neck supple.   Lymphadenopathy:      Cervical: No cervical adenopathy.   Skin:     General: Skin is warm and dry.      Findings: No erythema or rash.   Neurological:      Mental Status: He is alert and oriented to person, place, and time.      Cranial Nerves: No cranial nerve deficit.      Deep Tendon Reflexes: Reflexes are normal and symmetric. Reflexes normal.   Psychiatric:         Behavior: Behavior normal.         Thought Content: Thought content normal.         Judgment: Judgment normal.     Lesion Destruction    Date/Time: 4/14/2025 11:00 AM    Performed by: Jona Calzada DO  Authorized by: Jona Calzada DO  Universal Protocol:  Consent given by: patient  Patient understanding: patient states understanding of the procedure being performed  Patient identity confirmed: verbally with patient    Procedure Details - Lesion Destruction:     Number of Lesions:  1  Lesion 1:     Body area:  Head/neck    Head/neck location:  Forehead    Malignancy: benign hyperkeratotic lesion      Destruction method: cryotherapy

## 2025-04-14 NOTE — RESULT ENCOUNTER NOTE
Recent CT scan completed, there is an area of nodular density in right lobe, radiology requested a three month follow up Ct scan or PET scan. Would also advise he discuss this with his pulmonary.

## 2025-04-14 NOTE — ASSESSMENT & PLAN NOTE
Patient will continue with daily physical activity recommend stretching in the beginning and end of each day and we will add additional medication when flareups occur

## 2025-04-14 NOTE — ASSESSMENT & PLAN NOTE
Chronic atrial fibrillation has been stable continuing on Eliquis for stroke prevention rate control is managed with diltiazem now patient denies worsening palpitations or shortness of breath no change in medication regimen for now and follow-up with me in 6 months

## 2025-04-14 NOTE — ASSESSMENT & PLAN NOTE
1 cm in size will need repeat CT or PET scan for this he is going to see Dr. Lama next month for further workup and evaluation I will reserve imaging studies to his discretion.

## 2025-04-14 NOTE — ASSESSMENT & PLAN NOTE
Stable continuing on same inhaler regimen now no worsening shortness of breath follow-up with pulmonary medicine as scheduled

## 2025-04-17 DIAGNOSIS — R42 VERTIGO: Primary | ICD-10-CM

## 2025-04-18 ENCOUNTER — OFFICE VISIT (OUTPATIENT)
Dept: PHYSICAL THERAPY | Age: 80
End: 2025-04-18
Attending: FAMILY MEDICINE
Payer: COMMERCIAL

## 2025-04-18 DIAGNOSIS — R42 VERTIGO: Primary | ICD-10-CM

## 2025-04-18 PROCEDURE — 97162 PT EVAL MOD COMPLEX 30 MIN: CPT | Performed by: PHYSICAL THERAPIST

## 2025-04-18 PROCEDURE — 95992 CANALITH REPOSITIONING PROC: CPT | Performed by: PHYSICAL THERAPIST

## 2025-04-18 PROCEDURE — 97110 THERAPEUTIC EXERCISES: CPT | Performed by: PHYSICAL THERAPIST

## 2025-04-18 NOTE — PROGRESS NOTES
PT Evaluation     Today's date: 2025  Patient name: Rosalio Anguiano  : 1945  MRN: [id number]  Referring provider: Cathy Oliveira PT  Dx:   Encounter Diagnosis     ICD-10-CM    1. Vertigo  R42           Start Time: 916  Stop Time:   Total time in clinic (min): 61 minutes    Assessment  Impairments: abnormal or restricted ROM, activity intolerance, impaired physical strength, lacks appropriate home exercise program, pain with function, poor posture  and poor body mechanics  Symptom irritability: moderate    Assessment details: Rosalio Anguiano is a 80 y.o. male who presents with dizziness. Due to these impairments, Patient has difficulty performing a/iadls and recreational activities. Patient's clinical presentation is consistent with their referring diagnosis of vertigo. He comes to PT as direct access today. Patient was positive with right [place] today with up beating nystagmus lasting 30 seconds each testing. He was treated with 3 Epley maneuvers and did ok, but had nausea after session.  Reviewed HEP for post treatment precautions. Patient has been doing a lot of flying this past month and has to fly again on Tues next week. Will follow up with patient on Monday and treat as needed. Patient would benefit from skilled physical therapy to address their aforementioned impairments, improve their level of function and to improve their overall quality of life.  Understanding of Dx/Px/POC: excellent     Prognosis: excellent    Goals  STG 1-2 weeks  1. Decrease c/o dizziness/vertigo by 50%  2. transfers with ease from supine-sit- stand without symptoms of dizziness.     LTG 2-4 weeks  1. Resolve symptoms of vertigo  2. Independent HEP for home Epley maneuver/self management  3. Pt able to move in/out of bed and roll over without onset of symptoms.  4. Able to return to prior level of function without medication for dizziness  Goals  LT-6 WEEKS  1. Patient to be independent with a/iadls.  2.  Increase functional activities for leisure and home activities to previous LOF.  3. Independent with HEP and/or fitness program.      Plan  Patient would benefit from: skilled physical therapy    Planned therapy interventions: activity modification, behavior modification, body mechanics training, home exercise program, joint mobilization, manual therapy, neuromuscular re-education, patient education, postural training, therapeutic activities, therapeutic exercise and canalith repositioning    Frequency: as needed.  Duration in weeks: 8  Plan of Care beginning date: 4/18/2025  Plan of Care expiration date: 6/18/2025  Treatment plan discussed with: patient        Subjective Evaluation    History of Present Illness  Date of onset: 4/15/2025  Mechanism of injury: Patient reports he got dizzy Tues. with vertigo  with looking up or laying down, or rolling onto right side. He called PCP and ordered PT. Patient has history of vertigo in the past and got good results with PT Epley treatments.           Recurrent probem    Quality of life: excellent    Patient Goals  Patient goal: no dizziness  Pain  No pain reported  Progression: no change    Social Support  Lives with: spouse    Employment status: working (aircraft maintence, travels)    Diagnostic Tests  No diagnostic tests performed        Objective     Concurrent Complaints  Positive for tinnitus, history of cancer and hearing loss (hearing aides B). Negative for headaches, nausea/motion sickness, visual change, memory loss, aural fullness, poor concentration and peripheral neuropathy    Static Posture     Head  Forward.    Comments  BP: 129/79 HR 76    Postural Observations  Seated posture: fair  Standing posture: fair      Neurological Testing     Sensation   Cervical/Thoracic   Left   Intact: light touch    Right   Intact: light touch    Active Range of Motion   Cervical/Thoracic Spine       Cervical    Flexion:  WFL  Extension: Neck active extension: symptoms of  dizziness.     WFL  Left lateral flexion:  WFL  Right lateral flexion:  WFL  Left rotation:  WFL  Right rotation:  WFL    Additional Active Range of Motion Details  C/s is WFL but tight end ranges pain with rotation after holding one position for 30+ seconds.     Ambulation   Weight-Bearing Status   Assistive device used: none    Ambulation: Level Surfaces   Ambulation without assistive device: independent    General Comments:    Upper quarter screen   Shoulder: unremarkable  Elbow: unremarkable  Neuro Exam:     Dizziness  Positive for vertigo.   Negative for disequilibrium, oscillopsia, motion sickness (initally he had), light-headedness, rocking or swaying, diplopia and floating or swimming.     Exacerbating factors  Positive for rolling in bed (to the right), looking up and supine to/from sitting.   Negative for bending over, walking, turning head, optokinetic movement and walking in busy environment.     Headaches   Patient reports headaches: No.     Cervical exam   Modified VBI   Left: asymptomatic  Right: asymptomatic  Seated posture: forward head posture    Oculomotor exam   Oculomotor ROM: WNL  Resting nystagmus: not present   Gaze holding nystagmus: present left  Gaze holding nystagmus: not present right  Smooth pursuits: saccadic smooth pursuit  Vertical saccades: normal  Horizontal saccades: hypermetric  Convergence: normal  Head thrust: left normal and right normal    Positional testing   [place]-Hallpike   Right posterior canal: symptomatic  Thomas-Hallpike comment: 30 sec duration and 4 second delay  Positional testing comment:                POC expires Unit limit Auth Expiration date PT/OT + Visit Limit?   6/18 na pending pending                           Visit/Unit Tracking  AUTH Status:  Date 4/18              pending Used 1               Remaining                 FOTO                 Precautions: B THR, A-fib, L/s sx x3, prostate CA, R LE issues, COPD  Daily Treatment Diary:  Manuals 4/18            NATANAEL  Epley 3x                                                   Neuro Re-Ed                                                                                                        Ther Ex             HEP 10                                                                                                       Ther Activity                                       Gait Training                                       Modalities

## 2025-04-21 ENCOUNTER — APPOINTMENT (OUTPATIENT)
Dept: PHYSICAL THERAPY | Age: 80
End: 2025-04-21
Attending: FAMILY MEDICINE
Payer: COMMERCIAL

## 2025-05-03 ENCOUNTER — HOSPITAL ENCOUNTER (INPATIENT)
Facility: HOSPITAL | Age: 80
LOS: 5 days | DRG: 199 | End: 2025-05-08
Attending: EMERGENCY MEDICINE | Admitting: STUDENT IN AN ORGANIZED HEALTH CARE EDUCATION/TRAINING PROGRAM
Payer: COMMERCIAL

## 2025-05-03 ENCOUNTER — APPOINTMENT (INPATIENT)
Dept: RADIOLOGY | Facility: HOSPITAL | Age: 80
DRG: 199 | End: 2025-05-03
Payer: COMMERCIAL

## 2025-05-03 ENCOUNTER — APPOINTMENT (EMERGENCY)
Dept: RADIOLOGY | Facility: HOSPITAL | Age: 80
DRG: 199 | End: 2025-05-03
Payer: COMMERCIAL

## 2025-05-03 DIAGNOSIS — J44.1 COPD EXACERBATION (HCC): ICD-10-CM

## 2025-05-03 DIAGNOSIS — J93.9 PNEUMOTHORAX, UNSPECIFIED TYPE: ICD-10-CM

## 2025-05-03 DIAGNOSIS — J93.9 PNEUMOTHORAX: Primary | ICD-10-CM

## 2025-05-03 LAB
ALBUMIN SERPL BCG-MCNC: 4.3 G/DL (ref 3.5–5)
ALP SERPL-CCNC: 92 U/L (ref 34–104)
ALT SERPL W P-5'-P-CCNC: 20 U/L (ref 7–52)
ANION GAP SERPL CALCULATED.3IONS-SCNC: 11 MMOL/L (ref 4–13)
APTT PPP: 43 SECONDS (ref 23–34)
AST SERPL W P-5'-P-CCNC: 21 U/L (ref 13–39)
ATRIAL RATE: 110 BPM
BACTERIA UR QL AUTO: ABNORMAL /HPF
BASOPHILS # BLD AUTO: 0.02 THOUSANDS/ÂΜL (ref 0–0.1)
BASOPHILS NFR BLD AUTO: 0 % (ref 0–1)
BILIRUB SERPL-MCNC: 1.66 MG/DL (ref 0.2–1)
BILIRUB UR QL STRIP: NEGATIVE
BUN SERPL-MCNC: 24 MG/DL (ref 5–25)
CALCIUM SERPL-MCNC: 9.4 MG/DL (ref 8.4–10.2)
CHLORIDE SERPL-SCNC: 102 MMOL/L (ref 96–108)
CLARITY UR: CLEAR
CO2 SERPL-SCNC: 25 MMOL/L (ref 21–32)
COLOR UR: YELLOW
CREAT SERPL-MCNC: 1.01 MG/DL (ref 0.6–1.3)
EOSINOPHIL # BLD AUTO: 0.08 THOUSAND/ÂΜL (ref 0–0.61)
EOSINOPHIL NFR BLD AUTO: 1 % (ref 0–6)
ERYTHROCYTE [DISTWIDTH] IN BLOOD BY AUTOMATED COUNT: 25.6 % (ref 11.6–15.1)
GFR SERPL CREATININE-BSD FRML MDRD: 70 ML/MIN/1.73SQ M
GLUCOSE SERPL-MCNC: 129 MG/DL (ref 65–140)
GLUCOSE UR STRIP-MCNC: NEGATIVE MG/DL
HCT VFR BLD AUTO: 40.3 % (ref 36.5–49.3)
HGB BLD-MCNC: 13.6 G/DL (ref 12–17)
HGB UR QL STRIP.AUTO: NEGATIVE
HYALINE CASTS #/AREA URNS LPF: ABNORMAL /LPF
IMM GRANULOCYTES # BLD AUTO: 0.04 THOUSAND/UL (ref 0–0.2)
IMM GRANULOCYTES NFR BLD AUTO: 0 % (ref 0–2)
INR PPP: 1.35 (ref 0.85–1.19)
KETONES UR STRIP-MCNC: ABNORMAL MG/DL
LACTATE SERPL-SCNC: 2.3 MMOL/L (ref 0.5–2)
LACTATE SERPL-SCNC: 4.4 MMOL/L (ref 0.5–2)
LEUKOCYTE ESTERASE UR QL STRIP: NEGATIVE
LYMPHOCYTES # BLD AUTO: 1.62 THOUSANDS/ÂΜL (ref 0.6–4.47)
LYMPHOCYTES NFR BLD AUTO: 18 % (ref 14–44)
MCH RBC QN AUTO: 34.2 PG (ref 26.8–34.3)
MCHC RBC AUTO-ENTMCNC: 33.7 G/DL (ref 31.4–37.4)
MCV RBC AUTO: 101 FL (ref 82–98)
MONOCYTES # BLD AUTO: 1.12 THOUSAND/ÂΜL (ref 0.17–1.22)
MONOCYTES NFR BLD AUTO: 12 % (ref 4–12)
MUCOUS THREADS UR QL AUTO: ABNORMAL
NEUTROPHILS # BLD AUTO: 6.15 THOUSANDS/ÂΜL (ref 1.85–7.62)
NEUTS SEG NFR BLD AUTO: 69 % (ref 43–75)
NITRITE UR QL STRIP: NEGATIVE
NON-SQ EPI CELLS URNS QL MICRO: ABNORMAL /HPF
NRBC BLD AUTO-RTO: 0 /100 WBCS
PH UR STRIP.AUTO: 5.5 [PH]
PLATELET # BLD AUTO: 203 THOUSANDS/UL (ref 149–390)
PMV BLD AUTO: 11.1 FL (ref 8.9–12.7)
POTASSIUM SERPL-SCNC: 4.1 MMOL/L (ref 3.5–5.3)
PROCALCITONIN SERPL-MCNC: 0.08 NG/ML
PROT SERPL-MCNC: 8.3 G/DL (ref 6.4–8.4)
PROT UR STRIP-MCNC: ABNORMAL MG/DL
PROTHROMBIN TIME: 17.4 SECONDS (ref 12.3–15)
QRS AXIS: -64 DEGREES
QRSD INTERVAL: 98 MS
QT INTERVAL: 316 MS
QTC INTERVAL: 471 MS
RBC # BLD AUTO: 3.98 MILLION/UL (ref 3.88–5.62)
RBC #/AREA URNS AUTO: ABNORMAL /HPF
SODIUM SERPL-SCNC: 138 MMOL/L (ref 135–147)
SP GR UR STRIP.AUTO: 1.02 (ref 1–1.03)
T WAVE AXIS: 86 DEGREES
UROBILINOGEN UR STRIP-ACNC: <2 MG/DL
VENTRICULAR RATE: 134 BPM
WBC # BLD AUTO: 9.03 THOUSAND/UL (ref 4.31–10.16)
WBC #/AREA URNS AUTO: ABNORMAL /HPF

## 2025-05-03 PROCEDURE — 84145 PROCALCITONIN (PCT): CPT

## 2025-05-03 PROCEDURE — 94760 N-INVAS EAR/PLS OXIMETRY 1: CPT

## 2025-05-03 PROCEDURE — 94664 DEMO&/EVAL PT USE INHALER: CPT

## 2025-05-03 PROCEDURE — 99223 1ST HOSP IP/OBS HIGH 75: CPT | Performed by: INTERNAL MEDICINE

## 2025-05-03 PROCEDURE — 71045 X-RAY EXAM CHEST 1 VIEW: CPT

## 2025-05-03 PROCEDURE — 83605 ASSAY OF LACTIC ACID: CPT

## 2025-05-03 PROCEDURE — 87154 CUL TYP ID BLD PTHGN 6+ TRGT: CPT

## 2025-05-03 PROCEDURE — 99291 CRITICAL CARE FIRST HOUR: CPT | Performed by: EMERGENCY MEDICINE

## 2025-05-03 PROCEDURE — 93005 ELECTROCARDIOGRAM TRACING: CPT

## 2025-05-03 PROCEDURE — 80053 COMPREHEN METABOLIC PANEL: CPT

## 2025-05-03 PROCEDURE — 96374 THER/PROPH/DIAG INJ IV PUSH: CPT

## 2025-05-03 PROCEDURE — 87040 BLOOD CULTURE FOR BACTERIA: CPT

## 2025-05-03 PROCEDURE — 85025 COMPLETE CBC W/AUTO DIFF WBC: CPT

## 2025-05-03 PROCEDURE — 94640 AIRWAY INHALATION TREATMENT: CPT

## 2025-05-03 PROCEDURE — 81001 URINALYSIS AUTO W/SCOPE: CPT | Performed by: INTERNAL MEDICINE

## 2025-05-03 PROCEDURE — 85730 THROMBOPLASTIN TIME PARTIAL: CPT

## 2025-05-03 PROCEDURE — 93010 ELECTROCARDIOGRAM REPORT: CPT | Performed by: INTERNAL MEDICINE

## 2025-05-03 PROCEDURE — 36415 COLL VENOUS BLD VENIPUNCTURE: CPT

## 2025-05-03 PROCEDURE — 99285 EMERGENCY DEPT VISIT HI MDM: CPT

## 2025-05-03 PROCEDURE — 85610 PROTHROMBIN TIME: CPT

## 2025-05-03 PROCEDURE — 94644 CONT INHLJ TX 1ST HOUR: CPT

## 2025-05-03 PROCEDURE — 0W9930Z DRAINAGE OF RIGHT PLEURAL CAVITY WITH DRAINAGE DEVICE, PERCUTANEOUS APPROACH: ICD-10-PCS

## 2025-05-03 RX ORDER — IPRATROPIUM BROMIDE AND ALBUTEROL SULFATE .5; 3 MG/3ML; MG/3ML
1 SOLUTION RESPIRATORY (INHALATION) ONCE
Status: COMPLETED | OUTPATIENT
Start: 2025-05-03 | End: 2025-05-03

## 2025-05-03 RX ORDER — ALBUTEROL SULFATE 90 UG/1
2 INHALANT RESPIRATORY (INHALATION) EVERY 6 HOURS PRN
Status: DISCONTINUED | OUTPATIENT
Start: 2025-05-03 | End: 2025-05-03

## 2025-05-03 RX ORDER — ALBUTEROL SULFATE 2.5 MG/3ML
2 SOLUTION RESPIRATORY (INHALATION) ONCE
Status: COMPLETED | OUTPATIENT
Start: 2025-05-03 | End: 2025-05-03

## 2025-05-03 RX ORDER — ALBUTEROL SULFATE 5 MG/ML
10 SOLUTION RESPIRATORY (INHALATION) ONCE
Status: COMPLETED | OUTPATIENT
Start: 2025-05-03 | End: 2025-05-03

## 2025-05-03 RX ORDER — LIDOCAINE HYDROCHLORIDE AND EPINEPHRINE 10; 10 MG/ML; UG/ML
20 INJECTION, SOLUTION INFILTRATION; PERINEURAL ONCE
Status: COMPLETED | OUTPATIENT
Start: 2025-05-03 | End: 2025-05-03

## 2025-05-03 RX ORDER — DILTIAZEM HYDROCHLORIDE 120 MG/1
120 CAPSULE, COATED, EXTENDED RELEASE ORAL DAILY
Status: DISCONTINUED | OUTPATIENT
Start: 2025-05-03 | End: 2025-05-03

## 2025-05-03 RX ORDER — DILTIAZEM HYDROCHLORIDE 120 MG/1
120 CAPSULE, COATED, EXTENDED RELEASE ORAL DAILY
Status: DISCONTINUED | OUTPATIENT
Start: 2025-05-04 | End: 2025-05-08 | Stop reason: HOSPADM

## 2025-05-03 RX ORDER — TAMSULOSIN HYDROCHLORIDE 0.4 MG/1
0.4 CAPSULE ORAL
Status: DISCONTINUED | OUTPATIENT
Start: 2025-05-03 | End: 2025-05-08 | Stop reason: HOSPADM

## 2025-05-03 RX ORDER — LIDOCAINE HYDROCHLORIDE 10 MG/ML
10 INJECTION, SOLUTION EPIDURAL; INFILTRATION; INTRACAUDAL; PERINEURAL ONCE
Status: DISCONTINUED | OUTPATIENT
Start: 2025-05-03 | End: 2025-05-05

## 2025-05-03 RX ORDER — METHYLPREDNISOLONE SODIUM SUCCINATE 40 MG/ML
40 INJECTION, POWDER, LYOPHILIZED, FOR SOLUTION INTRAMUSCULAR; INTRAVENOUS EVERY 8 HOURS
Status: DISCONTINUED | OUTPATIENT
Start: 2025-05-03 | End: 2025-05-05

## 2025-05-03 RX ORDER — FENTANYL CITRATE 50 UG/ML
25 INJECTION, SOLUTION INTRAMUSCULAR; INTRAVENOUS ONCE
Refills: 0 | Status: COMPLETED | OUTPATIENT
Start: 2025-05-03 | End: 2025-05-03

## 2025-05-03 RX ORDER — SODIUM CHLORIDE FOR INHALATION 0.9 %
3 VIAL, NEBULIZER (ML) INHALATION
Status: DISCONTINUED | OUTPATIENT
Start: 2025-05-03 | End: 2025-05-03

## 2025-05-03 RX ORDER — MAGNESIUM SULFATE HEPTAHYDRATE 40 MG/ML
1 INJECTION, SOLUTION INTRAVENOUS ONCE
Status: COMPLETED | OUTPATIENT
Start: 2025-05-03 | End: 2025-05-03

## 2025-05-03 RX ORDER — PANTOPRAZOLE SODIUM 20 MG/1
20 TABLET, DELAYED RELEASE ORAL
Status: DISCONTINUED | OUTPATIENT
Start: 2025-05-04 | End: 2025-05-08 | Stop reason: HOSPADM

## 2025-05-03 RX ORDER — LEVALBUTEROL INHALATION SOLUTION 1.25 MG/3ML
1.25 SOLUTION RESPIRATORY (INHALATION)
Status: DISCONTINUED | OUTPATIENT
Start: 2025-05-03 | End: 2025-05-04

## 2025-05-03 RX ORDER — ALBUTEROL SULFATE 90 UG/1
2 INHALANT RESPIRATORY (INHALATION) EVERY 4 HOURS PRN
Status: DISCONTINUED | OUTPATIENT
Start: 2025-05-03 | End: 2025-05-08 | Stop reason: HOSPADM

## 2025-05-03 RX ORDER — SODIUM CHLORIDE FOR INHALATION 0.9 %
12 VIAL, NEBULIZER (ML) INHALATION ONCE
Status: COMPLETED | OUTPATIENT
Start: 2025-05-03 | End: 2025-05-03

## 2025-05-03 RX ORDER — SODIUM CHLORIDE 9 MG/ML
75 INJECTION, SOLUTION INTRAVENOUS CONTINUOUS
Status: DISCONTINUED | OUTPATIENT
Start: 2025-05-03 | End: 2025-05-05

## 2025-05-03 RX ORDER — METHYLPREDNISOLONE SOD SUCC 125 MG
1 VIAL (EA) INJECTION ONCE
Status: COMPLETED | OUTPATIENT
Start: 2025-05-03 | End: 2025-05-03

## 2025-05-03 RX ORDER — DROPERIDOL 2.5 MG/ML
1 INJECTION, SOLUTION INTRAMUSCULAR; INTRAVENOUS ONCE
Status: COMPLETED | OUTPATIENT
Start: 2025-05-03 | End: 2025-05-03

## 2025-05-03 RX ORDER — LEVOTHYROXINE SODIUM 25 UG/1
25 TABLET ORAL
Status: DISCONTINUED | OUTPATIENT
Start: 2025-05-04 | End: 2025-05-08 | Stop reason: HOSPADM

## 2025-05-03 RX ADMIN — METHYLPREDNISOLONE SODIUM SUCCINATE 40 MG: 40 INJECTION, POWDER, FOR SOLUTION INTRAMUSCULAR; INTRAVENOUS at 17:52

## 2025-05-03 RX ADMIN — FENTANYL CITRATE 25 MCG: 50 INJECTION INTRAMUSCULAR; INTRAVENOUS at 09:40

## 2025-05-03 RX ADMIN — DILTIAZEM HYDROCHLORIDE 120 MG: 120 CAPSULE, COATED, EXTENDED RELEASE ORAL at 11:15

## 2025-05-03 RX ADMIN — LEVALBUTEROL HYDROCHLORIDE 1.25 MG: 1.25 SOLUTION RESPIRATORY (INHALATION) at 13:24

## 2025-05-03 RX ADMIN — LIDOCAINE HYDROCHLORIDE,EPINEPHRINE BITARTRATE 20 ML: 10; .01 INJECTION, SOLUTION INFILTRATION; PERINEURAL at 09:24

## 2025-05-03 RX ADMIN — ALBUTEROL SULFATE 10 MG: 2.5 SOLUTION RESPIRATORY (INHALATION) at 08:51

## 2025-05-03 RX ADMIN — SODIUM CHLORIDE 75 ML/HR: 0.9 INJECTION, SOLUTION INTRAVENOUS at 12:07

## 2025-05-03 RX ADMIN — LEVALBUTEROL HYDROCHLORIDE 1.25 MG: 1.25 SOLUTION RESPIRATORY (INHALATION) at 19:56

## 2025-05-03 RX ADMIN — TAMSULOSIN HYDROCHLORIDE 0.4 MG: 0.4 CAPSULE ORAL at 17:38

## 2025-05-03 RX ADMIN — IPRATROPIUM BROMIDE 0.5 MG: 0.5 SOLUTION RESPIRATORY (INHALATION) at 19:56

## 2025-05-03 RX ADMIN — IPRATROPIUM BROMIDE 0.5 MG: 0.5 SOLUTION RESPIRATORY (INHALATION) at 13:24

## 2025-05-03 RX ADMIN — IPRATROPIUM BROMIDE 1 MG: 0.5 SOLUTION RESPIRATORY (INHALATION) at 08:51

## 2025-05-03 RX ADMIN — CEFTRIAXONE SODIUM 1000 MG: 10 INJECTION, POWDER, FOR SOLUTION INTRAVENOUS at 10:48

## 2025-05-03 RX ADMIN — ISODIUM CHLORIDE 12 ML: 0.03 SOLUTION RESPIRATORY (INHALATION) at 08:51

## 2025-05-03 NOTE — PLAN OF CARE
Problem: INFECTION - ADULT  Goal: Absence of fever/infection during neutropenic period  Description: INTERVENTIONS:- Monitor WBC  Outcome: Progressing     Problem: SAFETY ADULT  Goal: Patient will remain free of falls  Description: INTERVENTIONS:- Educate patient/family on patient safety including physical limitations- Instruct patient to call for assistance with activity - Consult OT/PT to assist with strengthening/mobility - Keep Call bell within reach- Keep bed low and locked with side rails adjusted as appropriate- Keep care items and personal belongings within reach- Initiate and maintain comfort rounds- Make Fall Risk Sign visible to staff- Offer Toileting every 2 Hours, in advance of need- Initiate/Maintain alarm- Obtain necessary fall risk management equipment: - Apply yellow socks and bracelet for high fall risk patients- Consider moving patient to room near nurses station  Outcome: Progressing     Problem: SAFETY ADULT  Goal: Maintain or return to baseline ADL function  Description: INTERVENTIONS:-  Assess patient's ability to carry out ADLs; assess patient's baseline for ADL function and identify physical deficits which impact ability to perform ADLs (bathing, care of mouth/teeth, toileting, grooming, dressing, etc.)- Assess/evaluate cause of self-care deficits - Assess range of motion- Assess patient's mobility; develop plan if impaired- Assess patient's need for assistive devices and provide as appropriate- Encourage maximum independence but intervene and supervise when necessary- Involve family in performance of ADLs- Assess for home care needs following discharge - Consider OT consult to assist with ADL evaluation and planning for discharge- Provide patient education as appropriate  Outcome: Progressing

## 2025-05-03 NOTE — ED PROVIDER NOTES
Time reflects when diagnosis was documented in both MDM as applicable and the Disposition within this note       Time User Action Codes Description Comment    5/3/2025 11:36 AM Pancho Lambertham Add [J93.9] Pneumothorax, unspecified type     5/3/2025 12:09 PM Simin Rod Add [J93.9] Pneumothorax     5/3/2025 12:09 PM Simin Rod Add [J44.1] COPD exacerbation (HCC)           ED Disposition       ED Disposition   Admit    Condition   Stable    Date/Time   Sat May 3, 2025 10:55 AM    Comment   Case was discussed with Dr. Lambert and the patient's admission status was agreed to be Admission Status: inpatient status to the service of Dr. Lambert .               Assessment & Plan       Medical Decision Making  80 y.o. male presents to ED for evaluation of shortness of breath x 1 day.  Further details in HPI.  On physical exam, alert and oriented x 3, GCS 15.  Vital signs pertinent for tachycardia with heart rate in the 120s to 130s on arrival, tachypnea with estimated respiratory rate in 30s to 40s, saturations in the low 90s on 6 L nasal cannula.  Heart regular rate and rhythm, lungs show diffuse expiratory wheezing, decreased breath sounds on the diffuse right side.  Remainder physical exam without acute abnormality.     Ddx: Includes but not limited to COPD exacerbation, pneumonia, pneumothorax, viral syndrome.     Plan septic workup including CBC, CMP, coags, procalcitonin, lactic acid, UA with culture, blood culture; EKG, chest x-ray  Chest x-ray showing right-sided pneumothorax with tracheal shift away from the affected side suggesting tension pneumothorax.  Patient has mild increased work of breathing, though improving with [place], no hypotension or JVD.  Pigtail chest tube placed the right sixth intercostal, post chest tube placement x-ray obtained which showed reinflation of right lung, resolution of initially large appearing pneumothorax.  Patient reports it being able to breathe easier following insertion of  chest tube.  Output approximately 170 at time of admission of serosanguineous fluid, suspect concomitant pleural effusion or seroma in addition to right-sided pneumothorax.    On re-evaluation: well appearing in NAD, vital signs are normal. A&O x 3, airway patent, no chest pain or respiratory distress, Abdomen non distended, non tender. M/S no gross deformity, GCS 15     Final Evaluation:  (see ED course for additional MDM)  Given right-sided pneumothorax requiring chest tube placement and serosanguineous drainage chest tube output, wheezing consistent with COPD exacerbation, patient will need to be admitted for further evaluation and treatment.  Reached out to critical care for evaluation, deemed patient appropriate for SLIM admission and pulm consult for chest tube management.    Critical Care Time Statement: Upon my evaluation, this patient had a high probability of imminent or life-threatening deterioration due to pneumothorax, which required my direct attention, intervention, and personal management.  I spent a total of 40 minutes directly providing critical care services, including interpretation of complex medical databases, evaluating for the presence of life-threatening injuries or illnesses, complex medical decision making (to support/prevent further life-threatening deterioration)., and interpretation of hemodynamic data. This time is exclusive of procedures, teaching, treating other patients, family meetings, and any prior time recorded by providers other than myself.         Amount and/or Complexity of Data Reviewed  Labs: ordered.  Radiology: ordered and independent interpretation performed.    Risk  Prescription drug management.  Decision regarding hospitalization.             Medications   methylPREDNISolone sodium succinate (Solu-MEDROL) injection 40 mg (has no administration in time range)   levalbuterol (XOPENEX) inhalation solution 1.25 mg (has no administration in time range)   ipratropium  (ATROVENT) 0.02 % inhalation solution 0.5 mg (has no administration in time range)   sodium chloride 0.9 % infusion (75 mL/hr Intravenous New Bag 5/3/25 1207)   albuterol (PROVENTIL HFA,VENTOLIN HFA) inhaler 2 puff (has no administration in time range)   albuterol (FOR EMS ONLY) (2.5 mg/3 mL) 0.083 % inhalation solution 5 mg (0 mg Does not apply Given to EMS 5/3/25 0840)   ipratropium-albuterol (FOR EMS ONLY) (DUO-NEB) 0.5-2.5 mg/3 mL inhalation solution 3 mL (0 mL Does not apply Given to EMS 5/3/25 0840)   droperidol (FOR EMS ONLY) (INAPSINE) 2.5 mg/mL injection 2.5 mg (0 mg Does not apply Given to EMS 5/3/25 0840)   methylPREDNISolone sodium succinate (FOR EMS ONLY) (Solu-MEDROL) 125 MG injection 125 mg (0 mg Does not apply Given to EMS 5/3/25 0840)   magnesium sulfate (FOR EMS ONLY) 2 g/50 mL IVPB (premix) 2 g (0 g Does not apply Given to EMS 5/3/25 0840)   albuterol inhalation solution 10 mg (10 mg Nebulization Given 5/3/25 0851)   ipratropium (ATROVENT) 0.02 % inhalation solution 1 mg (1 mg Nebulization Given 5/3/25 0851)   sodium chloride 0.9 % inhalation solution 12 mL (12 mL Nebulization Given 5/3/25 0851)   lidocaine-epinephrine (XYLOCAINE/EPINEPHRINE) 1 %-1:100,000 injection 20 mL (20 mL Infiltration Given 5/3/25 0924)   fentaNYL injection 25 mcg (25 mcg Intravenous Given 5/3/25 0940)   ceftriaxone (ROCEPHIN) 1 g/50 mL in dextrose IVPB (0 mg Intravenous Stopped 5/3/25 1201)       ED Risk Strat Scores                    No data recorded        SBIRT 22yo+      Flowsheet Row Most Recent Value   Initial Alcohol Screen: US AUDIT-C     1. How often do you have a drink containing alcohol? 0 Filed at: 05/03/2025 0935   2. How many drinks containing alcohol do you have on a typical day you are drinking?  0 Filed at: 05/03/2025 0935   3a. Male UNDER 65: How often do you have five or more drinks on one occasion? 0 Filed at: 05/03/2025 0935   3b. FEMALE Any Age, or MALE 65+: How often do you have 4 or more drinks on  one occassion? 0 Filed at: 05/03/2025 0910   Audit-C Score 0 Filed at: 05/03/2025 0961   MANI: How many times in the past year have you...    Used an illegal drug or used a prescription medication for non-medical reasons? Never Filed at: 05/03/2025 0972                            History of Present Illness       Chief Complaint   Patient presents with    Shortness of Breath     BIBA from home for complaints of SOB, hx of COPD, Emphysema. Initial assessment was 82% on RA. Given Solumedrol 125, Mag 2 gm, I Duoneb and 1.25 Droperidol. Pox went up to 96%.       Past Medical History:   Diagnosis Date    A-fib (HCC)     BPH with obstruction/lower urinary tract symptoms     Colon polyp     COPD (chronic obstructive pulmonary disease) (HCC)     Frequency of urination     GERD (gastroesophageal reflux disease)     History of cardioversion 12/27/2011    Inital Rhythm AFIB, Final Rhythm Sinus, Max Joules 75    History of echocardiogram 06/01/2015    Normal LV systolic function, mild concentric LV hypertrophy, mild mitral and tricuspid regurg, right atrial enlargement. EF 55%    Irregular heart beat     AF    Other male erectile dysfunction     Personal history of irradiation     Personal history of malignant neoplasm of prostate     Prostate cancer (HCC)       Past Surgical History:   Procedure Laterality Date    BACK SURGERY      x 3    CATARACT EXTRACTION, BILATERAL Bilateral 01/2024    INTRAOPERATIVE RADIATION THERAPY ([facility])  2011    JOINT REPLACEMENT Bilateral     hips    LAMINECTOMY      three levels L3 - S1 - all at different times    PATELLA SURGERY      most of this removed after injury    PROSTATE BIOPSY      TOTAL HIP ARTHROPLASTY Bilateral     VASECTOMY  1973      Family History   Problem Relation Age of Onset    Heart disease Mother     No Known Problems Brother       Social History     Tobacco Use    Smoking status: Former     Current packs/day: 0.00     Average packs/day: 2.0 packs/day for 33.5 years (67.0 ttl  pk-yrs)     Types: Cigarettes     Start date:      Quit date: 1998     Years since quittin.8    Smokeless tobacco: Never   Vaping Use    Vaping status: Never Used   Substance Use Topics    Alcohol use: Yes     Alcohol/week: 7.0 standard drinks of alcohol     Types: 7 Standard drinks or equivalent per week     Comment: 1/2 glass of wine daily    Drug use: No      E-Cigarette/Vaping    E-Cigarette Use Never User       E-Cigarette/Vaping Substances    Nicotine No     THC No     CBD No     Flavoring No     Other No     Unknown No       I have reviewed and agree with the history as documented.     Patient is a 80-year-old male with history of COPD, A-fib presenting with shortness of breath x 1 day. He has been feeling short of breath since yesterday, took his albuterol yesterday with no relief though thought it would go away overnight. Denies any prodromal illness or symptoms. Woke up this morning very short of breath and couldn't speak full sentences so he called ems. He was hypoxic in 80s when EMS arrived, does not wear oxygen at home. EMS gave albuterol x 2, 1 duoneb, 125 solumedrol, 2 mag - sats improved to low 90s on arrival. He still had slight increased WOB on arrival though was able to form full sentences. HR elevated to 130 on arrival, in afib, says he didn't take his morning meds including cardizem.  Denies chest pain, lightheadedness or dizziness, fevers or chills, nausea or vomiting.          Review of Systems   Constitutional:  Negative for chills and fever.   HENT:  Negative for ear pain and sore throat.    Eyes:  Negative for pain and visual disturbance.   Respiratory:  Positive for shortness of breath. Negative for cough.    Cardiovascular:  Negative for chest pain and palpitations.   Gastrointestinal:  Negative for abdominal pain and vomiting.   Genitourinary:  Negative for dysuria and hematuria.   Musculoskeletal:  Negative for arthralgias and back pain.   Skin:  Negative for color change  and rash.   Neurological:  Negative for seizures and syncope.   All other systems reviewed and are negative.          Objective       ED Triage Vitals   Temperature Pulse Blood Pressure Respirations SpO2 Patient Position - Orthostatic VS   05/03/25 0833 05/03/25 0831 05/03/25 0831 05/03/25 0831 05/03/25 0831 05/03/25 0831   97.6 °F (36.4 °C) (!) 130 158/91 (!) 26 94 % Sitting      Temp Source Heart Rate Source BP Location FiO2 (%) Pain Score    05/03/25 0833 05/03/25 0831 05/03/25 0831 -- 05/03/25 1022    Oral Monitor Right arm  No Pain      Vitals      Date and Time Temp Pulse SpO2 Resp BP Pain Score FACES Pain Rating User   05/03/25 1508 98.4 °F (36.9 °C) 116 92 % -- 125/79 -- -- DII   05/03/25 1412 -- -- 93 % -- -- No Pain -- SJ   05/03/25 1348 -- -- -- -- -- No Pain -- KG   05/03/25 1337 -- -- -- -- -- No Pain -- KG   05/03/25 1324 -- -- 91 % -- -- -- -- CW   05/03/25 1319 97.4 °F (36.3 °C) 110 92 % -- 133/86 -- -- DII   05/03/25 1318 97.4 °F (36.3 °C) -- -- 18 133/86 -- -- DII   05/03/25 1200 -- 124 92 % 22 144/76 -- -- LM   05/03/25 1100 -- 119 94 % 20 126/71 -- -- LM   05/03/25 1045 -- 129 94 % 20 128/75 -- -- LM   05/03/25 1030 -- 153 93 % 23 121/71 -- -- LM   05/03/25 1022 -- -- -- -- -- No Pain -- LM   05/03/25 1015 -- 134 93 % 25 121/71 -- -- LM   05/03/25 1000 -- 159 93 % 30 158/99 -- -- LM   05/03/25 0851 -- -- 91 % -- -- -- -- CW   05/03/25 0833 97.6 °F (36.4 °C) -- -- -- -- -- -- LM   05/03/25 0831 -- 130 94 % 26 158/91 -- -- LM            Physical Exam  Vitals and nursing note reviewed.   Constitutional:       General: He is not in acute distress.     Appearance: He is not ill-appearing.   HENT:      Head: Normocephalic and atraumatic.      Right Ear: External ear normal.      Left Ear: External ear normal.      Nose: Nose normal.      Mouth/Throat:      Pharynx: Oropharynx is clear. No oropharyngeal exudate or posterior oropharyngeal erythema.   Eyes:      General: No scleral icterus.      Conjunctiva/sclera: Conjunctivae normal.   Cardiovascular:      Rate and Rhythm: Normal rate.      Pulses: Normal pulses.   Pulmonary:      Effort: Pulmonary effort is normal. No respiratory distress.      Breath sounds: Wheezing present.   Abdominal:      General: There is no distension.      Palpations: Abdomen is soft.      Tenderness: There is no abdominal tenderness. There is no guarding or rebound.   Musculoskeletal:         General: Normal range of motion.      Cervical back: Normal range of motion. No rigidity.      Right lower leg: No edema.      Left lower leg: No edema.   Skin:     General: Skin is warm and dry.      Findings: No erythema.   Neurological:      General: No focal deficit present.      Mental Status: He is alert and oriented to person, place, and time.   Psychiatric:         Mood and Affect: Mood normal.         Behavior: Behavior normal.         Results Reviewed       Procedure Component Value Units Date/Time    Urinalysis with microscopic [[id number]]  (Abnormal) Collected: 05/03/25 1613    Lab Status: Final result Specimen: Urine, Clean Catch Updated: 05/03/25 1634     Color, UA Yellow     Clarity, UA Clear     Specific Gravity, UA 1.025     pH, UA 5.5     Leukocytes, UA Negative     Nitrite, UA Negative     Protein, UA 70 (1+) mg/dl      Glucose, UA Negative mg/dl      Ketones, UA 10 (1+) mg/dl      Urobilinogen, UA <2.0 mg/dl      Bilirubin, UA Negative     Occult Blood, UA Negative     RBC, UA 1-2 /hpf      WBC, UA 1-2 /hpf      Epithelial Cells None Seen /hpf      Bacteria, UA None Seen /hpf      MUCUS THREADS Occasional     Hyaline Casts, UA 10-25 /lpf     Blood culture #1 [[id number]] Collected: 05/03/25 0847    Lab Status: Preliminary result Specimen: Blood from Arm, Right Updated: 05/03/25 1355     Blood Culture Received in Microbiology Lab. Culture in Progress.    Blood culture #2 [[id number]] Collected: 05/03/25 0838    Lab Status: Preliminary result Specimen: Blood from Arm, Left  Updated: 05/03/25 1301     Blood Culture Received in Microbiology Lab. Culture in Progress.    Lactic acid 2 Hours [[id number]]  (Abnormal) Collected: 05/03/25 1117    Lab Status: Final result Specimen: Blood Updated: 05/03/25 1151     LACTIC ACID 4.4 mmol/L     Narrative:      Result may be elevated if tourniquet was used during collection.    Procalcitonin [[id number]]  (Normal) Collected: 05/03/25 0838    Lab Status: Final result Specimen: Blood from Arm, Left Updated: 05/03/25 0913     Procalcitonin 0.08 ng/ml     Protime-INR [[id number]]  (Abnormal) Collected: 05/03/25 0838    Lab Status: Final result Specimen: Blood from Arm, Left Updated: 05/03/25 0907     Protime 17.4 seconds      INR 1.35    Narrative:      INR Therapeutic Range    Indication                                             INR Range      Atrial Fibrillation                                               2.0-3.0  Hypercoagulable State                                    2.0.2.3  Left Ventricular Asist Device                            2.0-3.0  Mechanical Heart Valve                                  -    Aortic(with afib, MI, embolism, HF, LA enlargement,    and/or coagulopathy)                                     2.0-3.0 (2.5-3.5)     Mitral                                                             2.5-3.5  Prosthetic/Bioprosthetic Heart Valve               2.0-3.0  Venous thromboembolism (VTE: VT, PE        2.0-3.0    APTT [[id number]]  (Abnormal) Collected: 05/03/25 0838    Lab Status: Final result Specimen: Blood from Arm, Left Updated: 05/03/25 0907     PTT 43 seconds     Comprehensive metabolic panel [[id number]]  (Abnormal) Collected: 05/03/25 0838    Lab Status: Final result Specimen: Blood from Arm, Left Updated: 05/03/25 0903     Sodium 138 mmol/L      Potassium 4.1 mmol/L      Chloride 102 mmol/L      CO2 25 mmol/L      ANION GAP 11 mmol/L      BUN 24 mg/dL      Creatinine 1.01 mg/dL      Glucose 129 mg/dL      Calcium 9.4 mg/dL      AST 21  U/L      ALT 20 U/L      Alkaline Phosphatase 92 U/L      Total Protein 8.3 g/dL      Albumin 4.3 g/dL      Total Bilirubin 1.66 mg/dL      eGFR 70 ml/min/1.73sq m     Narrative:      [employer] guidelines for Chronic Kidney Disease (CKD):     Stage 1 with normal or high GFR (GFR > 90 mL/min/1.73 square meters)    Stage 2 Mild CKD (GFR = 60-89 mL/min/1.73 square meters)    Stage 3A Moderate CKD (GFR = 45-59 mL/min/1.73 square meters)    Stage 3B Moderate CKD (GFR = 30-44 mL/min/1.73 square meters)    Stage 4 Severe CKD (GFR = 15-29 mL/min/1.73 square meters)    Stage 5 End Stage CKD (GFR <15 mL/min/1.73 square meters)  Note: GFR calculation is accurate only with a steady state creatinine    Lactic acid [[id number]]  (Abnormal) Collected: 05/03/25 0838    Lab Status: Final result Specimen: Blood from Arm, Left Updated: 05/03/25 0902     LACTIC ACID 2.3 mmol/L     Narrative:      Result may be elevated if tourniquet was used during collection.    CBC and differential [[id number]]  (Abnormal) Collected: 05/03/25 0838    Lab Status: Final result Specimen: Blood from Arm, Left Updated: 05/03/25 0849     WBC 9.03 Thousand/uL      RBC 3.98 Million/uL      Hemoglobin 13.6 g/dL      Hematocrit 40.3 %       fL      MCH 34.2 pg      MCHC 33.7 g/dL      RDW 25.6 %      MPV 11.1 fL      Platelets 203 Thousands/uL      nRBC 0 /100 WBCs      Segmented % 69 %      Immature Grans % 0 %      Lymphocytes % 18 %      Monocytes % 12 %      Eosinophils Relative 1 %      Basophils Relative 0 %      Absolute Neutrophils 6.15 Thousands/µL      Absolute Immature Grans 0.04 Thousand/uL      Absolute Lymphocytes 1.62 Thousands/µL      Absolute Monocytes 1.12 Thousand/µL      Eosinophils Absolute 0.08 Thousand/µL      Basophils Absolute 0.02 Thousands/µL             XR chest 1 view portable   Final Interpretation by Kendra White MD (05/03 1259)      Right pleural drainage catheter insertion with resolution  of pneumothorax.            Workstation performed: [id number]         XR chest portable   ED Interpretation by Simin Rod PA-C (05/03 1010)   Large right sided pneumothorax      Final Interpretation by Kendra White MD (05/03 1210)      Large right pneumothorax with mediastinal shift to the left suggesting tension. A chest tube was subsequently inserted.            Workstation performed: [id number]             Chest Tube    Date/Time: 5/3/2025 10:00 AM    Performed by: Simin Rod PA-C  Authorized by: Simin Rod PA-C    Patient location:  ED  Procedure performed by consultant: Dr. Dunham.    Consent:     Consent obtained:  Verbal    Consent given by:  Patient    Risks discussed:  Incomplete drainage, bleeding, damage to surrounding structures and pain    Alternatives discussed:  Delayed treatment and no treatment  Universal protocol:     Procedure explained and questions answered to patient or proxy's satisfaction: yes      Relevant documents present and verified: yes      Radiology Images displayed and confirmed.  If images not available, report reviewed: yes      Required blood products, implants, devices, and special equipment available: yes      Site/side marked: yes      Immediately prior to procedure a time out was called: yes      Patient identity confirmed:  Verbally with patient and arm band  Pre-procedure details:     Skin preparation:  ChloraPrep    Preparation: Patient was prepped and draped in the usual sterile fashion    Indications:     Indications: pneumothroax    Anesthesia (see MAR for exact dosages):     Anesthesia method:  Local infiltration    Local anesthetic:  Lidocaine 1% WITH epi  Procedure details:     Placement location:  Lateral    Laterality:  Right    Approach:  Percutaneous    Scalpel size:  11    Tube size (Marshallese): 10.2.    Ultrasound guidance: no      Tension pneumothorax: no (Tracheal deviation present on chest x-ray, echocardiogram vital signs though no JVD or  hypotension, mild increased work of breathing though improving with nebulization treatments and no presence of severe dyspnea)      Needle Decompression: no      Tube connected to:  Suction    Drainage characteristics:  Serosanguinous    Suture material:  0 silk    Dressing:  4x4 sterile gauze  Post-procedure details:     Post-insertion x-ray findings: tube in good position      Patient tolerance of procedure:  Tolerated well, no immediate complications  Comments:      Used [employer] medical pigtail catheter set with 10.2 French tube.  Due to large amount of fluid output following chest tube placement, maintain chest tube to -20 suction per critical care recommendations.  ECG 12 Lead Documentation Only    Date/Time: 5/3/2025 8:37 AM    Performed by: Simin Rod PA-C  Authorized by: Simin Rod PA-C    ECG reviewed by me, the ED Provider: yes    Patient location:  ED  Previous ECG:     Previous ECG:  Compared to current    Comparison ECG info:  10/22/22    Similarity:  Changes noted  Interpretation:     Interpretation: abnormal    Rate:     ECG rate assessment: tachycardic    Rhythm:     Rhythm: atrial fibrillation    ST segments:     ST segments:  Non-specific  T waves:     T waves: non-specific        ED Medication and Procedure Management   Prior to Admission Medications   Prescriptions Last Dose Informant Patient Reported? Taking?   Cholecalciferol 50 MCG (2000 UT) TABS   Yes No   Sig: Take 50 mcg by mouth   albuterol (PROVENTIL HFA,VENTOLIN HFA) 90 mcg/act inhaler  Self Yes No   Sig: Inhale 2 puffs every 6 (six) hours as needed for wheezing   apixaban (Eliquis) 5 mg   No No   Sig: Take 1 tablet (5 mg total) by mouth 2 (two) times a day   diltiazem (CARDIZEM CD) 120 mg 24 hr capsule  Self No No   Sig: TAKE 1 CAPSULE BY MOUTH  DAILY   fluticasone-salmeterol (Advair) 500-50 mcg/dose inhaler   Yes No   Sig: Inhale 1 puff 2 (two) times a day Rinse mouth after use.   Patient taking differently: Inhale 1 puff 2  (two) times a day Rinse mouth after use. Taking Wixela 500/50 2 puffs.   levothyroxine (Synthroid) 25 mcg tablet   Yes No   Sig: Take 50 mcg by mouth daily   naproxen (Naprosyn) 500 mg tablet   No No   Sig: Take 1 tablet (500 mg total) by mouth 2 (two) times a day with meals   Patient not taking: Reported on 2024   omeprazole (PriLOSEC OTC) 20 MG tablet  Self Yes No   Sig: Take 1 tablet by mouth daily   predniSONE 10 mg tablet   No No   Si mg by mouth daily for 3 days, then 20 mg by mouth daily for 3 days, then 10 mg by mouth daily for 3 days, then stop   Patient not taking: Reported on 2025   tamsulosin (FLOMAX) 0.4 mg  Self Yes No      Facility-Administered Medications: None     Current Discharge Medication List        CONTINUE these medications which have NOT CHANGED    Details   albuterol (PROVENTIL HFA,VENTOLIN HFA) 90 mcg/act inhaler Inhale 2 puffs every 6 (six) hours as needed for wheezing    Comments: <!--EPICS-->Substitution to a formulary equivalent within the same pharmaceutical class is authorized.<!--EPICE-->      apixaban (Eliquis) 5 mg Take 1 tablet (5 mg total) by mouth 2 (two) times a day  Qty: 180 tablet, Refills: 3    Associated Diagnoses: Chronic atrial fibrillation (HCC)      Cholecalciferol 50 MCG ( UT) TABS Take 50 mcg by mouth      diltiazem (CARDIZEM CD) 120 mg 24 hr capsule TAKE 1 CAPSULE BY MOUTH  DAILY  Qty: 90 capsule, Refills: 3    Associated Diagnoses: Chronic a-fib (HCC)      fluticasone-salmeterol (Advair) 500-50 mcg/dose inhaler Inhale 1 puff 2 (two) times a day Rinse mouth after use.      levothyroxine (Synthroid) 25 mcg tablet Take 50 mcg by mouth daily      naproxen (Naprosyn) 500 mg tablet Take 1 tablet (500 mg total) by mouth 2 (two) times a day with meals  Qty: 30 tablet, Refills: 0    Associated Diagnoses: Right leg pain      omeprazole (PriLOSEC OTC) 20 MG tablet Take 1 tablet by mouth daily      predniSONE 10 mg tablet 30 mg by mouth daily for 3 days,  then 20 mg by mouth daily for 3 days, then 10 mg by mouth daily for 3 days, then stop  Qty: 18 tablet, Refills: 0    Associated Diagnoses: Simple chronic bronchitis (HCC)      tamsulosin (FLOMAX) 0.4 mg            No discharge procedures on file.  ED SEPSIS DOCUMENTATION   Time reflects when diagnosis was documented in both MDM as applicable and the Disposition within this note       Time User Action Codes Description Comment    5/3/2025 11:36 AM Caleb Lambert Add [J93.9] Pneumothorax, unspecified type     5/3/2025 12:09 PM Simin Rod Add [J93.9] Pneumothorax     5/3/2025 12:09 PM Simin Rod Add [J44.1] COPD exacerbation (HCC)                  Simin Rod PA-C  05/03/25 8066

## 2025-05-03 NOTE — RESPIRATORY THERAPY NOTE
RT Protocol Note  Rosalio Anguiano 80 y.o. male MRN: [id number]  Unit/Bed#: -01 Encounter: [id number]    Assessment    Principal Problem:    Pneumothorax  Active Problems:    Chronic atrial fibrillation (HCC)    Asthma    GERD (gastroesophageal reflux disease)      Home Pulmonary Medications:     05/03/25 1957   Respiratory Protocol   Protocol Initiated? Yes   Protocol Selection Respiratory   Language Barrier? No   Medical & Social History Reviewed? Yes   Diagnostic Studies Reviewed? Yes   Physical Assessment Performed? Yes   Respiratory Plan Mild Distress pathway   Respiratory Assessment   Assessment Type During-treatment   General Appearance Awake;Alert   Respiratory Pattern Normal   Chest Assessment Chest expansion symmetrical   Bilateral Breath Sounds Diminished   Cough None   Resp Comments Will continue with current tx plan   O2 Device NC   Cough Description   Sputum Amount None   Additional Assessments   Pulse 100   Respirations 21   SpO2 93 %            Past Medical History:   Diagnosis Date    A-fib (HCC)     BPH with obstruction/lower urinary tract symptoms     Colon polyp     COPD (chronic obstructive pulmonary disease) (HCC)     Frequency of urination     GERD (gastroesophageal reflux disease)     History of cardioversion 12/27/2011    Inital Rhythm AFIB, Final Rhythm Sinus, Max Joules 75    History of echocardiogram 06/01/2015    Normal LV systolic function, mild concentric LV hypertrophy, mild mitral and tricuspid regurg, right atrial enlargement. EF 55%    Irregular heart beat     AF    Other male erectile dysfunction     Personal history of irradiation     Personal history of malignant neoplasm of prostate     Prostate cancer (HCC)      Social History     Socioeconomic History    Marital status: /Civil Union     Spouse name: Not on file    Number of children: Not on file    Years of education: Not on file    Highest education level: Not on file   Occupational History    Not on file    Tobacco Use    Smoking status: Former     Current packs/day: 0.00     Average packs/day: 2.0 packs/day for 33.5 years (67.0 ttl pk-yrs)     Types: Cigarettes     Start date:      Quit date: 1998     Years since quittin.8    Smokeless tobacco: Never   Vaping Use    Vaping status: Never Used   Substance and Sexual Activity    Alcohol use: Yes     Alcohol/week: 7.0 standard drinks of alcohol     Types: 7 Standard drinks or equivalent per week     Comment: 1/2 glass of wine daily    Drug use: No    Sexual activity: Not on file   Other Topics Concern    Not on file   Social History Narrative    Daily caffeine use- 2 cups of coffee, 1-2 bottles of green tea     Social Drivers of Health     Financial Resource Strain: Not on file   Food Insecurity: No Food Insecurity (5/3/2025)    Nursing - Inadequate Food Risk Classification     Worried About Running Out of Food in the Last Year: Not on file     Ran Out of Food in the Last Year: Not on file     Ran Out of Food in the Last Year: Never true   Transportation Needs: No Transportation Needs (5/3/2025)    Nursing - Transportation Risk Classification     Lack of Transportation: Not on file     Lack of Transportation: No   Physical Activity: Not on file   Stress: Not on file   Social Connections: Not on file   Intimate Partner Violence: Unknown (5/3/2025)    Nursing IPS     Feels Physically and Emotionally Safe: Not on file     Physically Hurt by Someone: Not on file     Humiliated or Emotionally Abused by Someone: Not on file     Physically Hurt by Someone: No     Hurt or Threatened by Someone: No   Housing Stability: Unknown (5/3/2025)    Nursing: Inadequate Housing Risk Classification     Has Housing: Not on file     Worried About Losing Housing: Not on file     Unable to Get Utilities: Not on file     Unable to Pay for Housing in the Last Year: No     Has Housin       Subjective         Objective    Physical Exam:   Assessment Type:  During-treatment  General Appearance: Awake, Alert  Respiratory Pattern: Normal  Chest Assessment: Chest expansion symmetrical  Bilateral Breath Sounds: Diminished  Cough: None  O2 Device: NC    Vitals:  Blood pressure 126/79, pulse 100, temperature 98.4 °F (36.9 °C), resp. rate 21, SpO2 93%.          Imaging and other studies: Results Review Statement: No pertinent imaging studies reviewed.    O2 Device: NC     Plan    Respiratory Plan: Mild Distress pathway        Resp Comments: Will continue with current tx plan

## 2025-05-03 NOTE — ASSESSMENT & PLAN NOTE
Presented with shortness of breath and wheezing  Placed on IV steroids  Has since significant improved  Will continue with scheduled nebs and IV steroids

## 2025-05-03 NOTE — RESPIRATORY THERAPY NOTE
RT Protocol Note  Rosalio Anguiano 80 y.o. male MRN: [id number]  Unit/Bed#: -01 Encounter: [id number]    Assessment    Principal Problem:    Pneumothorax  Active Problems:    Chronic atrial fibrillation (HCC)    Asthma    GERD (gastroesophageal reflux disease)      Home Pulmonary Medications:  inhalers       Past Medical History:   Diagnosis Date    A-fib (HCC)     BPH with obstruction/lower urinary tract symptoms     Colon polyp     COPD (chronic obstructive pulmonary disease) (HCC)     Frequency of urination     GERD (gastroesophageal reflux disease)     History of cardioversion 2011    Inital Rhythm AFIB, Final Rhythm Sinus, Max Joules 75    History of echocardiogram 2015    Normal LV systolic function, mild concentric LV hypertrophy, mild mitral and tricuspid regurg, right atrial enlargement. EF 55%    Irregular heart beat     AF    Other male erectile dysfunction     Personal history of irradiation     Personal history of malignant neoplasm of prostate     Prostate cancer (HCC)      Social History     Socioeconomic History    Marital status: /Civil Union     Spouse name: Not on file    Number of children: Not on file    Years of education: Not on file    Highest education level: Not on file   Occupational History    Not on file   Tobacco Use    Smoking status: Former     Current packs/day: 0.00     Average packs/day: 2.0 packs/day for 33.5 years (67.0 ttl pk-yrs)     Types: Cigarettes     Start date:      Quit date: 1998     Years since quittin.8    Smokeless tobacco: Never   Vaping Use    Vaping status: Never Used   Substance and Sexual Activity    Alcohol use: Yes     Alcohol/week: 7.0 standard drinks of alcohol     Types: 7 Standard drinks or equivalent per week     Comment: 1/2 glass of wine daily    Drug use: No    Sexual activity: Not on file   Other Topics Concern    Not on file   Social History Narrative    Daily caffeine use- 2 cups of coffee, 1-2 bottles of  green tea     Social Drivers of Health     Financial Resource Strain: Not on file   Food Insecurity: Not on file   Transportation Needs: Not on file   Physical Activity: Not on file   Stress: Not on file   Social Connections: Not on file   Intimate Partner Violence: Not on file   Housing Stability: Not on file       Subjective         Objective    Physical Exam:   Assessment Type: (P) During-treatment  General Appearance: (P) Awake, Alert  Respiratory Pattern: (P) Normal  Chest Assessment: (P) Chest expansion symmetrical  Bilateral Breath Sounds: (P) Diminished  Cough: (P) None  O2 Device: (P) nc    Vitals:  Blood pressure 133/86, pulse (!) 110, temperature (!) 97.4 °F (36.3 °C), resp. rate 18, SpO2 91%.          Imaging and other studies: Results Review Statement: No pertinent imaging studies reviewed.    O2 Device: (P) nc     Plan    Respiratory Plan: (P) Mild Distress pathway        Resp Comments: (P) cont w/ current therapy

## 2025-05-03 NOTE — QUICK NOTE
Progress Note - Triage Asssessment   Rosalio Anguiano 80 y.o. male MRN: [id number]    Time Called ( Time): 1030  Date Called: 05/03/25  Room#: ED 14  Person requesting evaluation: Simin Rod Pa-C    Situation:    Patient is a 76 year old male with a past medical history of COPD, atrial fibrillation on rate control agents and oral systemic anticoagulation along with BPH, prostate CA and GERD.  He presents to the emergency room from home with complaints of shortness of breath.  In the emergency room he was noted to have right sided pneumothorax with re-expansion upon CT insertion and atrial fibrillation with RVR.  He stated that he did not take his home dose medications today given his shortness of breath.  Critical care asked to evaluate for SD admission.  Upon arrival, patient appears very comfortable from a respiratory standpoint and is resting with his eyes closed.  HR remains mildly elevated.  However, hemodynamics.  There are no major metabolic derangements noted on review of laboratory data.  Patient is stable for admission to [facility].      Interventions:   Right sided pneumothorax: maintain CT to -20 mmHg suction.  Chest xray reviewed noting current re-expansion.    Consult pulmonology for CT management    Atrial fibrillation with RVR: restart home dose oral medications as patient was unable to take them this AM along with PRN IV agents          Triage Assessment:     Patient can be admitted to med-surg level of care    Recommendations discussed with Simin Rod Pa-C

## 2025-05-03 NOTE — ED ATTENDING ATTESTATION
5/3/2025  I, Jesus Dunham MD, saw and evaluated the patient. I have discussed the patient with the resident/non-physician practitioner and agree with the resident's/non-physician practitioner's findings, Plan of Care, and MDM as documented in the resident's/non-physician practitioner's note, except where noted. All available labs and Radiology studies were reviewed.  I was present for key portions of any procedure(s) performed by the resident/non-physician practitioner and I was immediately available to provide assistance.       At this point I agree with the current assessment done in the Emergency Department.  I have conducted an independent evaluation of this patient a history and physical is as follows: Patient is a 80-year-old male who I first heard about him from EMS through the medical command phone.  They called report the patient was significantly short of breath.  Apparently short breath over the last 24 hours.  Currently using albuterol yesterday with no relief.  EMS reports they gave him 2 albuterol treatments and Solu-Medrol.  EMS was asking for magnesium.  We did give the patient magnesium prehospital.  I did see the patient with the advanced practitioner he was dyspneic on arrival but could talk in sentences, he denies any chest pain.  Denies any fever or chills  Physical exam showed an awake and alert 80-year-old male, on arrival wheezing but could talk in full sentences, heart irregular rhythm patient has chronic atrial fibrillation did not take his Cardizem this morning.  Lungs showed bilateral wheezing, extremities were normal no edema.      ED Course   Diagnostic testing on initial portable chest x-ray the patient had a large right pneumothorax.  Discussed with the practitioner we did place the chest tube.,  Initially attempted the over the wire chest tube kit but the wire kinked so we placed a pigtail catheter in the patient's right chest.  There was a large amount of air that was allowed to  escape during the procedure.  Chest x-ray post tube placement showed the lung to be reinflated.  There was a large amount of serous drainage from the tube consistent with a pleural effusion.      Critical Care Time  Procedures  Critical Care Time Statement: Upon my evaluation, this patient had a high probability of imminent or life-threatening deterioration due to respiratory distress pneumothorax, which required my direct attention, intervention, and personal management.  I spent a total of 60 minutes directly providing critical care services, including evaluating for the presence of life-threatening injuries or illnesses. This time is exclusive of procedures, teaching, treating other patients, family meetings, and any prior time recorded by providers other than myself.

## 2025-05-03 NOTE — ASSESSMENT & PLAN NOTE
Presented with shortness of breath  Noted to have right-sided pneumothorax  Chest tube placed  Will consult pulmonology for further management

## 2025-05-03 NOTE — ED PROCEDURE NOTE
Procedure  Chest Tube    Date/Time: 5/3/2025 6:52 PM    Performed by: Simin Rod PA-C  Authorized by: Simin Rod PA-C    Patient location:  Bedside  Procedure performed by consultant: Dr. Hazel.    Consent:     Consent obtained:  Verbal    Consent given by:  Patient    Risks discussed:  Bleeding, pain, infection and damage to surrounding structures    Alternatives discussed:  No treatment, delayed treatment and alternative treatment  Universal protocol:     Procedure explained and questions answered to patient or proxy's satisfaction: yes      Radiology Images displayed and confirmed.  If images not available, report reviewed: yes      Required blood products, implants, devices, and special equipment available: yes      Site/side marked: yes      Immediately prior to procedure a time out was called: yes      Patient identity confirmed:  Verbally with patient, arm band and hospital-assigned identification number  Pre-procedure details:     Skin preparation:  ChloraPrep    Preparation: Patient was prepped and draped in the usual sterile fashion    Indications:     Indications: pneumothroax and other (comment)      Indications comment:  Accidental removal of chest tube while on inpatient floor, patient denying he removed chest tube himself, requests or ED provider to replace right-sided chest tube given large pneumothorax presentation this morning, concern for recurrence.  Anesthesia (see MAR for exact dosages):     Anesthesia method:  Local infiltration    Local anesthetic:  Lidocaine 1% WITH epi  Procedure details:     Placement location:  Lateral    Laterality:  Right    Approach:  Percutaneous    Scalpel size:  11    Tube size (Iranian): 10.2Fr Cook pigtail catheter set.    Ultrasound guidance: no      Tension pneumothorax: no      Needle Decompression: no      Tube connected to:  Suction and water seal    Drainage characteristics:  Air only    Suture material:  2-0 silk    Dressing:  4x4 sterile gauze and  petrolatum-impregnated gauze  Post-procedure details:     Post-insertion x-ray findings: tube in good position      Patient tolerance of procedure:  Tolerated well, no immediate complications                   Simin Rod PA-C  05/03/25 [id number]

## 2025-05-03 NOTE — H&P
H&P - Hospitalist   Name: Rosalio Anguiano 80 y.o. male I MRN: [id number]  Unit/Bed#: ED 14 I Date of Admission: 5/3/2025   Date of Service: 5/3/2025 I Hospital Day: 0     Assessment & Plan  Pneumothorax  Presented with shortness of breath  Noted to have right-sided pneumothorax  Chest tube placed  Will consult pulmonology for further management  Chronic atrial fibrillation (HCC)  Remains rate controlled  Continue Cardizem  Can likely resume Eliquis tomorrow  Asthma  Presented with shortness of breath and wheezing  Placed on IV steroids  Has since significant improved  Will continue with scheduled nebs and IV steroids  GERD (gastroesophageal reflux disease)  Continue PPI      VTE Pharmacologic Prophylaxis:   Moderate Risk (Score 3-4) - Pharmacological DVT Prophylaxis Ordered: apixaban (Eliquis).  Code Status: full code  Discussion with family: Patient declined call to .     Anticipated Length of Stay: Patient will be admitted on an inpatient basis with an anticipated length of stay of greater than 2 midnights secondary to see below.    History of Present Illness   Chief Complaint: Shortness of breath    Rosalio Anguiano is a 80 y.o. male with past medical history significant COPD initially presented with shortness of breath and wheezing.  Reports symptoms that began earlier today.  Otherwise denies any acute complaints.  No fevers, chills, cough, abdominal pain, nausea, vomiting, diarrhea or any other complaints    Review of Systems   Constitutional:  Negative for appetite change, chills, diaphoresis, fatigue, fever and unexpected weight change.   HENT:  Negative for congestion, rhinorrhea and sore throat.    Eyes:  Negative for photophobia and visual disturbance.   Respiratory:  Negative for cough, shortness of breath and wheezing.    Cardiovascular:  Negative for chest pain, palpitations and leg swelling.   Gastrointestinal:  Negative for abdominal pain, anal bleeding, blood in stool, constipation,  diarrhea, nausea and vomiting.   Genitourinary:  Negative for decreased urine volume, difficulty urinating, dysuria, flank pain, frequency, hematuria and urgency.   Musculoskeletal:  Negative for arthralgias, back pain, joint swelling and myalgias.   Skin:  Negative for color change and rash.   Neurological:  Negative for dizziness, seizures, facial asymmetry, speech difficulty, numbness and headaches.   Psychiatric/Behavioral:  Negative for agitation, confusion and decreased concentration. The patient is not nervous/anxious.        Historical Information   Past Medical History:   Diagnosis Date    A-fib (HCC)     BPH with obstruction/lower urinary tract symptoms     Colon polyp     COPD (chronic obstructive pulmonary disease) (HCC)     Frequency of urination     GERD (gastroesophageal reflux disease)     History of cardioversion 2011    Inital Rhythm AFIB, Final Rhythm Sinus, Max Joules 75    History of echocardiogram 2015    Normal LV systolic function, mild concentric LV hypertrophy, mild mitral and tricuspid regurg, right atrial enlargement. EF 55%    Irregular heart beat     AF    Other male erectile dysfunction     Personal history of irradiation     Personal history of malignant neoplasm of prostate     Prostate cancer ([facility])      Past Surgical History:   Procedure Laterality Date    BACK SURGERY      x 3    CATARACT EXTRACTION, BILATERAL Bilateral 2024    INTRAOPERATIVE RADIATION THERAPY ([facility])      JOINT REPLACEMENT Bilateral     hips    LAMINECTOMY      three levels L3 - S1 - all at different times    PATELLA SURGERY      most of this removed after injury    PROSTATE BIOPSY      TOTAL HIP ARTHROPLASTY Bilateral     VASECTOMY       Social History     Tobacco Use    Smoking status: Former     Current packs/day: 0.00     Average packs/day: 2.0 packs/day for 33.5 years (67.0 ttl pk-yrs)     Types: Cigarettes     Start date:      Quit date: 1998     Years since quittin.8     Smokeless tobacco: Never   Vaping Use    Vaping status: Never Used   Substance and Sexual Activity    Alcohol use: Yes     Alcohol/week: 7.0 standard drinks of alcohol     Types: 7 Standard drinks or equivalent per week     Comment: 1/2 glass of wine daily    Drug use: No    Sexual activity: Not on file     E-Cigarette/Vaping    E-Cigarette Use Never User      E-Cigarette/Vaping Substances    Nicotine No     THC No     CBD No     Flavoring No     Other No     Unknown No      Family History   Problem Relation Age of Onset    Heart disease Mother     No Known Problems Brother      Social History:  Marital Status: /Civil Union     Patient Pre-hospital Living Situation: Home  Patient Pre-hospital Level of Mobility: walks  Patient Pre-hospital Diet Restrictions: none    Meds/Allergies   I have reviewed home medications with patient personally.  Prior to Admission medications    Medication Sig Start Date End Date Taking? Authorizing Provider   albuterol (PROVENTIL HFA,VENTOLIN HFA) 90 mcg/act inhaler Inhale 2 puffs every 6 (six) hours as needed for wheezing    Historical Provider, MD   apixaban (Eliquis) 5 mg Take 1 tablet (5 mg total) by mouth 2 (two) times a day 1/14/25   Zachary Edwards MD   Cholecalciferol 50 MCG (2000 UT) TABS Take 50 mcg by mouth 9/9/24   Historical Provider, MD   diltiazem (CARDIZEM CD) 120 mg 24 hr capsule TAKE 1 CAPSULE BY MOUTH  DAILY 6/3/20   Zachary Edwards MD   fluticasone-salmeterol (Advair) 500-50 mcg/dose inhaler Inhale 1 puff 2 (two) times a day Rinse mouth after use.  Patient taking differently: Inhale 1 puff 2 (two) times a day Rinse mouth after use. Taking Wixela 500/50 2 puffs.    Historical Provider, MD   levothyroxine (Synthroid) 25 mcg tablet 25 mcg 9/7/23   Historical Provider, MD   naproxen (Naprosyn) 500 mg tablet Take 1 tablet (500 mg total) by mouth 2 (two) times a day with meals  Patient not taking: Reported on 4/29/2024 11/19/23   Abril Menjivar DO    omeprazole (PriLOSEC OTC) 20 MG tablet Take 1 tablet by mouth daily    Historical Provider, MD   predniSONE 10 mg tablet 30 mg by mouth daily for 3 days, then 20 mg by mouth daily for 3 days, then 10 mg by mouth daily for 3 days, then stop  Patient not taking: Reported on 4/14/2025 2/17/25   Marium Suárez, KADI   tamsulosin (FLOMAX) 0.4 mg  10/29/21   Historical Provider, MD     No Known Allergies    Objective :  Temp:  [97.6 °F (36.4 °C)] 97.6 °F (36.4 °C)  HR:  [119-159] 119  BP: (121-158)/(71-99) 126/71  Resp:  [20-30] 20  SpO2:  [91 %-94 %] 94 %  O2 Device: Nasal cannula  Nasal Cannula O2 Flow Rate (L/min):  [4 L/min-5 L/min] 4 L/min    Physical Exam  Constitutional:       General: He is not in acute distress.     Appearance: He is well-developed. He is not diaphoretic.   HENT:      Head: Normocephalic and atraumatic.      Nose: Nose normal.      Mouth/Throat:      Pharynx: No oropharyngeal exudate.   Eyes:      General: No scleral icterus.     Conjunctiva/sclera: Conjunctivae normal.   Cardiovascular:      Rate and Rhythm: Normal rate and regular rhythm.      Heart sounds: Normal heart sounds. No murmur heard.     No friction rub. No gallop.   Pulmonary:      Effort: Pulmonary effort is normal. No respiratory distress.      Breath sounds: Normal breath sounds. No wheezing or rales.   Chest:      Chest wall: No tenderness.   Abdominal:      General: Bowel sounds are normal. There is no distension.      Palpations: Abdomen is soft.      Tenderness: There is no abdominal tenderness. There is no guarding.   Musculoskeletal:         General: No tenderness or deformity. Normal range of motion.      Cervical back: Normal range of motion and neck supple.   Skin:     General: Skin is warm and dry.      Findings: No erythema.   Neurological:      Mental Status: He is alert. Mental status is at baseline.          Lines/Drains:  Lines/Drains/Airways       Active Status       Name Placement date Placement time Site Days     Chest Tube 1 Right Fifth intercostal space 05/03/25  0948  Fifth intercostal space  less than 1                          Lab Results: I have reviewed the following results:  Results from last 7 days   Lab Units 05/03/25  0838   WBC Thousand/uL 9.03   HEMOGLOBIN g/dL 13.6   HEMATOCRIT % 40.3   PLATELETS Thousands/uL 203   SEGS PCT % 69   LYMPHO PCT % 18   MONO PCT % 12   EOS PCT % 1     Results from last 7 days   Lab Units 05/03/25  0838   SODIUM mmol/L 138   POTASSIUM mmol/L 4.1   CHLORIDE mmol/L 102   CO2 mmol/L 25   BUN mg/dL 24   CREATININE mg/dL 1.01   ANION GAP mmol/L 11   CALCIUM mg/dL 9.4   ALBUMIN g/dL 4.3   TOTAL BILIRUBIN mg/dL 1.66*   ALK PHOS U/L 92   ALT U/L 20   AST U/L 21   GLUCOSE RANDOM mg/dL 129     Results from last 7 days   Lab Units 05/03/25  0838   INR  1.35*         Lab Results   Component Value Date    HGBA1C 5.5 09/05/2023    HGBA1C 5.5 10/31/2022    HGBA1C 5.9 10/25/2021     Results from last 7 days   Lab Units 05/03/25  0838   LACTIC ACID mmol/L 2.3*   PROCALCITONIN ng/ml 0.08       Imaging Results Review: I personally reviewed the following image studies/reports in PACS and discussed pertinent findings with Radiology: chest xray. My interpretation of the radiology images/reports is:  .  Other Study Results Review: EKG was reviewed.     Administrative Statements   I have spent a total time of 76 minutes in caring for this patient on the day of the visit/encounter including Diagnostic results.    ** Please Note: This note has been constructed using a voice recognition system. **

## 2025-05-04 ENCOUNTER — APPOINTMENT (INPATIENT)
Dept: CT IMAGING | Facility: HOSPITAL | Age: 80
DRG: 199 | End: 2025-05-04
Attending: RADIOLOGY
Payer: COMMERCIAL

## 2025-05-04 ENCOUNTER — APPOINTMENT (INPATIENT)
Dept: RADIOLOGY | Facility: HOSPITAL | Age: 80
DRG: 199 | End: 2025-05-04
Payer: COMMERCIAL

## 2025-05-04 LAB
ALBUMIN SERPL BCG-MCNC: 4 G/DL (ref 3.5–5)
ALP SERPL-CCNC: 86 U/L (ref 34–104)
ALT SERPL W P-5'-P-CCNC: 26 U/L (ref 7–52)
ANION GAP SERPL CALCULATED.3IONS-SCNC: 9 MMOL/L (ref 4–13)
ANISOCYTOSIS BLD QL SMEAR: PRESENT
AST SERPL W P-5'-P-CCNC: 32 U/L (ref 13–39)
BASOPHILS # BLD AUTO: 0.01 THOUSANDS/ÂΜL (ref 0–0.1)
BASOPHILS NFR BLD AUTO: 0 % (ref 0–1)
BILIRUB SERPL-MCNC: 1.25 MG/DL (ref 0.2–1)
BUN SERPL-MCNC: 26 MG/DL (ref 5–25)
CALCIUM SERPL-MCNC: 8.9 MG/DL (ref 8.4–10.2)
CHLORIDE SERPL-SCNC: 105 MMOL/L (ref 96–108)
CO2 SERPL-SCNC: 22 MMOL/L (ref 21–32)
CREAT SERPL-MCNC: 0.97 MG/DL (ref 0.6–1.3)
EOSINOPHIL # BLD AUTO: 0 THOUSAND/ÂΜL (ref 0–0.61)
EOSINOPHIL NFR BLD AUTO: 0 % (ref 0–6)
ERYTHROCYTE [DISTWIDTH] IN BLOOD BY AUTOMATED COUNT: 25.2 % (ref 11.6–15.1)
GFR SERPL CREATININE-BSD FRML MDRD: 73 ML/MIN/1.73SQ M
GLUCOSE SERPL-MCNC: 149 MG/DL (ref 65–140)
HCT VFR BLD AUTO: 36.8 % (ref 36.5–49.3)
HGB BLD-MCNC: 12.1 G/DL (ref 12–17)
IMM GRANULOCYTES # BLD AUTO: 0.04 THOUSAND/UL (ref 0–0.2)
IMM GRANULOCYTES NFR BLD AUTO: 0 % (ref 0–2)
INR PPP: 1.26 (ref 0.85–1.19)
LACTATE SERPL-SCNC: 1.2 MMOL/L (ref 0.5–2)
LYMPHOCYTES # BLD AUTO: 0.49 THOUSANDS/ÂΜL (ref 0.6–4.47)
LYMPHOCYTES NFR BLD AUTO: 5 % (ref 14–44)
MACROCYTES BLD QL AUTO: PRESENT
MCH RBC QN AUTO: 33.4 PG (ref 26.8–34.3)
MCHC RBC AUTO-ENTMCNC: 32.9 G/DL (ref 31.4–37.4)
MCV RBC AUTO: 102 FL (ref 82–98)
MONOCYTES # BLD AUTO: 0.46 THOUSAND/ÂΜL (ref 0.17–1.22)
MONOCYTES NFR BLD AUTO: 4 % (ref 4–12)
NEUTROPHILS # BLD AUTO: 9.36 THOUSANDS/ÂΜL (ref 1.85–7.62)
NEUTS SEG NFR BLD AUTO: 91 % (ref 43–75)
NRBC BLD AUTO-RTO: 0 /100 WBCS
PLATELET # BLD AUTO: 199 THOUSANDS/UL (ref 149–390)
PLATELET BLD QL SMEAR: ADEQUATE
PMV BLD AUTO: 11 FL (ref 8.9–12.7)
POTASSIUM SERPL-SCNC: 4.2 MMOL/L (ref 3.5–5.3)
PROT SERPL-MCNC: 7.6 G/DL (ref 6.4–8.4)
PROTHROMBIN TIME: 16.5 SECONDS (ref 12.3–15)
RBC # BLD AUTO: 3.62 MILLION/UL (ref 3.88–5.62)
RBC MORPH BLD: PRESENT
SODIUM SERPL-SCNC: 136 MMOL/L (ref 135–147)
WBC # BLD AUTO: 10.36 THOUSAND/UL (ref 4.31–10.16)

## 2025-05-04 PROCEDURE — 32557 INSERT CATH PLEURA W/ IMAGE: CPT | Performed by: RADIOLOGY

## 2025-05-04 PROCEDURE — 99223 1ST HOSP IP/OBS HIGH 75: CPT | Performed by: INTERNAL MEDICINE

## 2025-05-04 PROCEDURE — 99152 MOD SED SAME PHYS/QHP 5/>YRS: CPT | Performed by: RADIOLOGY

## 2025-05-04 PROCEDURE — 94760 N-INVAS EAR/PLS OXIMETRY 1: CPT

## 2025-05-04 PROCEDURE — 99233 SBSQ HOSP IP/OBS HIGH 50: CPT | Performed by: STUDENT IN AN ORGANIZED HEALTH CARE EDUCATION/TRAINING PROGRAM

## 2025-05-04 PROCEDURE — A7041 WATER SEAL DRAIN CONTAINER: HCPCS

## 2025-05-04 PROCEDURE — 94640 AIRWAY INHALATION TREATMENT: CPT

## 2025-05-04 PROCEDURE — 85610 PROTHROMBIN TIME: CPT | Performed by: INTERNAL MEDICINE

## 2025-05-04 PROCEDURE — 94664 DEMO&/EVAL PT USE INHALER: CPT

## 2025-05-04 PROCEDURE — 99152 MOD SED SAME PHYS/QHP 5/>YRS: CPT

## 2025-05-04 PROCEDURE — 71045 X-RAY EXAM CHEST 1 VIEW: CPT

## 2025-05-04 PROCEDURE — C1729 CATH, DRAINAGE: HCPCS

## 2025-05-04 PROCEDURE — 85025 COMPLETE CBC W/AUTO DIFF WBC: CPT | Performed by: INTERNAL MEDICINE

## 2025-05-04 PROCEDURE — C1769 GUIDE WIRE: HCPCS

## 2025-05-04 PROCEDURE — 32557 INSERT CATH PLEURA W/ IMAGE: CPT

## 2025-05-04 PROCEDURE — 0W9930Z DRAINAGE OF RIGHT PLEURAL CAVITY WITH DRAINAGE DEVICE, PERCUTANEOUS APPROACH: ICD-10-PCS

## 2025-05-04 PROCEDURE — 83605 ASSAY OF LACTIC ACID: CPT | Performed by: STUDENT IN AN ORGANIZED HEALTH CARE EDUCATION/TRAINING PROGRAM

## 2025-05-04 PROCEDURE — NC001 PR NO CHARGE: Performed by: RADIOLOGY

## 2025-05-04 PROCEDURE — 80053 COMPREHEN METABOLIC PANEL: CPT | Performed by: INTERNAL MEDICINE

## 2025-05-04 RX ORDER — FENTANYL CITRATE 50 UG/ML
INJECTION, SOLUTION INTRAMUSCULAR; INTRAVENOUS AS NEEDED
Status: COMPLETED | OUTPATIENT
Start: 2025-05-04 | End: 2025-05-04

## 2025-05-04 RX ORDER — LIDOCAINE WITH 8.4% SOD BICARB 0.9%(10ML)
SYRINGE (ML) INJECTION AS NEEDED
Status: COMPLETED | OUTPATIENT
Start: 2025-05-04 | End: 2025-05-04

## 2025-05-04 RX ORDER — LEVALBUTEROL INHALATION SOLUTION 1.25 MG/3ML
1.25 SOLUTION RESPIRATORY (INHALATION)
Status: DISCONTINUED | OUTPATIENT
Start: 2025-05-05 | End: 2025-05-07

## 2025-05-04 RX ORDER — DIPHENHYDRAMINE HYDROCHLORIDE 50 MG/ML
25 INJECTION, SOLUTION INTRAMUSCULAR; INTRAVENOUS ONCE
Status: DISCONTINUED | OUTPATIENT
Start: 2025-05-04 | End: 2025-05-04

## 2025-05-04 RX ORDER — MIDAZOLAM HYDROCHLORIDE 2 MG/2ML
INJECTION, SOLUTION INTRAMUSCULAR; INTRAVENOUS AS NEEDED
Status: COMPLETED | OUTPATIENT
Start: 2025-05-04 | End: 2025-05-04

## 2025-05-04 RX ADMIN — Medication 5 ML: at 12:13

## 2025-05-04 RX ADMIN — LEVOTHYROXINE SODIUM 25 MCG: 0.03 TABLET ORAL at 05:26

## 2025-05-04 RX ADMIN — FENTANYL CITRATE 25 MCG: 50 INJECTION, SOLUTION INTRAMUSCULAR; INTRAVENOUS at 12:11

## 2025-05-04 RX ADMIN — LEVALBUTEROL HYDROCHLORIDE 1.25 MG: 1.25 SOLUTION RESPIRATORY (INHALATION) at 13:21

## 2025-05-04 RX ADMIN — TAMSULOSIN HYDROCHLORIDE 0.4 MG: 0.4 CAPSULE ORAL at 17:08

## 2025-05-04 RX ADMIN — MIDAZOLAM HYDROCHLORIDE 0.5 MG: 1 INJECTION, SOLUTION INTRAMUSCULAR; INTRAVENOUS at 12:11

## 2025-05-04 RX ADMIN — DILTIAZEM HYDROCHLORIDE 120 MG: 120 CAPSULE, COATED, EXTENDED RELEASE ORAL at 08:51

## 2025-05-04 RX ADMIN — APIXABAN 5 MG: 5 TABLET, FILM COATED ORAL at 08:52

## 2025-05-04 RX ADMIN — LEVALBUTEROL HYDROCHLORIDE 1.25 MG: 1.25 SOLUTION RESPIRATORY (INHALATION) at 20:33

## 2025-05-04 RX ADMIN — IPRATROPIUM BROMIDE 0.5 MG: 0.5 SOLUTION RESPIRATORY (INHALATION) at 07:27

## 2025-05-04 RX ADMIN — METHYLPREDNISOLONE SODIUM SUCCINATE 40 MG: 40 INJECTION, POWDER, FOR SOLUTION INTRAMUSCULAR; INTRAVENOUS at 01:59

## 2025-05-04 RX ADMIN — METHYLPREDNISOLONE SODIUM SUCCINATE 40 MG: 40 INJECTION, POWDER, FOR SOLUTION INTRAMUSCULAR; INTRAVENOUS at 09:36

## 2025-05-04 RX ADMIN — SODIUM CHLORIDE 75 ML/HR: 0.9 INJECTION, SOLUTION INTRAVENOUS at 04:36

## 2025-05-04 RX ADMIN — METHYLPREDNISOLONE SODIUM SUCCINATE 40 MG: 40 INJECTION, POWDER, FOR SOLUTION INTRAMUSCULAR; INTRAVENOUS at 17:08

## 2025-05-04 RX ADMIN — LEVALBUTEROL HYDROCHLORIDE 1.25 MG: 1.25 SOLUTION RESPIRATORY (INHALATION) at 07:27

## 2025-05-04 RX ADMIN — IPRATROPIUM BROMIDE 0.5 MG: 0.5 SOLUTION RESPIRATORY (INHALATION) at 13:21

## 2025-05-04 RX ADMIN — PANTOPRAZOLE SODIUM 20 MG: 20 TABLET, DELAYED RELEASE ORAL at 05:26

## 2025-05-04 RX ADMIN — IPRATROPIUM BROMIDE 0.5 MG: 0.5 SOLUTION RESPIRATORY (INHALATION) at 20:33

## 2025-05-04 NOTE — PROGRESS NOTES
Pt pulled out his right chest tube while reaching out for his telephone. DR Lambert was notified and immediately came to bed side pt and vitals stable. Tegaderm was applied to the site and pt breathing was normal. ED JP Rod came to bedside and another chest tube was placed.

## 2025-05-04 NOTE — CONSULTS
ASSESSMENT PLAN:    1.  Right-sided pneumothorax: The patient has large right-sided pneumothorax and underwent chest tube placement in the emergency room.  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  This morning's chest x-ray showed recurrence of the pneumothorax most likely due to displacement of the chest tube.  He has subcutaneous emphysema.  He needs a bigger bore chest tube placement under imaging.  I discussed this with Dr. Beth from thoracic surgery.  I also subsequently spoke to Dr. Tenorio from interventional radiology.  The patient is to undergo a chest tube placement by IR this afternoon.  The patient is on systemic anticoagulation with Eliquis and stands  a higher risk for procedural bleeding.  I discussed this with the patient and he understands the situation.    2.  COPD: He has history of COPD from his previous smoking.  He usually uses Wixela regularly and albuterol as needed.  His exercise tolerance is usually more than 1 block.  He is usually not on any oxygen.  He denied any chest pain or palpitations.  He denied any significant cough or phlegm.  Currently on IV Solu-Medrol and nebulized ipratropium and levalbuterol.  We can change to oral prednisone later.    3.  Chronic atrial fibrillation: He has a history of chronic atrial fibrillation and has been on systemic anticoagulation with Eliquis.    I had a long discussion with the patient and answered all his questions.  I discussed with Dr. Ponce  the patient's hospitalist at length.  Also discussed with the nursing staff.    Thank you for allowing me to participate in the care of the patient.    History of Present Illness     Rosalio Anguiano is a 80 y.o. male with past medical history of COPD, chronic atrial fibrillation on Eliquis who has been admitted with right-sided pneumothorax.  The patient had a right chest tube placed in the lung that reexpanded.  However on this morning's x-ray he has significant  right-sided pneumothorax and his chest tube does seem to come out of it.  He had subcutaneous emphysema he has some shortness of breath but is saturating 92% on 4 L.  He is able to converse freely.  He is comfortable sitting in bed.  Denied any significant cough or phlegm or wheeze or chest pain. he has history of COPD and has been on treatment with Wixela and albuterol.  He follows with Dr. Cooney.  He is a previous smoker quit about 34 years back.  Used to work as a .  He has a history of chronic atrial fibrillation and has been on systemic anticoagulation.  He also had history of gastroesophageal reflux disease..    Review of Systems   Constitutional:  Negative for appetite change, chills and fever.   HENT:  Negative for hearing loss, rhinorrhea, sneezing, sore throat and trouble swallowing.    Respiratory:  Positive for shortness of breath. Negative for cough, chest tightness and wheezing.    Cardiovascular:  Positive for leg swelling. Negative for chest pain and palpitations.   Gastrointestinal:  Negative for abdominal pain, constipation, diarrhea, nausea and vomiting.   Genitourinary:  Negative for dysuria, frequency and urgency.   Musculoskeletal:  Negative for arthralgias, back pain and gait problem.   Skin:  Negative for pallor and rash.   Allergic/Immunologic: Negative for environmental allergies.   Neurological:  Negative for dizziness, light-headedness and headaches.   Psychiatric/Behavioral:  Negative for agitation and confusion. The patient is not nervous/anxious.        Historical Information   Medical History Review: I have reviewed the patient's PMH, PSH, Social History, Family History, Meds, and Allergies   Tobacco History: Smoker quit many years back.  Occupational History: Used to work as an   Family History:non-contributory    Objective :  Temp:  [97.5 °F (36.4 °C)-98.4 °F (36.9 °C)] 97.7 °F (36.5 °C)  HR:  [] 114  BP: (118-144)/(61-82) 144/81  Resp:   [16-21] 21  SpO2:  [90 %-98 %] 93 %  O2 Device: Nasal cannula  Nasal Cannula O2 Flow Rate (L/min):  [4 L/min] 4 L/min    Physical Exam  Vitals reviewed.   Constitutional:       General: He is not in acute distress.     Appearance: He is not ill-appearing, toxic-appearing or diaphoretic.      Interventions: He is not intubated.  HENT:      Head: Normocephalic.   Neck:      Vascular: No JVD.      Trachea: No tracheal deviation.   Cardiovascular:      Rate and Rhythm: Normal rate.      Heart sounds: Normal heart sounds. No murmur heard.  Pulmonary:      Effort: Pulmonary effort is normal. No tachypnea, bradypnea, accessory muscle usage or respiratory distress. He is not intubated.      Breath sounds: No stridor. Examination of the right-upper field reveals decreased breath sounds. Examination of the right-middle field reveals decreased breath sounds. Decreased breath sounds present. No wheezing, rhonchi or rales.      Comments: Subcutaneous emphysema right side  Chest:      Chest wall: Crepitus present. No tenderness.   Abdominal:      General: Bowel sounds are normal.      Tenderness: There is no abdominal tenderness. There is no guarding.   Musculoskeletal:      Right lower leg: Edema present.      Left lower leg: Edema present.   Lymphadenopathy:      Cervical: No cervical adenopathy.   Skin:     Coloration: Skin is not cyanotic or pale.      Findings: No rash.      Nails: There is no clubbing.   Neurological:      Mental Status: He is alert and oriented to person, place, and time.   Psychiatric:         Mood and Affect: Mood normal.         Behavior: Behavior normal. Behavior is not agitated.           Lab Results: I have reviewed the following results:  .     05/04/25  0542 05/04/25  1330   WBC 10.36*  --    HGB 12.1  --    HCT 36.8  --      --    SODIUM 136  --    K 4.2  --      --    CO2 22  --    BUN 26*  --    CREATININE 0.97  --    GLUC 149*  --    AST 32  --    ALT 26  --    ALB 4.0  --    TBILI  1.25*  --    ALKPHOS 86  --    INR 1.26*  --    LACTICACID  --  1.2     ABG: No new results in last 24 hours.    Imaging Results Review: I personally reviewed the following image studies in PACS and associated radiology reports: chest xray. My interpretation of the radiology images/reports is: Previous chest x-ray showing increase in size of the right-sided pneumothorax which had decreased after chest tube placement yesterday..  Other Study Results Review: Other studies reviewed include: Previous echocardiogram from 1/13/2025 showed good systolic ejection fraction.  Moderate to severe TR right atrium was severely dilated and right ventricular was also dilated.  PFT Results Reviewed: No PFTs available in the system    VTE Prophylaxis: On systemic anticoagulation with Eliquis

## 2025-05-04 NOTE — CONSULTS
[facility] Electronic [facility] Record Consult  IR has been consulted to evaluate the patient, determine the appropriate procedure, and whether or not a procedure can and should be performed regarding the care of Rosalio Anguiano.     We were consulted by pulstella concerning Rosalio Anguiano, and to possibly perform a chest tube if medically appropriate for the patient. The patient is aware that a specialty consultation is taking place, and agrees to the IR Consult on their behalf.      Assessment/Plan:   79 yo male with persistent pneumothorax despite chest tube placement.  Image guided chest tube placement desired.  Patient on eliquis.  Patient understands risk of bleeding.  Procedure to be performed today     I spent 25 minutes in medical consultative time evaluating the medical record and imaging of Rosalio Anguiano.     Thank you for allowing [facility] to participate in the care of Rosalio Anguiano. Please don't hesitate to call or TigerText us with any questions.      Caio Tenorio, DO

## 2025-05-04 NOTE — PLAN OF CARE
Problem: PAIN - ADULT  Goal: Verbalizes/displays adequate comfort level or baseline comfort level  Description: Interventions:- Encourage patient to monitor pain and request assistance- Assess pain using appropriate pain scale- Administer analgesics based on type and severity of pain and evaluate response- Implement non-pharmacological measures as appropriate and evaluate response- Consider cultural and social influences on pain and pain management- Notify physician/advanced practitioner if interventions unsuccessful or patient reports new pain  Outcome: Progressing     Problem: INFECTION - ADULT  Goal: Absence or prevention of progression during hospitalization  Description: INTERVENTIONS:- Assess and monitor for signs and symptoms of infection- Monitor lab/diagnostic results- Monitor all insertion sites, i.e. indwelling lines, tubes, and drains- Monitor endotracheal if appropriate and nasal secretions for changes in amount and color- [place] appropriate cooling/warming therapies per order- Administer medications as ordered- Instruct and encourage patient and family to use good hand hygiene technique- Identify and instruct in appropriate isolation precautions for identified infection/condition  Outcome: Progressing     Problem: INFECTION - ADULT  Goal: Absence of fever/infection during neutropenic period  Description: INTERVENTIONS:- Monitor WBC  Outcome: Progressing

## 2025-05-04 NOTE — PROGRESS NOTES
Progress Note - Hospitalist   Name: Rosalio Anguiano 80 y.o. male I MRN: [id number]  Unit/Bed#: -01 I Date of Admission: 5/3/2025   Date of Service: 5/4/2025 I Hospital Day: 1    Assessment & Plan  Pneumothorax  80-year-old male patient with past medical history of COPD, chronic A-fib currently on Eliquis, hospitalized due to spontaneous pneumothorax status post chest tube x 2 overnight since wound was dislodged, a.m. chest x-ray noted with moderate and worsened right sided pneumothorax.       Clinically patient is not in acute significant respite distress and able to speak in full sentences.  However he is hypoxic on room air requiring 4 L nasal cannula currently O2 saturation 92%, tachycardia.  Care discussed with pulmonology, IR.  Currently plan is to hold Eliquis, n.p.o., plan to have larger chest tube placed as per pulmonology recommendation after that discussion with thoracic surgery  Upgrade to stepdown 2 under SLIM.  Elbow has been discussed with patient at length and he is in agreement.  Offered to call wife to update her about above however reports that he had already spoke with the wife.    Chronic atrial fibrillation (HCC)  Rate currently uncontrolled due to pneumothorax.  Continue Cardizem.  Patient had received a.m. Eliquis dose.  Hold Eliquis for now.  Asthma  Presented with shortness of breath and wheezing  Placed on IV steroids  Has since significant improved  Will continue with scheduled nebs and IV steroids  GERD (gastroesophageal reflux disease)  Continue PPI    VTE Pharmacologic Prophylaxis: VTE Score: 6 Moderate Risk (Score 3-4) - Pharmacological DVT Prophylaxis Ordered: Eliquis currently on hold.    Mobility:   Basic Mobility Inpatient Raw Score: 23  -HLM Goal: 7: Walk 25 feet or more  -HLM Achieved: 7: Walk 25 feet or more      Patient Centered Rounds: I performed bedside rounds with nursing staff today.   Discussions with Specialists or Other Care Team Provider: Care discussed  with pulmonology, IR    Education and Discussions with Family / Patient: Patient declined call to .     Current Length of Stay: 1 day(s)  Current Patient Status: Inpatient   Certification Statement: The patient will continue to require additional inpatient hospital stay due to large right-sided pneumothorax  Discharge Plan: Anticipate discharge in 48 hrs to home.    Code Status: Level 1 - Full Code    Subjective   Seen during a.m. rounds.  Patient appears comfortable not in distress however noted hypoxic on room air requiring 4 to 5 L of nasal cannula, tachycardic overnight events reviewed. Care discussed with pulmonology and IR.  Plan for larger chest tube placement. Upgraded to step down 2.     Objective :  Temp:  [97.4 °F (36.3 °C)-98.4 °F (36.9 °C)] 97.5 °F (36.4 °C)  HR:  [] 95  BP: (125-144)/(76-86) 133/82  Resp:  [18-22] 21  SpO2:  [90 %-93 %] 92 %  O2 Device: Nasal cannula  Nasal Cannula O2 Flow Rate (L/min):  [4 L/min] 4 L/min    There is no height or weight on file to calculate BMI.     Input and Output Summary (last 24 hours):     Intake/Output Summary (Last 24 hours) at 5/4/2025 1120  Last data filed at 5/4/2025 0529  Gross per 24 hour   Intake 770 ml   Output 1155 ml   Net -385 ml       Physical Exam  Constitutional:       General: He is not in acute distress.     Appearance: Normal appearance. He is not ill-appearing, toxic-appearing or diaphoretic.      Comments: Elderly male patient, acutely nontoxic appearing.   HENT:      Head: Normocephalic and atraumatic.   Cardiovascular:      Rate and Rhythm: Tachycardia present.      Pulses: Normal pulses.   Pulmonary:      Effort: Pulmonary effort is normal.      Breath sounds: No wheezing.      Comments: Hypoxia on room air.  O2 saturation 90 to 93% on 4 to 5 L nasal cannula.  Right-sided chest tube noted.  Abdominal:      General: Bowel sounds are normal. There is no distension.      Palpations: Abdomen is soft.      Tenderness: There is  no abdominal tenderness.   Musculoskeletal:      Right lower leg: Edema present.      Left lower leg: Edema present.   Neurological:      General: No focal deficit present.      Mental Status: He is alert and oriented to person, place, and time. Mental status is at baseline.   Psychiatric:         Mood and Affect: Mood normal.         Behavior: Behavior normal.           Lines/Drains:  Lines/Drains/Airways       Active Status       Name Placement date Placement time Site Days    Chest Tube 1 Right Fifth intercostal space 05/03/25  0948  Fifth intercostal space  1                            Lab Results: I have reviewed the following results:   Results from last 7 days   Lab Units 05/04/25  0542   WBC Thousand/uL 10.36*   HEMOGLOBIN g/dL 12.1   HEMATOCRIT % 36.8   PLATELETS Thousands/uL 199   SEGS PCT % 91*   LYMPHO PCT % 5*   MONO PCT % 4   EOS PCT % 0     Results from last 7 days   Lab Units 05/04/25  0542   SODIUM mmol/L 136   POTASSIUM mmol/L 4.2   CHLORIDE mmol/L 105   CO2 mmol/L 22   BUN mg/dL 26*   CREATININE mg/dL 0.97   ANION GAP mmol/L 9   CALCIUM mg/dL 8.9   ALBUMIN g/dL 4.0   TOTAL BILIRUBIN mg/dL 1.25*   ALK PHOS U/L 86   ALT U/L 26   AST U/L 32   GLUCOSE RANDOM mg/dL 149*     Results from last 7 days   Lab Units 05/04/25  0542   INR  1.26*             Results from last 7 days   Lab Units 05/03/25  1117 05/03/25  0838   LACTIC ACID mmol/L 4.4* 2.3*   PROCALCITONIN ng/ml  --  0.08       Recent Cultures (last 7 days):   Results from last 7 days   Lab Units 05/03/25  0847 05/03/25  0838   BLOOD CULTURE  Received in Microbiology Lab. Culture in Progress.  --    GRAM STAIN RESULT   --  Gram positive cocci in clusters*       Imaging Results Review: I reviewed radiology reports from this admission including: chest xray.  Other Study Results Review: EKG was personally reviewed and my interpretation is: A-fib RVR..    Last 24 Hours Medication List:     Current Facility-Administered Medications:     albuterol  (PROVENTIL HFA,VENTOLIN HFA) inhaler 2 puff, Q4H PRN    diltiazem (CARDIZEM CD) 24 hr capsule 120 mg, Daily    ipratropium (ATROVENT) 0.02 % inhalation solution 0.5 mg, TID    levalbuterol (XOPENEX) inhalation solution 1.25 mg, TID **AND** [DISCONTINUED] sodium chloride 0.9 % inhalation solution 3 mL, TID    levothyroxine tablet 25 mcg, Early Morning    lidocaine (PF) (XYLOCAINE-MPF) 1 % injection 10 mL, Once    methylPREDNISolone sodium succinate (Solu-MEDROL) injection 40 mg, Q8H    pantoprazole (PROTONIX) EC tablet 20 mg, Early Morning    sodium chloride 0.9 % infusion, Continuous, Last Rate: 75 mL/hr (05/04/25 0436)    tamsulosin (FLOMAX) capsule 0.4 mg, Daily With Dinner    Administrative Statements   Today, Patient Was Seen By: Mack Ponce MD  I have spent a total time of 50 minutes in caring for this patient on the day of the visit/encounter including Diagnostic results, Prognosis, Risks and benefits of tx options, Instructions for management, Patient and family education, Importance of tx compliance, Risk factor reductions, Impressions, Counseling / Coordination of care, Documenting in the medical record, Reviewing/placing orders in the medical record (including tests, medications, and/or procedures), Obtaining or reviewing history  , and Communicating with other healthcare professionals .    **Please Note: This note may have been constructed using a voice recognition system.**

## 2025-05-04 NOTE — QUICK NOTE
Critical care asked by pulmonology to assess the patient immediately post IR CT insertion for stability.  Patient arrives to SD level 2 awake, alert, oriented and hard of hearing.  He is hemodynamically stable with variable heart rates in atrial fibrillation.  His respiratory effort is calm and he is speaking in full sentences.  Repeat Chest xray post procedure with re-expansion of right lung.  Patient is ok to maintain SD 2 level of care on SLIM service.  Continue care per SLIM and pulmonology.

## 2025-05-04 NOTE — ASSESSMENT & PLAN NOTE
Rate currently uncontrolled due to pneumothorax.  Continue Cardizem.  Patient had received a.m. Eliquis dose.  Hold Eliquis for now.

## 2025-05-04 NOTE — RESPIRATORY THERAPY NOTE
RT Protocol Note  Rosalio Anguiano 80 y.o. male MRN: [id number]  Unit/Bed#: -01 Encounter: [id number]    Assessment    Principal Problem:    Pneumothorax  Active Problems:    Chronic atrial fibrillation (HCC)    Asthma    GERD (gastroesophageal reflux disease)      Home Pulmonary Medications:         Past Medical History:   Diagnosis Date    A-fib (HCC)     BPH with obstruction/lower urinary tract symptoms     Colon polyp     COPD (chronic obstructive pulmonary disease) (HCC)     Frequency of urination     GERD (gastroesophageal reflux disease)     History of cardioversion 2011    Inital Rhythm AFIB, Final Rhythm Sinus, Max Joules 75    History of echocardiogram 2015    Normal LV systolic function, mild concentric LV hypertrophy, mild mitral and tricuspid regurg, right atrial enlargement. EF 55%    Irregular heart beat     AF    Other male erectile dysfunction     Personal history of irradiation     Personal history of malignant neoplasm of prostate     Prostate cancer (HCC)      Social History     Socioeconomic History    Marital status: /Civil Union     Spouse name: Not on file    Number of children: Not on file    Years of education: Not on file    Highest education level: Not on file   Occupational History    Not on file   Tobacco Use    Smoking status: Former     Current packs/day: 0.00     Average packs/day: 2.0 packs/day for 33.5 years (67.0 ttl pk-yrs)     Types: Cigarettes     Start date:      Quit date: 1998     Years since quittin.8    Smokeless tobacco: Never   Vaping Use    Vaping status: Never Used   Substance and Sexual Activity    Alcohol use: Yes     Alcohol/week: 7.0 standard drinks of alcohol     Types: 7 Standard drinks or equivalent per week     Comment: 1/2 glass of wine daily    Drug use: No    Sexual activity: Not on file   Other Topics Concern    Not on file   Social History Narrative    Daily caffeine use- 2 cups of coffee, 1-2 bottles of green tea      Social Drivers of Health     Financial Resource Strain: Not on file   Food Insecurity: No Food Insecurity (5/3/2025)    Nursing - Inadequate Food Risk Classification     Worried About Running Out of Food in the Last Year: Not on file     Ran Out of Food in the Last Year: Not on file     Ran Out of Food in the Last Year: Never true   Transportation Needs: No Transportation Needs (5/3/2025)    Nursing - Transportation Risk Classification     Lack of Transportation: Not on file     Lack of Transportation: No   Physical Activity: Not on file   Stress: Not on file   Social Connections: Not on file   Intimate Partner Violence: Unknown (5/3/2025)    Nursing IPS     Feels Physically and Emotionally Safe: Not on file     Physically Hurt by Someone: Not on file     Humiliated or Emotionally Abused by Someone: Not on file     Physically Hurt by Someone: No     Hurt or Threatened by Someone: No   Housing Stability: Unknown (5/3/2025)    Nursing: Inadequate Housing Risk Classification     Has Housing: Not on file     Worried About Losing Housing: Not on file     Unable to Get Utilities: Not on file     Unable to Pay for Housing in the Last Year: No     Has Housin       Subjective         Objective    Physical Exam:   Assessment Type: (P) During-treatment  General Appearance: (P) Awake, Alert  Respiratory Pattern: (P) Normal  Chest Assessment: (P) Chest expansion symmetrical, Subcutaneous emphysema  Bilateral Breath Sounds: (P) Diminished  Cough: (P) None  O2 Device: (P) nc    Vitals:  Blood pressure 133/82, pulse 95, temperature 97.5 °F (36.4 °C), resp. rate 21, SpO2 92%.          Imaging and other studies: Results Review Statement: No pertinent imaging studies reviewed.    O2 Device: (P) nc     Plan    Respiratory Plan: Mild Distress pathway        Resp Comments: (P) cont w/ current therapy

## 2025-05-04 NOTE — PROGRESS NOTES
[facility] Electronic [facility] Record Consult  IR has been consulted to evaluate the patient, determine the appropriate procedure, and whether or not a procedure can and should be performed regarding the care of Rosalio Anguiano.    We were consulted by pulstella concerning Rosalio Anguiano, and to possibly perform a chest tube if medically appropriate for the patient. The patient is aware that a specialty consultation is taking place, and agrees to the IR Consult on their behalf.     Assessment/Plan:   81 yo male with persistent pneumothorax despite chest tube placement.  Image guided chest tube placement desired.  Patient on eliquis.  Patient understands risk of bleeding.  Procedure to be performed today    I spent 25 minutes in medical consultative time evaluating the medical record and imaging of Rosalio Anguiano.    Thank you for allowing [facility] to participate in the care of Rosalio Anguiano. Please don't hesitate to call or TigerText us with any questions.     Caio Tenorio, DO

## 2025-05-05 ENCOUNTER — APPOINTMENT (INPATIENT)
Dept: RADIOLOGY | Facility: HOSPITAL | Age: 80
DRG: 199 | End: 2025-05-05
Payer: COMMERCIAL

## 2025-05-05 PROBLEM — J43.2 CENTRILOBULAR EMPHYSEMA (HCC): Status: ACTIVE | Noted: 2017-01-12

## 2025-05-05 LAB
ALBUMIN SERPL BCG-MCNC: 3.4 G/DL (ref 3.5–5)
ALP SERPL-CCNC: 76 U/L (ref 34–104)
ALT SERPL W P-5'-P-CCNC: 53 U/L (ref 7–52)
ANION GAP SERPL CALCULATED.3IONS-SCNC: 5 MMOL/L (ref 4–13)
AST SERPL W P-5'-P-CCNC: 58 U/L (ref 13–39)
BILIRUB SERPL-MCNC: 1.03 MG/DL (ref 0.2–1)
BUN SERPL-MCNC: 27 MG/DL (ref 5–25)
CALCIUM ALBUM COR SERPL-MCNC: 8.9 MG/DL (ref 8.3–10.1)
CALCIUM SERPL-MCNC: 8.4 MG/DL (ref 8.4–10.2)
CHLORIDE SERPL-SCNC: 109 MMOL/L (ref 96–108)
CO2 SERPL-SCNC: 24 MMOL/L (ref 21–32)
CREAT SERPL-MCNC: 0.85 MG/DL (ref 0.6–1.3)
ERYTHROCYTE [DISTWIDTH] IN BLOOD BY AUTOMATED COUNT: 25.8 % (ref 11.6–15.1)
GFR SERPL CREATININE-BSD FRML MDRD: 82 ML/MIN/1.73SQ M
GLUCOSE SERPL-MCNC: 142 MG/DL (ref 65–140)
HCT VFR BLD AUTO: 33.1 % (ref 36.5–49.3)
HGB BLD-MCNC: 11.2 G/DL (ref 12–17)
MCH RBC QN AUTO: 34.1 PG (ref 26.8–34.3)
MCHC RBC AUTO-ENTMCNC: 33.8 G/DL (ref 31.4–37.4)
MCV RBC AUTO: 101 FL (ref 82–98)
PLATELET # BLD AUTO: 191 THOUSANDS/UL (ref 149–390)
PMV BLD AUTO: 11.4 FL (ref 8.9–12.7)
POTASSIUM SERPL-SCNC: 4.5 MMOL/L (ref 3.5–5.3)
PROT SERPL-MCNC: 6.4 G/DL (ref 6.4–8.4)
RBC # BLD AUTO: 3.28 MILLION/UL (ref 3.88–5.62)
SODIUM SERPL-SCNC: 138 MMOL/L (ref 135–147)
WBC # BLD AUTO: 9.54 THOUSAND/UL (ref 4.31–10.16)

## 2025-05-05 PROCEDURE — 71045 X-RAY EXAM CHEST 1 VIEW: CPT

## 2025-05-05 PROCEDURE — 94664 DEMO&/EVAL PT USE INHALER: CPT

## 2025-05-05 PROCEDURE — 99232 SBSQ HOSP IP/OBS MODERATE 35: CPT | Performed by: STUDENT IN AN ORGANIZED HEALTH CARE EDUCATION/TRAINING PROGRAM

## 2025-05-05 PROCEDURE — 94640 AIRWAY INHALATION TREATMENT: CPT

## 2025-05-05 PROCEDURE — 71046 X-RAY EXAM CHEST 2 VIEWS: CPT

## 2025-05-05 PROCEDURE — 85027 COMPLETE CBC AUTOMATED: CPT | Performed by: STUDENT IN AN ORGANIZED HEALTH CARE EDUCATION/TRAINING PROGRAM

## 2025-05-05 PROCEDURE — 94760 N-INVAS EAR/PLS OXIMETRY 1: CPT

## 2025-05-05 PROCEDURE — 99232 SBSQ HOSP IP/OBS MODERATE 35: CPT | Performed by: INTERNAL MEDICINE

## 2025-05-05 PROCEDURE — 80053 COMPREHEN METABOLIC PANEL: CPT | Performed by: STUDENT IN AN ORGANIZED HEALTH CARE EDUCATION/TRAINING PROGRAM

## 2025-05-05 RX ORDER — PREDNISONE 20 MG/1
40 TABLET ORAL DAILY
Status: COMPLETED | OUTPATIENT
Start: 2025-05-06 | End: 2025-05-08

## 2025-05-05 RX ORDER — FLUTICASONE FUROATE AND VILANTEROL 200; 25 UG/1; UG/1
1 POWDER RESPIRATORY (INHALATION) DAILY
Status: DISCONTINUED | OUTPATIENT
Start: 2025-05-05 | End: 2025-05-08 | Stop reason: HOSPADM

## 2025-05-05 RX ORDER — METHYLPREDNISOLONE SODIUM SUCCINATE 40 MG/ML
40 INJECTION, POWDER, LYOPHILIZED, FOR SOLUTION INTRAMUSCULAR; INTRAVENOUS EVERY 12 HOURS SCHEDULED
Status: DISCONTINUED | OUTPATIENT
Start: 2025-05-05 | End: 2025-05-05

## 2025-05-05 RX ADMIN — METHYLPREDNISOLONE SODIUM SUCCINATE 40 MG: 40 INJECTION, POWDER, FOR SOLUTION INTRAMUSCULAR; INTRAVENOUS at 01:57

## 2025-05-05 RX ADMIN — LEVALBUTEROL HYDROCHLORIDE 1.25 MG: 1.25 SOLUTION RESPIRATORY (INHALATION) at 08:07

## 2025-05-05 RX ADMIN — IPRATROPIUM BROMIDE 0.5 MG: 0.5 SOLUTION RESPIRATORY (INHALATION) at 17:57

## 2025-05-05 RX ADMIN — METHYLPREDNISOLONE SODIUM SUCCINATE 40 MG: 40 INJECTION, POWDER, FOR SOLUTION INTRAMUSCULAR; INTRAVENOUS at 10:29

## 2025-05-05 RX ADMIN — IPRATROPIUM BROMIDE 0.5 MG: 0.5 SOLUTION RESPIRATORY (INHALATION) at 08:07

## 2025-05-05 RX ADMIN — TAMSULOSIN HYDROCHLORIDE 0.4 MG: 0.4 CAPSULE ORAL at 17:50

## 2025-05-05 RX ADMIN — LEVOTHYROXINE SODIUM 25 MCG: 0.03 TABLET ORAL at 05:12

## 2025-05-05 RX ADMIN — DILTIAZEM HYDROCHLORIDE 120 MG: 120 CAPSULE, COATED, EXTENDED RELEASE ORAL at 10:29

## 2025-05-05 RX ADMIN — LEVALBUTEROL HYDROCHLORIDE 1.25 MG: 1.25 SOLUTION RESPIRATORY (INHALATION) at 17:57

## 2025-05-05 RX ADMIN — FLUTICASONE FUROATE AND VILANTEROL TRIFENATATE 1 PUFF: 200; 25 POWDER RESPIRATORY (INHALATION) at 10:29

## 2025-05-05 RX ADMIN — PANTOPRAZOLE SODIUM 20 MG: 20 TABLET, DELAYED RELEASE ORAL at 05:12

## 2025-05-05 RX ADMIN — SODIUM CHLORIDE 75 ML/HR: 0.9 INJECTION, SOLUTION INTRAVENOUS at 01:56

## 2025-05-05 NOTE — ASSESSMENT & PLAN NOTE
Start Breo  Currently on solu-medrol, will discontinue Solu-Medrol and switch to prednisone, can complete a 5-day course    Home regimen is Wixela, albuterol as needed

## 2025-05-05 NOTE — RESPIRATORY THERAPY NOTE
RT Protocol Note  Rosalio Anguiano 80 y.o. male MRN: [id number]  Unit/Bed#: ICU 10 Encounter: [id number]    Assessment    Principal Problem:    Pneumothorax  Active Problems:    Chronic atrial fibrillation (HCC)    Centrilobular emphysema (HCC)    GERD (gastroesophageal reflux disease)      Home Pulmonary Medications:         Past Medical History:   Diagnosis Date    A-fib (HCC)     BPH with obstruction/lower urinary tract symptoms     Colon polyp     COPD (chronic obstructive pulmonary disease) (HCC)     Frequency of urination     GERD (gastroesophageal reflux disease)     History of cardioversion 2011    Inital Rhythm AFIB, Final Rhythm Sinus, Max Joules 75    History of echocardiogram 2015    Normal LV systolic function, mild concentric LV hypertrophy, mild mitral and tricuspid regurg, right atrial enlargement. EF 55%    Irregular heart beat     AF    Other male erectile dysfunction     Personal history of irradiation     Personal history of malignant neoplasm of prostate     Prostate cancer (HCC)      Social History     Socioeconomic History    Marital status: /Civil Union     Spouse name: Not on file    Number of children: Not on file    Years of education: Not on file    Highest education level: Not on file   Occupational History    Not on file   Tobacco Use    Smoking status: Former     Current packs/day: 0.00     Average packs/day: 2.0 packs/day for 33.5 years (67.0 ttl pk-yrs)     Types: Cigarettes     Start date:      Quit date: 1998     Years since quittin.8    Smokeless tobacco: Never   Vaping Use    Vaping status: Never Used   Substance and Sexual Activity    Alcohol use: Yes     Alcohol/week: 7.0 standard drinks of alcohol     Types: 7 Standard drinks or equivalent per week     Comment: 1/2 glass of wine daily    Drug use: No    Sexual activity: Not on file   Other Topics Concern    Not on file   Social History Narrative    Daily caffeine use- 2 cups of coffee, 1-2  bottles of green tea     Social Drivers of Health     Financial Resource Strain: Not on file   Food Insecurity: No Food Insecurity (5/3/2025)    Nursing - Inadequate Food Risk Classification     Worried About Running Out of Food in the Last Year: Not on file     Ran Out of Food in the Last Year: Not on file     Ran Out of Food in the Last Year: Never true   Transportation Needs: No Transportation Needs (5/3/2025)    Nursing - Transportation Risk Classification     Lack of Transportation: Not on file     Lack of Transportation: No   Physical Activity: Not on file   Stress: Not on file   Social Connections: Not on file   Intimate Partner Violence: Unknown (5/3/2025)    Nursing IPS     Feels Physically and Emotionally Safe: Not on file     Physically Hurt by Someone: Not on file     Humiliated or Emotionally Abused by Someone: Not on file     Physically Hurt by Someone: No     Hurt or Threatened by Someone: No   Housing Stability: Unknown (5/3/2025)    Nursing: Inadequate Housing Risk Classification     Has Housing: Not on file     Worried About Losing Housing: Not on file     Unable to Get Utilities: Not on file     Unable to Pay for Housing in the Last Year: No     Has Housin       Subjective    Subjective Data: consider frequencychange as pt states the breathing treatments increase his coughing    Objective    Physical Exam:   Assessment Type: Assess only  General Appearance: Awake, Alert  Respiratory Pattern: Normal  Chest Assessment: Chest expansion symmetrical  Bilateral Breath Sounds: Diminished    Vitals:  Blood pressure 137/87, pulse (!) 106, temperature 97.8 °F (36.6 °C), temperature source Oral, resp. rate 19, weight 90.9 kg (200 lb 6.4 oz), SpO2 94%.          Imaging and other studies:     O2 Device: 2L o2 nc     Plan    Respiratory Plan: Mild Distress pathway, Home Bronchodilator Patient pathway        Resp Comments: pt  with hx of copd, uses inhalers at home, will continue with bid atr/xop

## 2025-05-05 NOTE — PROGRESS NOTES
Progress Note - Hospitalist   Name: Rosalio Anguiano 80 y.o. male I MRN: [id number]  Unit/Bed#: ICU 10 I Date of Admission: 5/3/2025   Date of Service: 5/5/2025 I Hospital Day: 2    Assessment & Plan  Pneumothorax  80-year-old male patient with past medical history of COPD, chronic A-fib currently on Eliquis, hospitalized due to spontaneous pneumothorax.  Initial chest tube was dislodged and second chest tube was placed by ER however further chest x-ray noted with expanding pneumothorax requiring emergent IR consultation and had large tube placed on 05/4/2025.  Currently patient does have 2 chest tubes in place.    Chest x-ray this morning noted resolution of pneumothorax.  Chest tube without airleak on waterseal.    Currently with saturation 94 to 95% on room air.  Not in distress.  Downgrade from SD 2 to MS  Discussed with pulmonology team.  Plan for clamp trial and have repeat chest x-ray later on today evening.  Plan is to again repeat chest x-ray in the morning and likely plan to remove chest tube tomorrow morning.  Pulmonology following and recommendation appreciated.  Patient is agreeable with above plan.  Chronic atrial fibrillation (HCC)  Rate currently uncontrolled due to pneumothorax.  Continue Cardizem.  HOLD eliquis for now: Chest tube in place and may need intervention.   Centrilobular emphysema (HCC)  Currently not in acute exacerbation.  Discontinue IV Solu-Medrol and transition to p.o. prednisone per pulmonology recommendation.  Continue home regimen of Wixela, albuterol as needed.  GERD (gastroesophageal reflux disease)  Continue PPI    VTE Pharmacologic Prophylaxis: VTE Score: 6  Eliquis on hold. Will resume once cleared by pulmonology due to chest tube in place.     Mobility:   Basic Mobility Inpatient Raw Score: 23  JH-HLM Goal: 7: Walk 25 feet or more  JH-HLM Achieved: 7: Walk 25 feet or more      Patient Centered Rounds: I performed bedside rounds with nursing staff today.   Discussions with  Specialists or Other Care Team Provider: Pulmonology    Education and Discussions with Family / Patient: Updated  (wife) via phone.    Current Length of Stay: 2 day(s)  Current Patient Status: Inpatient   Certification Statement: The patient will continue to require additional inpatient hospital stay due to pneumothorax currently 2 chest tubes in place  Discharge Plan: Anticipate discharge in 24-48 hrs to home.    Code Status: Level 1 - Full Code    Subjective   Seen during a.m. rounds.  Patient has 2 chest tubes in place.  O2 saturation 94 to 95% room air.  Not in acute respite distress.  Does report overall feeling much better.  He is eager to go home as soon as possible.  No other events reported.    Objective :  Temp:  [97.6 °F (36.4 °C)-97.9 °F (36.6 °C)] 97.6 °F (36.4 °C)  HR:  [] 93  BP: (118-144)/(61-87) 129/77  Resp:  [16-41] 18  SpO2:  [90 %-98 %] 94 %  O2 Device: None (Room air)  Nasal Cannula O2 Flow Rate (L/min):  [2 L/min-3 L/min] 2 L/min    Body mass index is 27.95 kg/m².     Input and Output Summary (last 24 hours):     Intake/Output Summary (Last 24 hours) at 5/5/2025 1155  Last data filed at 5/5/2025 0906  Gross per 24 hour   Intake 1908.25 ml   Output 1265 ml   Net 643.25 ml       Physical Exam  Constitutional:       General: He is not in acute distress.     Appearance: Normal appearance. He is not ill-appearing, toxic-appearing or diaphoretic.      Comments: Elderly male patient, acutely nontoxic appearing.   HENT:      Head: Normocephalic and atraumatic.   Cardiovascular:      Rate and Rhythm: Normal rate.      Pulses: Normal pulses.   Pulmonary:      Effort: Pulmonary effort is normal. No respiratory distress.      Breath sounds: No wheezing.      Comments: O2 saturation 94 to 95% on room air.  Not in acute respiratory stress.  Noted 2 right-sided chest tube.  Abdominal:      General: Bowel sounds are normal. There is no distension.      Palpations: Abdomen is soft.       Tenderness: There is no abdominal tenderness.   Musculoskeletal:      Right lower leg: Edema present.      Left lower leg: Edema present.   Neurological:      General: No focal deficit present.      Mental Status: He is alert and oriented to person, place, and time. Mental status is at baseline.   Psychiatric:         Mood and Affect: Mood normal.         Behavior: Behavior normal.           Lines/Drains:  Lines/Drains/Airways       Active Status       Name Placement date Placement time Site Days    Chest Tube 1 Right Fifth intercostal space 05/03/25  0948  Fifth intercostal space  2    Chest Tube 2 Right Anterior 14 Fr. 05/04/25  1223  Anterior  less than 1                            Lab Results: I have reviewed the following results:   Results from last 7 days   Lab Units 05/05/25  0521 05/04/25  0542   WBC Thousand/uL 9.54 10.36*   HEMOGLOBIN g/dL 11.2* 12.1   HEMATOCRIT % 33.1* 36.8   PLATELETS Thousands/uL 191 199   SEGS PCT %  --  91*   LYMPHO PCT %  --  5*   MONO PCT %  --  4   EOS PCT %  --  0     Results from last 7 days   Lab Units 05/05/25  0521   SODIUM mmol/L 138   POTASSIUM mmol/L 4.5   CHLORIDE mmol/L 109*   CO2 mmol/L 24   BUN mg/dL 27*   CREATININE mg/dL 0.85   ANION GAP mmol/L 5   CALCIUM mg/dL 8.4   ALBUMIN g/dL 3.4*   TOTAL BILIRUBIN mg/dL 1.03*   ALK PHOS U/L 76   ALT U/L 53*   AST U/L 58*   GLUCOSE RANDOM mg/dL 142*     Results from last 7 days   Lab Units 05/04/25  0542   INR  1.26*             Results from last 7 days   Lab Units 05/04/25  1330 05/03/25  1117 05/03/25  0838   LACTIC ACID mmol/L 1.2 4.4* 2.3*   PROCALCITONIN ng/ml  --   --  0.08       Recent Cultures (last 7 days):   Results from last 7 days   Lab Units 05/03/25  0847 05/03/25  0838   BLOOD CULTURE  No Growth at 24 hrs. Staphylococcus coagulase negative*   GRAM STAIN RESULT   --  Gram positive cocci in clusters*       Imaging Results Review: I reviewed radiology reports from this admission including: chest xray.      Last 24  Hours Medication List:     Current Facility-Administered Medications:     [Transfer Hold] albuterol (PROVENTIL HFA,VENTOLIN HFA) inhaler 2 puff, Q4H PRN    diltiazem (CARDIZEM CD) 24 hr capsule 120 mg, Daily    fluticasone-vilanterol 200-25 mcg/actuation 1 puff, Daily    ipratropium (ATROVENT) 0.02 % inhalation solution 0.5 mg, BID    levalbuterol (XOPENEX) inhalation solution 1.25 mg, BID **AND** [DISCONTINUED] sodium chloride 0.9 % inhalation solution 3 mL, TID    levothyroxine tablet 25 mcg, Early Morning    [Transfer Hold] lidocaine (PF) (XYLOCAINE-MPF) 1 % injection 10 mL, Once    pantoprazole (PROTONIX) EC tablet 20 mg, Early Morning    [START ON 5/6/2025] predniSONE tablet 40 mg, Daily    tamsulosin (FLOMAX) capsule 0.4 mg, Daily With Dinner    Administrative Statements   Today, Patient Was Seen By: Mack Ponce MD  I have spent a total time of 36 minutes in caring for this patient on the day of the visit/encounter including Diagnostic results, Prognosis, Risks and benefits of tx options, Patient and family education, Impressions, Counseling / Coordination of care, Documenting in the medical record, Reviewing/placing orders in the medical record (including tests, medications, and/or procedures), Obtaining or reviewing history  , and Communicating with other healthcare professionals .    **Please Note: This note may have been constructed using a voice recognition system.**

## 2025-05-05 NOTE — PLAN OF CARE
Problem: PAIN - ADULT  Goal: Verbalizes/displays adequate comfort level or baseline comfort level  Description: Interventions:- Encourage patient to monitor pain and request assistance- Assess pain using appropriate pain scale- Administer analgesics based on type and severity of pain and evaluate response- Implement non-pharmacological measures as appropriate and evaluate response- Consider cultural and social influences on pain and pain management- Notify physician/advanced practitioner if interventions unsuccessful or patient reports new pain  Outcome: Progressing     Problem: INFECTION - ADULT  Goal: Absence or prevention of progression during hospitalization  Description: INTERVENTIONS:- Assess and monitor for signs and symptoms of infection- Monitor lab/diagnostic results- Monitor all insertion sites, i.e. indwelling lines, tubes, and drains- Monitor endotracheal if appropriate and nasal secretions for changes in amount and color- [place] appropriate cooling/warming therapies per order- Administer medications as ordered- Instruct and encourage patient and family to use good hand hygiene technique- Identify and instruct in appropriate isolation precautions for identified infection/condition  Outcome: Progressing     Problem: SAFETY ADULT  Goal: Patient will remain free of falls  Description: INTERVENTIONS:- Educate patient/family on patient safety including physical limitations- Instruct patient to call for assistance with activity - Consult OT/PT to assist with strengthening/mobility - Keep Call bell within reach- Keep bed low and locked with side rails adjusted as appropriate- Keep care items and personal belongings within reach- Initiate and maintain comfort rounds- Make Fall Risk Sign visible to staff- Offer Toileting every 2 Hours, in advance of need- Initiate/Maintain bed alarm- Obtain necessary fall risk management equipment- Apply yellow socks and bracelet for high fall risk patients- Consider moving  patient to room near nurses station  Outcome: Progressing     Problem: RESPIRATORY - ADULT  Goal: Achieves optimal ventilation and oxygenation  Description: INTERVENTIONS:- Assess for changes in respiratory status- Assess for changes in mentation and behavior- Position to facilitate oxygenation and minimize respiratory effort- Oxygen administered by appropriate delivery if ordered- Initiate smoking cessation education as indicated- Encourage broncho-pulmonary hygiene including cough, deep breathe, Incentive Spirometry- Assess the need for suctioning and aspirate as needed- Assess and instruct to report SOB or any respiratory difficulty- Respiratory Therapy support as indicated  Outcome: Progressing     Problem: Prexisting or High Potential for Compromised Skin Integrity  Goal: Skin integrity is maintained or improved  Description: INTERVENTIONS:- Identify patients at risk for skin breakdown- Assess and monitor skin integrity- Assess and monitor nutrition and hydration status- Monitor labs - Assess for incontinence - Turn and reposition patient- Assist with mobility/ambulation- Relieve pressure over bony prominences- Avoid friction and shearing- Provide appropriate hygiene as needed including keeping skin clean and dry- Evaluate need for skin moisturizer/barrier cream- Collaborate with interdisciplinary team - Patient/family teaching- Consider wound care consult   Outcome: Progressing

## 2025-05-05 NOTE — ASSESSMENT & PLAN NOTE
Rate currently uncontrolled due to pneumothorax.  Continue Cardizem.  HOLD eliquis for now: Chest tube in place and may need intervention.

## 2025-05-05 NOTE — RESPIRATORY THERAPY NOTE
05/04/25 2034   Respiratory Protocol   Protocol Initiated? No   Protocol Selection Respiratory   Language Barrier? No   Medical & Social History Reviewed? Yes   Diagnostic Studies Reviewed? Yes   Physical Assessment Performed? Yes   Respiratory Plan Mild Distress pathway   Respiratory Assessment   General Appearance Awake;Alert   Respiratory Pattern Normal   Chest Assessment Chest expansion symmetrical   R Breath Sounds Diminished   L Breath Sounds Clear   Cough Non-productive;Congested;Moist;Strong   Resp Comments loose NPC   O2 Device 2L o2 nc   Additional Assessments   SpO2 95 %

## 2025-05-05 NOTE — ASSESSMENT & PLAN NOTE
Likely secondary to ruptured bullae  Initial chest tube placed in ED still with large PTX  New chest tube placed by IR on 5/4/25  CXR with re-expansion of the right lung    No airleak from chest tubes  I did clamp the initial chest tube, placed the new apical chest tube to waterseal  Will follow-up with repeat chest x-ray this afternoon, if lung is still expanded we will plan to clamp chest tube

## 2025-05-05 NOTE — QUICK NOTE
Repeat chest x-ray in the evening noted with enlarging moderate pneumothorax within the lateral aspect of the right chest on a clamp trial.  Will unclamp and reconnect chest tubes to suction and will order repeat chest x-ray.  Clinically patient is acutely nontoxic appearing and hemodynamically stable. Upgraded to SD 2 under SLIM again for now. Pt is physically in ICU bed.

## 2025-05-05 NOTE — UTILIZATION REVIEW
Initial Clinical Review    Admission: Date/Time/Statement:   Admission Orders (From admission, onward)       Ordered        05/03/25 1056  INPATIENT ADMISSION  Once                          Orders Placed This Encounter   Procedures    INPATIENT ADMISSION     Standing Status:   Standing     Number of Occurrences:   1     Level of Care:   Med Surg [16]     Estimated length of stay:   More than 2 Midnights     Certification:   I certify that inpatient services are medically necessary for this patient for a duration of greater than two midnights. See H&P and MD Progress Notes for additional information about the patient's course of treatment.     ED Arrival Information       Expected   -    Arrival   5/3/2025 08:27    Acuity   Emergent              Means of arrival   Ambulance    Escorted by   [facility]    Service   Hospitalist    Admission type   Emergency              Arrival complaint   SOB             Chief Complaint   Patient presents with    Shortness of Breath     BIBA from home for complaints of SOB, hx of COPD, Emphysema. Initial assessment was 82% on RA. Given Solumedrol 125, Mag 2 gm, I Duoneb and 1.25 Droperidol. Pox went up to 96%.       5/3/25 Initial Presentation: 80 y.o. male presents to the ED via EMS due to shortness of breath and wheezing. Pt reports symptoms began earlier today. Was using albuterol without relief. EMS reports they gave him 2 albuterol treatments and Solu-Medrol and IV MG sulfate. Exam: + tachycardic HR in the 120-130's. Did not take his home cardizem due to SOB, GCS 15. Tachypnea with RR 30-40's. Spo2 low 90's on 6 L NC. + expiratory wheezing decreased breath sounds  on the right side, increased work of breathing.  PMH: chronic Atrial fibrillation, Asthma, GERD. Abnormal labs/imaging: Chest x-ray showing right-sided pneumothorax with tracheal shift away from the affected side suggesting tension pneumothorax. Nebulizer given with some improvement in the ED. Pigtail chest tube  placed the right sixth intercostal, post chest tube placement x-ray obtained which showed reinflation of right lung, resolution of initially large appearing pneumothorax.  Patient reports it being able to breathe easier following insertion of chest tube.  Output approximately 170 at time of admission of serosanguineous fluid. In the ED pt given Albuterol/atrovent/NS nebulizer, IV ceftriaxone, IV fentanyl and IVF;s of NS at 75 ml/hr. Plan: admit to inpatient for Right sided pneumothorax, chest tube to suction, consult pulmonology, home meds for afib, telemetry monitoring, prn IV meds for afib, IV steroids, scheduled nebulizers.       Anticipated Length of Stay/Certification Statement: Patient will be admitted on an inpatient basis with an anticipated length of stay of greater than 2 midnights secondary to see below.       Date: 5/4/25   Day 2: Pulmonology consult: On exam Pt has some shortness of breath but is saturating 92% on 4 L. Decreased breath sounds on right upper,middle lung. + crepitus present chest wall. He is able to converse freely. He is comfortable sitting in bed. Previous chest x-ray showing increase in size of the right-sided pneumothorax which had decreased after chest tube placement yesterday.His pneumothorax is likely secondary to rupture of emphysematous bullae.  This morning's chest x-ray showed recurrence of the pneumothorax most likely due to displacement of the chest tube.  He has subcutaneous emphysema.  He needs a bigger bore chest tube placement under imaging. Currently on IV Solu-Medrol and nebulized ipratropium and levalbuterol. Plan: Chest tube replacement by IR this afternoon. C/w IV steroids, scheduled Nebulizers, Supplement oxygen, NPO, hold Eliquis upgraded to Stepdown level 2.     IR consult: 81 yo male with persistent pneumothorax despite chest tube placement. Image guided chest tube placement desired. Patient on eliquis. Increased risk of bleeding. Replaced Chest tube today.      Critical Care/ICU addendum : post IR CT insertion for stability.    awake, alert, oriented and hard of hearing.  He is hemodynamically stable with variable heart rates in atrial fibrillation.  Remains tachycardic -139. His respiratory effort is calm and he is speaking in full sentences.  Repeat Chest xray post procedure with re-expansion of right lung. Maintain level 2 stepdown. C/w telemetry monitoring, chest xray 5/5 am, c/w IV steroids Q8, Scheduled Nebulizers, IVF's NS at 75 ml/hr.       ED Treatment-Medication Administration from 05/03/2025 0826 to 05/03/2025 1314         Date/Time Order Dose Route Action     05/03/2025 0840 albuterol (FOR EMS ONLY) (2.5 mg/3 mL) 0.083 % inhalation solution 5 mg 0 mg Does not apply Given to EMS     05/03/2025 0840 ipratropium-albuterol (FOR EMS ONLY) (DUO-NEB) 0.5-2.5 mg/3 mL inhalation solution 3 mL 0 mL Does not apply Given to EMS     05/03/2025 0840 droperidol (FOR EMS ONLY) (INAPSINE) 2.5 mg/mL injection 2.5 mg 0 mg Does not apply Given to EMS     05/03/2025 0840 methylPREDNISolone sodium succinate (FOR EMS ONLY) (Solu-MEDROL) 125 MG injection 125 mg 0 mg Does not apply Given to EMS     05/03/2025 0840 magnesium sulfate (FOR EMS ONLY) 2 g/50 mL IVPB (premix) 2 g 0 g Does not apply Given to EMS     05/03/2025 0851 albuterol inhalation solution 10 mg 10 mg Nebulization Given     05/03/2025 0851 ipratropium (ATROVENT) 0.02 % inhalation solution 1 mg 1 mg Nebulization Given     05/03/2025 0851 sodium chloride 0.9 % inhalation solution 12 mL 12 mL Nebulization Given     05/03/2025 0924 lidocaine-epinephrine (XYLOCAINE/EPINEPHRINE) 1 %-1:100,000 injection 20 mL 20 mL Infiltration Given     05/03/2025 0940 fentaNYL injection 25 mcg 25 mcg Intravenous Given     05/03/2025 1048 ceftriaxone (ROCEPHIN) 1 g/50 mL in dextrose IVPB 1,000 mg Intravenous New Bag     05/03/2025 1115 diltiazem (CARDIZEM CD) 24 hr capsule 120 mg 120 mg Oral Given     05/03/2025 1207 sodium chloride  0.9 % infusion 75 mL/hr Intravenous New Bag            Scheduled Medications:  diltiazem, 120 mg, Oral, Daily  fluticasone-vilanterol, 1 puff, Inhalation, Daily  ipratropium, 0.5 mg, Nebulization, BID  levalbuterol, 1.25 mg, Nebulization, BID  levothyroxine, 25 mcg, Oral, Early Morning  [Transfer Hold] lidocaine (PF), 10 mL, Infiltration, Once  methylPREDNISolone sodium succinate, 40 mg, Intravenous, Q12H HARRY  pantoprazole, 20 mg, Oral, Early Morning  tamsulosin, 0.4 mg, Oral, Daily With Dinner      Continuous IV Infusions: sodium chloride 0.9 % infusion  Rate: 75 mL/hr Dose: 75 mL/hr  Freq: Continuous Route: IV  Last Dose: Stopped (05/05/25 0935)  Start: 05/03/25 1200 End: 05/05/25 0830     PRN Meds:  [Transfer Hold] albuterol, 2 puff, Inhalation, Q4H PRN      ED Triage Vitals   Temperature Pulse Respirations Blood Pressure SpO2 Pain Score   05/03/25 0833 05/03/25 0831 05/03/25 0831 05/03/25 0831 05/03/25 0831 05/03/25 1022   97.6 °F (36.4 °C) (!) 130 (!) 26 158/91 94 % No Pain     Weight (last 2 days)       None            Vital Signs (last 3 days)       Date/Time Temp Pulse Resp BP MAP (mmHg) SpO2 Calculated FIO2 (%) - Nasal Cannula Nasal Cannula O2 Flow Rate (L/min) O2 Device Patient Position - Orthostatic VS Pain    05/05/25 0807 -- -- -- -- -- 94 % 28 2 L/min Nasal cannula -- --    05/05/25 0710 97.8 °F (36.6 °C) 106 19 137/87 106 90 % -- -- -- Lying --    05/05/25 0400 -- -- -- -- -- -- -- -- -- -- No Pain    05/05/25 0200 97.6 °F (36.4 °C) 91 34 128/81 101 94 % 32 3 L/min Nasal cannula Lying --    05/05/25 0000 -- -- -- -- -- -- -- -- -- -- No Pain    05/04/25 2300 97.9 °F (36.6 °C) 87 18 140/83 103 93 % 32 3 L/min Nasal cannula -- --    05/04/25 2119 -- 90 17 -- -- 90 % 32 3 L/min Nasal cannula -- --    05/04/25 2034 -- -- -- -- -- 95 % 28 2 L/min Nasal cannula -- --    05/04/25 2015 -- 98 41 135/76 99 94 % 28 2 L/min Nasal cannula Lying No Pain    05/04/25 1856 97.8 °F (36.6 °C) 98 20 131/76 97 93 % -- --  Nasal cannula Lying No Pain    05/04/25 1530 97.7 °F (36.5 °C) 101 20 125/77 97 92 % -- -- Nasal cannula Lying No Pain    05/04/25 1321 -- -- -- -- -- 93 % -- -- -- -- --    05/04/25 1300 -- -- -- -- -- -- -- -- -- -- No Pain    05/04/25 1255 97.7 °F (36.5 °C) 114 21 144/81 106 91 % -- -- -- Lying --    05/04/25 12:24:59 -- 121 16 138/66 -- 97 % -- -- -- -- --    05/04/25 12:22:46 -- 110 18 122/79 -- 98 % -- -- -- -- --    05/04/25 12:17:07 -- 124 16 134/61 -- 93 % -- -- -- -- --    05/04/25 12:11:11 -- 139 18 139/81 -- 91 % -- -- -- -- --    05/04/25 12:07:14 -- 130 16 118/65 -- 93 % -- -- -- -- --    05/04/25 0830 -- -- -- -- -- 93 % 36 4 L/min Nasal cannula -- No Pain    05/04/25 0727 -- -- -- -- -- 92 % 36 4 L/min Nasal cannula -- --    05/04/25 07:12:29 97.5 °F (36.4 °C) 95 -- 133/82 99 91 % -- -- -- -- --    05/03/25 22:21:19 98 °F (36.7 °C) 99 -- 132/82 99 90 % -- -- -- -- --    05/03/25 2000 -- -- -- -- -- -- 36 4 L/min Nasal cannula -- No Pain    05/03/25 1957 -- 100 21 -- -- 93 % -- -- -- -- --    05/03/25 18:46:16 -- 93 -- 126/79 95 93 % -- -- -- -- --    05/03/25 15:08:10 98.4 °F (36.9 °C) 116 -- 125/79 94 92 % -- -- -- -- --    05/03/25 1412 -- -- -- -- -- 93 % 36 4 L/min Nasal cannula -- No Pain    05/03/25 1348 -- -- -- -- -- -- -- -- -- -- No Pain    05/03/25 1337 -- -- -- -- -- -- -- -- -- -- No Pain    05/03/25 1324 -- -- -- -- -- 91 % 36 4 L/min Nasal cannula -- --    05/03/25 13:19:08 97.4 °F (36.3 °C) 110 -- 133/86 102 92 % -- -- -- -- --    05/03/25 13:18:12 97.4 °F (36.3 °C) -- 18 133/86 102 -- -- -- -- -- --    05/03/25 1200 -- 124 22 144/76 102 92 % 36 4 L/min Nasal cannula -- --    05/03/25 1100 -- 119 20 126/71 93 94 % 36 4 L/min Nasal cannula Sitting --    05/03/25 1045 -- 129 20 128/75 98 94 % 36 4 L/min Nasal cannula Sitting --    05/03/25 1030 -- 153 23 121/71 92 93 % -- -- None (Room air) Lying --    05/03/25 1022 -- -- -- -- -- -- -- -- -- -- No Pain    05/03/25 1015 -- 134 25 121/71  90 93 % 36 4 L/min Nasal cannula Sitting --    05/03/25 1000 -- 159 30 158/99 125 93 % 36 4 L/min Nasal cannula Sitting --    05/03/25 0853 -- -- -- -- -- -- -- -- Nasal cannula -- --    05/03/25 0851 -- -- -- -- -- 91 % 36 4 L/min Nasal cannula -- --    05/03/25 0833 97.6 °F (36.4 °C) -- -- -- -- -- -- -- -- -- --    05/03/25 0831 -- 130 26 158/91 119 94 % 40 5 L/min Nasal cannula Sitting --              Pertinent Labs/Diagnostic Test Results:   Radiology:  XR chest portable ICU   Final Interpretation by Kendra White MD (05/04 1318)      Insertion of right pigtail catheter in addition to small caliber right chest tube with decrease in right pneumothorax, now small about the right apex.            Workstation performed: [id number]         IR chest tube placement   Final Interpretation by Caio Tenorio DO (05/04 1225)   Impression: Successful fluoroscopic guided 14 Yi right chest tube placement.         Workstation performed: [id number]         XR chest portable   Final Interpretation by Kendra White MD (05/04 1111)      Increase in size of right pneumothorax, now moderate, with increasing subcutaneous emphysema in the right chest wall.      The study was marked in EPIC for immediate notification.            Workstation performed: [id number]         XR chest 1 view portable   Final Interpretation by Kendra White MD (05/04 0715)      Right pleural drainage catheter insertion with persistent trace right pneumothorax.            Workstation performed: [id number]         XR chest portable   Final Interpretation by Kendra White MD (05/04 0712)      Previous right pleural drainage catheter not visible with trace superior lateral right apical pneumothorax.            Workstation performed: [id number]         XR chest 1 view portable   Final Interpretation by Kendra White MD (05/03 1259)      Right pleural drainage catheter insertion with resolution of pneumothorax.             Workstation performed: [id number]         XR chest portable   ED Interpretation by Simin Rod PA-C (05/03 1010)   Large right sided pneumothorax      Final Interpretation by Kednra White MD (05/03 1210)      Large right pneumothorax with mediastinal shift to the left suggesting tension. A chest tube was subsequently inserted.            Workstation performed: [id number]         CXR pa & lateral in AM    (Results Pending)     Cardiology:  ECG 12 lead   Final Result by Annia Acosta MD (05/03 1819)   Atrial fibrillation with rapid ventricular response   Left axis deviation   Incomplete right bundle branch block   Nonspecific ST abnormality   Abnormal ECG      Confirmed by Annia Acosta ([id number]) on 5/3/2025 6:19:57 PM            Results from last 7 days   Lab Units 05/05/25 0521 05/04/25 0542 05/03/25  0838   WBC Thousand/uL 9.54 10.36* 9.03   HEMOGLOBIN g/dL 11.2* 12.1 13.6   HEMATOCRIT % 33.1* 36.8 40.3   PLATELETS Thousands/uL 191 199 203   TOTAL NEUT ABS Thousands/µL  --  9.36* 6.15         Results from last 7 days   Lab Units 05/05/25 0521 05/04/25  0542 05/03/25  0838   SODIUM mmol/L 138 136 138   POTASSIUM mmol/L 4.5 4.2 4.1   CHLORIDE mmol/L 109* 105 102   CO2 mmol/L 24 22 25   ANION GAP mmol/L 5 9 11   BUN mg/dL 27* 26* 24   CREATININE mg/dL 0.85 0.97 1.01   EGFR ml/min/1.73sq m 82 73 70   CALCIUM mg/dL 8.4 8.9 9.4     Results from last 7 days   Lab Units 05/05/25 0521 05/04/25  0542 05/03/25  0838   AST U/L 58* 32 21   ALT U/L 53* 26 20   ALK PHOS U/L 76 86 92   TOTAL PROTEIN g/dL 6.4 7.6 8.3   ALBUMIN g/dL 3.4* 4.0 4.3   TOTAL BILIRUBIN mg/dL 1.03* 1.25* 1.66*         Results from last 7 days   Lab Units 05/05/25 0521 05/04/25  0542 05/03/25  0838   GLUCOSE RANDOM mg/dL 142* 149* 129           Results from last 7 days   Lab Units 05/03/25  0838   PROCALCITONIN ng/ml 0.08     Results from last 7 days   Lab Units 05/04/25  1330 05/03/25  1117 05/03/25  0838   LACTIC ACID mmol/L 1.2 4.4*  2.3*                 Results from last 7 days   Lab Units 05/03/25  1613   CLARITY UA  Clear   COLOR UA  Yellow   SPEC GRAV UA  1.025   PH UA  5.5   GLUCOSE UA mg/dl Negative   KETONES UA mg/dl 10 (1+)*   BLOOD UA  Negative   PROTEIN UA mg/dl 70 (1+)*   NITRITE UA  Negative   BILIRUBIN UA  Negative   UROBILINOGEN UA (BE) mg/dl <2.0   LEUKOCYTES UA  Negative   WBC UA /hpf 1-2   RBC UA /hpf 1-2   BACTERIA UA /hpf None Seen   EPITHELIAL CELLS WET PREP /hpf None Seen   MUCUS THREADS  Occasional*                 Results from last 7 days   Lab Units 05/03/25  0847 05/03/25  0838   BLOOD CULTURE  No Growth at 24 hrs.  --    GRAM STAIN RESULT   --  Gram positive cocci in clusters*                   Past Medical History:   Diagnosis Date    A-fib (HCC)     BPH with obstruction/lower urinary tract symptoms     Colon polyp     COPD (chronic obstructive pulmonary disease) (HCC)     Frequency of urination     GERD (gastroesophageal reflux disease)     History of cardioversion 12/27/2011    Inital Rhythm AFIB, Final Rhythm Sinus, Max Joules 75    History of echocardiogram 06/01/2015    Normal LV systolic function, mild concentric LV hypertrophy, mild mitral and tricuspid regurg, right atrial enlargement. EF 55%    Irregular heart beat     AF    Other male erectile dysfunction     Personal history of irradiation     Personal history of malignant neoplasm of prostate     Prostate cancer (HCC)      Present on Admission:   Centrilobular emphysema (HCC)   GERD (gastroesophageal reflux disease)   Chronic atrial fibrillation (HCC)      Admitting Diagnosis: SOB (shortness of breath) [R06.02]  COPD exacerbation (HCC) [J44.1]  Pneumothorax [J93.9]  Pneumothorax, unspecified type [J93.9]  Age/Sex: 80 y.o. male    Network Utilization Review Department  ATTENTION: Please call with any questions or concerns to [phone] and carefully listen to the prompts so that you are directed to the right person. All voicemails are confidential.   For  Discharge needs, contact Care Management DC Support Team at [phone] opt. [phone]  Send all requests for admission clinical reviews, approved or denied determinations and any other requests to dedicated fax number below belonging to the campus where the patient is receiving treatment. List of dedicated fax numbers for the Facilities:  FACILITY NAME UR FAX NUMBER   ADMISSION DENIALS (Administrative/Medical Necessity) [phone]   DISCHARGE SUPPORT TEAM (NETWORK) [phone]   PARENT CHILD HEALTH (Maternity/NICU/Pediatrics) [phone]   [facility] [phone]   [facility] [phone]   [facility] [phone]   [facility] [phone]   [facility] [phone]   [facility] [phone]   [facility] [phone]   [facility] [phone]   [facility] [phone]   [facility] [phone]   [facility] [phone]   [facility] [phone]

## 2025-05-05 NOTE — ASSESSMENT & PLAN NOTE
80-year-old male patient with past medical history of COPD, chronic A-fib currently on Eliquis, hospitalized due to spontaneous pneumothorax.  Initial chest tube was dislodged and second chest tube was placed by ER however further chest x-ray noted with expanding pneumothorax requiring emergent IR consultation and had large tube placed on 05/4/2025.  Currently patient does have 2 chest tubes in place.    Chest x-ray this morning noted resolution of pneumothorax.  Chest tube without airleak on waterseal.    Currently with saturation 94 to 95% on room air.  Not in distress.  Downgrade from SD 2 to MS  Discussed with pulmonology team.  Plan for clamp trial and have repeat chest x-ray later on today evening.  Plan is to again repeat chest x-ray in the morning and likely plan to remove chest tube tomorrow morning.  Pulmonology following and recommendation appreciated.  Patient is agreeable with above plan.

## 2025-05-05 NOTE — ASSESSMENT & PLAN NOTE
Currently not in acute exacerbation.  Discontinue IV Solu-Medrol and transition to p.o. prednisone per pulmonology recommendation.  Continue home regimen of Wixela, albuterol as needed.

## 2025-05-05 NOTE — PROGRESS NOTES
Progress Note - Pulmonology   Name: Rosalio Anguiano 80 y.o. male I MRN: [id number]  Unit/Bed#: ICU 10 I Date of Admission: 5/3/2025   Date of Service: 5/5/2025 I Hospital Day: 2    Assessment & Plan  Pneumothorax  Likely secondary to ruptured bullae  Initial chest tube placed in ED still with large PTX  New chest tube placed by IR on 5/4/25  CXR with re-expansion of the right lung    No airleak from chest tubes  I did clamp the initial chest tube, placed the new apical chest tube to waterseal  Will follow-up with repeat chest x-ray this afternoon, if lung is still expanded we will plan to clamp chest tube  Centrilobular emphysema (HCC)  Start Breo  Currently on solu-medrol, will discontinue Solu-Medrol and switch to prednisone, can complete a 5-day course    Home regimen is Wixela, albuterol as needed    24 Hour Events : Second chest tube placed by IR  Subjective : Patient sitting up in the chair.  Feeling better with his breathing.    Objective :  Temp:  [97.6 °F (36.4 °C)-97.9 °F (36.6 °C)] 97.6 °F (36.4 °C)  HR:  [] 91  BP: (118-144)/(61-83) 128/81  Resp:  [16-41] 34  SpO2:  [90 %-98 %] 94 %  O2 Device: Nasal cannula  Nasal Cannula O2 Flow Rate (L/min):  [2 L/min-4 L/min] 3 L/min    Physical Exam  Vitals reviewed.   Constitutional:       General: He is not in acute distress.     Appearance: Normal appearance. He is well-developed. He is not ill-appearing.   HENT:      Head: Normocephalic and atraumatic.      Mouth/Throat:      Pharynx: Oropharynx is clear.   Eyes:      Pupils: Pupils are equal, round, and reactive to light.   Cardiovascular:      Rate and Rhythm: Normal rate and regular rhythm.   Pulmonary:      Effort: Pulmonary effort is normal. No respiratory distress.      Breath sounds: Decreased breath sounds present. No wheezing, rhonchi or rales.      Comments: Chest tubes in place, no air leak  Abdominal:      General: Abdomen is flat. There is no distension.   Musculoskeletal:         General:  Normal range of motion.      Cervical back: Normal range of motion.      Right lower leg: No edema.      Left lower leg: No edema.   Skin:     General: Skin is warm and dry.      Findings: No rash.   Neurological:      Mental Status: He is alert and oriented to person, place, and time.   Psychiatric:         Mood and Affect: Mood normal.         Behavior: Behavior normal.           Lab Results: I have reviewed the following results:   .     05/04/25  1330 05/05/25  0521   WBC  --  9.54   HGB  --  11.2*   HCT  --  33.1*   PLT  --  191   SODIUM  --  138   K  --  4.5   CL  --  109*   CO2  --  24   BUN  --  27*   CREATININE  --  0.85   GLUC  --  142*   AST  --  58*   ALT  --  53*   ALB  --  3.4*   TBILI  --  1.03*   ALKPHOS  --  76   LACTICACID 1.2  --      ABG: No new results in last 24 hours.    Imaging Results Review: I reviewed radiology reports from this admission including: chest xray.  Other Study Results Review: No additional pertinent studies reviewed.  PFT Results Reviewed: none available

## 2025-05-06 ENCOUNTER — APPOINTMENT (INPATIENT)
Dept: RADIOLOGY | Facility: HOSPITAL | Age: 80
DRG: 199 | End: 2025-05-06
Payer: COMMERCIAL

## 2025-05-06 PROBLEM — R78.81 POSITIVE BLOOD CULTURE: Status: ACTIVE | Noted: 2025-05-06

## 2025-05-06 LAB
ALBUMIN SERPL BCG-MCNC: 3.4 G/DL (ref 3.5–5)
ALP SERPL-CCNC: 76 U/L (ref 34–104)
ALT SERPL W P-5'-P-CCNC: 91 U/L (ref 7–52)
ANION GAP SERPL CALCULATED.3IONS-SCNC: 4 MMOL/L (ref 4–13)
AST SERPL W P-5'-P-CCNC: 61 U/L (ref 13–39)
BACTERIA BLD CULT: ABNORMAL
BILIRUB SERPL-MCNC: 1.08 MG/DL (ref 0.2–1)
BUN SERPL-MCNC: 31 MG/DL (ref 5–25)
CALCIUM ALBUM COR SERPL-MCNC: 9.1 MG/DL (ref 8.3–10.1)
CALCIUM SERPL-MCNC: 8.6 MG/DL (ref 8.4–10.2)
CHLORIDE SERPL-SCNC: 109 MMOL/L (ref 96–108)
CO2 SERPL-SCNC: 25 MMOL/L (ref 21–32)
CREAT SERPL-MCNC: 0.88 MG/DL (ref 0.6–1.3)
ERYTHROCYTE [DISTWIDTH] IN BLOOD BY AUTOMATED COUNT: 25.5 % (ref 11.6–15.1)
GFR SERPL CREATININE-BSD FRML MDRD: 81 ML/MIN/1.73SQ M
GLUCOSE SERPL-MCNC: 117 MG/DL (ref 65–140)
GRAM STN SPEC: ABNORMAL
HCT VFR BLD AUTO: 34.8 % (ref 36.5–49.3)
HGB BLD-MCNC: 11.6 G/DL (ref 12–17)
MCH RBC QN AUTO: 33.3 PG (ref 26.8–34.3)
MCHC RBC AUTO-ENTMCNC: 33.3 G/DL (ref 31.4–37.4)
MCV RBC AUTO: 100 FL (ref 82–98)
PLATELET # BLD AUTO: 177 THOUSANDS/UL (ref 149–390)
PMV BLD AUTO: 10 FL (ref 8.9–12.7)
POTASSIUM SERPL-SCNC: 4.5 MMOL/L (ref 3.5–5.3)
PROT SERPL-MCNC: 6.2 G/DL (ref 6.4–8.4)
RBC # BLD AUTO: 3.48 MILLION/UL (ref 3.88–5.62)
S AUREUS+CONS DNA BLD POS NAA+NON-PROBE: DETECTED
SODIUM SERPL-SCNC: 138 MMOL/L (ref 135–147)
WBC # BLD AUTO: 8.42 THOUSAND/UL (ref 4.31–10.16)

## 2025-05-06 PROCEDURE — 99232 SBSQ HOSP IP/OBS MODERATE 35: CPT

## 2025-05-06 PROCEDURE — 71045 X-RAY EXAM CHEST 1 VIEW: CPT

## 2025-05-06 PROCEDURE — 80053 COMPREHEN METABOLIC PANEL: CPT

## 2025-05-06 PROCEDURE — 85027 COMPLETE CBC AUTOMATED: CPT

## 2025-05-06 PROCEDURE — 94760 N-INVAS EAR/PLS OXIMETRY 1: CPT

## 2025-05-06 PROCEDURE — 94640 AIRWAY INHALATION TREATMENT: CPT

## 2025-05-06 PROCEDURE — 99232 SBSQ HOSP IP/OBS MODERATE 35: CPT | Performed by: INTERNAL MEDICINE

## 2025-05-06 RX ORDER — ENOXAPARIN SODIUM 100 MG/ML
40 INJECTION SUBCUTANEOUS
Status: DISCONTINUED | OUTPATIENT
Start: 2025-05-06 | End: 2025-05-07

## 2025-05-06 RX ADMIN — IPRATROPIUM BROMIDE 0.5 MG: 0.5 SOLUTION RESPIRATORY (INHALATION) at 19:29

## 2025-05-06 RX ADMIN — IPRATROPIUM BROMIDE 0.5 MG: 0.5 SOLUTION RESPIRATORY (INHALATION) at 07:50

## 2025-05-06 RX ADMIN — TAMSULOSIN HYDROCHLORIDE 0.4 MG: 0.4 CAPSULE ORAL at 17:27

## 2025-05-06 RX ADMIN — PREDNISONE 40 MG: 20 TABLET ORAL at 08:35

## 2025-05-06 RX ADMIN — ENOXAPARIN SODIUM 40 MG: 40 INJECTION SUBCUTANEOUS at 17:27

## 2025-05-06 RX ADMIN — PANTOPRAZOLE SODIUM 20 MG: 20 TABLET, DELAYED RELEASE ORAL at 06:03

## 2025-05-06 RX ADMIN — LEVOTHYROXINE SODIUM 25 MCG: 0.03 TABLET ORAL at 06:03

## 2025-05-06 RX ADMIN — DILTIAZEM HYDROCHLORIDE 120 MG: 120 CAPSULE, COATED, EXTENDED RELEASE ORAL at 08:35

## 2025-05-06 RX ADMIN — FLUTICASONE FUROATE AND VILANTEROL TRIFENATATE 1 PUFF: 200; 25 POWDER RESPIRATORY (INHALATION) at 08:36

## 2025-05-06 RX ADMIN — LEVALBUTEROL HYDROCHLORIDE 1.25 MG: 1.25 SOLUTION RESPIRATORY (INHALATION) at 07:50

## 2025-05-06 RX ADMIN — LEVALBUTEROL HYDROCHLORIDE 1.25 MG: 1.25 SOLUTION RESPIRATORY (INHALATION) at 19:29

## 2025-05-06 NOTE — QUICK NOTE
"Repeat CXR at 2200 revealing \"reduced size of right pneumothorax and stable subcutaneous emphysema\".  On evaluation patient sitting upright in chair in no acute distress, denies CP or SOB, VSS.  Discussed with on-call pulmonology, Will maintain suction overnight with repeat CXR in a.m.  "

## 2025-05-06 NOTE — UTILIZATION REVIEW
"Continued Stay Review    Date: 5/5/25                           Current Patient Class: Inpatient  Current Level of Care: stepdown level 2     HPI:80 y.o. male initially admitted on 5/3/25      Current Diagnosis:Pneumothorax ,chest tube x 2 right side.     Assessment/Plan: Hospitalist: Currently patient does have 2 chest tubes in place. Chest x-ray this morning noted resolution of pneumothorax. Chest tube without airleak on waterseal.   Currently with saturation 94 to 95% on room air.  Not in distress. Does report overall feeling much better. Plan: Plan for clamp trial and have repeat chest x-ray later on today evening. Plan is to again repeat chest x-ray in the morning and likely plan to remove chest tube tomorrow morning. Downgrade to Med surg. C/w nebulizers, pain control prn.     Hospitalist addendum : Repeat chest x-ray in the evening noted with enlarging moderate pneumothorax within the lateral aspect of the right chest on a clamp trial.  Will unclamp and reconnect chest tubes to suction and will order repeat chest x-ray.  Clinically patient is acutely nontoxic appearing and hemodynamically stable. Upgraded to SD 2 again. Repeat chest xray tonight.     Chest xray at 2200: \"reduced size of right pneumothorax and stable subcutaneous emphysema\".  On evaluation patient sitting upright in chair in no acute distress, Will maintain suction overnight with repeat CXR in a.m         Medications:   Scheduled Medications:  diltiazem, 120 mg, Oral, Daily  enoxaparin, 40 mg, Subcutaneous, Q24H HARRY  fluticasone-vilanterol, 1 puff, Inhalation, Daily  ipratropium, 0.5 mg, Nebulization, BID  levalbuterol, 1.25 mg, Nebulization, BID  levothyroxine, 25 mcg, Oral, Early Morning  pantoprazole, 20 mg, Oral, Early Morning  predniSONE, 40 mg, Oral, Daily  tamsulosin, 0.4 mg, Oral, Daily With Dinner      Continuous IV Infusions:sodium chloride 0.9 % infusion  Rate: 75 mL/hr Dose: 75 mL/hr  Freq: Continuous Route: IV  Last Dose: Stopped " (05/05/25 0935)  Start: 05/03/25 1200 End: 05/05/25 0830     PRN Meds:  albuterol, 2 puff, Inhalation, Q4H PRN      Discharge Plan: TBD     Vital Signs (last 3 days)       Date/Time Temp Pulse Resp BP MAP (mmHg) SpO2 Calculated FIO2 (%) - Nasal Cannula Nasal Cannula O2 Flow Rate (L/min) O2 Device Patient Position - Orthostatic VS Pain    05/06/25 1100 97.5 °F (36.4 °C) 89 21 155/75 103 91 % -- -- None (Room air) Sitting No Pain    05/06/25 0900 -- 97 -- -- -- 91 % -- -- -- -- No Pain    05/06/25 0745 -- -- -- -- -- 91 % -- -- -- -- --    05/06/25 0700 97.6 °F (36.4 °C) 77 18 145/90 111 90 % -- -- None (Room air) Sitting No Pain    05/06/25 0011 98 °F (36.7 °C) -- 18 136/93 109 -- -- -- None (Room air) Lying --    05/05/25 2000 -- -- -- -- -- -- -- -- -- -- No Pain    05/05/25 1932 97.7 °F (36.5 °C) 89 18 143/69 99 95 % -- -- None (Room air) Sitting No Pain    05/05/25 1757 -- -- -- -- -- 93 % -- -- None (Room air) -- --    05/05/25 1100 97.6 °F (36.4 °C) 93 18 129/77 98 94 % -- -- None (Room air) Lying No Pain    05/05/25 0807 -- -- -- -- -- 94 % 28 2 L/min Nasal cannula -- --    05/05/25 0800 -- -- -- -- -- -- 28 2 L/min Nasal cannula -- No Pain    05/05/25 0710 97.8 °F (36.6 °C) 106 19 137/87 106 90 % -- -- -- Lying --    05/05/25 0400 -- -- -- -- -- -- -- -- -- -- No Pain    05/05/25 0200 97.6 °F (36.4 °C) 91 34 128/81 101 94 % 32 3 L/min Nasal cannula Lying --    05/05/25 0000 -- -- -- -- -- -- -- -- -- -- No Pain    05/04/25 2300 97.9 °F (36.6 °C) 87 18 140/83 103 93 % 32 3 L/min Nasal cannula -- --    05/04/25 2119 -- 90 17 -- -- 90 % 32 3 L/min Nasal cannula -- --    05/04/25 2034 -- -- -- -- -- 95 % 28 2 L/min Nasal cannula -- --    05/04/25 2015 -- 98 41 135/76 99 94 % 28 2 L/min Nasal cannula Lying No Pain    05/04/25 1856 97.8 °F (36.6 °C) 98 20 131/76 97 93 % -- -- Nasal cannula Lying No Pain    05/04/25 1530 97.7 °F (36.5 °C) 101 20 125/77 97 92 % -- -- Nasal cannula Lying No Pain    05/04/25 1321 -- --  -- -- -- 93 % -- -- -- -- --    05/04/25 1300 -- -- -- -- -- -- -- -- -- -- No Pain    05/04/25 1255 97.7 °F (36.5 °C) 114 21 144/81 106 91 % -- -- -- Lying --    05/04/25 12:24:59 -- 121 16 138/66 -- 97 % -- -- -- -- --    05/04/25 12:22:46 -- 110 18 122/79 -- 98 % -- -- -- -- --    05/04/25 12:17:07 -- 124 16 134/61 -- 93 % -- -- -- -- --    05/04/25 12:11:11 -- 139 18 139/81 -- 91 % -- -- -- -- --    05/04/25 12:07:14 -- 130 16 118/65 -- 93 % -- -- -- -- --    05/04/25 0830 -- -- -- -- -- 93 % 36 4 L/min Nasal cannula -- No Pain    05/04/25 0727 -- -- -- -- -- 92 % 36 4 L/min Nasal cannula -- --    05/04/25 07:12:29 97.5 °F (36.4 °C) 95 -- 133/82 99 91 % -- -- -- -- --    05/03/25 22:21:19 98 °F (36.7 °C) 99 -- 132/82 99 90 % -- -- -- -- --    05/03/25 2000 -- -- -- -- -- -- 36 4 L/min Nasal cannula -- No Pain    05/03/25 1957 -- 100 21 -- -- 93 % -- -- -- -- --    05/03/25 18:46:16 -- 93 -- 126/79 95 93 % -- -- -- -- --    05/03/25 15:08:10 98.4 °F (36.9 °C) 116 -- 125/79 94 92 % -- -- -- -- --    05/03/25 1412 -- -- -- -- -- 93 % 36 4 L/min Nasal cannula -- No Pain    05/03/25 1348 -- -- -- -- -- -- -- -- -- -- No Pain    05/03/25 1337 -- -- -- -- -- -- -- -- -- -- No Pain    05/03/25 1324 -- -- -- -- -- 91 % 36 4 L/min Nasal cannula -- --    05/03/25 13:19:08 97.4 °F (36.3 °C) 110 -- 133/86 102 92 % -- -- -- -- --    05/03/25 13:18:12 97.4 °F (36.3 °C) -- 18 133/86 102 -- -- -- -- -- --    05/03/25 1200 -- 124 22 144/76 102 92 % 36 4 L/min Nasal cannula -- --    05/03/25 1100 -- 119 20 126/71 93 94 % 36 4 L/min Nasal cannula Sitting --    05/03/25 1045 -- 129 20 128/75 98 94 % 36 4 L/min Nasal cannula Sitting --    05/03/25 1030 -- 153 23 121/71 92 93 % -- -- None (Room air) Lying --    05/03/25 1022 -- -- -- -- -- -- -- -- -- -- No Pain    05/03/25 1015 -- 134 25 121/71 90 93 % 36 4 L/min Nasal cannula Sitting --    05/03/25 1000 -- 159 30 158/99 125 93 % 36 4 L/min Nasal cannula Sitting --    05/03/25 0853  "-- -- -- -- -- -- -- -- Nasal cannula -- --    05/03/25 0851 -- -- -- -- -- 91 % 36 4 L/min Nasal cannula -- --    05/03/25 0833 97.6 °F (36.4 °C) -- -- -- -- -- -- -- -- -- --    05/03/25 0831 -- 130 26 158/91 119 94 % 40 5 L/min Nasal cannula Sitting --          Weight (last 2 days)       Date/Time Weight    05/05/25 0906 90.9 (200.4)            Pertinent Labs/Diagnostic Results:   Radiology:  XR chest portable   Final Interpretation by Camille Calvo MD (05/06 1359)   Stable small right pneumothorax compared to earlier the same date.            Workstation performed: [id number]         XR chest portable   Final Interpretation by Perla Phelan MD (05/06 0830)   Right-sided small apical pneumothorax, unchanged      Stable soft tissue emphysema   No worsening      Workstation performed: [id number]         XR chest portable   Final Interpretation by Jocy Leary MD (05/05 2033)      Decreased size of right pneumothorax.      Improved consolidation in the medial right base.      Stable subcutaneous emphysema.            Workstation performed: [id number]         XR chest portable   Final Interpretation by Franklyn Moody DO (05/05 1718)      There is an enlarging, now moderate pneumothorax within the lateral aspect of the right chest.      Increasing subcutaneous emphysema especially in the axillary region.      This examination was marked \"immediate notification\" in Epic in order to begin the standard process by which the radiology reading room liaison alerts the referring practitioner.            Workstation performed: [id number]         XR chest portable ICU   Final Interpretation by Isaiah De La Rosa MD (05/05 1339)      No convincing residual pneumothorax. Improving subcutaneous emphysema.            Workstation performed: [id number]         CXR pa & lateral in AM   Final Interpretation by Kendra White MD (05/05 1011)      Right pigtail catheter and small caliber right chest tube with " trace right pneumothorax and moderate subcutaneous emphysema.            Workstation performed: [id number]         XR chest portable ICU   Final Interpretation by Kendra White MD (05/04 1318)      Insertion of right pigtail catheter in addition to small caliber right chest tube with decrease in right pneumothorax, now small about the right apex.            Workstation performed: [id number]         IR chest tube placement   Final Interpretation by Caio Tenorio DO (05/04 1225)   Impression: Successful fluoroscopic guided 14 [place] right chest tube placement.         Workstation performed: [id number]         XR chest portable   Final Interpretation by Kendra White MD (05/04 1111)      Increase in size of right pneumothorax, now moderate, with increasing subcutaneous emphysema in the right chest wall.      The study was marked in EPIC for immediate notification.            Workstation performed: [id number]         XR chest 1 view portable   Final Interpretation by Kendra White MD (05/04 0715)      Right pleural drainage catheter insertion with persistent trace right pneumothorax.            Workstation performed: [id number]         XR chest portable   Final Interpretation by Kendra White MD (05/04 0712)      Previous right pleural drainage catheter not visible with trace superior lateral right apical pneumothorax.            Workstation performed: [id number]         XR chest 1 view portable   Final Interpretation by Kendra White MD (05/03 1259)      Right pleural drainage catheter insertion with resolution of pneumothorax.            Workstation performed: [id number]         XR chest portable   ED Interpretation by Simin Rod PA-C (05/03 1010)   Large right sided pneumothorax      Final Interpretation by Kendra White MD (05/03 1210)      Large right pneumothorax with mediastinal shift to the left suggesting tension. A chest tube was subsequently inserted.             Workstation performed: [id number]           Cardiology:  ECG 12 lead   Final Result by Annia Acosta MD (05/03 1819)   Atrial fibrillation with rapid ventricular response   Left axis deviation   Incomplete right bundle branch block   Nonspecific ST abnormality   Abnormal ECG      Confirmed by Annia Acosta ([id number]) on 5/3/2025 6:19:57 PM            Results from last 7 days   Lab Units 05/06/25  0541 05/05/25  0521 05/04/25  0542 05/03/25  0838   WBC Thousand/uL 8.42 9.54 10.36* 9.03   HEMOGLOBIN g/dL 11.6* 11.2* 12.1 13.6   HEMATOCRIT % 34.8* 33.1* 36.8 40.3   PLATELETS Thousands/uL 177 191 199 203   TOTAL NEUT ABS Thousands/µL  --   --  9.36* 6.15         Results from last 7 days   Lab Units 05/06/25  0541 05/05/25  0521 05/04/25  0542 05/03/25  0838   SODIUM mmol/L 138 138 136 138   POTASSIUM mmol/L 4.5 4.5 4.2 4.1   CHLORIDE mmol/L 109* 109* 105 102   CO2 mmol/L 25 24 22 25   ANION GAP mmol/L 4 5 9 11   BUN mg/dL 31* 27* 26* 24   CREATININE mg/dL 0.88 0.85 0.97 1.01   EGFR ml/min/1.73sq m 81 82 73 70   CALCIUM mg/dL 8.6 8.4 8.9 9.4     Results from last 7 days   Lab Units 05/06/25 0541 05/05/25  0521 05/04/25  0542 05/03/25  0838   AST U/L 61* 58* 32 21   ALT U/L 91* 53* 26 20   ALK PHOS U/L 76 76 86 92   TOTAL PROTEIN g/dL 6.2* 6.4 7.6 8.3   ALBUMIN g/dL 3.4* 3.4* 4.0 4.3   TOTAL BILIRUBIN mg/dL 1.08* 1.03* 1.25* 1.66*         Results from last 7 days   Lab Units 05/06/25  0541 05/05/25  0521 05/04/25  0542 05/03/25  0838   GLUCOSE RANDOM mg/dL 117 142* 149* 129       Results from last 7 days   Lab Units 05/04/25  0542 05/03/25  0838   PROTIME seconds 16.5* 17.4*   INR  1.26* 1.35*   PTT seconds  --  43*         Results from last 7 days   Lab Units 05/03/25  0838   PROCALCITONIN ng/ml 0.08     Results from last 7 days   Lab Units 05/04/25  1330 05/03/25  1117 05/03/25  0838   LACTIC ACID mmol/L 1.2 4.4* 2.3*     Results from last 7 days   Lab Units 05/03/25  1613   CLARITY UA  Clear   COLOR UA  Yellow    SPEC GRAV UA  1.025   PH UA  5.5   GLUCOSE UA mg/dl Negative   KETONES UA mg/dl 10 (1+)*   BLOOD UA  Negative   PROTEIN UA mg/dl 70 (1+)*   NITRITE UA  Negative   BILIRUBIN UA  Negative   UROBILINOGEN UA (BE) mg/dl <2.0   LEUKOCYTES UA  Negative   WBC UA /hpf 1-2   RBC UA /hpf 1-2   BACTERIA UA /hpf None Seen   EPITHELIAL CELLS WET PREP /hpf None Seen   MUCUS THREADS  Occasional*             Results from last 7 days   Lab Units 05/03/25  0847 05/03/25  0838   BLOOD CULTURE  No Growth at 72 hrs. Staphylococcus coagulase negative*   GRAM STAIN RESULT   --  Gram positive cocci in clusters*                   Network Utilization Review Department  ATTENTION: Please call with any questions or concerns to [phone] and carefully listen to the prompts so that you are directed to the right person. All voicemails are confidential.   For Discharge needs, contact Care Management DC Support Team at [phone] opt. 2  Send all requests for admission clinical reviews, approved or denied determinations and any other requests to dedicated fax number below belonging to the [facility] where the patient is receiving treatment. List of dedicated fax numbers for the Facilities:  FACILITY NAME UR FAX NUMBER   ADMISSION DENIALS (Administrative/Medical Necessity) [phone]   DISCHARGE SUPPORT TEAM (NETWORK) [phone]   PARENT CHILD HEALTH (Maternity/NICU/Pediatrics) [phone]   [facility] [phone]   [facility] [phone]   [facility] [phone]   [facility] [phone]   [facility] [phone]   [facility] [phone]   [facility] [phone]   [facility] [phone]   [facility] [phone]   [facility] [phone]   [facility]  [facility] [phone]   [place] [phone]

## 2025-05-06 NOTE — CASE MANAGEMENT
Case Management Assessment & Discharge Planning Note    Patient name Rosalio Anguiano  Location ICU 10/ICU 10 MRN [id number]  : 1945 Date 2025       Current Admission Date: 5/3/2025  Current Admission Diagnosis:Pneumothorax   Patient Active Problem List    Diagnosis Date Noted Date Diagnosed    Positive blood culture 2025     Pneumothorax 2025     Nodule of apex of right lung 2025     Keratosis 2025     Annual physical exam 10/08/2024     Labyrinthitis of both ears 10/08/2024     Chronic pain of both shoulders 10/08/2024     Seborrheic keratosis 10/08/2024     Preoperative examination 2023     Combined forms of age-related cataract of both eyes 2023     Pain and swelling of right lower leg 2023     Vertigo 2022     Osteoarthritis of lumbar spine with myelopathy 2022     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 10/22/2022     Hearing loss 10/22/2022     History of colonic polyps 10/22/2022     Osteoarthritis of hip 10/22/2022     Sensorineural hearing loss, bilateral 10/22/2022     Hypothyroidism 2022     Neck pain 2019     Bradycardia 2019     Simple chronic bronchitis (HCC) 2019     LAUREL (obstructive sleep apnea) 2019     COPD (chronic obstructive pulmonary disease) (HCC) 04/15/2019     Right groin pain 04/15/2019     Chronic atrial fibrillation (HCC) 2018     Bilateral leg edema 2018     Adenocarcinoma of prostate (HCC) 2017     Centrilobular emphysema (HCC) 2017     GERD (gastroesophageal reflux disease) 2017     Tinnitus 2017       LOS (days): 3  Geometric Mean LOS (GMLOS) (days):   Days to GMLOS:     OBJECTIVE:    Risk of Unplanned Readmission Score: 11.74         Current admission status: Inpatient       Preferred Pharmacy:   [id number] - [place] - [address] [place] [address]  Phone: [phone] Fax:  [phone]    [facility]  [address] [facility] [address]  Phone: [phone] Fax: [phone]    Primary Care Provider: Jona Calzada DO    Primary Insurance: BLUE CROSS  Secondary Insurance: MEDICARE    ASSESSMENT:  Active Health Care Proxies    There are no active Health Care Proxies on file.       Advance Directives  Does patient have a Health Care POA?: Yes  Does patient have Advance Directives?: Yes  Primary Contact: Shanti Anguiano (Spouse)  [phone] (Mobile)         Readmission Root Cause  30 Day Readmission: No    Patient Information  Admitted from:: Home  Mental Status: Alert  During Assessment patient was accompanied by: Not accompanied during assessment  Assessment information provided by:: Patient  Primary Caregiver: Self  Support Systems: Self  County of Residence: [place]  What city do you live in[place]  Home entry access options. Select all that apply.: Stairs  Number of steps to enter home.: 1  Do the steps have railings?: No  Type of Current Residence: [facility]  Living Arrangements: Lives w/ Spouse/significant other  Is patient a ?: Yes  Is patient [facility] with [facility] ([facility] Geoli[facility] Class[facility])?: Yes ([facility])  Is patient service connected?: Yes    Activities of Daily Living Prior to Admission  Functional Status: Independent  Completes ADLs independently?: Yes  Ambulates independently?: Yes  Does patient use assisted devices?: Yes  Assisted Devices (DME) used: CPAP  DME Company Name (respiratory supplies): cannot recall; \"no one local\"  Does patient currently own DME?: Yes  What DME does the patient currently own?: Walker, Straight Cane, Bedside Commode, Shower Chair, CPAP  Does patient have a history of Outpatient Therapy (PT/OT)?: Yes  Does the patient have a history of Short-Term Rehab?: No  Does patient have a history of HHC?: Yes  Does patient currently have HHC?: No         Patient Information Continued  Income Source: Other (Comment) " (employment, pension, SSI)  Does patient have prescription coverage?: Yes  Does patient receive dialysis treatments?: No  Does patient have a history of substance abuse?: No  Does patient have a history of Mental Health Diagnosis?: No         Means of Transportation  Means of Transport to Appts:: Drives Self      Social Determinants of Health (SDOH)      Flowsheet Row Most Recent Value   Housing Stability    In the last 12 months, was there a time when you were not able to pay the mortgage or rent on time? N   In the past 12 months, how many times have you moved where you were living? 0   At any time in the past 12 months, were you homeless or living in a shelter (including now)? N   Transportation Needs    In the past 12 months, has lack of transportation kept you from medical appointments or from getting medications? no   In the past 12 months, has lack of transportation kept you from meetings, work, or from getting things needed for daily living? No   Food Insecurity    Within the past 12 months, you worried that your food would run out before you got the money to buy more. Never true   Within the past 12 months, the food you bought just didn't last and you didn't have money to get more. Never true   Utilities    In the past 12 months has the electric, gas, oil, or water company threatened to shut off services in your home? No            DISCHARGE DETAILS:    Discharge planning discussed with:: pt  Freedom of Choice: Yes  Comments - Freedom of Choice: Met w pt at bedside; introduced self and explained role of CM, including arranging DME, STR, home health, out pt tx. Advised pt that CM will make appropriate referrals based on treatment team recommendations and MD orders, and he can consider recommended plan of care and choose from available vendors.  CM contacted family/caregiver?: No- see comments (pt alert and oriented adult)  Were Treatment Team discharge recommendations reviewed with patient/caregiver?:  (none  "at present)          Contacts  Patient Contacts: Shanti Anguiano (Spouse)  [phone] (Mobile)  Relationship to Patient:: Family         DME Referral Provided  Referral made for DME?: No    Other Referral/Resources/Interventions Provided:  Referral Comments: Pt admitted d/t pneumothorax;  CT x 2 in place, pt in ICU.  [facility].  Pt hoping for d/c \"asap\".  CM will continue to follow [facility] stay.         Treatment Team Recommendation: Home  Discharge Destination Plan:: Home  Transport at Discharge : Family                                                           "

## 2025-05-06 NOTE — RESPIRATORY THERAPY NOTE
RT Protocol Note  Rosalio Anguiano 80 y.o. male MRN: [id number]  Unit/Bed#: ICU 10 Encounter: [id number]    Assessment    Principal Problem:    Pneumothorax  Active Problems:    Chronic atrial fibrillation (HCC)    Centrilobular emphysema (HCC)    GERD (gastroesophageal reflux disease)    Positive blood culture      Home Pulmonary Medications:     05/06/25 1921   Respiratory Protocol   Protocol Initiated? No   Protocol Selection Respiratory   Language Barrier? No   Medical & Social History Reviewed? Yes   Diagnostic Studies Reviewed? Yes   Physical Assessment Performed? Yes   Respiratory Plan Mild Distress pathway   Respiratory Assessment   Assessment Type During-treatment   General Appearance Awake   Respiratory Pattern Normal   Chest Assessment Chest expansion symmetrical   Bilateral Breath Sounds Diminished   Cough None   Resp Comments Will continue with current therapy   O2 Device NC   Cough Description   Sputum Amount None   Additional Assessments   Pulse 91   Respirations 20   SpO2 94 %            Past Medical History:   Diagnosis Date    A-fib (HCC)     BPH with obstruction/lower urinary tract symptoms     Colon polyp     COPD (chronic obstructive pulmonary disease) (HCC)     Frequency of urination     GERD (gastroesophageal reflux disease)     History of cardioversion 12/27/2011    Inital Rhythm AFIB, Final Rhythm Sinus, Max Joules 75    History of echocardiogram 06/01/2015    Normal LV systolic function, mild concentric LV hypertrophy, mild mitral and tricuspid regurg, right atrial enlargement. EF 55%    Irregular heart beat     AF    Other male erectile dysfunction     Personal history of irradiation     Personal history of malignant neoplasm of prostate     Prostate cancer (HCC)      Social History     Socioeconomic History    Marital status: /Civil Union     Spouse name: Not on file    Number of children: Not on file    Years of education: Not on file    Highest education level: Not on file    Occupational History    Not on file   Tobacco Use    Smoking status: Former     Current packs/day: 0.00     Average packs/day: 2.0 packs/day for 33.5 years (67.0 ttl pk-yrs)     Types: Cigarettes     Start date:      Quit date: 1998     Years since quittin.8    Smokeless tobacco: Never   Vaping Use    Vaping status: Never Used   Substance and Sexual Activity    Alcohol use: Yes     Alcohol/week: 7.0 standard drinks of alcohol     Types: 7 Standard drinks or equivalent per week     Comment: 1/2 glass of wine daily    Drug use: No    Sexual activity: Not on file   Other Topics Concern    Not on file   Social History Narrative    Daily caffeine use- 2 cups of coffee, 1-2 bottles of green tea     Social Drivers of Health     Financial Resource Strain: Not on file   Food Insecurity: No Food Insecurity (2025)    Hunger Vital Sign     Worried About Running Out of Food in the Last Year: Never true     Ran Out of Food in the Last Year: Never true   Transportation Needs: No Transportation Needs (2025)    PRAPARE - Transportation     Lack of Transportation (Medical): No     Lack of Transportation (Non-Medical): No   Physical Activity: Not on file   Stress: Not on file   Social Connections: Not on file   Intimate Partner Violence: Unknown (5/3/2025)    Nursing IPS     Feels Physically and Emotionally Safe: Not on file     Physically Hurt by Someone: Not on file     Humiliated or Emotionally Abused by Someone: Not on file     Physically Hurt by Someone: No     Hurt or Threatened by Someone: No   Housing Stability: Low Risk  (2025)    Housing Stability Vital Sign     Unable to Pay for Housing in the Last Year: No     Number of Times Moved in the Last Year: 0     Homeless in the Last Year: No       Subjective    Subjective Data: consider frequencychange as pt states the breathing treatments increase his coughing    Objective    Physical Exam:   Assessment Type: During-treatment  General Appearance:  Awake  Respiratory Pattern: Normal  Chest Assessment: Chest expansion symmetrical  Bilateral Breath Sounds: Diminished  Cough: None  O2 Device: NC    Vitals:  Blood pressure 158/100, pulse 91, temperature 97.9 °F (36.6 °C), temperature source Oral, resp. rate 20, height 6' (1.829 m), weight 90.9 kg (200 lb 6.4 oz), SpO2 94%.          Imaging and other studies: Results Review Statement: No pertinent imaging studies reviewed.    O2 Device: NC     Plan    Respiratory Plan: Mild Distress pathway        Resp Comments: Will continue with current therapy

## 2025-05-06 NOTE — ASSESSMENT & PLAN NOTE
80-year-old male patient with past medical history of COPD, chronic A-fib currently on Eliquis, hospitalized due to spontaneous pneumothorax.  Initial chest tube was dislodged and second chest tube was placed by ER however further chest x-ray noted with expanding pneumothorax requiring emergent IR consultation and had large tube placed on 05/4/2025.  Currently patient does have 2 chest tubes in place.  Plan:  Repeat chest x-ray this morning showing persistent apical small pneumothorax  Discussed with pulmonology team and will do waterseal for today and repeat chest x-ray in the afternoon.  Patient remains on room air.  Not in distress  If pneumothorax remains stable patient will be left on waterseal for today and reassess chest x-ray in the a.m..  Pulmonology following and recommendation appreciated.  Patient is agreeable with above plan.

## 2025-05-06 NOTE — PROGRESS NOTES
Progress Note - Hospitalist   Name: Rosalio Anguiano 80 y.o. male I MRN: [id number]  Unit/Bed#: ICU 10 I Date of Admission: 5/3/2025   Date of Service: 5/6/2025 I Hospital Day: 3    Assessment & Plan  Pneumothorax  80-year-old male patient with past medical history of COPD, chronic A-fib currently on Eliquis, hospitalized due to spontaneous pneumothorax.  Initial chest tube was dislodged and second chest tube was placed by ER however further chest x-ray noted with expanding pneumothorax requiring emergent IR consultation and had large tube placed on 05/4/2025.  Currently patient does have 2 chest tubes in place.  Plan:  Repeat chest x-ray this morning showing persistent apical small pneumothorax  Discussed with pulmonology team and will do waterseal for today and repeat chest x-ray in the afternoon.  Patient remains on room air.  Not in distress  If pneumothorax remains stable patient will be left on waterseal for today and reassess chest x-ray in the a.m..  Pulmonology following and recommendation appreciated.  Patient is agreeable with above plan.  Chronic atrial fibrillation (HCC)  Rate currently uncontrolled due to pneumothorax.  Continue Cardizem.  HOLD eliquis for now: Chest tube in place and may need intervention.   Centrilobular emphysema (HCC)  Currently not in acute exacerbation.  Discontinue IV Solu-Medrol and transition to p.o. prednisone per pulmonology recommendation.  For 5-day course.  Continue home regimen of Wixela, albuterol as needed.  GERD (gastroesophageal reflux disease)  Continue PPI  Positive blood culture  1 out of 2 blood culture turns positive for staphylococcal coagulase-negative.  Patient remains hemodynamically stable and afebrile.  Normal WBC.  Suspected to be contaminant.  Will continue holding antibiotics    VTE Pharmacologic Prophylaxis: VTE Score: 6 High Risk (Score >/= 5) - Pharmacological DVT Prophylaxis Ordered: enoxaparin (Lovenox). Sequential Compression Devices  Ordered.    Mobility:   Basic Mobility Inpatient Raw Score: 24  JH-HLM Goal: 8: Walk 250 feet or more  JH-HLM Achieved: 6: Walk 10 steps or more  JH-HLM Goal achieved. Continue to encourage appropriate mobility.    Patient Centered Rounds: I performed bedside rounds with nursing staff today.   Discussions with Specialists or Other Care Team Provider: Discussed case with pulmonology team    Education and Discussions with Family / Patient: Patient declined call to .     Current Length of Stay: 3 day(s)  Current Patient Status: Inpatient   Certification Statement: The patient will continue to require additional inpatient hospital stay due to chest tube in place  Discharge Plan: Anticipate discharge in 24-48 hrs to home.    Code Status: Level 1 - Full Code    Subjective   Patient admits to being asymptomatic.  Denies overnight events.    Objective :  Temp:  [97.5 °F (36.4 °C)-98 °F (36.7 °C)] 97.5 °F (36.4 °C)  HR:  [77-97] 89  BP: (136-155)/(69-93) 155/75  Resp:  [18-21] 21  SpO2:  [90 %-95 %] 91 %  O2 Device: None (Room air)    Body mass index is 27.18 kg/m².     Input and Output Summary (last 24 hours):     Intake/Output Summary (Last 24 hours) at 5/6/2025 1342  Last data filed at 5/6/2025 0801  Gross per 24 hour   Intake 750 ml   Output 1904 ml   Net -1154 ml       Physical Exam  Vitals and nursing note reviewed.   Constitutional:       General: He is not in acute distress.     Appearance: He is well-developed. He is not ill-appearing.   HENT:      Head: Normocephalic and atraumatic.   Eyes:      Conjunctiva/sclera: Conjunctivae normal.   Cardiovascular:      Rate and Rhythm: Normal rate and regular rhythm.      Heart sounds: No murmur heard.  Pulmonary:      Effort: Pulmonary effort is normal. No respiratory distress.      Breath sounds: No wheezing or rales.      Comments: Breathing sounds are decreased  Abdominal:      Palpations: Abdomen is soft.      Tenderness: There is no abdominal tenderness.    Musculoskeletal:         General: No swelling.      Cervical back: Neck supple.      Right lower leg: No edema.      Left lower leg: No edema.   Skin:     General: Skin is warm and dry.      Capillary Refill: Capillary refill takes less than 2 seconds.   Neurological:      Mental Status: He is alert.   Psychiatric:         Mood and Affect: Mood normal.           Lines/Drains:  Lines/Drains/Airways       Active Status       Name Placement date Placement time Site Days    Chest Tube 1 Right Fifth intercostal space 05/03/25  0948  Fifth intercostal space  3    Chest Tube 2 Right Anterior 14 Fr. 05/04/25  1223  Anterior  2                            Lab Results: I have reviewed the following results:   Results from last 7 days   Lab Units 05/06/25  0541 05/05/25  0521 05/04/25  0542   WBC Thousand/uL 8.42   < > 10.36*   HEMOGLOBIN g/dL 11.6*   < > 12.1   HEMATOCRIT % 34.8*   < > 36.8   PLATELETS Thousands/uL 177   < > 199   SEGS PCT %  --   --  91*   LYMPHO PCT %  --   --  5*   MONO PCT %  --   --  4   EOS PCT %  --   --  0    < > = values in this interval not displayed.     Results from last 7 days   Lab Units 05/06/25  0541   SODIUM mmol/L 138   POTASSIUM mmol/L 4.5   CHLORIDE mmol/L 109*   CO2 mmol/L 25   BUN mg/dL 31*   CREATININE mg/dL 0.88   ANION GAP mmol/L 4   CALCIUM mg/dL 8.6   ALBUMIN g/dL 3.4*   TOTAL BILIRUBIN mg/dL 1.08*   ALK PHOS U/L 76   ALT U/L 91*   AST U/L 61*   GLUCOSE RANDOM mg/dL 117     Results from last 7 days   Lab Units 05/04/25  0542   INR  1.26*             Results from last 7 days   Lab Units 05/04/25  1330 05/03/25  1117 05/03/25  0838   LACTIC ACID mmol/L 1.2 4.4* 2.3*   PROCALCITONIN ng/ml  --   --  0.08       Recent Cultures (last 7 days):   Results from last 7 days   Lab Units 05/03/25  0847 05/03/25  0838   BLOOD CULTURE  No Growth at 72 hrs. Staphylococcus coagulase negative*   GRAM STAIN RESULT   --  Gram positive cocci in clusters*       Imaging Results Review: I reviewed  radiology reports from this admission including: chest xray.  Other Study Results Review: No additional pertinent studies reviewed.    Last 24 Hours Medication List:     Current Facility-Administered Medications:     albuterol (PROVENTIL HFA,VENTOLIN HFA) inhaler 2 puff, Q4H PRN    diltiazem (CARDIZEM CD) 24 hr capsule 120 mg, Daily    fluticasone-vilanterol 200-25 mcg/actuation 1 puff, Daily    ipratropium (ATROVENT) 0.02 % inhalation solution 0.5 mg, BID    levalbuterol (XOPENEX) inhalation solution 1.25 mg, BID **AND** [DISCONTINUED] sodium chloride 0.9 % inhalation solution 3 mL, TID    levothyroxine tablet 25 mcg, Early Morning    pantoprazole (PROTONIX) EC tablet 20 mg, Early Morning    predniSONE tablet 40 mg, Daily    tamsulosin (FLOMAX) capsule 0.4 mg, Daily With Dinner    Administrative Statements   Today, Patient Was Seen By: Lizeth Clark MD      **Please Note: This note may have been constructed using a voice recognition system.**

## 2025-05-06 NOTE — ASSESSMENT & PLAN NOTE
1 out of 2 blood culture turns positive for staphylococcal coagulase-negative.  Patient remains hemodynamically stable and afebrile.  Normal WBC.  Suspected to be contaminant.  Will continue holding antibiotics

## 2025-05-06 NOTE — ASSESSMENT & PLAN NOTE
Currently not in acute exacerbation.  Discontinue IV Solu-Medrol and transition to p.o. prednisone per pulmonology recommendation.  For 5-day course.  Continue home regimen of Wixela, albuterol as needed.

## 2025-05-06 NOTE — PROGRESS NOTES
Progress Note - Pulmonology   Name: Rosalio Anguiano 80 y.o. male I MRN: [id number]  Unit/Bed#: ICU 10 I Date of Admission: 5/3/2025   Date of Service: 5/6/2025 I Hospital Day: 3    Assessment & Plan  Pneumothorax  Likely secondary to ruptured bullae  Initial chest tube placed in ED still with large PTX  New chest tube placed by IR on 5/4/25  CXR with re-expansion of the right lung    Chest tube was clamped yesterday, enlarging pneumothorax on follow up imaging yesterday evening  Chest tube was placed back to suction with improvement on subsequent CXR  Follow up CXR this morning shows small apical pneumothorax  He has no airleak from the chest tube    Will place him back to waterseal today and repeat another chest x-ray around lunchtime  Centrilobular emphysema (HCC)  Start Breo  Complete 5 day course of prednisone    Home regimen is Wixela, albuterol as needed    24 Hour Events : Worsening pneumothorax last night  Subjective : Patient sitting up in the chair. He is feeling ok with his breathing.     Objective :  Temp:  [97.6 °F (36.4 °C)-98 °F (36.7 °C)] 98 °F (36.7 °C)  HR:  [] 89  BP: (129-143)/(69-93) 136/93  Resp:  [18-19] 18  SpO2:  [90 %-95 %] 95 %  O2 Device: None (Room air)  Nasal Cannula O2 Flow Rate (L/min):  [2 L/min] 2 L/min    Physical Exam  Vitals reviewed.   Constitutional:       General: He is not in acute distress.     Appearance: Normal appearance. He is well-developed. He is not ill-appearing.   HENT:      Head: Normocephalic and atraumatic.      Mouth/Throat:      Pharynx: Oropharynx is clear.   Eyes:      Pupils: Pupils are equal, round, and reactive to light.   Cardiovascular:      Rate and Rhythm: Normal rate and regular rhythm.   Pulmonary:      Effort: Pulmonary effort is normal. No respiratory distress.      Breath sounds: Normal breath sounds. No decreased breath sounds, wheezing, rhonchi or rales.      Comments: Chest tube without air leak    Abdominal:      General: Abdomen is  flat. There is no distension.   Musculoskeletal:         General: Normal range of motion.      Cervical back: Normal range of motion.      Right lower leg: No edema.      Left lower leg: No edema.   Skin:     General: Skin is warm and dry.      Findings: No rash.   Neurological:      Mental Status: He is alert and oriented to person, place, and time.   Psychiatric:         Mood and Affect: Mood normal.         Behavior: Behavior normal.           Lab Results: I have reviewed the following results:   .     05/06/25  0541   WBC 8.42   HGB 11.6*   HCT 34.8*      SODIUM 138   K 4.5   *   CO2 25   BUN 31*   CREATININE 0.88   GLUC 117   AST 61*   ALT 91*   ALB 3.4*   TBILI 1.08*   ALKPHOS 76     ABG: No new results in last 24 hours.    Imaging Results Review: I reviewed radiology reports from this admission including: chest xray.  Other Study Results Review: No additional pertinent studies reviewed.  PFT Results Reviewed: none available

## 2025-05-06 NOTE — PLAN OF CARE
Problem: PAIN - ADULT  Goal: Verbalizes/displays adequate comfort level or baseline comfort level  Description: Interventions:- Encourage patient to monitor pain and request assistance- Assess pain using appropriate pain scale- Administer analgesics based on type and severity of pain and evaluate response- Implement non-pharmacological measures as appropriate and evaluate response- Consider cultural and social influences on pain and pain management- Notify physician/advanced practitioner if interventions unsuccessful or patient reports new pain  Outcome: Progressing     Problem: INFECTION - ADULT  Goal: Absence or prevention of progression during hospitalization  Description: INTERVENTIONS:- Assess and monitor for signs and symptoms of infection- Monitor lab/diagnostic results- Monitor all insertion sites, i.e. indwelling lines, tubes, and drains- Monitor endotracheal if appropriate and nasal secretions for changes in amount and color- [place] appropriate cooling/warming therapies per order- Administer medications as ordered- Instruct and encourage patient and family to use good hand hygiene technique- Identify and instruct in appropriate isolation precautions for identified infection/condition  Outcome: Progressing  Goal: Absence of fever/infection during neutropenic period  Description: INTERVENTIONS:- Monitor WBC  Outcome: Progressing     Problem: SAFETY ADULT  Goal: Patient will remain free of falls  Description: INTERVENTIONS:- Educate patient/family on patient safety including physical limitations- Instruct patient to call for assistance with activity - Consult OT/PT to assist with strengthening/mobility - Keep Call bell within reach- Keep bed low and locked with side rails adjusted as appropriate- Keep care items and personal belongings within reach- Initiate and maintain comfort rounds- Make Fall Risk Sign visible to staff- Offer Toileting every  Hours, in advance of need- Initiate/Maintain alarm- Obtain  necessary fall risk management equipment: - Apply yellow socks and bracelet for high fall risk patients- Consider moving patient to room near nurses station  Outcome: Progressing  Goal: Maintain or return to baseline ADL function  Description: INTERVENTIONS:-  Assess patient's ability to carry out ADLs; assess patient's baseline for ADL function and identify physical deficits which impact ability to perform ADLs (bathing, care of mouth/teeth, toileting, grooming, dressing, etc.)- Assess/evaluate cause of self-care deficits - Assess range of motion- Assess patient's mobility; develop plan if impaired- Assess patient's need for assistive devices and provide as appropriate- Encourage maximum independence but intervene and supervise when necessary- Involve family in performance of ADLs- Assess for home care needs following discharge - Consider OT consult to assist with ADL evaluation and planning for discharge- Provide patient education as appropriate  Outcome: Progressing  Goal: Maintains/Returns to pre admission functional level  Description: INTERVENTIONS:- Perform AM-PAC 6 Click Basic Mobility/ Daily Activity assessment daily.- Set and communicate daily mobility goal to care team and patient/family/caregiver. - Collaborate with rehabilitation services on mobility goals if consulted- Perform Range of Motion  times a day.- Reposition patient every  hours.- Dangle patient  times a day- Stand patient  times a day- Ambulate patient  times a day- Out of bed to chair  times a day - Out of bed for meals  times a day- Out of bed for toileting- Record patient progress and toleration of activity level   Outcome: Progressing

## 2025-05-06 NOTE — ASSESSMENT & PLAN NOTE
Likely secondary to ruptured bullae  Initial chest tube placed in ED still with large PTX  New chest tube placed by IR on 5/4/25  CXR with re-expansion of the right lung    Chest tube was clamped yesterday, enlarging pneumothorax on follow up imaging yesterday evening  Chest tube was placed back to suction with improvement on subsequent CXR  Follow up CXR this morning shows small apical pneumothorax  He has no airleak from the chest tube    Will place him back to waterseal today and repeat another chest x-ray around lunchtime

## 2025-05-07 ENCOUNTER — APPOINTMENT (INPATIENT)
Dept: RADIOLOGY | Facility: HOSPITAL | Age: 80
DRG: 199 | End: 2025-05-07
Payer: COMMERCIAL

## 2025-05-07 ENCOUNTER — APPOINTMENT (INPATIENT)
Dept: ULTRASOUND IMAGING | Facility: HOSPITAL | Age: 80
DRG: 199 | End: 2025-05-07
Payer: COMMERCIAL

## 2025-05-07 PROBLEM — J96.01 ACUTE RESPIRATORY FAILURE WITH HYPOXIA (HCC): Status: ACTIVE | Noted: 2025-05-07

## 2025-05-07 PROBLEM — R74.8 ELEVATED LIVER ENZYMES: Status: ACTIVE | Noted: 2025-05-07

## 2025-05-07 LAB
BASOPHILS # BLD AUTO: 0.02 THOUSANDS/ÂΜL (ref 0–0.1)
BASOPHILS NFR BLD AUTO: 0 % (ref 0–1)
EOSINOPHIL # BLD AUTO: 0.01 THOUSAND/ÂΜL (ref 0–0.61)
EOSINOPHIL NFR BLD AUTO: 0 % (ref 0–6)
ERYTHROCYTE [DISTWIDTH] IN BLOOD BY AUTOMATED COUNT: 25.3 % (ref 11.6–15.1)
HCT VFR BLD AUTO: 37.2 % (ref 36.5–49.3)
HGB BLD-MCNC: 12.6 G/DL (ref 12–17)
IMM GRANULOCYTES # BLD AUTO: 0.12 THOUSAND/UL (ref 0–0.2)
IMM GRANULOCYTES NFR BLD AUTO: 1 % (ref 0–2)
LYMPHOCYTES # BLD AUTO: 1.14 THOUSANDS/ÂΜL (ref 0.6–4.47)
LYMPHOCYTES NFR BLD AUTO: 9 % (ref 14–44)
MCH RBC QN AUTO: 34.2 PG (ref 26.8–34.3)
MCHC RBC AUTO-ENTMCNC: 33.9 G/DL (ref 31.4–37.4)
MCV RBC AUTO: 101 FL (ref 82–98)
MONOCYTES # BLD AUTO: 1.13 THOUSAND/ÂΜL (ref 0.17–1.22)
MONOCYTES NFR BLD AUTO: 8 % (ref 4–12)
NEUTROPHILS # BLD AUTO: 11.07 THOUSANDS/ÂΜL (ref 1.85–7.62)
NEUTS SEG NFR BLD AUTO: 82 % (ref 43–75)
NRBC BLD AUTO-RTO: 0 /100 WBCS
PLATELET # BLD AUTO: 201 THOUSANDS/UL (ref 149–390)
PMV BLD AUTO: 11.9 FL (ref 8.9–12.7)
RBC # BLD AUTO: 3.68 MILLION/UL (ref 3.88–5.62)
WBC # BLD AUTO: 13.49 THOUSAND/UL (ref 4.31–10.16)

## 2025-05-07 PROCEDURE — 99232 SBSQ HOSP IP/OBS MODERATE 35: CPT

## 2025-05-07 PROCEDURE — 76705 ECHO EXAM OF ABDOMEN: CPT

## 2025-05-07 PROCEDURE — 85025 COMPLETE CBC W/AUTO DIFF WBC: CPT

## 2025-05-07 PROCEDURE — 99232 SBSQ HOSP IP/OBS MODERATE 35: CPT | Performed by: INTERNAL MEDICINE

## 2025-05-07 PROCEDURE — 71045 X-RAY EXAM CHEST 1 VIEW: CPT

## 2025-05-07 RX ADMIN — TAMSULOSIN HYDROCHLORIDE 0.4 MG: 0.4 CAPSULE ORAL at 17:11

## 2025-05-07 RX ADMIN — PANTOPRAZOLE SODIUM 20 MG: 20 TABLET, DELAYED RELEASE ORAL at 05:08

## 2025-05-07 RX ADMIN — Medication 2000 UNITS: at 15:23

## 2025-05-07 RX ADMIN — APIXABAN 5 MG: 5 TABLET, FILM COATED ORAL at 17:10

## 2025-05-07 RX ADMIN — FLUTICASONE FUROATE AND VILANTEROL TRIFENATATE 1 PUFF: 200; 25 POWDER RESPIRATORY (INHALATION) at 10:02

## 2025-05-07 RX ADMIN — DILTIAZEM HYDROCHLORIDE 120 MG: 120 CAPSULE, COATED, EXTENDED RELEASE ORAL at 08:45

## 2025-05-07 RX ADMIN — ENOXAPARIN SODIUM 40 MG: 40 INJECTION SUBCUTANEOUS at 08:45

## 2025-05-07 RX ADMIN — LEVOTHYROXINE SODIUM 25 MCG: 0.03 TABLET ORAL at 05:08

## 2025-05-07 RX ADMIN — PREDNISONE 40 MG: 20 TABLET ORAL at 08:45

## 2025-05-07 RX ADMIN — APIXABAN 5 MG: 5 TABLET, FILM COATED ORAL at 12:54

## 2025-05-07 NOTE — ASSESSMENT & PLAN NOTE
80-year-old male patient with past medical history of COPD, chronic A-fib currently on Eliquis, hospitalized due to spontaneous pneumothorax.  Initial chest tube was dislodged and second chest tube was placed by ER however further chest x-ray noted with expanding pneumothorax requiring emergent IR consultation and had large tube placed on 05/4/2025.  Currently patient does have 2 chest tubes in place.  Plan:  Repeat chest x-ray this morning showing persistent apical small pneumothorax  Discussed with pulmonology team and will do waterseal for today and repeat chest x-ray in the afternoon.  Patient remains on room air.  Not in distress  If pneumothorax remains stable patient will be left on waterseal for today and reassess chest x-ray in the a.m..  Will continue with daily x-rays.  If after 5 days no resolution of pneumothorax patient may need cardiothoracic evaluation.  Pulmonology following and recommendation appreciated.  Patient is agreeable with above plan.

## 2025-05-07 NOTE — RESPIRATORY THERAPY NOTE
RT Protocol Note  Rosalio Anguiano 80 y.o. male MRN: [id number]  Unit/Bed#: ICU 10 Encounter: [id number]    Assessment    Principal Problem:    Pneumothorax  Active Problems:    Chronic atrial fibrillation (HCC)    Centrilobular emphysema (HCC)    GERD (gastroesophageal reflux disease)    Positive blood culture      Home Pulmonary Medications:     05/07/25 0801   Respiratory Protocol   Protocol Initiated? No   Protocol Selection Respiratory   Language Barrier? No   Medical & Social History Reviewed? Yes   Diagnostic Studies Reviewed? Yes   Physical Assessment Performed? Yes   Respiratory Plan No distress/Pulmonary history   Respiratory Assessment   Bilateral Breath Sounds Diminished;Clear   Resp Comments patient refusing nebs, states they make him cough severely, will discontinue nebs   Additional Assessments   Pulse 97   SpO2 94 %            Past Medical History:   Diagnosis Date    A-fib (HCC)     BPH with obstruction/lower urinary tract symptoms     Colon polyp     COPD (chronic obstructive pulmonary disease) (HCC)     Frequency of urination     GERD (gastroesophageal reflux disease)     History of cardioversion 12/27/2011    Inital Rhythm AFIB, Final Rhythm Sinus, Max Joules 75    History of echocardiogram 06/01/2015    Normal LV systolic function, mild concentric LV hypertrophy, mild mitral and tricuspid regurg, right atrial enlargement. EF 55%    Irregular heart beat     AF    Other male erectile dysfunction     Personal history of irradiation     Personal history of malignant neoplasm of prostate     Prostate cancer (HCC)      Social History     Socioeconomic History    Marital status: /Civil Union     Spouse name: Not on file    Number of children: Not on file    Years of education: Not on file    Highest education level: Not on file   Occupational History    Not on file   Tobacco Use    Smoking status: Former     Current packs/day: 0.00     Average packs/day: 2.0 packs/day for 33.5 years (67.0 ttl  pk-yrs)     Types: Cigarettes     Start date:      Quit date: 1998     Years since quittin.8    Smokeless tobacco: Never   Vaping Use    Vaping status: Never Used   Substance and Sexual Activity    Alcohol use: Yes     Alcohol/week: 7.0 standard drinks of alcohol     Types: 7 Standard drinks or equivalent per week     Comment: 1/2 glass of wine daily    Drug use: No    Sexual activity: Not on file   Other Topics Concern    Not on file   Social History Narrative    Daily caffeine use- 2 cups of coffee, 1-2 bottles of green tea     Social Drivers of Health     Financial Resource Strain: Not on file   Food Insecurity: No Food Insecurity (2025)    Hunger Vital Sign     Worried About Running Out of Food in the Last Year: Never true     Ran Out of Food in the Last Year: Never true   Transportation Needs: No Transportation Needs (2025)    PRAPARE - Transportation     Lack of Transportation (Medical): No     Lack of Transportation (Non-Medical): No   Physical Activity: Not on file   Stress: Not on file   Social Connections: Not on file   Intimate Partner Violence: Unknown (5/3/2025)    Nursing IPS     Feels Physically and Emotionally Safe: Not on file     Physically Hurt by Someone: Not on file     Humiliated or Emotionally Abused by Someone: Not on file     Physically Hurt by Someone: No     Hurt or Threatened by Someone: No   Housing Stability: Low Risk  (2025)    Housing Stability Vital Sign     Unable to Pay for Housing in the Last Year: No     Number of Times Moved in the Last Year: 0     Homeless in the Last Year: No       Subjective    Subjective Data: consider frequencychange as pt states the breathing treatments increase his coughing    Objective    Physical Exam:   Bilateral Breath Sounds: Diminished, Clear    Vitals:  Blood pressure 142/80, pulse 97, temperature 97.8 °F (36.6 °C), temperature source Oral, resp. rate 22, height 6' (1.829 m), weight 90.9 kg (200 lb 6.4 oz), SpO2 94%.           Imaging and other studies:     O2 Device: NC     Plan    Respiratory Plan: No distress/Pulmonary history        Resp Comments: patient refusing nebs, states they make him cough severely, will discontinue nebs

## 2025-05-07 NOTE — ASSESSMENT & PLAN NOTE
Found nodular density in right lobe on last CT chest.  Required 3-month follow-up with a repeat CT chest.

## 2025-05-07 NOTE — PROGRESS NOTES
Progress Note - Pulmonology   Name: Rosalio Anguiano 80 y.o. male I MRN: [id number]  Unit/Bed#: ICU 10 I Date of Admission: 5/3/2025   Date of Service: 5/7/2025 I Hospital Day: 4    Assessment & Plan  Pneumothorax  Likely secondary to ruptured bullae  Initial chest tube placed in ED still with large PTX  New chest tube placed by IR on 5/4/25  CXR with re-expansion of the right lung    On initial clamping of the chest tube on 5/5/2025, patient had worsening pneumothorax  Chest tube has since been to waterseal, has ongoing airleak although follow-up chest x-ray this morning shows stable apical pneumothorax  Will maintain chest tube to waterseal for now  Initial chest tube that had been placed in the emergency room was removed this morning without difficulty, apical chest tube remains still with air leak  Centrilobular emphysema (HCC)  Start Breo  Complete 5 day course of prednisone    Home regimen is Wixela, albuterol as needed  Lung nodule  Recent CT scan was done to his PCP which shows an indeterminate 1 x 0.8 cm nodular density in the periphery of the right upper lobe which was new from 2013.  Patient did not have any imaging in between  PET scan versus short-term 3-month follow-up was recommended  Patient can discuss with his pulmonologist upon discharge whether PET scan versus short-term follow-up will be done.  I did explain to patient that the PET scan cannot be done while he is here in the hospital  Positive blood culture      24 Hour Events : No events  Subjective : Patient sitting up in the chair.  He reports feeling well.  Denies any significant shortness of breath, no pain.    Objective :  Temp:  [97.5 °F (36.4 °C)-97.9 °F (36.6 °C)] 97.8 °F (36.6 °C)  HR:  [76-98] 97  BP: (133-158)/() 133/78  Resp:  [20-22] 22  SpO2:  [90 %-94 %] 94 %  O2 Device: None (Room air)    Physical Exam  Vitals reviewed.   Constitutional:       General: He is not in acute distress.     Appearance: Normal appearance. He is  well-developed. He is not ill-appearing.   HENT:      Head: Normocephalic and atraumatic.      Mouth/Throat:      Pharynx: Oropharynx is clear.   Eyes:      Pupils: Pupils are equal, round, and reactive to light.   Cardiovascular:      Rate and Rhythm: Normal rate and regular rhythm.   Pulmonary:      Effort: Pulmonary effort is normal. No respiratory distress.      Breath sounds: Decreased breath sounds present. No wheezing, rhonchi or rales.      Comments: Chest tubes in place - apical chest tube with air leak, lateral chest wall tube without air leak  Abdominal:      General: Abdomen is flat. There is no distension.   Musculoskeletal:         General: Normal range of motion.      Cervical back: Normal range of motion.      Right lower leg: No edema.      Left lower leg: No edema.   Skin:     General: Skin is warm and dry.      Findings: No rash.   Neurological:      Mental Status: He is alert and oriented to person, place, and time.   Psychiatric:         Mood and Affect: Mood normal.         Behavior: Behavior normal.           Lab Results: I have reviewed the following results:   .     05/07/25  0530   WBC 13.49*   HGB 12.6   HCT 37.2        ABG: No new results in last 24 hours.    Imaging Results Review: I reviewed radiology reports from this admission including: chest xray.  Other Study Results Review: Other studies reviewed include: recent CT chest  PFT Results Reviewed: none available

## 2025-05-07 NOTE — ASSESSMENT & PLAN NOTE
Rate currently uncontrolled due to pneumothorax.  Continue Cardizem.  Discussed with pulmonology team and okay to resume Eliquis today.  Might need to hold Eliquis in 48 hours if patient will require further cardiothoracic intervention.

## 2025-05-07 NOTE — ASSESSMENT & PLAN NOTE
Likely secondary to ruptured bullae  Initial chest tube placed in ED still with large PTX  New chest tube placed by IR on 5/4/25  CXR with re-expansion of the right lung    On initial clamping of the chest tube on 5/5/2025, patient had worsening pneumothorax  Chest tube has since been to waterseal, has ongoing airleak although follow-up chest x-ray this morning shows stable apical pneumothorax  Will maintain chest tube to waterseal for now  Initial chest tube that had been placed in the emergency room was removed this morning without difficulty, apical chest tube remains still with air leak

## 2025-05-07 NOTE — PLAN OF CARE
Problem: PAIN - ADULT  Goal: Verbalizes/displays adequate comfort level or baseline comfort level  Description: Interventions:- Encourage patient to monitor pain and request assistance- Assess pain using appropriate pain scale- Administer analgesics based on type and severity of pain and evaluate response- Implement non-pharmacological measures as appropriate and evaluate response- Consider cultural and social influences on pain and pain management- Notify physician/advanced practitioner if interventions unsuccessful or patient reports new pain  Outcome: Progressing     Problem: INFECTION - ADULT  Goal: Absence or prevention of progression during hospitalization  Description: INTERVENTIONS:- Assess and monitor for signs and symptoms of infection- Monitor lab/diagnostic results- Monitor all insertion sites, i.e. indwelling lines, tubes, and drains- Monitor endotracheal if appropriate and nasal secretions for changes in amount and color- [place] appropriate cooling/warming therapies per order- Administer medications as ordered- Instruct and encourage patient and family to use good hand hygiene technique- Identify and instruct in appropriate isolation precautions for identified infection/condition  Outcome: Progressing  Goal: Absence of fever/infection during neutropenic period  Description: INTERVENTIONS:- Monitor WBC  Outcome: Progressing     Problem: SAFETY ADULT  Goal: Patient will remain free of falls  Description: INTERVENTIONS:- Educate patient/family on patient safety including physical limitations- Instruct patient to call for assistance with activity - Consult OT/PT to assist with strengthening/mobility - Keep Call bell within reach- Keep bed low and locked with side rails adjusted as appropriate- Keep care items and personal belongings within reach- Initiate and maintain comfort rounds- Make Fall Risk Sign visible to staff- Offer Toileting every  Hours, in advance of need- Initiate/Maintain alarm- Obtain  necessary fall risk management equipment: - Apply yellow socks and bracelet for high fall risk patients- Consider moving patient to room near nurses station  Outcome: Progressing  Goal: Maintain or return to baseline ADL function  Description: INTERVENTIONS:-  Assess patient's ability to carry out ADLs; assess patient's baseline for ADL function and identify physical deficits which impact ability to perform ADLs (bathing, care of mouth/teeth, toileting, grooming, dressing, etc.)- Assess/evaluate cause of self-care deficits - Assess range of motion- Assess patient's mobility; develop plan if impaired- Assess patient's need for assistive devices and provide as appropriate- Encourage maximum independence but intervene and supervise when necessary- Involve family in performance of ADLs- Assess for home care needs following discharge - Consider OT consult to assist with ADL evaluation and planning for discharge- Provide patient education as appropriate  Outcome: Progressing  Goal: Maintains/Returns to pre admission functional level  Description: INTERVENTIONS:- Perform AM-PAC 6 Click Basic Mobility/ Daily Activity assessment daily.- Set and communicate daily mobility goal to care team and patient/family/caregiver. - Collaborate with rehabilitation services on mobility goals if consulted- Perform Range of Motion  times a day.- Reposition patient every  hours.- Dangle patient  times a day- Stand patient  times a day- Ambulate patient  times a day- Out of bed to chair  times a day - Out of bed for meals  times a day- Out of bed for toileting- Record patient progress and toleration of activity level   Outcome: Progressing

## 2025-05-07 NOTE — ASSESSMENT & PLAN NOTE
LFTs and LFTs mildly elevated.  Patient asymptomatic on exam  Will obtain right upper quadrant ultrasound.

## 2025-05-07 NOTE — ASSESSMENT & PLAN NOTE
Currently not in acute exacerbation.  Discontinue IV Solu-Medrol and transition to p.o. prednisone per pulmonology recommendation.  For 5-day course.  Continue home regimen of Wixela, albuterol as needed.    I have personally counseled the patient on medication indication, compliance, utilization and side effects. Patient may be discharged home with albuterol

## 2025-05-07 NOTE — PROGRESS NOTES
Progress Note - Hospitalist   Name: Rosalio Anguiano 80 y.o. male I MRN: [id number]  Unit/Bed#: ICU 10 I Date of Admission: 5/3/2025   Date of Service: 5/7/2025 I Hospital Day: 4    Assessment & Plan  Pneumothorax  80-year-old male patient with past medical history of COPD, chronic A-fib currently on Eliquis, hospitalized due to spontaneous pneumothorax.  Initial chest tube was dislodged and second chest tube was placed by ER however further chest x-ray noted with expanding pneumothorax requiring emergent IR consultation and had large tube placed on 05/4/2025.  Currently patient does have 2 chest tubes in place.  Plan:  Repeat chest x-ray this morning showing persistent apical small pneumothorax  Discussed with pulmonology team and will do waterseal for today and repeat chest x-ray in the afternoon.  Patient remains on room air.  Not in distress  If pneumothorax remains stable patient will be left on waterseal for today and reassess chest x-ray in the a.m..  Will continue with daily x-rays.  If after 5 days no resolution of pneumothorax patient may need cardiothoracic evaluation.  Pulmonology following and recommendation appreciated.  Patient is agreeable with above plan.  Chronic atrial fibrillation (HCC)  Rate currently uncontrolled due to pneumothorax.  Continue Cardizem.  Discussed with pulmonology team and okay to resume Eliquis today.  Might need to hold Eliquis in 48 hours if patient will require further cardiothoracic intervention.  Centrilobular emphysema (HCC)  Currently not in acute exacerbation.  Discontinue IV Solu-Medrol and transition to p.o. prednisone per pulmonology recommendation.  For 5-day course.  Continue home regimen of Wixela, albuterol as needed.  GERD (gastroesophageal reflux disease)  Continue PPI  Positive blood culture  1 out of 2 blood culture turns positive for staphylococcal coagulase-negative.  Patient remains hemodynamically stable and afebrile.  Normal WBC.  Suspected to be  contaminant.  Will continue holding antibiotics  Lung nodule  Found nodular density in right lobe on last CT chest.  Required 3-month follow-up with a repeat CT chest.  Elevated liver enzymes  LFTs and LFTs mildly elevated.  Patient asymptomatic on exam  Will obtain right upper quadrant ultrasound.    VTE Pharmacologic Prophylaxis: VTE Score: 6 High Risk (Score >/= 5) - Pharmacological DVT Prophylaxis Ordered: enoxaparin (Lovenox). Sequential Compression Devices Ordered.    Mobility:   Basic Mobility Inpatient Raw Score: 24  JH-HLM Goal: 8: Walk 250 feet or more  JH-HLM Achieved: 7: Walk 25 feet or more  JH-HLM Goal achieved. Continue to encourage appropriate mobility.    Patient Centered Rounds: I performed bedside rounds with nursing staff today.   Discussions with Specialists or Other Care Team Provider: Discussed plan with pulmonology team    Education and Discussions with Family / Patient: Patient declined call to .     Current Length of Stay: 4 day(s)  Current Patient Status: Inpatient   Certification Statement: The patient will continue to require additional inpatient hospital stay due to persistent pneumothorax  Code Status: Level 1 - Full Code    Subjective   Patient admits uneventful night.  Reports being asymptomatic on exam.    Objective :  Temp:  [97.6 °F (36.4 °C)-97.9 °F (36.6 °C)] 97.8 °F (36.6 °C)  HR:  [76-98] 97  BP: (133-158)/() 133/78  Resp:  [20-22] 22  SpO2:  [90 %-94 %] 94 %  O2 Device: None (Room air)    Body mass index is 27.18 kg/m².     Input and Output Summary (last 24 hours):     Intake/Output Summary (Last 24 hours) at 5/7/2025 1201  Last data filed at 5/7/2025 0531  Gross per 24 hour   Intake 520 ml   Output 1908 ml   Net -1388 ml       Physical Exam  Vitals and nursing note reviewed.   Constitutional:       General: He is not in acute distress.     Appearance: He is well-developed. He is not ill-appearing.   HENT:      Head: Normocephalic and atraumatic.   Eyes:       Conjunctiva/sclera: Conjunctivae normal.   Cardiovascular:      Rate and Rhythm: Normal rate and regular rhythm.      Heart sounds: No murmur heard.  Pulmonary:      Effort: Pulmonary effort is normal. No respiratory distress.      Breath sounds: No wheezing or rales.      Comments: Decreased breathing sounds.  Abdominal:      Palpations: Abdomen is soft.      Tenderness: There is no abdominal tenderness.   Musculoskeletal:         General: No swelling.      Cervical back: Neck supple.      Right lower leg: No edema.      Left lower leg: No edema.   Skin:     General: Skin is warm and dry.      Capillary Refill: Capillary refill takes less than 2 seconds.   Neurological:      Mental Status: He is alert.   Psychiatric:         Mood and Affect: Mood normal.           Lines/Drains:  Lines/Drains/Airways       Active Status       Name Placement date Placement time Site Days    Chest Tube 1 Right Fifth intercostal space 05/03/25  0948  Fifth intercostal space  4    Chest Tube 2 Right Anterior 14 Fr. 05/04/25  1223  Anterior  2                            Lab Results: I have reviewed the following results:   Results from last 7 days   Lab Units 05/07/25  0530   WBC Thousand/uL 13.49*   HEMOGLOBIN g/dL 12.6   HEMATOCRIT % 37.2   PLATELETS Thousands/uL 201   SEGS PCT % 82*   LYMPHO PCT % 9*   MONO PCT % 8   EOS PCT % 0     Results from last 7 days   Lab Units 05/06/25  0541   SODIUM mmol/L 138   POTASSIUM mmol/L 4.5   CHLORIDE mmol/L 109*   CO2 mmol/L 25   BUN mg/dL 31*   CREATININE mg/dL 0.88   ANION GAP mmol/L 4   CALCIUM mg/dL 8.6   ALBUMIN g/dL 3.4*   TOTAL BILIRUBIN mg/dL 1.08*   ALK PHOS U/L 76   ALT U/L 91*   AST U/L 61*   GLUCOSE RANDOM mg/dL 117     Results from last 7 days   Lab Units 05/04/25  0542   INR  1.26*             Results from last 7 days   Lab Units 05/04/25  1330 05/03/25  1117 05/03/25  0838   LACTIC ACID mmol/L 1.2 4.4* 2.3*   PROCALCITONIN ng/ml  --   --  0.08       Recent Cultures (last 7  days):   Results from last 7 days   Lab Units 05/03/25  0847 05/03/25  0838   BLOOD CULTURE  No Growth at 72 hrs. Staphylococcus coagulase negative*   GRAM STAIN RESULT   --  Gram positive cocci in clusters*       Imaging Results Review: I reviewed radiology reports from this admission including: chest xray.  Other Study Results Review: No additional pertinent studies reviewed.    Last 24 Hours Medication List:     Current Facility-Administered Medications:     albuterol (PROVENTIL HFA,VENTOLIN HFA) inhaler 2 puff, Q4H PRN    diltiazem (CARDIZEM CD) 24 hr capsule 120 mg, Daily    enoxaparin (LOVENOX) subcutaneous injection 40 mg, Q24H HARRY    fluticasone-vilanterol 200-25 mcg/actuation 1 puff, Daily    levothyroxine tablet 25 mcg, Early Morning    pantoprazole (PROTONIX) EC tablet 20 mg, Early Morning    predniSONE tablet 40 mg, Daily    tamsulosin (FLOMAX) capsule 0.4 mg, Daily With Dinner    Administrative Statements   Today, Patient Was Seen By: Lizeth Clark MD      **Please Note: This note may have been constructed using a voice recognition system.**

## 2025-05-07 NOTE — UTILIZATION REVIEW
Continued Stay Review    Date: 5/7/25                           Current Patient Class: Inpatient  Current Level of Care: ICU     HPI:80 y.o. male initially admitted on 5/3/25    Current Diagnosis: Rt Pneumothorax     Assessment/Plan: Hospitalist: on Room air. Chest tube has since been to waterseal, has ongoing airleak . Chest xray from this AM reveals persistent apical small pneumothorax. Initial chest tube placed on 5/3 removed today , Apical Chest tube remains. Pt has Decreased breathing sounds.  Plan for repeat CXR in the afternoon. If pneumothorax remains stable patient will be left on waterseal for today and reassess chest x-ray in the a.m.  Will continue with daily x-rays.  If after 5 days no resolution of pneumothorax patient may need cardiothoracic evaluation.Discontinue IV Solu-Medrol and transition to p.o. prednisone per pulmonology recommendation. For 5-day course. + BC 1 of 2, suspected contaminent, no fever and WBC WNL.     Medications:   Scheduled Medications:  apixaban, 5 mg, Oral, BID  Cholecalciferol, 2,000 Units, Oral, Daily  diltiazem, 120 mg, Oral, Daily  fluticasone-vilanterol, 1 puff, Inhalation, Daily  levothyroxine, 25 mcg, Oral, Early Morning  pantoprazole, 20 mg, Oral, Early Morning  predniSONE, 40 mg, Oral, Daily  tamsulosin, 0.4 mg, Oral, Daily With Dinner      Continuous IV Infusions: none      PRN Meds:  albuterol, 2 puff, Inhalation, Q4H PRN      Discharge Plan: TBD     Vital Signs (last 3 days)       Date/Time Temp Pulse Resp BP MAP (mmHg) SpO2 Calculated FIO2 (%) - Nasal Cannula Nasal Cannula O2 Flow Rate (L/min) O2 Device Patient Position - Orthostatic VS Pain    05/07/25 1220 97.7 °F (36.5 °C) 98 20 123/71 90 94 % -- -- None (Room air) Sitting No Pain    05/07/25 0845 -- -- -- 133/78 -- -- -- -- -- -- --    05/07/25 0801 -- 97 -- -- -- 94 % -- -- -- -- --    05/07/25 0800 -- -- -- -- -- -- -- -- -- -- No Pain    05/07/25 0600 -- 90 -- -- -- 94 % -- -- -- -- --    05/07/25 0359 97.8  °F (36.6 °C) 83 22 142/80 105 93 % -- -- None (Room air) Lying No Pain    05/07/25 0200 -- 76 -- -- -- 90 % -- -- -- -- --    05/07/25 0100 97.6 °F (36.4 °C) 77 20 134/90 106 91 % -- -- None (Room air) Lying No Pain    05/07/25 0000 -- 95 -- -- -- 90 % -- -- -- -- --    05/06/25 2300 -- 81 -- -- -- 92 % -- -- -- -- --    05/06/25 2200 -- 81 -- -- -- 91 % -- -- -- -- --    05/06/25 2100 -- 88 -- -- -- 93 % -- -- -- -- --    05/06/25 2001 -- 98 -- -- -- 91 % -- -- -- -- --    05/06/25 1921 97.9 °F (36.6 °C) 91 20 158/100 124 94 % -- -- None (Room air) Lying No Pain    05/06/25 1500 97.8 °F (36.6 °C) 88 20 138/83 106 94 % -- -- None (Room air) Sitting No Pain    05/06/25 1100 97.5 °F (36.4 °C) 89 21 155/75 103 91 % -- -- None (Room air) Sitting No Pain    05/06/25 0900 -- 97 -- -- -- 91 % -- -- -- -- No Pain    05/06/25 0745 -- -- -- -- -- 91 % -- -- -- -- --    05/06/25 0700 97.6 °F (36.4 °C) 77 18 145/90 111 90 % -- -- None (Room air) Sitting No Pain    05/06/25 0011 98 °F (36.7 °C) -- 18 136/93 109 -- -- -- None (Room air) Lying --    05/05/25 2000 -- -- -- -- -- -- -- -- -- -- No Pain    05/05/25 1932 97.7 °F (36.5 °C) 89 18 143/69 99 95 % -- -- None (Room air) Sitting No Pain    05/05/25 1757 -- -- -- -- -- 93 % -- -- None (Room air) -- --    05/05/25 1100 97.6 °F (36.4 °C) 93 18 129/77 98 94 % -- -- None (Room air) Lying No Pain    05/05/25 0807 -- -- -- -- -- 94 % 28 2 L/min Nasal cannula -- --    05/05/25 0800 -- -- -- -- -- -- 28 2 L/min Nasal cannula -- No Pain    05/05/25 0710 97.8 °F (36.6 °C) 106 19 137/87 106 90 % -- -- -- Lying --    05/05/25 0400 -- -- -- -- -- -- -- -- -- -- No Pain    05/05/25 0200 97.6 °F (36.4 °C) 91 34 128/81 101 94 % 32 3 L/min Nasal cannula Lying --    05/05/25 0000 -- -- -- -- -- -- -- -- -- -- No Pain    05/04/25 2300 97.9 °F (36.6 °C) 87 18 140/83 103 93 % 32 3 L/min Nasal cannula -- --    05/04/25 2119 -- 90 17 -- -- 90 % 32 3 L/min Nasal cannula -- --    05/04/25 2034 -- --  -- -- -- 95 % 28 2 L/min Nasal cannula -- --    05/04/25 2015 -- 98 41 135/76 99 94 % 28 2 L/min Nasal cannula Lying No Pain    05/04/25 1856 97.8 °F (36.6 °C) 98 20 131/76 97 93 % -- -- Nasal cannula Lying No Pain    05/04/25 1530 97.7 °F (36.5 °C) 101 20 125/77 97 92 % -- -- Nasal cannula Lying No Pain    05/04/25 1321 -- -- -- -- -- 93 % -- -- -- -- --    05/04/25 1300 -- -- -- -- -- -- -- -- -- -- No Pain    05/04/25 1255 97.7 °F (36.5 °C) 114 21 144/81 106 91 % -- -- -- Lying --    05/04/25 12:24:59 -- 121 16 138/66 -- 97 % -- -- -- -- --    05/04/25 12:22:46 -- 110 18 122/79 -- 98 % -- -- -- -- --    05/04/25 12:17:07 -- 124 16 134/61 -- 93 % -- -- -- -- --    05/04/25 12:11:11 -- 139 18 139/81 -- 91 % -- -- -- -- --    05/04/25 12:07:14 -- 130 16 118/65 -- 93 % -- -- -- -- --    05/04/25 0830 -- -- -- -- -- 93 % 36 4 L/min Nasal cannula -- No Pain    05/04/25 0727 -- -- -- -- -- 92 % 36 4 L/min Nasal cannula -- --    05/04/25 07:12:29 97.5 °F (36.4 °C) 95 -- 133/82 99 91 % -- -- -- -- --          Weight (last 2 days)       Date/Time Weight    05/05/25 0906 90.9 (200.4)            Pertinent Labs/Diagnostic Results:   Radiology:  XR chest portable ICU   Final Interpretation by Rob Kenyon MD (05/07 0903)      Right-sided chest tubes in place with a grossly stable small right pneumothorax.            Workstation performed: [id number]         XR chest portable   Final Interpretation by Camille Calvo MD (05/06 1359)   Stable small right pneumothorax compared to earlier the same date.            Workstation performed: [id number]         XR chest portable   Final Interpretation by Perla Phelan MD (05/06 0830)   Right-sided small apical pneumothorax, unchanged      Stable soft tissue emphysema   No worsening      Workstation performed: [id number]         XR chest portable   Final Interpretation by Jocy Leary MD (05/05 2033)      Decreased size of right pneumothorax.      Improved consolidation in the  "medial right base.      Stable subcutaneous emphysema.            Workstation performed: [id number]         XR chest portable   Final Interpretation by Franklyn Moody DO (05/05 1718)      There is an enlarging, now moderate pneumothorax within the lateral aspect of the right chest.      Increasing subcutaneous emphysema especially in the axillary region.      This examination was marked \"immediate notification\" in Epic in order to begin the standard process by which the radiology reading room liaison alerts the referring practitioner.            Workstation performed: [id number]         XR chest portable ICU   Final Interpretation by Isaiah De La Rosa MD (05/05 1339)      No convincing residual pneumothorax. Improving subcutaneous emphysema.            Workstation performed: [id number]         CXR pa & lateral in AM   Final Interpretation by Kendra White MD (05/05 1011)      Right pigtail catheter and small caliber right chest tube with trace right pneumothorax and moderate subcutaneous emphysema.            Workstation performed: [id number]         XR chest portable ICU   Final Interpretation by Kendra White MD (05/04 1318)      Insertion of right pigtail catheter in addition to small caliber right chest tube with decrease in right pneumothorax, now small about the right apex.            Workstation performed: [id number]         IR chest tube placement   Final Interpretation by Caio Tenorio DO (05/04 1225)   Impression: Successful fluoroscopic guided 14 Czech right chest tube placement.         Workstation performed: [id number]         XR chest portable   Final Interpretation by Kendra White MD (05/04 1111)      Increase in size of right pneumothorax, now moderate, with increasing subcutaneous emphysema in the right chest wall.      The study was marked in EPIC for immediate notification.            Workstation performed: [id number]         XR chest 1 view portable   Final " Ambulatory Interpretation by Kendra White MD (05/04 0715)      Right pleural drainage catheter insertion with persistent trace right pneumothorax.            Workstation performed: [id number]         XR chest portable   Final Interpretation by Kendra White MD (05/04 0712)      Previous right pleural drainage catheter not visible with trace superior lateral right apical pneumothorax.            Workstation performed: [id number]         XR chest 1 view portable   Final Interpretation by Kendra White MD (05/03 1259)      Right pleural drainage catheter insertion with resolution of pneumothorax.            Workstation performed: [id number]         XR chest portable   ED Interpretation by Simin Rod PA-C (05/03 1010)   Large right sided pneumothorax      Final Interpretation by Kendra White MD (05/03 1210)      Large right pneumothorax with mediastinal shift to the left suggesting tension. A chest tube was subsequently inserted.            Workstation performed: [id number]         US right upper quadrant    (Results Pending)     Cardiology:  ECG 12 lead   Final Result by Annia Acosta MD (05/03 1819)   Atrial fibrillation with rapid ventricular response   Left axis deviation   Incomplete right bundle branch block   Nonspecific ST abnormality   Abnormal ECG      Confirmed by Annia Acosta ([id number]) on 5/3/2025 6:19:57 PM            Results from last 7 days   Lab Units 05/07/25  0530 05/06/25  0541 05/05/25  0521 05/04/25  0542 05/03/25  0838   WBC Thousand/uL 13.49* 8.42 9.54 10.36* 9.03   HEMOGLOBIN g/dL 12.6 11.6* 11.2* 12.1 13.6   HEMATOCRIT % 37.2 34.8* 33.1* 36.8 40.3   PLATELETS Thousands/uL 201 177 191 199 203   TOTAL NEUT ABS Thousands/µL 11.07*  --   --  9.36* 6.15         Results from last 7 days   Lab Units 05/06/25  0541 05/05/25  0521 05/04/25  0542 05/03/25  0838   SODIUM mmol/L 138 138 136 138   POTASSIUM mmol/L 4.5 4.5 4.2 4.1   CHLORIDE mmol/L 109* 109* 105 102   CO2 mmol/L 25  24 22 25   ANION GAP mmol/L 4 5 9 11   BUN mg/dL 31* 27* 26* 24   CREATININE mg/dL 0.88 0.85 0.97 1.01   EGFR ml/min/1.73sq m 81 82 73 70   CALCIUM mg/dL 8.6 8.4 8.9 9.4     Results from last 7 days   Lab Units 05/06/25  0541 05/05/25  0521 05/04/25  0542 05/03/25  0838   AST U/L 61* 58* 32 21   ALT U/L 91* 53* 26 20   ALK PHOS U/L 76 76 86 92   TOTAL PROTEIN g/dL 6.2* 6.4 7.6 8.3   ALBUMIN g/dL 3.4* 3.4* 4.0 4.3   TOTAL BILIRUBIN mg/dL 1.08* 1.03* 1.25* 1.66*         Results from last 7 days   Lab Units 05/06/25  0541 05/05/25  0521 05/04/25  0542 05/03/25  0838   GLUCOSE RANDOM mg/dL 117 142* 149* 129           Results from last 7 days   Lab Units 05/03/25  0838   PROCALCITONIN ng/ml 0.08     Results from last 7 days   Lab Units 05/04/25  1330 05/03/25  1117 05/03/25  0838   LACTIC ACID mmol/L 1.2 4.4* 2.3*           Results from last 7 days   Lab Units 05/03/25  1613   CLARITY UA  Clear   COLOR UA  Yellow   SPEC GRAV UA  1.025   PH UA  5.5   GLUCOSE UA mg/dl Negative   KETONES UA mg/dl 10 (1+)*   BLOOD UA  Negative   PROTEIN UA mg/dl 70 (1+)*   NITRITE UA  Negative   BILIRUBIN UA  Negative   UROBILINOGEN UA (BE) mg/dl <2.0   LEUKOCYTES UA  Negative   WBC UA /hpf 1-2   RBC UA /hpf 1-2   BACTERIA UA /hpf None Seen   EPITHELIAL CELLS WET PREP /hpf None Seen   MUCUS THREADS  Occasional*             Results from last 7 days   Lab Units 05/03/25  0847 05/03/25  0838   BLOOD CULTURE  No Growth After 4 Days. Staphylococcus coagulase negative*   GRAM STAIN RESULT   --  Gram positive cocci in clusters*                   Network Utilization Review Department  ATTENTION: Please call with any questions or concerns to [phone] and carefully listen to the prompts so that you are directed to the right person. All voicemails are confidential.   For Discharge needs, contact Care Management DC Support Team at [phone] opt. 2  Send all requests for admission clinical reviews, approved or denied determinations and any other  requests to dedicated fax number below belonging to the campus where the patient is receiving treatment. List of dedicated fax numbers for the Facilities:  FACILITY NAME UR FAX NUMBER   ADMISSION DENIALS (Administrative/Medical Necessity) [phone]   DISCHARGE SUPPORT TEAM (NETWORK) [phone]   PARENT CHILD HEALTH (Maternity/NICU/Pediatrics) [phone]   [facility] [phone]   [facility] [phone]   [facility] [phone]   [facility] [phone]   [facility] [phone]   [facility] [phone]   [facility] [phone]   [facility] [phone]   [facility] [phone]   [facility] [phone]   [facility] [phone]   [facility] [phone]

## 2025-05-08 ENCOUNTER — HOSPITAL ENCOUNTER (INPATIENT)
Facility: HOSPITAL | Age: 80
LOS: 7 days | Discharge: HOME | DRG: 166 | End: 2025-05-15
Attending: FAMILY MEDICINE | Admitting: INTERNAL MEDICINE
Payer: COMMERCIAL

## 2025-05-08 ENCOUNTER — APPOINTMENT (INPATIENT)
Dept: RADIOLOGY | Facility: HOSPITAL | Age: 80
DRG: 199 | End: 2025-05-08
Payer: COMMERCIAL

## 2025-05-08 VITALS
HEIGHT: 72 IN | WEIGHT: 200.4 LBS | SYSTOLIC BLOOD PRESSURE: 121 MMHG | DIASTOLIC BLOOD PRESSURE: 81 MMHG | RESPIRATION RATE: 20 BRPM | HEART RATE: 90 BPM | TEMPERATURE: 97.6 F | BODY MASS INDEX: 27.14 KG/M2 | OXYGEN SATURATION: 94 %

## 2025-05-08 DIAGNOSIS — J93.11 PRIMARY SPONTANEOUS PNEUMOTHORAX: Primary | ICD-10-CM

## 2025-05-08 PROBLEM — J96.01 ACUTE RESPIRATORY FAILURE WITH HYPOXIA (HCC): Status: RESOLVED | Noted: 2025-05-07 | Resolved: 2025-05-08

## 2025-05-08 LAB
ALBUMIN SERPL BCG-MCNC: 3.6 G/DL (ref 3.5–5)
ALP SERPL-CCNC: 85 U/L (ref 34–104)
ALT SERPL W P-5'-P-CCNC: 99 U/L (ref 7–52)
ANION GAP SERPL CALCULATED.3IONS-SCNC: 6 MMOL/L (ref 4–13)
AST SERPL W P-5'-P-CCNC: 35 U/L (ref 13–39)
BACTERIA BLD CULT: NORMAL
BASOPHILS # BLD AUTO: 0.01 THOUSANDS/ÂΜL (ref 0–0.1)
BASOPHILS NFR BLD AUTO: 0 % (ref 0–1)
BILIRUB SERPL-MCNC: 1.17 MG/DL (ref 0.2–1)
BUN SERPL-MCNC: 21 MG/DL (ref 5–25)
CALCIUM SERPL-MCNC: 9 MG/DL (ref 8.4–10.2)
CHLORIDE SERPL-SCNC: 105 MMOL/L (ref 96–108)
CO2 SERPL-SCNC: 26 MMOL/L (ref 21–32)
CREAT SERPL-MCNC: 0.77 MG/DL (ref 0.6–1.3)
EOSINOPHIL # BLD AUTO: 0.11 THOUSAND/ÂΜL (ref 0–0.61)
EOSINOPHIL NFR BLD AUTO: 1 % (ref 0–6)
ERYTHROCYTE [DISTWIDTH] IN BLOOD BY AUTOMATED COUNT: 25 % (ref 11.6–15.1)
GFR SERPL CREATININE-BSD FRML MDRD: 86 ML/MIN/1.73SQ M
GLUCOSE SERPL-MCNC: 108 MG/DL (ref 65–140)
HCT VFR BLD AUTO: 37.6 % (ref 36.5–49.3)
HGB BLD-MCNC: 12.8 G/DL (ref 12–17)
IMM GRANULOCYTES # BLD AUTO: 0.16 THOUSAND/UL (ref 0–0.2)
IMM GRANULOCYTES NFR BLD AUTO: 1 % (ref 0–2)
LYMPHOCYTES # BLD AUTO: 1.21 THOUSANDS/ÂΜL (ref 0.6–4.47)
LYMPHOCYTES NFR BLD AUTO: 9 % (ref 14–44)
MCH RBC QN AUTO: 33.7 PG (ref 26.8–34.3)
MCHC RBC AUTO-ENTMCNC: 34 G/DL (ref 31.4–37.4)
MCV RBC AUTO: 99 FL (ref 82–98)
MONOCYTES # BLD AUTO: 1.53 THOUSAND/ÂΜL (ref 0.17–1.22)
MONOCYTES NFR BLD AUTO: 11 % (ref 4–12)
NEUTROPHILS # BLD AUTO: 11.03 THOUSANDS/ÂΜL (ref 1.85–7.62)
NEUTS SEG NFR BLD AUTO: 78 % (ref 43–75)
NRBC BLD AUTO-RTO: 1 /100 WBCS
PLATELET # BLD AUTO: 178 THOUSANDS/UL (ref 149–390)
PMV BLD AUTO: 10.3 FL (ref 8.9–12.7)
POTASSIUM SERPL-SCNC: 3.9 MMOL/L (ref 3.5–5.3)
PROT SERPL-MCNC: 6.6 G/DL (ref 6.4–8.4)
RBC # BLD AUTO: 3.8 MILLION/UL (ref 3.88–5.62)
SODIUM SERPL-SCNC: 137 MMOL/L (ref 135–147)
WBC # BLD AUTO: 14.05 THOUSAND/UL (ref 4.31–10.16)

## 2025-05-08 PROCEDURE — 85025 COMPLETE CBC W/AUTO DIFF WBC: CPT

## 2025-05-08 PROCEDURE — 94664 DEMO&/EVAL PT USE INHALER: CPT

## 2025-05-08 PROCEDURE — 71045 X-RAY EXAM CHEST 1 VIEW: CPT

## 2025-05-08 PROCEDURE — 94760 N-INVAS EAR/PLS OXIMETRY 1: CPT

## 2025-05-08 PROCEDURE — 80053 COMPREHEN METABOLIC PANEL: CPT

## 2025-05-08 PROCEDURE — 99223 1ST HOSP IP/OBS HIGH 75: CPT | Performed by: INTERNAL MEDICINE

## 2025-05-08 PROCEDURE — NC001 PR NO CHARGE

## 2025-05-08 PROCEDURE — 99232 SBSQ HOSP IP/OBS MODERATE 35: CPT

## 2025-05-08 PROCEDURE — 99232 SBSQ HOSP IP/OBS MODERATE 35: CPT | Performed by: INTERNAL MEDICINE

## 2025-05-08 RX ORDER — PANTOPRAZOLE SODIUM 20 MG/1
20 TABLET, DELAYED RELEASE ORAL
Status: DISCONTINUED | OUTPATIENT
Start: 2025-05-09 | End: 2025-05-15 | Stop reason: HOSPADM

## 2025-05-08 RX ORDER — TAMSULOSIN HYDROCHLORIDE 0.4 MG/1
0.4 CAPSULE ORAL
Status: CANCELLED | OUTPATIENT
Start: 2025-05-09

## 2025-05-08 RX ORDER — ALBUTEROL SULFATE 90 UG/1
2 INHALANT RESPIRATORY (INHALATION) EVERY 4 HOURS PRN
Status: CANCELLED | OUTPATIENT
Start: 2025-05-08

## 2025-05-08 RX ORDER — TAMSULOSIN HYDROCHLORIDE 0.4 MG/1
0.4 CAPSULE ORAL
Status: DISCONTINUED | OUTPATIENT
Start: 2025-05-09 | End: 2025-05-15 | Stop reason: HOSPADM

## 2025-05-08 RX ORDER — DILTIAZEM HYDROCHLORIDE 120 MG/1
120 CAPSULE, COATED, EXTENDED RELEASE ORAL DAILY
Status: CANCELLED | OUTPATIENT
Start: 2025-05-09

## 2025-05-08 RX ORDER — FLUTICASONE FUROATE AND VILANTEROL 200; 25 UG/1; UG/1
1 POWDER RESPIRATORY (INHALATION) DAILY
Status: DISCONTINUED | OUTPATIENT
Start: 2025-05-09 | End: 2025-05-15 | Stop reason: HOSPADM

## 2025-05-08 RX ORDER — ALBUTEROL SULFATE 90 UG/1
2 INHALANT RESPIRATORY (INHALATION) EVERY 4 HOURS PRN
Status: DISCONTINUED | OUTPATIENT
Start: 2025-05-08 | End: 2025-05-15 | Stop reason: HOSPADM

## 2025-05-08 RX ORDER — DILTIAZEM HYDROCHLORIDE 120 MG/1
120 CAPSULE, COATED, EXTENDED RELEASE ORAL DAILY
Status: DISCONTINUED | OUTPATIENT
Start: 2025-05-09 | End: 2025-05-15 | Stop reason: HOSPADM

## 2025-05-08 RX ORDER — LEVOTHYROXINE SODIUM 25 UG/1
25 TABLET ORAL
Status: DISCONTINUED | OUTPATIENT
Start: 2025-05-09 | End: 2025-05-15 | Stop reason: HOSPADM

## 2025-05-08 RX ORDER — FLUTICASONE FUROATE AND VILANTEROL 200; 25 UG/1; UG/1
1 POWDER RESPIRATORY (INHALATION) DAILY
Status: CANCELLED | OUTPATIENT
Start: 2025-05-09

## 2025-05-08 RX ORDER — PANTOPRAZOLE SODIUM 20 MG/1
20 TABLET, DELAYED RELEASE ORAL
Status: CANCELLED | OUTPATIENT
Start: 2025-05-09

## 2025-05-08 RX ORDER — LEVOTHYROXINE SODIUM 25 UG/1
25 TABLET ORAL
Status: CANCELLED | OUTPATIENT
Start: 2025-05-09

## 2025-05-08 RX ADMIN — LEVOTHYROXINE SODIUM 25 MCG: 0.03 TABLET ORAL at 05:40

## 2025-05-08 RX ADMIN — Medication 2000 UNITS: at 09:43

## 2025-05-08 RX ADMIN — DILTIAZEM HYDROCHLORIDE 120 MG: 120 CAPSULE, COATED, EXTENDED RELEASE ORAL at 09:43

## 2025-05-08 RX ADMIN — APIXABAN 5 MG: 5 TABLET, FILM COATED ORAL at 09:43

## 2025-05-08 RX ADMIN — PANTOPRAZOLE SODIUM 20 MG: 20 TABLET, DELAYED RELEASE ORAL at 05:40

## 2025-05-08 RX ADMIN — TAMSULOSIN HYDROCHLORIDE 0.4 MG: 0.4 CAPSULE ORAL at 18:37

## 2025-05-08 RX ADMIN — PREDNISONE 40 MG: 20 TABLET ORAL at 09:43

## 2025-05-08 RX ADMIN — FLUTICASONE FUROATE AND VILANTEROL TRIFENATATE 1 PUFF: 200; 25 POWDER RESPIRATORY (INHALATION) at 09:46

## 2025-05-08 NOTE — ASSESSMENT & PLAN NOTE
Patient hemodynamically stable and afebrile  WBC thought elevated in setting of steroids   Suspected to be contaminant  Monitor off antibiotics

## 2025-05-08 NOTE — ASSESSMENT & PLAN NOTE
80-year-old male patient with past medical history of COPD, chronic A-fib currently on Eliquis, hospitalized due to spontaneous pneumothorax.  Initial chest tube was dislodged and second chest tube was placed by ER however further chest x-ray noted with expanding pneumothorax requiring emergent IR consultation and had large tube placed on 05/4/2025.  Currently patient does have 2 chest tubes in place.  Plan:  Repeat chest x-ray this morning showing persistent apical small pneumothorax  Discussed with pulmonology team and will do waterseal for today and repeat chest x-ray in the afternoon.  Patient remains on room air.  Not in distress  Patient will be continued on waterseal since there is some air leak still present.  X-ray is definitely looking better.  Hopefully in the next 24 hours we will proceed with clamping if airleak is decreased.  Patient is agreeable with above plan.

## 2025-05-08 NOTE — RESPIRATORY THERAPY NOTE
RT Protocol Note  Rosalio Anguiano 80 y.o. male MRN: [id number]  Unit/Bed#: ICU 10 Encounter: [id number]    Assessment    Principal Problem:    Pneumothorax  Active Problems:    Chronic atrial fibrillation (HCC)    Centrilobular emphysema (HCC)    GERD (gastroesophageal reflux disease)    Lung nodule    Positive blood culture    Elevated liver enzymes    Acute respiratory failure with hypoxia (HCC)      Home Pulmonary Medications:     05/08/25 1434   Respiratory Protocol   Protocol Initiated? No   Protocol Selection Respiratory   Language Barrier? No   Medical & Social History Reviewed? Yes   Diagnostic Studies Reviewed? Yes   Physical Assessment Performed? Yes   Respiratory Plan No distress/Pulmonary history   Respiratory Assessment   Resp Comments respirations even and non labored, continue prn albuterol   Additional Assessments   Pulse 100   Respirations 18   SpO2 94 %            Past Medical History:   Diagnosis Date    A-fib (HCC)     BPH with obstruction/lower urinary tract symptoms     Colon polyp     COPD (chronic obstructive pulmonary disease) (HCC)     Frequency of urination     GERD (gastroesophageal reflux disease)     History of cardioversion 12/27/2011    Inital Rhythm AFIB, Final Rhythm Sinus, Max Joules 75    History of echocardiogram 06/01/2015    Normal LV systolic function, mild concentric LV hypertrophy, mild mitral and tricuspid regurg, right atrial enlargement. EF 55%    Irregular heart beat     AF    Other male erectile dysfunction     Personal history of irradiation     Personal history of malignant neoplasm of prostate     Prostate cancer (HCC)      Social History     Socioeconomic History    Marital status: /Civil Union     Spouse name: Not on file    Number of children: Not on file    Years of education: Not on file    Highest education level: Not on file   Occupational History    Not on file   Tobacco Use    Smoking status: Former     Current packs/day: 0.00     Average packs/day:  2.0 packs/day for 33.5 years (67.0 ttl pk-yrs)     Types: Cigarettes     Start date:      Quit date: 1998     Years since quittin.8    Smokeless tobacco: Never   Vaping Use    Vaping status: Never Used   Substance and Sexual Activity    Alcohol use: Yes     Alcohol/week: 7.0 standard drinks of alcohol     Types: 7 Standard drinks or equivalent per week     Comment: 1/2 glass of wine daily    Drug use: No    Sexual activity: Not on file   Other Topics Concern    Not on file   Social History Narrative    Daily caffeine use- 2 cups of coffee, 1-2 bottles of green tea     Social Drivers of Health     Financial Resource Strain: Not on file   Food Insecurity: No Food Insecurity (2025)    Hunger Vital Sign     Worried About Running Out of Food in the Last Year: Never true     Ran Out of Food in the Last Year: Never true   Transportation Needs: No Transportation Needs (2025)    PRAPARE - Transportation     Lack of Transportation (Medical): No     Lack of Transportation (Non-Medical): No   Physical Activity: Not on file   Stress: Not on file   Social Connections: Not on file   Intimate Partner Violence: Unknown (5/3/2025)    Nursing IPS     Feels Physically and Emotionally Safe: Not on file     Physically Hurt by Someone: Not on file     Humiliated or Emotionally Abused by Someone: Not on file     Physically Hurt by Someone: No     Hurt or Threatened by Someone: No   Housing Stability: Low Risk  (2025)    Housing Stability Vital Sign     Unable to Pay for Housing in the Last Year: No     Number of Times Moved in the Last Year: 0     Homeless in the Last Year: No       Subjective    Subjective Data: consider frequencychange as pt states the breathing treatments increase his coughing    Objective    Physical Exam:        Vitals:  Blood pressure 119/78, pulse 100, temperature 97.8 °F (36.6 °C), temperature source Oral, resp. rate 18, height 6' (1.829 m), weight 90.9 kg (200 lb 6.4 oz), SpO2 94%.           Imaging and other studies:     O2 Device: NC     Plan    Respiratory Plan: No distress/Pulmonary history        Resp Comments: respirations even and non labored, continue prn albuterol

## 2025-05-08 NOTE — ASSESSMENT & PLAN NOTE
Presented to [facility] ER on 5/3 with shortness of breath and found to have a spontaneous pneumothorax for which a chest tube was initially placed which was dislodged and then a second chest tube was placed by the ER.  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  Thoracic surgery consultation and appreciate their assistance

## 2025-05-08 NOTE — ASSESSMENT & PLAN NOTE
Currently not in exacerbation  Completed 5 days of steroid therapy from 5/3-->5/8  Holding off additional steroids for now   Continue pta inhalers

## 2025-05-08 NOTE — PLAN OF CARE
Problem: PAIN - ADULT  Goal: Verbalizes/displays adequate comfort level or baseline comfort level  Description: Interventions:- Encourage patient to monitor pain and request assistance- Assess pain using appropriate pain scale- Administer analgesics based on type and severity of pain and evaluate response- Implement non-pharmacological measures as appropriate and evaluate response- Consider cultural and social influences on pain and pain management- Notify physician/advanced practitioner if interventions unsuccessful or patient reports new pain  Outcome: Progressing     Problem: INFECTION - ADULT  Goal: Absence or prevention of progression during hospitalization  Description: INTERVENTIONS:- Assess and monitor for signs and symptoms of infection- Monitor lab/diagnostic results- Monitor all insertion sites, i.e. indwelling lines, tubes, and drains- Monitor endotracheal if appropriate and nasal secretions for changes in amount and color- Pease appropriate cooling/warming therapies per order- Administer medications as ordered- Instruct and encourage patient and family to use good hand hygiene technique- Identify and instruct in appropriate isolation precautions for identified infection/condition  Outcome: Progressing  Goal: Absence of fever/infection during neutropenic period  Description: INTERVENTIONS:- Monitor WBC  Outcome: Progressing     Problem: DISCHARGE PLANNING  Goal: Discharge to home or other facility with appropriate resources  Description: INTERVENTIONS:- Identify barriers to discharge w/patient and caregiver- Arrange for needed discharge resources and transportation as appropriate- Identify discharge learning needs (meds, wound care, etc.)- Arrange for interpretive services to assist at discharge as needed- Refer to Case Management Department for coordinating discharge planning if the patient needs post-hospital services based on physician/advanced practitioner order or complex needs related to  functional status, cognitive ability, or social support system  Outcome: Progressing     Problem: Knowledge Deficit  Goal: Patient/family/caregiver demonstrates understanding of disease process, treatment plan, medications, and discharge instructions  Description: Complete learning assessment and assess knowledge base.Interventions:- Provide teaching at level of understanding- Provide teaching via preferred learning methods  Outcome: Progressing

## 2025-05-08 NOTE — ASSESSMENT & PLAN NOTE
Likely secondary to ruptured bullae  Initial chest tube placed in ED still with large PTX  New chest tube placed by IR on 5/4/25    On initial clamping of the chest tube on 5/5/2025, patient had worsening pneumothorax  Chest tube has since been to waterseal, has ongoing airleak although seems to be slowing today  CXR looks improved on my review  Will maintain chest tube to waterseal for now given ongoing air leak  Initial chest tube that had been placed in the emergency room was removed yesterday

## 2025-05-08 NOTE — PROGRESS NOTES
Progress Note - Hospitalist   Name: Rosalio Anguiano 80 y.o. male I MRN: [id number]  Unit/Bed#: ICU 10 I Date of Admission: 5/3/2025   Date of Service: 5/8/2025 I Hospital Day: 5    Assessment & Plan  Pneumothorax  80-year-old male patient with past medical history of COPD, chronic A-fib currently on Eliquis, hospitalized due to spontaneous pneumothorax.  Initial chest tube was dislodged and second chest tube was placed by ER however further chest x-ray noted with expanding pneumothorax requiring emergent IR consultation and had large tube placed on 05/4/2025.  Currently patient does have 2 chest tubes in place.  Plan:  Repeat chest x-ray this morning showing persistent apical small pneumothorax  Discussed with pulmonology team and will do waterseal for today and repeat chest x-ray in the afternoon.  Patient remains on room air.  Not in distress  Patient will be continued on waterseal since there is some air leak still present.  X-ray is definitely looking better.  Hopefully in the next 24 hours we will proceed with clamping if airleak is decreased.  Patient is agreeable with above plan.  Chronic atrial fibrillation (HCC)  Rate currently uncontrolled due to pneumothorax.  Continue Cardizem.  Discussed with pulmonology team and okay to resume Eliquis today.  Might need to hold Eliquis in 48 hours if patient will require further cardiothoracic intervention.  Centrilobular emphysema (HCC)  Currently not in acute exacerbation.  Discontinue IV Solu-Medrol and transition to p.o. prednisone per pulmonology recommendation.  For 5-day course.  Continue home regimen of Wixela, albuterol as needed.    I have personally counseled the patient on medication indication, compliance, utilization and side effects. Patient may be discharged home with albuterol   GERD (gastroesophageal reflux disease)  Continue PPI  Positive blood culture  1 out of 2 blood culture turns positive for staphylococcal coagulase-negative.  Patient remains  hemodynamically stable and afebrile.  Normal WBC.  Suspected to be contaminant.  Will continue holding antibiotics  Lung nodule  Found nodular density in right lobe on last CT chest.  Required 3-month follow-up with a repeat CT chest.  Elevated liver enzymes  LFTs and LFTs mildly elevated.  Patient asymptomatic on exam  Right upper quadrant ultrasound without any acute findings.  Patient has hepatomegaly however smooth contour.  Follow-up with primary care doctor.  LFTs with improvement.  Acute respiratory failure with hypoxia (HCC)  .  Patient noted to have O2 requirements earlier in the admission.  Likely related to the pneumothorax.  Currently resolved and patient on room air    VTE Pharmacologic Prophylaxis: VTE Score: 6 High Risk (Score >/= 5) - Pharmacological DVT Prophylaxis Ordered: apixaban (Eliquis). Sequential Compression Devices Ordered.    Mobility:   Basic Mobility Inpatient Raw Score: 24  JH-HLM Goal: 8: Walk 250 feet or more  JH-HLM Achieved: 7: Walk 25 feet or more  JH-HLM Goal achieved. Continue to encourage appropriate mobility.    Patient Centered Rounds: I performed bedside rounds with nursing staff today.   Discussions with Specialists or Other Care Team Provider: Discussed plan with pulmonology team    Education and Discussions with Family / Patient: Patient declined call to .     Current Length of Stay: 5 day(s)  Current Patient Status: Inpatient   Certification Statement: The patient will continue to require additional inpatient hospital stay due to pneumothorax  Discharge Plan: Anticipate discharge in 48 hrs to home.    Code Status: Level 1 - Full Code    Subjective   Patient reports being asymptomatic.  No overnight events.    Objective :  Temp:  [97.4 °F (36.3 °C)-98.5 °F (36.9 °C)] 97.8 °F (36.6 °C)  HR:  [74-94] 94  BP: (119-148)/(67-89) 119/67  Resp:  [18-21] 20  SpO2:  [90 %-95 %] 94 %  O2 Device: None (Room air)    Body mass index is 27.18 kg/m².     Input and Output  Summary (last 24 hours):     Intake/Output Summary (Last 24 hours) at 5/8/2025 1226  Last data filed at 5/7/2025 2301  Gross per 24 hour   Intake 480 ml   Output 1155 ml   Net -675 ml       Physical Exam  Vitals and nursing note reviewed.   Constitutional:       General: He is not in acute distress.     Appearance: He is well-developed.   HENT:      Head: Normocephalic and atraumatic.   Eyes:      Conjunctiva/sclera: Conjunctivae normal.   Cardiovascular:      Rate and Rhythm: Normal rate and regular rhythm.      Heart sounds: No murmur heard.  Pulmonary:      Effort: Pulmonary effort is normal. No respiratory distress.      Comments: Decreased breathing sounds.  Abdominal:      Palpations: Abdomen is soft.      Tenderness: There is no abdominal tenderness.   Musculoskeletal:         General: No swelling.      Cervical back: Neck supple.      Right lower leg: No edema.      Left lower leg: No edema.   Skin:     General: Skin is warm and dry.      Capillary Refill: Capillary refill takes less than 2 seconds.   Neurological:      Mental Status: He is alert.   Psychiatric:         Mood and Affect: Mood normal.           Lines/Drains:  Lines/Drains/Airways       Active Status       Name Placement date Placement time Site Days    Chest Tube 1 Right Fifth intercostal space 05/03/25  0948  Fifth intercostal space  5    Chest Tube 2 Right Anterior 14 Fr. 05/04/25  1223  Anterior  4                            Lab Results: I have reviewed the following results:   Results from last 7 days   Lab Units 05/08/25  0453   WBC Thousand/uL 14.05*   HEMOGLOBIN g/dL 12.8   HEMATOCRIT % 37.6   PLATELETS Thousands/uL 178   SEGS PCT % 78*   LYMPHO PCT % 9*   MONO PCT % 11   EOS PCT % 1     Results from last 7 days   Lab Units 05/08/25  0453   SODIUM mmol/L 137   POTASSIUM mmol/L 3.9   CHLORIDE mmol/L 105   CO2 mmol/L 26   BUN mg/dL 21   CREATININE mg/dL 0.77   ANION GAP mmol/L 6   CALCIUM mg/dL 9.0   ALBUMIN g/dL 3.6   TOTAL BILIRUBIN  mg/dL 1.17*   ALK PHOS U/L 85   ALT U/L 99*   AST U/L 35   GLUCOSE RANDOM mg/dL 108     Results from last 7 days   Lab Units 05/04/25  0542   INR  1.26*             Results from last 7 days   Lab Units 05/04/25  1330 05/03/25  1117 05/03/25  0838   LACTIC ACID mmol/L 1.2 4.4* 2.3*   PROCALCITONIN ng/ml  --   --  0.08       Recent Cultures (last 7 days):   Results from last 7 days   Lab Units 05/03/25  0847 05/03/25  0838   BLOOD CULTURE  No Growth After 5 Days. Staphylococcus coagulase negative*   GRAM STAIN RESULT   --  Gram positive cocci in clusters*       Imaging Results Review: I reviewed radiology reports from this admission including: chest xray.  Other Study Results Review: No additional pertinent studies reviewed.    Last 24 Hours Medication List:     Current Facility-Administered Medications:     albuterol (PROVENTIL HFA,VENTOLIN HFA) inhaler 2 puff, Q4H PRN    apixaban (ELIQUIS) tablet 5 mg, BID    Cholecalciferol (VITAMIN D3) tablet 2,000 Units, Daily    diltiazem (CARDIZEM CD) 24 hr capsule 120 mg, Daily    fluticasone-vilanterol 200-25 mcg/actuation 1 puff, Daily    levothyroxine tablet 25 mcg, Early Morning    pantoprazole (PROTONIX) EC tablet 20 mg, Early Morning    tamsulosin (FLOMAX) capsule 0.4 mg, Daily With Dinner    Administrative Statements   Today, Patient Was Seen By: Lizeth Clark MD      **Please Note: This note may have been constructed using a voice recognition system.**

## 2025-05-08 NOTE — H&P
H&P - Hospitalist   Name: Rosalio Anguiano 80 y.o. male I MRN: [id number]  Unit/Bed#: [facility] 529-01 I Date of Admission: (Not on file)   Date of Service: 5/8/2025 I Hospital Day: 0     Assessment & Plan  Acute respiratory failure with hypoxia (HCC)  Initially had presented to [facility] requiring O2, but that reportedly had resolved on 5/8 on day of transfer to [facility] as reportedly on Room Air with sats > 88%  On Room air at [facility]  See Pneumothorax section below for details  Pneumothorax  Presented to [facility] ER on 5/3 with shortness of breath and found to have a spontaneous pneumothorax for which a chest tube was initially placed which was dislodged and then a second chest tube was placed by the ER.  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  Thoracic surgery consultation and appreciate their assistance    Chronic atrial fibrillation (HCC)  Rate controlled with Cardizem  Holding Eliquis as patient most likely need cardiothoracic intervention  Centrilobular emphysema (HCC)  Currently not in exacerbation  Completed 5 days of steroid therapy from 5/3-->5/8  Holding off additional steroids for now   Continue pta inhalers  GERD (gastroesophageal reflux disease)  Continue Protonix  Lung nodule  Will need further follow-up within 1 month outpatient  Positive blood culture  Patient hemodynamically stable and afebrile  WBC thought elevated in setting of steroids   Suspected  to be contaminant  Monitor off antibiotics  Elevated liver enzymes  Continue to monitor  Reviewed right upper quadrant ultrasound no acute findings      VTE Pharmacologic Prophylaxis:  holding pta eliquis overnight with possible need for CT surgery procedure   Code Status: Level 1 - Full Code       Anticipated Length of Stay: Patient will be admitted on an inpatient basis with an anticipated length of stay of greater than 2 midnights secondary to pneumothorax.    History of Present Illness   Chief Complaint: Shortness of breath    Rosalio Anguiano is a 80 y.o. male with a PMH of Afib, COPD who presents with pneumothorax.  Patient transferred to [facility] for further management by pulmonology and thoracic surgery.  Patient was managed at previous campus with chest tube unfortunately pneumothorax persisted.  Transferred for Thoracic surgery.     Appears comfortable on arrival at [facility].     Review of Systems   Constitutional:  Negative for chills and fever.   HENT:  Negative for ear pain and sore throat.    Eyes:  Negative for pain and visual disturbance.   Respiratory:  Negative for cough, chest tightness and shortness of breath.    Cardiovascular:  Negative for chest pain and palpitations.   Gastrointestinal:  Negative for abdominal pain and vomiting.   Genitourinary:  Negative for dysuria and hematuria.   Musculoskeletal:  Negative for arthralgias and back pain.   Skin:  Negative for color change and rash.   Neurological:  Negative for seizures and syncope.   All other systems reviewed and are negative.      Historical Information   Past Medical History:   Diagnosis Date    A-fib (HCC)     BPH with obstruction/lower urinary tract symptoms     Colon polyp     COPD (chronic obstructive pulmonary disease) (HCC)     Frequency of urination     GERD (gastroesophageal reflux disease)     History of cardioversion 12/27/2011    Inital Rhythm AFIB, Final Rhythm Sinus, Max Joules 75    History of echocardiogram  2015    Normal LV systolic function, mild concentric LV hypertrophy, mild mitral and tricuspid regurg, right atrial enlargement. EF 55%    Irregular heart beat     AF    Other male erectile dysfunction     Personal history of irradiation     Personal history of malignant neoplasm of prostate     Prostate cancer (HCC)      Past Surgical History:   Procedure Laterality Date    BACK SURGERY      x 3    CATARACT EXTRACTION, BILATERAL Bilateral 2024    INTRAOPERATIVE RADIATION THERAPY (IORT)      IR CHEST TUBE PLACEMENT  2025    JOINT REPLACEMENT Bilateral     hips    LAMINECTOMY      three levels L3 - S1 - all at different times    PATELLA SURGERY      most of this removed after injury    PROSTATE BIOPSY      TOTAL HIP ARTHROPLASTY Bilateral     VASECTOMY       Social History     Tobacco Use    Smoking status: Former     Current packs/day: 0.00     Average packs/day: 2.0 packs/day for 33.5 years (67.0 ttl pk-yrs)     Types: Cigarettes     Start date:      Quit date: 1998     Years since quittin.8    Smokeless tobacco: Never   Vaping Use    Vaping status: Never Used   Substance and Sexual Activity    Alcohol use: Yes     Alcohol/week: 7.0 standard drinks of alcohol     Types: 7 Standard drinks or equivalent per week     Comment: 1/2 glass of wine daily    Drug use: No    Sexual activity: Not on file     E-Cigarette/Vaping    E-Cigarette Use Never User      E-Cigarette/Vaping Substances    Nicotine No     THC No     CBD No     Flavoring No     Other No     Unknown No      Family history non-contributory  Social History:  Marital Status: /Civil Union     Meds/Allergies   I have reviewed home medications using recent Epic encounter.  Prior to Admission medications    Medication Sig Start Date End Date Taking? Authorizing Provider   albuterol (PROVENTIL HFA,VENTOLIN HFA) 90 mcg/act inhaler Inhale 2 puffs every 6 (six) hours as needed for wheezing    Historical Provider, MD herreraban  (Eliquis) 5 mg Take 1 tablet (5 mg total) by mouth 2 (two) times a day 1/14/25   Zachary Edwards MD   Cholecalciferol 50 MCG (2000 UT) TABS Take 50 mcg by mouth 9/9/24   Historical Provider, MD   diltiazem (CARDIZEM CD) 120 mg 24 hr capsule TAKE 1 CAPSULE BY MOUTH  DAILY 6/3/20   Zachary Edwards MD   fluticasone-salmeterol (Advair) 500-50 mcg/dose inhaler Inhale 1 puff 2 (two) times a day Rinse mouth after use.  Patient taking differently: Inhale 1 puff 2 (two) times a day Rinse mouth after use. Taking Wixela 500/50 2 puffs.    Historical Provider, MD   levothyroxine (Synthroid) 25 mcg tablet Take 50 mcg by mouth daily 9/7/23   Historical Provider, MD   naproxen (Naprosyn) 500 mg tablet Take 1 tablet (500 mg total) by mouth 2 (two) times a day with meals  Patient not taking: Reported on 4/29/2024 11/19/23   Abril Menjivar,    omeprazole (PriLOSEC OTC) 20 MG tablet Take 1 tablet by mouth daily    Historical Provider, MD   predniSONE 10 mg tablet 30 mg by mouth daily for 3 days, then 20 mg by mouth daily for 3 days, then 10 mg by mouth daily for 3 days, then stop  Patient not taking: Reported on 4/14/2025 2/17/25   Marium Suárez, KADI   tamsulosin (FLOMAX) 0.4 mg  10/29/21   Historical Provider, MD     No Known Allergies    Objective :  Temp:  [97.4 °F (36.3 °C)-98.4 °F (36.9 °C)] 97.6 °F (36.4 °C)  HR:  [] 90  BP: (119-148)/(67-89) 121/81  Resp:  [18-20] 20  SpO2:  [93 %-95 %] 94 %  O2 Device: None (Room air)    Physical Exam  Vitals reviewed.   Constitutional:       Appearance: He is not ill-appearing or toxic-appearing.   HENT:      Head: Normocephalic.      Nose: No congestion.      Mouth/Throat:      Pharynx: No oropharyngeal exudate or posterior oropharyngeal erythema.   Eyes:      Conjunctiva/sclera: Conjunctivae normal.   Cardiovascular:      Rate and Rhythm: Normal rate and regular rhythm.   Pulmonary:      Effort: No respiratory distress.      Comments: R sided chest tube in  place  Abdominal:      General: Abdomen is flat.      Palpations: Abdomen is soft.   Skin:     General: Skin is warm and dry.   Neurological:      General: No focal deficit present.      Mental Status: He is alert. Mental status is at baseline.                  Lab Results: I have reviewed the following results:  Results from last 7 days   Lab Units 05/08/25  0453   WBC Thousand/uL 14.05*   HEMOGLOBIN g/dL 12.8   HEMATOCRIT % 37.6   PLATELETS Thousands/uL 178   SEGS PCT % 78*   LYMPHO PCT % 9*   MONO PCT % 11   EOS PCT % 1     Results from last 7 days   Lab Units 05/08/25  0453   SODIUM mmol/L 137   POTASSIUM mmol/L 3.9   CHLORIDE mmol/L 105   CO2 mmol/L 26   BUN mg/dL 21   CREATININE mg/dL 0.77   ANION GAP mmol/L 6   CALCIUM mg/dL 9.0   ALBUMIN g/dL 3.6   TOTAL BILIRUBIN mg/dL 1.17*   ALK PHOS U/L 85   ALT U/L 99*   AST U/L 35   GLUCOSE RANDOM mg/dL 108     Results from last 7 days   Lab Units 05/04/25  0542   INR  1.26*         Lab Results   Component Value Date    HGBA1C 5.5 09/05/2023    HGBA1C 5.5 10/31/2022    HGBA1C 5.9 10/25/2021     Results from last 7 days   Lab Units 05/04/25  1330 05/03/25  1117 05/03/25  0838   LACTIC ACID mmol/L 1.2 4.4* 2.3*   PROCALCITONIN ng/ml  --   --  0.08       Imaging Results Review: I reviewed radiology reports from this admission including: chest xray.  Other Study Results Review: EKG was reviewed.     Administrative Statements   I have spent a total time of 80 minutes in caring for this patient on the day of the visit/encounter including Diagnostic results, Prognosis, Risks and benefits of tx options, Instructions for management, Importance of tx compliance, Risk factor reductions, Counseling / Coordination of care, Reviewing/placing orders in the medical record (including tests, medications, and/or procedures), and Obtaining or reviewing history  .    ** Please Note: This note has been constructed using a voice recognition system. **

## 2025-05-08 NOTE — ASSESSMENT & PLAN NOTE
Initially had presented to [facility] requiring O2, but that reportedly had resolved on 5/8 on day of transfer to [facility] as reportedly on Room Air with sats > 88%  On Room air at [facility]  See Pneumothorax section below for details

## 2025-05-08 NOTE — PROGRESS NOTES
Progress Note - Pulmonology   Name: Rosalio Anguiano 80 y.o. male I MRN: [id number]  Unit/Bed#: ICU 10 I Date of Admission: 5/3/2025   Date of Service: 5/8/2025 I Hospital Day: 5    Assessment & Plan  Pneumothorax  Likely secondary to ruptured bullae  Initial chest tube placed in ED still with large PTX  New chest tube placed by IR on 5/4/25    On initial clamping of the chest tube on 5/5/2025, patient had worsening pneumothorax  Chest tube has since been to waterseal, has ongoing airleak although seems to be slowing today  CXR looks improved on my review  Will maintain chest tube to waterseal for now given ongoing air leak  Initial chest tube that had been placed in the emergency room was removed yesterday  Centrilobular emphysema (HCC)  Start Breo  Complete 5 day course of prednisone    Home regimen is Wixela, albuterol as needed  Lung nodule  Recent CT scan was done to his PCP which shows an indeterminate 1 x 0.8 cm nodular density in the periphery of the right upper lobe which was new from 2013.  Patient did not have any imaging in between  PET scan versus short-term 3-month follow-up was recommended  Patient can discuss with his pulmonologist upon discharge whether PET scan versus short-term follow-up will be done.  I did explain to patient that the PET scan cannot be done while he is here in the hospital  Positive blood culture    Elevated liver enzymes    Acute respiratory failure with hypoxia (HCC)      24 Hour Events : No events  Subjective : Patient sitting in the chair. He is feeling well with his breathing.    Objective :  Temp:  [97.7 °F (36.5 °C)-98.5 °F (36.9 °C)] 97.8 °F (36.6 °C)  HR:  [74-98] 82  BP: (123-148)/(71-89) 148/89  Resp:  [20-21] 20  SpO2:  [90 %-95 %] 95 %  O2 Device: None (Room air)    Physical Exam  Vitals reviewed.   Constitutional:       General: He is not in acute distress.     Appearance: Normal appearance. He is well-developed. He is not ill-appearing.   HENT:      Head:  Normocephalic and atraumatic.      Mouth/Throat:      Pharynx: Oropharynx is clear.   Eyes:      Pupils: Pupils are equal, round, and reactive to light.   Cardiovascular:      Rate and Rhythm: Normal rate and regular rhythm.   Pulmonary:      Effort: Pulmonary effort is normal. No respiratory distress.      Breath sounds: Decreased breath sounds present. No wheezing, rhonchi or rales.      Comments: Chest tube with intermittent air leak especially with movement and deep breaths  Abdominal:      General: Abdomen is flat. There is no distension.   Musculoskeletal:         General: Normal range of motion.      Cervical back: Normal range of motion.      Right lower leg: No edema.      Left lower leg: No edema.   Skin:     General: Skin is warm and dry.      Findings: No rash.   Neurological:      Mental Status: He is alert and oriented to person, place, and time.   Psychiatric:         Mood and Affect: Mood normal.         Behavior: Behavior normal.           Lab Results: I have reviewed the following results:   .     05/08/25  0453   WBC 14.05*   HGB 12.8   HCT 37.6      SODIUM 137   K 3.9      CO2 26   BUN 21   CREATININE 0.77   GLUC 108   AST 35   ALT 99*   ALB 3.6   TBILI 1.17*   ALKPHOS 85     ABG: No new results in last 24 hours.    Imaging Results Review: I reviewed radiology reports from this admission including: chest xray and CT chest.  Other Study Results Review: No additional pertinent studies reviewed.  PFT Results Reviewed: reviewed

## 2025-05-08 NOTE — LETTER
Thank you for allowing us to participate in the care of your patient, Rosalio Anguiano, who was hospitalized by spontaneous pneumothorax status post VATS by thoracic surgery.  Today patient is deemed fit for discharge.  Advised close follow-up with your clinic.  Repeat chest x-ray after 2 weeks and follow-up with vascular in the outpatient setting.  Thank you very much.  No changes in medication.  If you have any additional questions or would like to discuss further, please feel free to contact me.    Felipe Prince MD  [facility] Internal Medicine, Hospitalist  [phone]

## 2025-05-08 NOTE — ASSESSMENT & PLAN NOTE
80-year-old male patient with past medical history of COPD, chronic A-fib currently on Eliquis, hospitalized due to spontaneous pneumothorax.  Initial chest tube was dislodged and second chest tube was placed by ER however further chest x-ray noted with expanding pneumothorax requiring emergent IR consultation and had large tube placed on 05/4/2025.  Currently patient does have 2 chest tubes in place.  Plan:  Repeat chest x-ray this morning showing persistent apical small pneumothorax  After pulmonology reassessment and persistent leakage ultimately decision was to transfer patient over to [place] for thoracic surgery evaluation and possible surgical intervention.  Patient agreeable with the plan and transfer to [facility] initiated

## 2025-05-08 NOTE — LETTER
[facility] [address]  [address]  [place] [address]  Dept: [phone]    May 15, 2025     Patient: Rosalio Anguiano   YOB: 1945   Date of Visit: 5/8/2025       To Whom it May Concern:    Rosalio Anguiano is under my professional care. He was seen in the hospital from 5/8/2025 to 05/15/25. He may return to work on Monday May 26, 2025 without limitations.    If you have any questions or concerns, please don't hesitate to call.         Sincerely,          Felipe Prince MD

## 2025-05-08 NOTE — ASSESSMENT & PLAN NOTE
Recent CT scan was done to his PCP which shows an indeterminate 1 x 0.8 cm nodular density in the periphery of the right upper lobe which was new from 2013.  Patient did not have any imaging in between  PET scan versus short-term 3-month follow-up was recommended  Patient can discuss with his pulmonologist upon discharge whether PET scan versus short-term follow-up will be done.  I did explain to patient that the PET scan cannot be done while he is here in the hospital

## 2025-05-08 NOTE — ASSESSMENT & PLAN NOTE
LFTs and LFTs mildly elevated.  Patient asymptomatic on exam  Right upper quadrant ultrasound without any acute findings.  Patient has hepatomegaly however smooth contour.  Follow-up with primary care doctor.  LFTs with improvement.

## 2025-05-08 NOTE — ASSESSMENT & PLAN NOTE
.  Patient noted to have O2 requirements earlier in the admission.  Likely related to the pneumothorax.  Currently resolved and patient on room air

## 2025-05-08 NOTE — ASSESSMENT & PLAN NOTE
Rate controlled with Cardizem  Holding Eliquis as patient most likely need cardiothoracic intervention

## 2025-05-09 ENCOUNTER — TRANSITIONAL CARE MANAGEMENT (OUTPATIENT)
Dept: FAMILY MEDICINE CLINIC | Facility: CLINIC | Age: 80
End: 2025-05-09

## 2025-05-09 LAB
ABO GROUP BLD: NORMAL
ALBUMIN SERPL BCG-MCNC: 3.5 G/DL (ref 3.5–5)
ALP SERPL-CCNC: 90 U/L (ref 34–104)
ALT SERPL W P-5'-P-CCNC: 117 U/L (ref 7–52)
ANION GAP SERPL CALCULATED.3IONS-SCNC: 4 MMOL/L (ref 4–13)
APTT PPP: 30 SECONDS (ref 23–34)
AST SERPL W P-5'-P-CCNC: 45 U/L (ref 13–39)
BASOPHILS # BLD AUTO: 0.01 THOUSANDS/ÂΜL (ref 0–0.1)
BASOPHILS NFR BLD AUTO: 0 % (ref 0–1)
BILIRUB SERPL-MCNC: 1.14 MG/DL (ref 0.2–1)
BLD GP AB SCN SERPL QL: NEGATIVE
BUN SERPL-MCNC: 25 MG/DL (ref 5–25)
CALCIUM SERPL-MCNC: 8.7 MG/DL (ref 8.4–10.2)
CHLORIDE SERPL-SCNC: 103 MMOL/L (ref 96–108)
CO2 SERPL-SCNC: 29 MMOL/L (ref 21–32)
CREAT SERPL-MCNC: 0.89 MG/DL (ref 0.6–1.3)
EOSINOPHIL # BLD AUTO: 0.14 THOUSAND/ÂΜL (ref 0–0.61)
EOSINOPHIL NFR BLD AUTO: 1 % (ref 0–6)
ERYTHROCYTE [DISTWIDTH] IN BLOOD BY AUTOMATED COUNT: 26.1 % (ref 11.6–15.1)
GFR SERPL CREATININE-BSD FRML MDRD: 81 ML/MIN/1.73SQ M
GLUCOSE SERPL-MCNC: 98 MG/DL (ref 65–140)
HCT VFR BLD AUTO: 38.5 % (ref 36.5–49.3)
HGB BLD-MCNC: 12.4 G/DL (ref 12–17)
IMM GRANULOCYTES # BLD AUTO: 0.19 THOUSAND/UL (ref 0–0.2)
IMM GRANULOCYTES NFR BLD AUTO: 2 % (ref 0–2)
INR PPP: 1.23 (ref 0.85–1.19)
LYMPHOCYTES # BLD AUTO: 1.2 THOUSANDS/ÂΜL (ref 0.6–4.47)
LYMPHOCYTES NFR BLD AUTO: 10 % (ref 14–44)
MAGNESIUM SERPL-MCNC: 2.2 MG/DL (ref 1.9–2.7)
MCH RBC QN AUTO: 33.2 PG (ref 26.8–34.3)
MCHC RBC AUTO-ENTMCNC: 32.2 G/DL (ref 31.4–37.4)
MCV RBC AUTO: 103 FL (ref 82–98)
MONOCYTES # BLD AUTO: 1.18 THOUSAND/ÂΜL (ref 0.17–1.22)
MONOCYTES NFR BLD AUTO: 10 % (ref 4–12)
NEUTROPHILS # BLD AUTO: 9.14 THOUSANDS/ÂΜL (ref 1.85–7.62)
NEUTS SEG NFR BLD AUTO: 77 % (ref 43–75)
NRBC BLD AUTO-RTO: 1 /100 WBCS
PLATELET # BLD AUTO: 202 THOUSANDS/UL (ref 149–390)
PMV BLD AUTO: 11.5 FL (ref 8.9–12.7)
POTASSIUM SERPL-SCNC: 4.5 MMOL/L (ref 3.5–5.3)
PROT SERPL-MCNC: 6.4 G/DL (ref 6.4–8.4)
PROTHROMBIN TIME: 15.8 SECONDS (ref 12.3–15)
RBC # BLD AUTO: 3.74 MILLION/UL (ref 3.88–5.62)
RH BLD: NEGATIVE
SODIUM SERPL-SCNC: 136 MMOL/L (ref 135–147)
SPECIMEN EXPIRATION DATE: NORMAL
WBC # BLD AUTO: 11.86 THOUSAND/UL (ref 4.31–10.16)

## 2025-05-09 PROCEDURE — 83735 ASSAY OF MAGNESIUM: CPT | Performed by: INTERNAL MEDICINE

## 2025-05-09 PROCEDURE — 86923 COMPATIBILITY TEST ELECTRIC: CPT

## 2025-05-09 PROCEDURE — 85025 COMPLETE CBC W/AUTO DIFF WBC: CPT | Performed by: INTERNAL MEDICINE

## 2025-05-09 PROCEDURE — 80053 COMPREHEN METABOLIC PANEL: CPT | Performed by: INTERNAL MEDICINE

## 2025-05-09 PROCEDURE — 86850 RBC ANTIBODY SCREEN: CPT | Performed by: INTERNAL MEDICINE

## 2025-05-09 PROCEDURE — 86900 BLOOD TYPING SEROLOGIC ABO: CPT | Performed by: INTERNAL MEDICINE

## 2025-05-09 PROCEDURE — 86901 BLOOD TYPING SEROLOGIC RH(D): CPT | Performed by: INTERNAL MEDICINE

## 2025-05-09 PROCEDURE — 99232 SBSQ HOSP IP/OBS MODERATE 35: CPT | Performed by: INTERNAL MEDICINE

## 2025-05-09 PROCEDURE — 85610 PROTHROMBIN TIME: CPT | Performed by: INTERNAL MEDICINE

## 2025-05-09 PROCEDURE — 99255 IP/OBS CONSLTJ NEW/EST HI 80: CPT | Performed by: THORACIC SURGERY (CARDIOTHORACIC VASCULAR SURGERY)

## 2025-05-09 PROCEDURE — 85730 THROMBOPLASTIN TIME PARTIAL: CPT | Performed by: INTERNAL MEDICINE

## 2025-05-09 RX ADMIN — PANTOPRAZOLE SODIUM 20 MG: 20 TABLET, DELAYED RELEASE ORAL at 05:04

## 2025-05-09 RX ADMIN — LEVOTHYROXINE SODIUM 25 MCG: 0.03 TABLET ORAL at 05:04

## 2025-05-09 RX ADMIN — Medication 2000 UNITS: at 09:16

## 2025-05-09 RX ADMIN — DILTIAZEM HYDROCHLORIDE 120 MG: 120 CAPSULE, COATED, EXTENDED RELEASE ORAL at 09:16

## 2025-05-09 RX ADMIN — FLUTICASONE FUROATE AND VILANTEROL TRIFENATATE 1 PUFF: 200; 25 POWDER RESPIRATORY (INHALATION) at 09:17

## 2025-05-09 RX ADMIN — TAMSULOSIN HYDROCHLORIDE 0.4 MG: 0.4 CAPSULE ORAL at 16:10

## 2025-05-09 NOTE — PROGRESS NOTES
Progress Note - Hospitalist   Name: Rosalio Anguiano 80 y.o. male I MRN: [id number]  Unit/Bed#: [facility] 529-01 I Date of Admission: 5/8/2025   Date of Service: 5/9/2025 I Hospital Day: 1    Assessment & Plan  Acute respiratory failure with hypoxia (HCC)  Initially had presented to [facility] requiring O2, but that reportedly had resolved on 5/8 on day of transfer to [facility] as reportedly on Room Air with sats > 88%  On Room air at [facility]  See Pneumothorax section below for details  Pneumothorax  Presented to [facility] ER on 5/3 with shortness of breath and found to have a spontaneous pneumothorax for which a chest tube was initially placed which was dislodged and then a second chest tube was placed by the ER.  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; thoracic surgery requesting [facility] admission with Thoracics consult  For OR tomorrow    Chronic atrial fibrillation (HCC)  Rate controlled with Cardizem  Holding Eliquis as patient most likely need cardiothoracic intervention  Centrilobular emphysema (HCC)  Currently not in exacerbation  Completed 5 days of steroid therapy from 5/3-->5/8  Holding off additional steroids for now   Continue pta inhalers  GERD (gastroesophageal reflux disease)  Continue Protonix  Lung nodule  Will need further follow-up within 1 month outpatient  Positive blood culture  Patient hemodynamically stable and afebrile  WBC thought elevated in setting of steroids   Suspected to be contaminant  Monitor off antibiotics  Elevated liver  enzymes  Continue to monitor  Reviewed right upper quadrant ultrasound no acute findings    VTE Pharmacologic Prophylaxis:   Moderate Risk (Score 3-4) - Pharmacological DVT Prophylaxis Contraindicated. Sequential Compression Devices Ordered.    Mobility:   Basic Mobility Inpatient Raw Score: 24  JH-HLM Goal: 8: Walk 250 feet or more  JH-HLM Achieved: 7: Walk 25 feet or more  JH-HLM Goal NOT achieved. Continue with multidisciplinary rounding and encourage appropriate mobility to improve upon JH-HLM goals.    Patient Centered Rounds: I performed bedside rounds with nursing staff today.   Discussions with Specialists or Other Care Team Provider: nurse, CM    Current Length of Stay: 1 day(s)  Current Patient Status: Inpatient   Certification Statement: The patient will continue to require additional inpatient hospital stay due to OR tomorrow  Discharge Plan: Anticipate discharge in >72 hrs to home.    Code Status: Level 1 - Full Code    Subjective   Denies any new complaints. Denies pain. Has a cough with sputum which is chronic for him.    Objective :  Temp:  [97.1 °F (36.2 °C)-97.8 °F (36.6 °C)] 97.3 °F (36.3 °C)  HR:  [] 101  BP: (116-145)/(73-85) 116/73  Resp:  [18-20] 18  SpO2:  [91 %-96 %] 95 %  O2 Device: None (Room air)    Body mass index is 27.8 kg/m².     Input and Output Summary (last 24 hours):     Intake/Output Summary (Last 24 hours) at 5/9/2025 1421  Last data filed at 5/9/2025 0900  Gross per 24 hour   Intake 50 ml   Output 442 ml   Net -392 ml       Physical Exam  Constitutional:       Appearance: Normal appearance.   HENT:      Head: Normocephalic and atraumatic.      Nose: Nose normal.   Eyes:      Extraocular Movements: Extraocular movements intact.   Cardiovascular:      Rate and Rhythm: Normal rate and regular rhythm.   Pulmonary:      Effort: Pulmonary effort is normal.      Breath sounds: No wheezing or rales.      Comments: Right side chest tube  Musculoskeletal:      Right lower leg: No  edema.      Left lower leg: No edema.   Skin:     General: Skin is warm and dry.   Neurological:      Mental Status: He is alert and oriented to person, place, and time.   Psychiatric:         Mood and Affect: Mood normal.         Behavior: Behavior normal.           Lines/Drains:  Lines/Drains/Airways       Active Status       Name Placement date Placement time Site Days    Chest Tube 2 Right Anterior 14 Fr. 05/04/25  1223  Anterior  5                            Lab Results: I have reviewed the following results:   Results from last 7 days   Lab Units 05/09/25  0511   WBC Thousand/uL 11.86*   HEMOGLOBIN g/dL 12.4   HEMATOCRIT % 38.5   PLATELETS Thousands/uL 202   SEGS PCT % 77*   LYMPHO PCT % 10*   MONO PCT % 10   EOS PCT % 1     Results from last 7 days   Lab Units 05/09/25  0511   SODIUM mmol/L 136   POTASSIUM mmol/L 4.5   CHLORIDE mmol/L 103   CO2 mmol/L 29   BUN mg/dL 25   CREATININE mg/dL 0.89   ANION GAP mmol/L 4   CALCIUM mg/dL 8.7   ALBUMIN g/dL 3.5   TOTAL BILIRUBIN mg/dL 1.14*   ALK PHOS U/L 90   ALT U/L 117*   AST U/L 45*   GLUCOSE RANDOM mg/dL 98     Results from last 7 days   Lab Units 05/09/25  0511   INR  1.23*             Results from last 7 days   Lab Units 05/04/25  1330 05/03/25  1117 05/03/25  0838   LACTIC ACID mmol/L 1.2 4.4* 2.3*   PROCALCITONIN ng/ml  --   --  0.08       Recent Cultures (last 7 days):   Results from last 7 days   Lab Units 05/03/25  0847 05/03/25  0838   BLOOD CULTURE  No Growth After 5 Days. Staphylococcus coagulase negative*   GRAM STAIN RESULT   --  Gram positive cocci in clusters*       Imaging Results Review: No pertinent imaging studies reviewed.  Other Study Results Review: No additional pertinent studies reviewed.    Last 24 Hours Medication List:     Current Facility-Administered Medications:     albuterol (PROVENTIL HFA,VENTOLIN HFA) inhaler 2 puff, Q4H PRN    [Held by provider] apixaban (ELIQUIS) tablet 5 mg, BID    Cholecalciferol (VITAMIN D3) tablet 2,000 Units,  Daily    diltiazem (CARDIZEM CD) 24 hr capsule 120 mg, Daily    fluticasone-vilanterol 200-25 mcg/actuation 1 puff, Daily    levothyroxine tablet 25 mcg, Early Morning    pantoprazole (PROTONIX) EC tablet 20 mg, Early Morning    tamsulosin (FLOMAX) capsule 0.4 mg, Daily With Dinner    Administrative Statements   Today, Patient Was Seen By: Yovanny Giles MD  I have spent a total time of 40 minutes in caring for this patient on the day of the visit/encounter including Diagnostic results, Instructions for management, Patient and family education, Impressions, Counseling / Coordination of care, Documenting in the medical record, Reviewing/placing orders in the medical record (including tests, medications, and/or procedures), Obtaining or reviewing history  , and Communicating with other healthcare professionals .    **Please Note: This note may have been constructed using a voice recognition system.**

## 2025-05-09 NOTE — CASE MANAGEMENT
Case Management Assessment & Discharge Planning Note    Patient name Rosalio Anguiano  Location [facility] 529/[facility] 529-01 MRN [id number]  : 1945 Date 2025       Current Admission Date: 2025  Current Admission Diagnosis:Pneumothorax   Patient Active Problem List    Diagnosis Date Noted Date Diagnosed    Elevated liver enzymes 2025     Acute respiratory failure with hypoxia (HCC) 2025     Positive blood culture 2025     Pneumothorax 2025     Lung nodule 2025     Keratosis 2025     Annual physical exam 10/08/2024     Labyrinthitis of both ears 10/08/2024     Chronic pain of both shoulders 10/08/2024     Seborrheic keratosis 10/08/2024     Preoperative examination 2023     Combined forms of age-related cataract of both eyes 2023     Pain and swelling of right lower leg 2023     Vertigo 2022     Osteoarthritis of lumbar spine with myelopathy 2022     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 10/22/2022     Hearing loss 10/22/2022     History of colonic polyps 10/22/2022     Osteoarthritis of hip 10/22/2022     Sensorineural hearing loss, bilateral 10/22/2022     Hypothyroidism 2022     Neck pain 2019     Bradycardia 2019     Simple chronic bronchitis (HCC) 2019     LAUREL (obstructive sleep apnea) 2019     COPD (chronic obstructive pulmonary disease) (HCC) 04/15/2019     Right groin pain 04/15/2019     Chronic atrial fibrillation (HCC) 2018     Bilateral leg edema 2018     Adenocarcinoma of prostate (HCC) 2017     Centrilobular emphysema (HCC) 2017     GERD (gastroesophageal reflux disease) 2017     Tinnitus 2017       LOS (days): 1  Geometric Mean LOS (GMLOS) (days): 4.9  Days to GMLOS:4.3     OBJECTIVE:    Risk of Unplanned Readmission Score: 8.85         Current admission status: Inpatient       Preferred Pharmacy:   [id number] - [place] - [facility]  [facility]  [address] [address]  Phone: [phone] Fax: [phone]    [facility]  [address]  [place] [address]  Phone: [phone] Fax: [phone]    Primary Care Provider: Jona Calzada DO    Primary Insurance: BLUE CROSS  Secondary Insurance: MEDICARE    ASSESSMENT:  Active Health Care Proxies    There are no active Health Care Proxies on file.       Advance Directives  Does patient have a Health Care POA?: Yes  Does patient have Advance Directives?: Yes  Advance Directives: Living will  Primary Contact: states dghters are POA, no documents on file              Patient Information  Admitted from:: Facility  Mental Status: Alert                                   DISCHARGE DETAILS:    Discharge planning discussed with:: see assessment comnpleted 5/6-transfer from [facility]  [place] of Choice: Yes                                                                                         Additional Comments: tx from [place]-resides with wife,ranch home, I PARMJIT, I PTA, drives, works full time

## 2025-05-09 NOTE — UTILIZATION REVIEW
NOTIFICATION OF ADMISSION DISCHARGE   This is a Notification of Discharge from [facility]. Please be advised that this patient has been discharge from our facility. Below you will find the admission and discharge date and time including the patient’s disposition.   UTILIZATION REVIEW CONTACT:  Utilization Review Assistants  Network Utilization Review Department  Phone: [phone] x carefully listen to the prompts. All voicemails are confidential.  Email: [email or web address]     ADMISSION INFORMATION  PRESENTATION DATE: 5/3/2025  8:27 AM  OBERVATION ADMISSION DATE: N/A  INPATIENT ADMISSION DATE: 5/3/25 10:56 AM   DISCHARGE DATE: 5/8/2025  9:40 PM   DISPOSITION:[facility] Utilization Review Department  ATTENTION: Please call with any questions or concerns to [phone] and carefully listen to the prompts so that you are directed to the right person. All voicemails are confidential.   For Discharge needs, contact Care Management DC Support Team at [phone] opt. 2  Send all requests for admission clinical reviews, approved or denied determinations and any other requests to dedicated fax number below belonging to the campus where the patient is receiving treatment. List of dedicated fax numbers for the Facilities:  FACILITY NAME UR FAX NUMBER   ADMISSION DENIALS (Administrative/Medical Necessity) [phone]   DISCHARGE SUPPORT TEAM ([facility]) [phone]   PARENT CHILD HEALTH (Maternity/NICU/Pediatrics) [phone]   General acute hospital [phone]   [facility] [phone]   [facility] [phone]   [facility] [phone]   [facility] [phone]   [facility] [phone]   [facility] [phone]   [facility] [phone]   [facility]  [facility] [phone]   [facility] [phone]   [facility] [phone]   [facility] [phone]

## 2025-05-09 NOTE — ASSESSMENT & PLAN NOTE
Presented to [facility] ER on 5/3 with shortness of breath and found to have a spontaneous pneumothorax for which a chest tube was initially placed which was dislodged and then a second chest tube was placed by the ER.  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

## 2025-05-09 NOTE — PLAN OF CARE
Problem: PAIN - ADULT  Goal: Verbalizes/displays adequate comfort level or baseline comfort level  Description: Interventions:- Encourage patient to monitor pain and request assistance- Assess pain using appropriate pain scale- Administer analgesics based on type and severity of pain and evaluate response- Implement non-pharmacological measures as appropriate and evaluate response- Consider cultural and social influences on pain and pain management- Notify physician/advanced practitioner if interventions unsuccessful or patient reports new pain  Outcome: Progressing     Problem: INFECTION - ADULT  Goal: Absence or prevention of progression during hospitalization  Description: INTERVENTIONS:- Assess and monitor for signs and symptoms of infection- Monitor lab/diagnostic results- Monitor all insertion sites, i.e. indwelling lines, tubes, and drains- Monitor endotracheal if appropriate and nasal secretions for changes in amount and color- [place] appropriate cooling/warming therapies per order- Administer medications as ordered- Instruct and encourage patient and family to use good hand hygiene technique- Identify and instruct in appropriate isolation precautions for identified infection/condition  Outcome: Progressing  Goal: Absence of fever/infection during neutropenic period  Description: INTERVENTIONS:- Monitor WBC  Outcome: Progressing     Problem: SAFETY ADULT  Goal: Patient will remain free of falls  Description: INTERVENTIONS:- Educate patient/family on patient safety including physical limitations- Instruct patient to call for assistance with activity - Consult OT/PT to assist with strengthening/mobility - Keep Call bell within reach- Keep bed low and locked with side rails adjusted as appropriate- Keep care items and personal belongings within reach- Initiate and maintain comfort rounds- Make Fall Risk Sign visible to staff- Offer Toileting every  Hours, in advance of need- Initiate/Maintain alarm- Obtain  necessary fall risk management equipment: - Apply yellow socks and bracelet for high fall risk patients- Consider moving patient to room near nurses station  Outcome: Progressing  Goal: Maintain or return to baseline ADL function  Description: INTERVENTIONS:-  Assess patient's ability to carry out ADLs; assess patient's baseline for ADL function and identify physical deficits which impact ability to perform ADLs (bathing, care of mouth/teeth, toileting, grooming, dressing, etc.)- Assess/evaluate cause of self-care deficits - Assess range of motion- Assess patient's mobility; develop plan if impaired- Assess patient's need for assistive devices and provide as appropriate- Encourage maximum independence but intervene and supervise when necessary- Involve family in performance of ADLs- Assess for home care needs following discharge - Consider OT consult to assist with ADL evaluation and planning for discharge- Provide patient education as appropriate  Outcome: Progressing  Goal: Maintains/Returns to pre admission functional level  Description: INTERVENTIONS:- Perform AM-PAC 6 Click Basic Mobility/ Daily Activity assessment daily.- Set and communicate daily mobility goal to care team and patient/family/caregiver. - Collaborate with rehabilitation services on mobility goals if consulted- Perform Range of Motion  times a day.- Reposition patient every  hours.- Dangle patient  times a day- Stand patient  times a day- Ambulate patient  times a day- Out of bed to chair  times a day - Out of bed for meals  times a day- Out of bed for toileting- Record patient progress and toleration of activity level   Outcome: Progressing     Problem: SKIN/TISSUE INTEGRITY - ADULT  Goal: Incision(s), wounds(s) or drain site(s) healing without S/S of infection  Description: INTERVENTIONS- Assess and document dressing, incision, wound bed, drain sites and surrounding tissue- Provide patient and family education- Perform skin care/dressing  changes every  Outcome: Progressing

## 2025-05-09 NOTE — ASSESSMENT & PLAN NOTE
Continue Protonix   Doxycycline Counseling:  Patient counseled regarding possible photosensitivity and increased risk for sunburn.  Patient instructed to avoid sunlight, if possible.  When exposed to sunlight, patients should wear protective clothing, sunglasses, and sunscreen.  The patient was instructed to call the office immediately if the following severe adverse effects occur:  hearing changes, easy bruising/bleeding, severe headache, or vision changes.  The patient verbalized understanding of the proper use and possible adverse effects of doxycycline.  All of the patient's questions and concerns were addressed. Exam limited; unable to assess.

## 2025-05-09 NOTE — ASSESSMENT & PLAN NOTE
Initially had presented to [facility] requiring O2, but that reportedly had resolved on 5/8 on day of transfer to [facility] as reportedly on Room Air with sats > 88%  On Room air at [facility]  See Pneumothorax section below for details

## 2025-05-09 NOTE — CONSULTS
Consultation - Surgery-General   Name: Rosalio Anguiano 80 y.o. male I MRN: [id number]  Unit/Bed#: [facility] 529-01 I Date of Admission: 5/8/2025   Date of Service: 5/9/2025 I Hospital Day: 1   Inpatient consult to Thoracic Surgery  Consult performed by: Andres Ramos MD  Consult ordered by: Enmanuel Nowak DO        Physician Requesting Evaluation: Ricky Calixto MD   Reason for Evaluation / Principal Problem: Spontaneous pneumothorax/persistent airleak    Assessment & Plan  Pneumothorax  80-year-old male with history of COPD who presented with spontaneous pneumothorax on 5/3 with emergent placement of chest tube.  Interval dislodgment of chest tube with reaccumulation of pneumothorax requiring IR chest tube placement on 5/4.  In the interim persistent airleak and evidence of pneumothorax on chest x-rays.    - Plan for OR for VATS bleb resection and pleurodesis on 5/10 V5/11 given current anticoagulation  - Last Eliquis dose a.m. of 5/8; continue to hold  - Maintain right chest tube to -20 suction  Chronic atrial fibrillation (HCC)    Centrilobular emphysema (HCC)    GERD (gastroesophageal reflux disease)    Lung nodule    Positive blood culture    Elevated liver enzymes    Acute respiratory failure with hypoxia (HCC)        History of Present Illness   Rosalio Anguiano is a 80 y.o. male with a past medical history of COPD, A-fib on Eliquis, GERD, hypothyroidism, and LAUREL who presented to an outside [facility] on 5/3 with chest pain and shortness of breath found to have a spontaneous pneumothorax. Emergent placement of chest tube was performed with resolution of pneumothorax although there was Interval dislodgment of chest tube with reaccumulation of pneumothorax requiring IR chest tube placement on 5/4.  In the interim persistent airleak and evidence of pneumothorax on chest x-rays.  Currently patient denies chest pain or shortness of breath.  Denies fevers, chills, nausea, vomiting, diarrhea or  constipation.  Reports he last took his Eliquis on 5/8 in the morning.  Denies any previous abdominal or thoracic surgeries    Review of Systems; as stated in the above HPI      Objective :  Temp:  [97.1 °F (36.2 °C)-97.8 °F (36.6 °C)] 97.1 °F (36.2 °C)  HR:  [] 83  BP: (119-148)/(67-89) 145/83  Resp:  [18-20] 20  SpO2:  [93 %-95 %] 93 %  O2 Device: None (Room air)    Lines/Drains/Airways       Active Status       Name Placement date Placement time Site Days    Chest Tube 1 Right Fifth intercostal space 05/03/25  0948  Fifth intercostal space  5                  Physical Exam;  General: NAD  Skin: Warm, dry, anicteric  HEENT: Normocephalic, atraumatic  CV: RRR  Chest: Right chest tube in place to waterseal with intermittent airleak and minimal serosanguineous drainage in the atrium  Pulm:  Nonlabored breathing on room air  Abd: Soft, ND/NT  MSK: Symmetric, no edema, no tenderness, no deformity  Neuro: AOx3, GCS 15     Lab Results: I have reviewed the following results:  Recent Labs     05/08/25  0453   WBC 14.05*   HGB 12.8   HCT 37.6      SODIUM 137   K 3.9      CO2 26   BUN 21   CREATININE 0.77   GLUC 108   AST 35   ALT 99*   ALB 3.6   TBILI 1.17*   ALKPHOS 85

## 2025-05-09 NOTE — ASSESSMENT & PLAN NOTE
80-year-old male with history of COPD who presented with spontaneous pneumothorax on 5/3 with emergent placement of chest tube.  Interval dislodgment of chest tube with reaccumulation of pneumothorax requiring IR chest tube placement on 5/4.  In the interim persistent airleak and evidence of pneumothorax on chest x-rays.    - Plan for OR for VATS bleb resection and pleurodesis on 5/10 V5/11 given current anticoagulation  - Last Eliquis dose a.m. of 5/8; continue to hold  - Maintain right chest tube to -20 suction

## 2025-05-09 NOTE — UTILIZATION REVIEW
Initial Clinical Review    Admission: Date/Time/Statement:   Admission Orders (From admission, onward)       Ordered        05/08/25 2250  INPATIENT ADMISSION  Once                          Orders Placed This Encounter   Procedures    INPATIENT ADMISSION     Standing Status:   Standing     Number of Occurrences:   1     Level of Care:   Med Surg [16]     Estimated length of stay:   More than 2 Midnights     Certification:   I certify that inpatient services are medically necessary for this patient for a duration of greater than two midnights. See H&P and MD Progress Notes for additional information about the patient's course of treatment.     ED Arrival Information       Patient not seen in ED                       No chief complaint on file.      Initial Presentation: 80 y.o. male with a PMH of Afib, COPD who presents with pneumothorax. Patient transferred from [facility] to [facility] for further management by pulmonology and thoracic surgery. Patient was managed at previous campus with chest tube placed 5/4, unfortunately pneumothorax persisted. Transferred for Thoracic surgery. 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 Plan: Inpatient admission for evaluation and treatment of pneumothorax, acute resp failure with hypoxia, Afib, emphysema, GERD, lung nodule, +blood culture, elevated liver enzyme: Thoracic surgery consult, hold Eliquis, continue Protonix.     Anticipated Length of Stay/Certification Statement: Patient will be admitted on an inpatient basis with an anticipated length of stay of greater than 2 midnights secondary to pneumothorax.     Date: 5/9   Day 2:     Thoracic Surgery consult: Plan for OR for VATS bleb resection and pleurodesis on 5/10 V5/11 given current  anticoagulation. Last Eliquis dose a.m. of 5/8; continue to hold. Maintain right chest tube to -20 suction.    Surgery consult: I discussed management options including continued conservative management with his chest tube versus wedge resection of both the larger nodule and the blebs in association with pleurodesis. The patient would like to proceed with surgery.    Internal medicine: Plan for OR tomorrow. Rate controlled with Cardizem. Continue holding Eliquis. Continue Protonix.     Scheduled Medications:  [Held by provider] apixaban, 5 mg, Oral, BID  Cholecalciferol, 2,000 Units, Oral, Daily  diltiazem, 120 mg, Oral, Daily  fluticasone-vilanterol, 1 puff, Inhalation, Daily  levothyroxine, 25 mcg, Oral, Early Morning  pantoprazole, 20 mg, Oral, Early Morning  tamsulosin, 0.4 mg, Oral, Daily With Dinner      Continuous IV Infusions:     PRN Meds:  albuterol, 2 puff, Inhalation, Q4H PRN      ED Triage Vitals   Temperature Pulse Respirations Blood Pressure SpO2 Pain Score   05/08/25 2245 05/08/25 2247 05/08/25 2245 05/08/25 2245 05/08/25 2247 05/08/25 2239   (!) 97.1 °F (36.2 °C) 83 18 145/83 95 % No Pain     Weight (last 2 days)       Date/Time Weight    05/08/25 2257 93 (205)            Vital Signs (last 3 days)       Date/Time Temp Pulse Resp BP MAP (mmHg) SpO2 O2 Device Patient Position - Orthostatic VS Pain    05/09/25 0900 -- -- -- -- -- -- -- -- No Pain    05/09/25 07:15:49 97.7 °F (36.5 °C) 67 18 144/85 105 96 % -- Sitting --    05/09/25 07:15:33 97.7 °F (36.5 °C) 92 20 144/85 105 96 % -- -- --    05/08/25 2300 -- -- -- -- -- 93 % None (Room air) -- --    05/08/25 2257 97.1 °F (36.2 °C) 83 20 145/83 -- -- -- -- --    05/08/25 22:47:40 97.1 °F (36.2 °C) 83 -- 145/83 104 95 % -- -- --    05/08/25 22:45:23 97.1 °F (36.2 °C) -- 18 145/83 104 -- -- -- --    05/08/25 2240 -- -- -- -- -- -- None (Room air) -- --    05/08/25 2239 -- -- -- -- -- -- -- -- No Pain              Pertinent Labs/Diagnostic Test Results:    Radiology:  No orders to display     Cardiology:  No orders to display     GI:  No orders to display           Results from last 7 days   Lab Units 05/09/25 0511 05/08/25 0453 05/07/25 0530 05/06/25 0541 05/05/25  0521   WBC Thousand/uL 11.86* 14.05* 13.49* 8.42 9.54   HEMOGLOBIN g/dL 12.4 12.8 12.6 11.6* 11.2*   HEMATOCRIT % 38.5 37.6 37.2 34.8* 33.1*   PLATELETS Thousands/uL 202 178 201 177 191   TOTAL NEUT ABS Thousands/µL 9.14* 11.03* 11.07*  --   --          Results from last 7 days   Lab Units 05/09/25 0511 05/08/25 0453 05/06/25 0541 05/05/25  0521 05/04/25  0542   SODIUM mmol/L 136 137 138 138 136   POTASSIUM mmol/L 4.5 3.9 4.5 4.5 4.2   CHLORIDE mmol/L 103 105 109* 109* 105   CO2 mmol/L 29 26 25 24 22   ANION GAP mmol/L 4 6 4 5 9   BUN mg/dL 25 21 31* 27* 26*   CREATININE mg/dL 0.89 0.77 0.88 0.85 0.97   EGFR ml/min/1.73sq m 81 86 81 82 73   CALCIUM mg/dL 8.7 9.0 8.6 8.4 8.9   MAGNESIUM mg/dL 2.2  --   --   --   --      Results from last 7 days   Lab Units 05/09/25 0511 05/08/25 0453 05/06/25 0541 05/05/25  0521 05/04/25  0542   AST U/L 45* 35 61* 58* 32   ALT U/L 117* 99* 91* 53* 26   ALK PHOS U/L 90 85 76 76 86   TOTAL PROTEIN g/dL 6.4 6.6 6.2* 6.4 7.6   ALBUMIN g/dL 3.5 3.6 3.4* 3.4* 4.0   TOTAL BILIRUBIN mg/dL 1.14* 1.17* 1.08* 1.03* 1.25*         Results from last 7 days   Lab Units 05/09/25 0511 05/08/25 0453 05/06/25 0541 05/05/25  0521 05/04/25  0542 05/03/25  0838   GLUCOSE RANDOM mg/dL 98 108 117 142* 149* 129           Results from last 7 days   Lab Units 05/09/25  0511 05/04/25  0542 05/03/25  0838   PROTIME seconds 15.8* 16.5* 17.4*   INR  1.23* 1.26* 1.35*   PTT seconds 30  --  43*         Results from last 7 days   Lab Units 05/03/25  0838   PROCALCITONIN ng/ml 0.08     Results from last 7 days   Lab Units 05/04/25  1330 05/03/25  1117 05/03/25  0838   LACTIC ACID mmol/L 1.2 4.4* 2.3*         Results from last 7 days   Lab Units 05/03/25  1613   CLARITY UA  Clear   COLOR UA   Yellow   SPEC GRAV UA  1.025   PH UA  5.5   GLUCOSE UA mg/dl Negative   KETONES UA mg/dl 10 (1+)*   BLOOD UA  Negative   PROTEIN UA mg/dl 70 (1+)*   NITRITE UA  Negative   BILIRUBIN UA  Negative   UROBILINOGEN UA (BE) mg/dl <2.0   LEUKOCYTES UA  Negative   WBC UA /hpf 1-2   RBC UA /hpf 1-2   BACTERIA UA /hpf None Seen   EPITHELIAL CELLS WET PREP /hpf None Seen   MUCUS THREADS  Occasional*           Results from last 7 days   Lab Units 05/03/25  0847 05/03/25  0838   BLOOD CULTURE  No Growth After 5 Days. Staphylococcus coagulase negative*   GRAM STAIN RESULT   --  Gram positive cocci in clusters*         Past Medical History:   Diagnosis Date    A-fib (HCC)     BPH with obstruction/lower urinary tract symptoms     Colon polyp     COPD (chronic obstructive pulmonary disease) (HCC)     Frequency of urination     GERD (gastroesophageal reflux disease)     History of cardioversion 12/27/2011    Inital Rhythm AFIB, Final Rhythm Sinus, Max Joules 75    History of echocardiogram 06/01/2015    Normal LV systolic function, mild concentric LV hypertrophy, mild mitral and tricuspid regurg, right atrial enlargement. EF 55%    Irregular heart beat     AF    Other male erectile dysfunction     Personal history of irradiation     Personal history of malignant neoplasm of prostate     Prostate cancer (HCC)      Present on Admission:   Acute respiratory failure with hypoxia (HCC)   Pneumothorax   Chronic atrial fibrillation (HCC)   Centrilobular emphysema (HCC)   GERD (gastroesophageal reflux disease)   Positive blood culture   Lung nodule   Elevated liver enzymes      Admitting Diagnosis: Pneumothorax  Age/Sex: 80 y.o. male    Network Utilization Review Department  ATTENTION: Please call with any questions or concerns to [phone] and carefully listen to the prompts so that you are directed to the right person. All voicemails are confidential.   For Discharge needs, contact Care Management DC Support Team at [phone] opt.  2  Send all requests for admission clinical reviews, approved or denied determinations and any other requests to dedicated fax number below belonging to the campus where the patient is receiving treatment. List of dedicated fax numbers for the Facilities:  FACILITY NAME UR FAX NUMBER   ADMISSION DENIALS (Administrative/Medical Necessity) [phone]   DISCHARGE SUPPORT TEAM (NETWORK) [phone]   PARENT CHILD HEALTH (Maternity/NICU/Pediatrics) [phone]   [facility] [phone]   [facility] [phone]   [facility] [phone]   [facility] [phone]   [facility] [phone]   [facility] [phone]   [facility] [phone]   [facility] [phone]   [facility] [phone]   [facility] [phone]   [facility] [phone]   [facility] [phone]

## 2025-05-09 NOTE — PLAN OF CARE
Problem: PAIN - ADULT  Goal: Verbalizes/displays adequate comfort level or baseline comfort level  Description: Interventions:- Encourage patient to monitor pain and request assistance- Assess pain using appropriate pain scale- Administer analgesics based on type and severity of pain and evaluate response- Implement non-pharmacological measures as appropriate and evaluate response- Consider cultural and social influences on pain and pain management- Notify physician/advanced practitioner if interventions unsuccessful or patient reports new pain  Outcome: Progressing     Problem: INFECTION - ADULT  Goal: Absence or prevention of progression during hospitalization  Description: INTERVENTIONS:- Assess and monitor for signs and symptoms of infection- Monitor lab/diagnostic results- Monitor all insertion sites, i.e. indwelling lines, tubes, and drains- Monitor endotracheal if appropriate and nasal secretions for changes in amount and color- [place] appropriate cooling/warming therapies per order- Administer medications as ordered- Instruct and encourage patient and family to use good hand hygiene technique- Identify and instruct in appropriate isolation precautions for identified infection/condition  Outcome: Progressing  Goal: Absence of fever/infection during neutropenic period  Description: INTERVENTIONS:- Monitor WBC  Outcome: Progressing     Problem: SAFETY ADULT  Goal: Patient will remain free of falls  Description: INTERVENTIONS:- Educate patient/family on patient safety including physical limitations- Instruct patient to call for assistance with activity - Consult OT/PT to assist with strengthening/mobility - Keep Call bell within reach- Keep bed low and locked with side rails adjusted as appropriate- Keep care items and personal belongings within reach- Initiate and maintain comfort rounds- Make Fall Risk Sign visible to staff  Outcome: Progressing  Goal: Maintain or return to baseline ADL  function  Description: INTERVENTIONS:-  Assess patient's ability to carry out ADLs; assess patient's baseline for ADL function and identify physical deficits which impact ability to perform ADLs (bathing, care of mouth/teeth, toileting, grooming, dressing, etc.)- Assess/evaluate cause of self-care deficits - Assess range of motion- Assess patient's mobility; develop plan if impaired- Assess patient's need for assistive devices and provide as appropriate- Encourage maximum independence but intervene and supervise when necessary- Involve family in performance of ADLs- Assess for home care needs following discharge - Consider OT consult to assist with ADL evaluation and planning for discharge- Provide patient education as appropriate  Outcome: Progressing  Goal: Maintains/Returns to pre admission functional level  Description: INTERVENTIONS:- Perform AM-PAC 6 Click Basic Mobility/ Daily Activity assessment daily.- Set and communicate daily mobility goal to care team and patient/family/caregiver. - Collaborate with rehabilitation services on mobility goals if consulted  Outcome: Progressing     Problem: DISCHARGE PLANNING  Goal: Discharge to home or other facility with appropriate resources  Description: INTERVENTIONS:- Identify barriers to discharge w/patient and caregiver- Arrange for needed discharge resources and transportation as appropriate- Identify discharge learning needs (meds, wound care, etc.)- Arrange for interpretive services to assist at discharge as needed- Refer to Case Management Department for coordinating discharge planning if the patient needs post-hospital services based on physician/advanced practitioner order or complex needs related to functional status, cognitive ability, or social support system  Outcome: Progressing     Problem: Knowledge Deficit  Goal: Patient/family/caregiver demonstrates understanding of disease process, treatment plan, medications, and discharge instructions  Description:  Complete learning assessment and assess knowledge base.Interventions:- Provide teaching at level of understanding- Provide teaching via preferred learning methods  Outcome: Progressing     Problem: RESPIRATORY - ADULT  Goal: Achieves optimal ventilation and oxygenation  Description: INTERVENTIONS:- Assess for changes in respiratory status- Assess for changes in mentation and behavior- Position to facilitate oxygenation and minimize respiratory effort- Oxygen administered by appropriate delivery if ordered- Initiate smoking cessation education as indicated- Encourage broncho-pulmonary hygiene including cough, deep breathe, Incentive Spirometry- Assess the need for suctioning and aspirate as needed- Assess and instruct to report SOB or any respiratory difficulty- Respiratory Therapy support as indicated  Outcome: Progressing     Problem: Prexisting or High Potential for Compromised Skin Integrity  Goal: Skin integrity is maintained or improved  Description: INTERVENTIONS:- Identify patients at risk for skin breakdown- Assess and monitor skin integrity- Assess and monitor nutrition and hydration status- Monitor labs - Assess for incontinence - Turn and reposition patient- Assist with mobility/ambulation- Relieve pressure over bony prominences- Avoid friction and shearing- Provide appropriate hygiene as needed including keeping skin clean and dry- Evaluate need for skin moisturizer/barrier cream- Collaborate with interdisciplinary team - Patient/family teaching- Consider wound care consult   Outcome: Progressing
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
## 2025-05-09 NOTE — DISCHARGE SUMMARY
Discharge Summary - Hospitalist   Name: Rosalio Anguiano 80 y.o. male I MRN: [id number]  Unit/Bed#: ICU 10 I Date of Admission: 5/3/2025   Date of Service: 5/9/2025 I Hospital Day: 5     Assessment & Plan  Pneumothorax  80-year-old male patient with past medical history of COPD, chronic A-fib currently on Eliquis, hospitalized due to spontaneous pneumothorax.  Initial chest tube was dislodged and second chest tube was placed by ER however further chest x-ray noted with expanding pneumothorax requiring emergent IR consultation and had large tube placed on 05/4/2025.  Currently patient does have 2 chest tubes in place.  Plan:  Repeat chest x-ray this morning showing persistent apical small pneumothorax  After pulmonology reassessment and persistent leakage ultimately decision was to transfer patient over to [place] for thoracic surgery evaluation and possible surgical intervention.  Patient agreeable with the plan and transfer to [facility] initiated  Chronic atrial fibrillation (HCC)  Rate currently uncontrolled due to pneumothorax.  Continue Cardizem.  Discussed with pulmonology team and okay to resume Eliquis today.  Might need to hold Eliquis in 48 hours if patient will require further cardiothoracic intervention.  Centrilobular emphysema (HCC)  Currently not in acute exacerbation.  Discontinue IV Solu-Medrol and transition to p.o. prednisone per pulmonology recommendation.  For 5-day course.  Continue home regimen of Wixela, albuterol as needed.    I have personally counseled the patient on medication indication, compliance, utilization and side effects. Patient may be discharged home with albuterol   GERD (gastroesophageal reflux disease)  Continue PPI  Positive blood culture  1 out of 2 blood culture turns positive for staphylococcal coagulase-negative.  Patient remains hemodynamically stable and afebrile.  Normal WBC.  Suspected to be contaminant.  Will continue holding antibiotics  Lung  nodule  Found nodular density in right lobe on last CT chest.  Required 3-month follow-up with a repeat CT chest.  Elevated liver enzymes  LFTs and LFTs mildly elevated.  Patient asymptomatic on exam  Right upper quadrant ultrasound without any acute findings.  Patient has hepatomegaly however smooth contour.  Follow-up with primary care doctor.  LFTs with improvement.  Acute respiratory failure with hypoxia (HCC)  .  Patient noted to have O2 requirements earlier in the admission.  Likely related to the pneumothorax.  Currently resolved and patient on room air     Medical Problems       Resolved Problems  Date Reviewed: 4/14/2025   None       Discharging Physician / Practitioner: Lizeth Clark MD  PCP: Jona Calzada DO  Admission Date:   Admission Orders (From admission, onward)       Ordered        05/03/25 1056  INPATIENT ADMISSION  Once                          Discharge Date: 05/08/25    Consultations During Hospital Stay:  Pulmonology, IR    Procedures Performed:   Chest tube placement    Significant Findings / Test Results:   XR chest portable   Final Result by Lianne Perkins MD (05/08 1053)      Stable small right apical pneumothorax.            Workstation performed: [id number]         US right upper quadrant   Final Result by Zohreh Blankenship MD (05/07 2006)   Technically limited portable study.   Unremarkable gallbladder.   No biliary dilatation.   Mild hepatomegaly.      Workstation performed: [id number]         XR chest portable ICU   Final Result by Rob Kenyon MD (05/07 0903)      Right-sided chest tubes in place with a grossly stable small right pneumothorax.            Workstation performed: [id number]         XR chest portable   Final Result by Camille Calvo MD (05/06 1359)   Stable small right pneumothorax compared to earlier the same date.            Workstation performed: [id number]         XR chest portable   Final Result by Perla Phelan MD (05/06 0830)   Right-sided small apical  "pneumothorax, unchanged      Stable soft tissue emphysema   No worsening      Workstation performed: [id number]         XR chest portable   Final Result by Jocy Leary MD (05/05 2033)      Decreased size of right pneumothorax.      Improved consolidation in the medial right base.      Stable subcutaneous emphysema.            Workstation performed: [id number]         XR chest portable   Final Result by Franklyn Moody DO (05/05 1718)      There is an enlarging, now moderate pneumothorax within the lateral aspect of the right chest.      Increasing subcutaneous emphysema especially in the axillary region.      This examination was marked \"immediate notification\" in Epic in order to begin the standard process by which the radiology reading room liaison alerts the referring practitioner.            Workstation performed: [id number]         XR chest portable ICU   Final Result by Isaiah De La Rosa MD (05/05 1339)      No convincing residual pneumothorax. Improving subcutaneous emphysema.            Workstation performed: [id number]         CXR pa & lateral in AM   Final Result by Kendra White MD (05/05 1011)      Right pigtail catheter and small caliber right chest tube with trace right pneumothorax and moderate subcutaneous emphysema.            Workstation performed: [id number]         XR chest portable ICU   Final Result by Kendra White MD (05/04 1318)      Insertion of right pigtail catheter in addition to small caliber right chest tube with decrease in right pneumothorax, now small about the right apex.            Workstation performed: [id number]         IR chest tube placement   Final Result by Caio Tenorio DO (05/04 1225)   Impression: Successful fluoroscopic guided 14 Czech right chest tube placement.         Workstation performed: [id number]         XR chest portable   Final Result by Kendra White MD (05/04 1111)      Increase in size of right pneumothorax, now " moderate, with increasing subcutaneous emphysema in the right chest wall.      The study was marked in EPIC for immediate notification.            Workstation performed: [id number]         XR chest 1 view portable   Final Result by Kendra White MD (05/04 0715)      Right pleural drainage catheter insertion with persistent trace right pneumothorax.            Workstation performed: [id number]         XR chest portable   Final Result by Kendra White MD (05/04 0712)      Previous right pleural drainage catheter not visible with trace superior lateral right apical pneumothorax.            Workstation performed: [id number]         XR chest 1 view portable   Final Result by Kendra White MD (05/03 1259)      Right pleural drainage catheter insertion with resolution of pneumothorax.            Workstation performed: [id number]         XR chest portable   ED Interpretation by Simin Rod PA-C (05/03 1010)   Large right sided pneumothorax      Final Result by Kendra White MD (05/03 1210)      Large right pneumothorax with mediastinal shift to the left suggesting tension. A chest tube was subsequently inserted.            Workstation performed: [id number]           Results Reviewed       Procedure Component Value Units Date/Time    Blood culture #1 [[id number]] Collected: 05/03/25 0847    Lab Status: Final result Specimen: Blood from Arm, Right Updated: 05/08/25 1201     Blood Culture No Growth After 5 Days.    Blood culture #2 [[id number]]  (Abnormal) Collected: 05/03/25 0838    Lab Status: Final result Specimen: Blood from Arm, Left Updated: 05/06/25 0711     Blood Culture Staphylococcus coagulase negative     Gram Stain Result Gram positive cocci in clusters    Narrative:      Susceptibility testing will not be performed as this organism, when isolated from a single set of blood cultures, represents probable skin mark contamination. Please call the Microbiology Laboratory within 5 days at  [phone] if further workup is required.      Blood Culture Identification Panel [[id number]]  (Abnormal) Collected: 05/03/25 0838    Lab Status: Final result Specimen: Blood from Arm, Left Updated: 05/06/25 0711     Staphylococcus Detected    Narrative:      Film Array panel tests for 11 gram positive organisms, 15 gram negative organisms, 7 yeast species and 10 resistance genes.     Protime-INR [[id number]]  (Abnormal) Collected: 05/04/25 0542    Lab Status: Final result Specimen: Blood from Arm, Left Updated: 05/04/25 0655     Protime 16.5 seconds      INR 1.26    Narrative:      INR Therapeutic Range    Indication                                             INR Range      Atrial Fibrillation                                               2.0-3.0  Hypercoagulable State                                    2.0.2.3  Left Ventricular Asist Device                            2.0-3.0  Mechanical Heart Valve                                  -    Aortic(with afib, MI, embolism, HF, LA enlargement,    and/or coagulopathy)                                     2.0-3.0 (2.5-3.5)     Mitral                                                             2.5-3.5  Prosthetic/Bioprosthetic Heart Valve               2.0-3.0  Venous thromboembolism (VTE: VT, PE        2.0-3.0    CBC and differential [[id number]]  (Abnormal) Collected: 05/04/25 0542    Lab Status: Final result Specimen: Blood from Arm, Left Updated: 05/04/25 0644     WBC 10.36 Thousand/uL      RBC 3.62 Million/uL      Hemoglobin 12.1 g/dL      Hematocrit 36.8 %       fL      MCH 33.4 pg      MCHC 32.9 g/dL      RDW 25.2 %      MPV 11.0 fL      Platelets 199 Thousands/uL      nRBC 0 /100 WBCs      Segmented % 91 %      Immature Grans % 0 %      Lymphocytes % 5 %      Monocytes % 4 %      Eosinophils Relative 0 %      Basophils Relative 0 %      Absolute Neutrophils 9.36 Thousands/µL      Absolute Immature Grans 0.04 Thousand/uL      Absolute Lymphocytes 0.49  Thousands/µL      Absolute Monocytes 0.46 Thousand/µL      Eosinophils Absolute 0.00 Thousand/µL      Basophils Absolute 0.01 Thousands/µL     Narrative:      This is an appended report.  These results have been appended to a previously verified report.    Comprehensive metabolic panel [[id number]]  (Abnormal) Collected: 05/04/25 0542    Lab Status: Final result Specimen: Blood from Arm, Left Updated: 05/04/25 0629     Sodium 136 mmol/L      Potassium 4.2 mmol/L      Chloride 105 mmol/L      CO2 22 mmol/L      ANION GAP 9 mmol/L      BUN 26 mg/dL      Creatinine 0.97 mg/dL      Glucose 149 mg/dL      Calcium 8.9 mg/dL      AST 32 U/L      ALT 26 U/L      Alkaline Phosphatase 86 U/L      Total Protein 7.6 g/dL      Albumin 4.0 g/dL      Total Bilirubin 1.25 mg/dL      eGFR 73 ml/min/1.73sq m     Narrative:      National Kidney Disease Foundation guidelines for Chronic Kidney Disease (CKD):     Stage 1 with normal or high GFR (GFR > 90 mL/min/1.73 square meters)    Stage 2 Mild CKD (GFR = 60-89 mL/min/1.73 square meters)    Stage 3A Moderate CKD (GFR = 45-59 mL/min/1.73 square meters)    Stage 3B Moderate CKD (GFR = 30-44 mL/min/1.73 square meters)    Stage 4 Severe CKD (GFR = 15-29 mL/min/1.73 square meters)    Stage 5 End Stage CKD (GFR <15 mL/min/1.73 square meters)  Note: GFR calculation is accurate only with a steady state creatinine    Urinalysis with microscopic [[id number]]  (Abnormal) Collected: 05/03/25 1613    Lab Status: Final result Specimen: Urine, Clean Catch Updated: 05/03/25 1634     Color, UA Yellow     Clarity, UA Clear     Specific Gravity, UA 1.025     pH, UA 5.5     Leukocytes, UA Negative     Nitrite, UA Negative     Protein, UA 70 (1+) mg/dl      Glucose, UA Negative mg/dl      Ketones, UA 10 (1+) mg/dl      Urobilinogen, UA <2.0 mg/dl      Bilirubin, UA Negative     Occult Blood, UA Negative     RBC, UA 1-2 /hpf      WBC, UA 1-2 /hpf      Epithelial Cells None Seen /hpf      Bacteria, UA None  Seen /hpf      MUCUS THREADS Occasional     Hyaline Casts, UA 10-25 /lpf     Lactic acid 2 Hours [[id number]]  (Abnormal) Collected: 05/03/25 1117    Lab Status: Final result Specimen: Blood Updated: 05/03/25 1151     LACTIC ACID 4.4 mmol/L     Narrative:      Result may be elevated if tourniquet was used during collection.    Procalcitonin [[id number]]  (Normal) Collected: 05/03/25 0838    Lab Status: Final result Specimen: Blood from Arm, Left Updated: 05/03/25 0913     Procalcitonin 0.08 ng/ml     Protime-INR [[id number]]  (Abnormal) Collected: 05/03/25 0838    Lab Status: Final result Specimen: Blood from Arm, Left Updated: 05/03/25 0907     Protime 17.4 seconds      INR 1.35    Narrative:      INR Therapeutic Range    Indication                                             INR Range      Atrial Fibrillation                                               2.0-3.0  Hypercoagulable State                                    2.0.2.3  Left Ventricular Asist Device                            2.0-3.0  Mechanical Heart Valve                                  -    Aortic(with afib, MI, embolism, HF, LA enlargement,    and/or coagulopathy)                                     2.0-3.0 (2.5-3.5)     Mitral                                                             2.5-3.5  Prosthetic/Bioprosthetic Heart Valve               2.0-3.0  Venous thromboembolism (VTE: VT, PE        2.0-3.0    APTT [[id number]]  (Abnormal) Collected: 05/03/25 0838    Lab Status: Final result Specimen: Blood from Arm, Left Updated: 05/03/25 0907     PTT 43 seconds     Comprehensive metabolic panel [[id number]]  (Abnormal) Collected: 05/03/25 0838    Lab Status: Final result Specimen: Blood from Arm, Left Updated: 05/03/25 0903     Sodium 138 mmol/L      Potassium 4.1 mmol/L      Chloride 102 mmol/L      CO2 25 mmol/L      ANION GAP 11 mmol/L      BUN 24 mg/dL      Creatinine 1.01 mg/dL      Glucose 129 mg/dL      Calcium 9.4 mg/dL      AST 21 U/L      ALT 20  U/L      Alkaline Phosphatase 92 U/L      Total Protein 8.3 g/dL      Albumin 4.3 g/dL      Total Bilirubin 1.66 mg/dL      eGFR 70 ml/min/1.73sq m     Narrative:      National Kidney Disease Foundation guidelines for Chronic Kidney Disease (CKD):     Stage 1 with normal or high GFR (GFR > 90 mL/min/1.73 square meters)    Stage 2 Mild CKD (GFR = 60-89 mL/min/1.73 square meters)    Stage 3A Moderate CKD (GFR = 45-59 mL/min/1.73 square meters)    Stage 3B Moderate CKD (GFR = 30-44 mL/min/1.73 square meters)    Stage 4 Severe CKD (GFR = 15-29 mL/min/1.73 square meters)    Stage 5 End Stage CKD (GFR <15 mL/min/1.73 square meters)  Note: GFR calculation is accurate only with a steady state creatinine    Lactic acid [[id number]]  (Abnormal) Collected: 05/03/25 0838    Lab Status: Final result Specimen: Blood from Arm, Left Updated: 05/03/25 0902     LACTIC ACID 2.3 mmol/L     Narrative:      Result may be elevated if tourniquet was used during collection.    CBC and differential [[id number]]  (Abnormal) Collected: 05/03/25 0838    Lab Status: Final result Specimen: Blood from Arm, Left Updated: 05/03/25 0849     WBC 9.03 Thousand/uL      RBC 3.98 Million/uL      Hemoglobin 13.6 g/dL      Hematocrit 40.3 %       fL      MCH 34.2 pg      MCHC 33.7 g/dL      RDW 25.6 %      MPV 11.1 fL      Platelets 203 Thousands/uL      nRBC 0 /100 WBCs      Segmented % 69 %      Immature Grans % 0 %      Lymphocytes % 18 %      Monocytes % 12 %      Eosinophils Relative 1 %      Basophils Relative 0 %      Absolute Neutrophils 6.15 Thousands/µL      Absolute Immature Grans 0.04 Thousand/uL      Absolute Lymphocytes 1.62 Thousands/µL      Absolute Monocytes 1.12 Thousand/µL      Eosinophils Absolute 0.08 Thousand/µL      Basophils Absolute 0.02 Thousands/µL              Incidental Findings:   none       Test Results Pending at Discharge (will require follow up):   none     Outpatient Tests Requested:  none    Complications:   none    Reason for Admission: Shortness of breath    Hospital Course:   Rosalio Anguiano is a 80 y.o. male patient who originally presented to the hospital on 5/3/2025 due to persistent shortness of breath found to have R sided pneumothorax. Felt to be spontaneous due to rupture of the emphysematous bullae. Chest tube was placed in the ED. Pulmonology team was consulted Next morning  chest x-ray showed recurrence of the pneumothorax most likely due to displacement of the chest tube.  He has subcutaneous emphysema.  He needed a bigger bore chest tube placement under imaging. The patient underwent a second chest tube placement by IR . Patient was monitored in stepdown 2 and remained hemodynamically stable and on room air.  Patient initially on suction that improved to the large pneumothorax.  Patient was then transition to [facility] to assess for leakage and given persistence in [facility] thoracic surgery at [place] was contacted.  Ultimate plan was to transfer over to [place] for further evaluation and potential surgical intervention.  Successfully transferred.          Please see above list of diagnoses and related plan for additional information.     Condition at Discharge: good    Discharge Day Visit / Exam:   Subjective: See progress note  Vitals: Blood Pressure: 121/81 (05/08/25 1845)  Pulse: 90 (05/08/25 1845)  Temperature: 97.6 °F (36.4 °C) (05/08/25 1845)  Temp Source: Oral (05/08/25 1845)  Respirations: 20 (05/08/25 1845)  Height: 6' (182.9 cm) (05/05/25 0906)  Weight - Scale: 90.9 kg (200 lb 6.4 oz) (05/05/25 0906)  SpO2: 94 % (05/08/25 1845)          Discharge instructions/Information to patient and family:   See after visit summary for information provided to patient and family.      Provisions for Follow-Up Care:  See after visit summary for information related to follow-up care and any pertinent home health orders.      Mobility at time of Discharge:   Basic Mobility Inpatient Raw Score: 22  JH-HLM  Goal: 7: Walk 25 feet or more  JH-HLM Achieved: 7: Walk 25 feet or more  HLM Goal achieved. Continue to encourage appropriate mobility.     Disposition:   Acute Care Hospital Transfer to [facility]    Planned Readmission: none    Discharge Medications:  See after visit summary for reconciled discharge medications provided to patient and/or family.      Administrative Statements   Discharge Statement:  I have spent a total time of 40 minutes in caring for this patient on the day of the visit/encounter. >30 minutes of time was spent on: Diagnostic results, Prognosis, Risks and benefits of tx options, Instructions for management, Patient and family education, Importance of tx compliance, Risk factor reductions, Impressions, Counseling / Coordination of care, Documenting in the medical record, and Reviewing / ordering tests, medicine, procedures  .    **Please Note: This note may have been constructed using a voice recognition system**

## 2025-05-10 ENCOUNTER — ANESTHESIA EVENT (INPATIENT)
Dept: PERIOP | Facility: HOSPITAL | Age: 80
DRG: 166 | End: 2025-05-10
Payer: COMMERCIAL

## 2025-05-10 ENCOUNTER — APPOINTMENT (INPATIENT)
Dept: RADIOLOGY | Facility: HOSPITAL | Age: 80
DRG: 166 | End: 2025-05-10
Payer: COMMERCIAL

## 2025-05-10 ENCOUNTER — ANESTHESIA (INPATIENT)
Dept: PERIOP | Facility: HOSPITAL | Age: 80
DRG: 166 | End: 2025-05-10
Payer: COMMERCIAL

## 2025-05-10 LAB
ABO GROUP BLD BPU: NORMAL
ABO GROUP BLD BPU: NORMAL
ABO GROUP BLD: NORMAL
ABO GROUP BLD: NORMAL
ANION GAP SERPL CALCULATED.3IONS-SCNC: 5 MMOL/L (ref 4–13)
BASOPHILS # BLD AUTO: 0.02 THOUSANDS/ÂΜL (ref 0–0.1)
BASOPHILS NFR BLD AUTO: 0 % (ref 0–1)
BLD GP AB SCN SERPL QL: NEGATIVE
BPU ID: NORMAL
BPU ID: NORMAL
BUN SERPL-MCNC: 21 MG/DL (ref 5–25)
CALCIUM SERPL-MCNC: 8.7 MG/DL (ref 8.4–10.2)
CHLORIDE SERPL-SCNC: 103 MMOL/L (ref 96–108)
CO2 SERPL-SCNC: 30 MMOL/L (ref 21–32)
CREAT SERPL-MCNC: 0.99 MG/DL (ref 0.6–1.3)
CROSSMATCH: NORMAL
CROSSMATCH: NORMAL
EOSINOPHIL # BLD AUTO: 0.39 THOUSAND/ÂΜL (ref 0–0.61)
EOSINOPHIL NFR BLD AUTO: 4 % (ref 0–6)
ERYTHROCYTE [DISTWIDTH] IN BLOOD BY AUTOMATED COUNT: 26 % (ref 11.6–15.1)
GFR SERPL CREATININE-BSD FRML MDRD: 72 ML/MIN/1.73SQ M
GLUCOSE SERPL-MCNC: 105 MG/DL (ref 65–140)
HCT VFR BLD AUTO: 40.8 % (ref 36.5–49.3)
HGB BLD-MCNC: 13.2 G/DL (ref 12–17)
IMM GRANULOCYTES # BLD AUTO: 0.24 THOUSAND/UL (ref 0–0.2)
IMM GRANULOCYTES NFR BLD AUTO: 2 % (ref 0–2)
LYMPHOCYTES # BLD AUTO: 1.44 THOUSANDS/ÂΜL (ref 0.6–4.47)
LYMPHOCYTES NFR BLD AUTO: 14 % (ref 14–44)
MCH RBC QN AUTO: 33.2 PG (ref 26.8–34.3)
MCHC RBC AUTO-ENTMCNC: 32.4 G/DL (ref 31.4–37.4)
MCV RBC AUTO: 103 FL (ref 82–98)
MONOCYTES # BLD AUTO: 1.24 THOUSAND/ÂΜL (ref 0.17–1.22)
MONOCYTES NFR BLD AUTO: 12 % (ref 4–12)
NEUTROPHILS # BLD AUTO: 7.13 THOUSANDS/ÂΜL (ref 1.85–7.62)
NEUTS SEG NFR BLD AUTO: 68 % (ref 43–75)
NRBC BLD AUTO-RTO: 1 /100 WBCS
PLATELET # BLD AUTO: 206 THOUSANDS/UL (ref 149–390)
PMV BLD AUTO: 10.7 FL (ref 8.9–12.7)
POTASSIUM SERPL-SCNC: 4.4 MMOL/L (ref 3.5–5.3)
RBC # BLD AUTO: 3.97 MILLION/UL (ref 3.88–5.62)
RH BLD: NEGATIVE
RH BLD: NEGATIVE
SODIUM SERPL-SCNC: 138 MMOL/L (ref 135–147)
SPECIMEN EXPIRATION DATE: NORMAL
UNIT DISPENSE STATUS: NORMAL
UNIT DISPENSE STATUS: NORMAL
UNIT PRODUCT CODE: NORMAL
UNIT PRODUCT CODE: NORMAL
UNIT PRODUCT VOLUME: 350 ML
UNIT PRODUCT VOLUME: 350 ML
UNIT RH: NORMAL
UNIT RH: NORMAL
WBC # BLD AUTO: 10.46 THOUSAND/UL (ref 4.31–10.16)

## 2025-05-10 PROCEDURE — 86850 RBC ANTIBODY SCREEN: CPT

## 2025-05-10 PROCEDURE — 88307 TISSUE EXAM BY PATHOLOGIST: CPT | Performed by: PATHOLOGY

## 2025-05-10 PROCEDURE — 0BBC8ZX EXCISION OF RIGHT UPPER LUNG LOBE, VIA NATURAL OR ARTIFICIAL OPENING ENDOSCOPIC, DIAGNOSTIC: ICD-10-PCS | Performed by: THORACIC SURGERY (CARDIOTHORACIC VASCULAR SURGERY)

## 2025-05-10 PROCEDURE — 07B74ZX EXCISION OF THORAX LYMPHATIC, PERCUTANEOUS ENDOSCOPIC APPROACH, DIAGNOSTIC: ICD-10-PCS | Performed by: THORACIC SURGERY (CARDIOTHORACIC VASCULAR SURGERY)

## 2025-05-10 PROCEDURE — 71045 X-RAY EXAM CHEST 1 VIEW: CPT

## 2025-05-10 PROCEDURE — NC001 PR NO CHARGE: Performed by: THORACIC SURGERY (CARDIOTHORACIC VASCULAR SURGERY)

## 2025-05-10 PROCEDURE — 0BJ08ZZ INSPECTION OF TRACHEOBRONCHIAL TREE, VIA NATURAL OR ARTIFICIAL OPENING ENDOSCOPIC: ICD-10-PCS | Performed by: THORACIC SURGERY (CARDIOTHORACIC VASCULAR SURGERY)

## 2025-05-10 PROCEDURE — 85025 COMPLETE CBC W/AUTO DIFF WBC: CPT

## 2025-05-10 PROCEDURE — 88305 TISSUE EXAM BY PATHOLOGIST: CPT | Performed by: PATHOLOGY

## 2025-05-10 PROCEDURE — 88312 SPECIAL STAINS GROUP 1: CPT | Performed by: PATHOLOGY

## 2025-05-10 PROCEDURE — 3E0L3GC INTRODUCTION OF OTHER THERAPEUTIC SUBSTANCE INTO PLEURAL CAVITY, PERCUTANEOUS APPROACH: ICD-10-PCS | Performed by: THORACIC SURGERY (CARDIOTHORACIC VASCULAR SURGERY)

## 2025-05-10 PROCEDURE — C2615 SEALANT, PULMONARY, LIQUID: HCPCS | Performed by: THORACIC SURGERY (CARDIOTHORACIC VASCULAR SURGERY)

## 2025-05-10 PROCEDURE — 86900 BLOOD TYPING SEROLOGIC ABO: CPT

## 2025-05-10 PROCEDURE — 88342 IMHCHEM/IMCYTCHM 1ST ANTB: CPT | Performed by: PATHOLOGY

## 2025-05-10 PROCEDURE — 32674 THORACOSCOPY LYMPH NODE EXC: CPT | Performed by: THORACIC SURGERY (CARDIOTHORACIC VASCULAR SURGERY)

## 2025-05-10 PROCEDURE — 80048 BASIC METABOLIC PNL TOTAL CA: CPT

## 2025-05-10 PROCEDURE — 0W9930Z DRAINAGE OF RIGHT PLEURAL CAVITY WITH DRAINAGE DEVICE, PERCUTANEOUS APPROACH: ICD-10-PCS | Performed by: THORACIC SURGERY (CARDIOTHORACIC VASCULAR SURGERY)

## 2025-05-10 PROCEDURE — 99232 SBSQ HOSP IP/OBS MODERATE 35: CPT | Performed by: INTERNAL MEDICINE

## 2025-05-10 PROCEDURE — 88341 IMHCHEM/IMCYTCHM EA ADD ANTB: CPT | Performed by: PATHOLOGY

## 2025-05-10 PROCEDURE — 32650 THORACOSCOPY W/PLEURODESIS: CPT | Performed by: THORACIC SURGERY (CARDIOTHORACIC VASCULAR SURGERY)

## 2025-05-10 PROCEDURE — 0BH17EZ INSERTION OF ENDOTRACHEAL AIRWAY INTO TRACHEA, VIA NATURAL OR ARTIFICIAL OPENING: ICD-10-PCS | Performed by: THORACIC SURGERY (CARDIOTHORACIC VASCULAR SURGERY)

## 2025-05-10 PROCEDURE — 86901 BLOOD TYPING SEROLOGIC RH(D): CPT

## 2025-05-10 PROCEDURE — 32666 THORACOSCOPY W/WEDGE RESECT: CPT | Performed by: THORACIC SURGERY (CARDIOTHORACIC VASCULAR SURGERY)

## 2025-05-10 RX ORDER — ALBUMIN HUMAN 50 G/1000ML
SOLUTION INTRAVENOUS CONTINUOUS PRN
Status: DISCONTINUED | OUTPATIENT
Start: 2025-05-10 | End: 2025-05-10

## 2025-05-10 RX ORDER — SODIUM CHLORIDE 9 MG/ML
INJECTION, SOLUTION INTRAVENOUS CONTINUOUS PRN
Status: DISCONTINUED | OUTPATIENT
Start: 2025-05-10 | End: 2025-05-10

## 2025-05-10 RX ORDER — ONDANSETRON 2 MG/ML
4 INJECTION INTRAMUSCULAR; INTRAVENOUS ONCE AS NEEDED
Status: DISCONTINUED | OUTPATIENT
Start: 2025-05-10 | End: 2025-05-10 | Stop reason: HOSPADM

## 2025-05-10 RX ORDER — LIDOCAINE HYDROCHLORIDE 10 MG/ML
INJECTION, SOLUTION EPIDURAL; INFILTRATION; INTRACAUDAL; PERINEURAL AS NEEDED
Status: DISCONTINUED | OUTPATIENT
Start: 2025-05-10 | End: 2025-05-10

## 2025-05-10 RX ORDER — FENTANYL CITRATE/PF 50 MCG/ML
25 SYRINGE (ML) INJECTION
Status: DISCONTINUED | OUTPATIENT
Start: 2025-05-10 | End: 2025-05-10 | Stop reason: HOSPADM

## 2025-05-10 RX ORDER — PROPOFOL 10 MG/ML
INJECTION, EMULSION INTRAVENOUS CONTINUOUS PRN
Status: DISCONTINUED | OUTPATIENT
Start: 2025-05-10 | End: 2025-05-10

## 2025-05-10 RX ORDER — HYDROMORPHONE HCL/PF 1 MG/ML
SYRINGE (ML) INJECTION AS NEEDED
Status: DISCONTINUED | OUTPATIENT
Start: 2025-05-10 | End: 2025-05-10

## 2025-05-10 RX ORDER — ROCURONIUM BROMIDE 10 MG/ML
INJECTION, SOLUTION INTRAVENOUS AS NEEDED
Status: DISCONTINUED | OUTPATIENT
Start: 2025-05-10 | End: 2025-05-10

## 2025-05-10 RX ORDER — DEXAMETHASONE SODIUM PHOSPHATE 10 MG/ML
INJECTION, SOLUTION INTRAMUSCULAR; INTRAVENOUS AS NEEDED
Status: DISCONTINUED | OUTPATIENT
Start: 2025-05-10 | End: 2025-05-10

## 2025-05-10 RX ORDER — OXYCODONE HCL 5 MG/5 ML
5 SOLUTION, ORAL ORAL EVERY 4 HOURS PRN
Status: DISCONTINUED | OUTPATIENT
Start: 2025-05-10 | End: 2025-05-11

## 2025-05-10 RX ORDER — IBUPROFEN 400 MG/1
400 TABLET, FILM COATED ORAL ONCE
Status: COMPLETED | OUTPATIENT
Start: 2025-05-10 | End: 2025-05-10

## 2025-05-10 RX ORDER — PROPOFOL 10 MG/ML
INJECTION, EMULSION INTRAVENOUS AS NEEDED
Status: DISCONTINUED | OUTPATIENT
Start: 2025-05-10 | End: 2025-05-10

## 2025-05-10 RX ORDER — HYDROMORPHONE HCL/PF 1 MG/ML
0.5 SYRINGE (ML) INJECTION EVERY 4 HOURS PRN
Status: DISCONTINUED | OUTPATIENT
Start: 2025-05-10 | End: 2025-05-15 | Stop reason: HOSPADM

## 2025-05-10 RX ORDER — CEFAZOLIN SODIUM 2 G/50ML
SOLUTION INTRAVENOUS AS NEEDED
Status: DISCONTINUED | OUTPATIENT
Start: 2025-05-10 | End: 2025-05-10

## 2025-05-10 RX ORDER — SODIUM CHLORIDE, SODIUM LACTATE, POTASSIUM CHLORIDE, CALCIUM CHLORIDE 600; 310; 30; 20 MG/100ML; MG/100ML; MG/100ML; MG/100ML
100 INJECTION, SOLUTION INTRAVENOUS CONTINUOUS
Status: DISCONTINUED | OUTPATIENT
Start: 2025-05-10 | End: 2025-05-10

## 2025-05-10 RX ORDER — HYDROMORPHONE HCL IN WATER/PF 6 MG/30 ML
0.2 PATIENT CONTROLLED ANALGESIA SYRINGE INTRAVENOUS
Status: DISCONTINUED | OUTPATIENT
Start: 2025-05-10 | End: 2025-05-10 | Stop reason: HOSPADM

## 2025-05-10 RX ORDER — SODIUM CHLORIDE, SODIUM LACTATE, POTASSIUM CHLORIDE, CALCIUM CHLORIDE 600; 310; 30; 20 MG/100ML; MG/100ML; MG/100ML; MG/100ML
INJECTION, SOLUTION INTRAVENOUS CONTINUOUS PRN
Status: DISCONTINUED | OUTPATIENT
Start: 2025-05-10 | End: 2025-05-10

## 2025-05-10 RX ORDER — ONDANSETRON 2 MG/ML
INJECTION INTRAMUSCULAR; INTRAVENOUS AS NEEDED
Status: DISCONTINUED | OUTPATIENT
Start: 2025-05-10 | End: 2025-05-10

## 2025-05-10 RX ORDER — FENTANYL CITRATE 50 UG/ML
INJECTION, SOLUTION INTRAMUSCULAR; INTRAVENOUS AS NEEDED
Status: DISCONTINUED | OUTPATIENT
Start: 2025-05-10 | End: 2025-05-10

## 2025-05-10 RX ADMIN — ROCURONIUM BROMIDE 50 MG: 10 INJECTION, SOLUTION INTRAVENOUS at 08:15

## 2025-05-10 RX ADMIN — FENTANYL CITRATE 100 MCG: 50 INJECTION INTRAMUSCULAR; INTRAVENOUS at 08:13

## 2025-05-10 RX ADMIN — PANTOPRAZOLE SODIUM 20 MG: 20 TABLET, DELAYED RELEASE ORAL at 05:38

## 2025-05-10 RX ADMIN — DEXAMETHASONE SODIUM PHOSPHATE 10 MG: 10 INJECTION, SOLUTION INTRAMUSCULAR; INTRAVENOUS at 08:13

## 2025-05-10 RX ADMIN — SODIUM CHLORIDE: 0.9 INJECTION, SOLUTION INTRAVENOUS at 08:26

## 2025-05-10 RX ADMIN — LEVOTHYROXINE SODIUM 25 MCG: 0.03 TABLET ORAL at 05:38

## 2025-05-10 RX ADMIN — SODIUM CHLORIDE, SODIUM LACTATE, POTASSIUM CHLORIDE, AND CALCIUM CHLORIDE: .6; .31; .03; .02 INJECTION, SOLUTION INTRAVENOUS at 08:03

## 2025-05-10 RX ADMIN — PROPOFOL 10 MG: 10 INJECTION, EMULSION INTRAVENOUS at 10:47

## 2025-05-10 RX ADMIN — ROCURONIUM BROMIDE 10 MG: 10 INJECTION, SOLUTION INTRAVENOUS at 09:40

## 2025-05-10 RX ADMIN — PROPOFOL 50 MCG/KG/MIN: 10 INJECTION, EMULSION INTRAVENOUS at 08:30

## 2025-05-10 RX ADMIN — CEFAZOLIN SODIUM 2000 MG: 2 SOLUTION INTRAVENOUS at 08:35

## 2025-05-10 RX ADMIN — PROPOFOL 120 MG: 10 INJECTION, EMULSION INTRAVENOUS at 08:13

## 2025-05-10 RX ADMIN — DEXMEDETOMIDINE HYDROCHLORIDE 8 MCG: 100 INJECTION, SOLUTION INTRAVENOUS at 08:48

## 2025-05-10 RX ADMIN — ROCURONIUM BROMIDE 20 MG: 10 INJECTION, SOLUTION INTRAVENOUS at 09:14

## 2025-05-10 RX ADMIN — PHENYLEPHRINE HYDROCHLORIDE 30 MCG/MIN: 10 INJECTION INTRAVENOUS at 08:34

## 2025-05-10 RX ADMIN — SUGAMMADEX 200 MG: 100 INJECTION, SOLUTION INTRAVENOUS at 11:00

## 2025-05-10 RX ADMIN — ALBUMIN (HUMAN): 12.5 INJECTION, SOLUTION INTRAVENOUS at 09:24

## 2025-05-10 RX ADMIN — TAMSULOSIN HYDROCHLORIDE 0.4 MG: 0.4 CAPSULE ORAL at 16:30

## 2025-05-10 RX ADMIN — HYDROMORPHONE HYDROCHLORIDE 0.5 MG: 1 INJECTION, SOLUTION INTRAMUSCULAR; INTRAVENOUS; SUBCUTANEOUS at 09:00

## 2025-05-10 RX ADMIN — LIDOCAINE HYDROCHLORIDE 50 MG: 10 INJECTION, SOLUTION EPIDURAL; INFILTRATION; INTRACAUDAL; PERINEURAL at 08:13

## 2025-05-10 RX ADMIN — ONDANSETRON 4 MG: 2 INJECTION INTRAMUSCULAR; INTRAVENOUS at 10:38

## 2025-05-10 RX ADMIN — IBUPROFEN 400 MG: 400 TABLET, FILM COATED ORAL at 22:12

## 2025-05-10 NOTE — ANESTHESIA PREPROCEDURE EVALUATION
Procedure:  THORACOSCOPY VIDEO ASSISTED SURGERY (VATS) (Right: Chest)  PLEURODESIS MECHANICAL, POSSIBLE BLEB RESECTION/APICAL PLEURECTOMY (VATS) (Right: Chest)    Relevant Problems   CARDIO   (+) Chronic atrial fibrillation (HCC)      ENDO   (+) Hypothyroidism      GI/HEPATIC   (+) GERD (gastroesophageal reflux disease)      /RENAL   (+) Adenocarcinoma of prostate (HCC)      MUSCULOSKELETAL   (+) Osteoarthritis of hip   (+) Osteoarthritis of lumbar spine with myelopathy      NEURO/PSYCH   (+) Chronic pain of both shoulders   (+) Osteoarthritis of lumbar spine with myelopathy      PULMONARY   (+) Acute respiratory failure with hypoxia (HCC)   (+) COPD (chronic obstructive pulmonary disease) (HCC)   (+) Centrilobular emphysema (HCC)   (+) LAUREL (obstructive sleep apnea)   (+) Pneumothorax   (+) Simple chronic bronchitis (HCC)        Physical Exam    Airway    Mallampati score: II  TM Distance: >3 FB  Neck ROM: full     Dental   No notable dental hx     Cardiovascular  Cardiovascular exam normal    Pulmonary  Pulmonary exam normal     Other Findings      Left Ventricle: Left ventricular cavity size is mildly dilated. Wall thickness is mildly increased. The left ventricular ejection fraction is 50-55%. Systolic function is low normal. Wall motion is normal.    Right Ventricle: Right ventricular cavity size is mild-moderately dilated. Systolic function is mildly reduced. Abnormal tricuspid annular plane systolic excursion (TAPSE) < 1.7 cm.    Left Atrium: The atrium is severely dilated.    Right Atrium: The atrium is severely dilated.    Mitral Valve: There is mild regurgitation.    Tricuspid Valve: There is moderate to severe regurgitation.    Aorta: The aortic root is mildly dilated.       Anesthesia Plan  ASA Score- 2     Anesthesia Type- general with ASA Monitors.         Additional Monitors:     Airway Plan: ETT.           Plan Factors-Exercise tolerance (METS): >4 METS.    Chart reviewed. EKG reviewed.  Existing  labs reviewed. Patient summary reviewed.    Patient is not a current smoker.              Induction- intravenous.    Postoperative Plan- Plan for postoperative opioid use.         Informed Consent- Anesthetic plan and risks discussed with patient.  I personally reviewed this patient with the CRNA. Discussed and agreed on the Anesthesia Plan with the CRNA..      NPO Status:  Vitals Value Taken Time   Date of last liquid 05/09/25 05/10/25 0729   Time of last liquid 2300 05/10/25 0729   Date of last solid 05/09/25 05/10/25 0729   Time of last solid 1700 05/10/25 0729

## 2025-05-10 NOTE — OP NOTE
OPERATIVE REPORT  PATIENT NAME: Rosalio Anguiano    :  1945  MRN: [id number]  Pt Location: BE OR ROOM 08    SURGERY DATE: 5/10/2025    Surgeons and Role:     * Zachary Mayen MD - Primary     * Andres Ramos MD - Assisting    Preop Diagnosis:  First time secondary spontaneous right pneumothorax [J93.11]  Prolonged airleak  Right upper lobe 10 mm spiculated lung nodule    Post-Op Diagnosis Codes:  First time secondary spontaneous right pneumothorax [J93.11]  Prolonged airleak  Right upper lobe 10 mm spiculated lung nodule    Procedure(s):  FIBEROPTIC BRONCHOSCOPY  Right - THORACOSCOPY VIDEO ASSISTED SURGERY (VATS)   RIGHT THERAPEUTIC UPPER LOBE WEDGE RESECTION;   MEDIASTINAL LYMPH NODE DISSECTION   TALC PLEURODESIS    Specimen(s):  ID Type Source Tests Collected by Time Destination   1 : 4R Tissue Lymph Node TISSUE EXAM Zachary Mayen MD 5/10/2025 0944    2 : 2R Tissue Lymph Node TISSUE EXAM Zachary Mayen MD 5/10/2025 0955    3 : 7 Tissue Lymph Node TISSUE EXAM Zachary Mayen MD 5/10/2025 1011    4 : THERAPEUTIC WEDGE RESECTION Tissue Lung, Right Upper Lobe TISSUE EXAM Zachary Mayen MD 5/10/2025 1024        Estimated Blood Loss:   50 mL    Drains:  Chest Tube 2 Right Anterior 14 Fr. (Active)   Function -20 cm H2O 05/10/25 0514   Chest Tube Air Leak No 05/10/25 0514   Patency Intervention Tip/tilt 05/10/25 0514   Drainage Description Yellow 25 1617   Dressing Status Clean;Dry;Intact 05/10/25 0514   Site Assessment Clean;Dry;Intact 25 1617   Surrounding Skin Dry;Intact 25 1617   Output (mL) 2 mL 05/10/25 0514   Number of days: 6       Anesthesia Type:   Choice    Operative Indications:  First time secondary spontaneous right pneumothorax [J93.11]  Prolonged airleak  Right upper lobe 10 mm spiculated lung nodule  Mr. Anguiano is a 80-year-old gentleman with emphysema.  He developed a first time secondary pneumothorax on the right  treated with a chest tube.  Unfortunately, his airleak did not resolve over 6 days.  He also has a 10 mm spiculated right upper lobe pulmonary nodule that has a high pretest probability of being a lung cancer.  He is brought to the operating room for management.    Operative Findings:  Upper lobe wedge resection performed to encompass the mass as well as his apical blebs.      Complications:   None    Procedure and Technique:  No suspicion or identified risk for TB or other airborne infectious disease; bronchoscopy procedure being performed for diagnostic purposes  The patient is brought to the operating room and placed in the supine position.  After institution of adequate general anesthesia, the fiberoptic bronchoscope was inserted.  The distal trachea is normal in the israel is sharp.  The airways are examined to a subsegmental level bilaterally.  There are no endobronchial lesions, the anatomy appears normal, and secretions are scant.  A double-lumen endotracheal tube was placed and the position was confirmed with the bronchoscope.  The patient was turned into the left lateral decubitus position and his right lung was isolated.  His prior chest tube was removed.  His right chest was then prepped and draped into a sterile field.  Standard port incisions were utilized with a 5 mm port in the eighth intercostal space in the posterior axillary line as well as a 4 cm incision more anteriorly.  A wound protector was placed.  Paravertebral blocks with a mixture of 20 mL of Exparel with 20 mL of quarter percent Marcaine were performed over 8 levels.  The lung was noted to be emphysematous throughout with numerous adhesions at the apex.  These adhesions were taken down with the cautery.  The nodule was identified within the upper lobe and a large upper lobe wedge resection was performed encompassing the apical blebs with the mass and a margin.  The wedge resection was performed with a linear stapler using buttressed  staple loads.  The specimen was examined on the back table and the mass identified with a grossly widely negative margin.  The right paratracheal space was then opened and dissected with lymph nodes from both 2R and 4R removed.  The inferior pulmonary ligament was divided and the posterior pleura opened and the level 7 space dissected and a specimen sent.  The apical adhesions were cauterized and covered with some Surgiflo.  The staple line in the upper lobe was covered with Pro gel to promote pneumostasis.  8 g of sterile talc were then distributed around the pleural space, a 24 Bulgarian chest tube was placed through the camera port, and the lung was watched as it completely reexpanded.  The anterior access incision was then closed in layers with running absorbable suture to the pectoral fascia, the subcutaneous and subcuticular layers.  Surgical glue was applied.  The chest tube was affixed to the chest wall and connected to [place] drainage.  There was no airleak in the OR.  Sponge and instrument counts were correct.   I was present for the entire procedure.    Patient Disposition:  PACU              SIGNATURE: Zachary Mayen MD  DATE: May 10, 2025  TIME: 10:43 AM

## 2025-05-10 NOTE — ANESTHESIA POSTPROCEDURE EVALUATION
Post-Op Assessment Note    CV Status:  Stable    Pain management: adequate       Mental Status:  Alert and awake   Hydration Status:  Stable   PONV Controlled:  Controlled   Airway Patency:  Patent  Airway: intubated     Post Op Vitals Reviewed: Yes    No anethesia notable event occurred.    Staff: Anesthesiologist     Reason for prolonged intubation > 24 hours:  Patient was extubated at end of caseReason for prolonged intubation > 48 hours:  Patient was extubated at end of case      Last Filed PACU Vitals:  Vitals Value Taken Time   Temp 97.1 °F (36.2 °C) 05/10/25 1245   Pulse 85 05/10/25 1252   /74 05/10/25 1245   Resp 12 05/10/25 1251   SpO2 95 % 05/10/25 1252   Vitals shown include unfiled device data.    Modified Shannon:     Vitals Value Taken Time   Activity 2 05/10/25 1245   Respiration 2 05/10/25 1245   Circulation 2 05/10/25 1245   Consciousness 2 05/10/25 1245   Oxygen Saturation 1 05/10/25 1245     Modified Shannon Score: 9

## 2025-05-10 NOTE — PROGRESS NOTES
Progress Note - Thoracic    Name: Rosalio Anguiano 80 y.o. male I MRN: [id number]  Unit/Bed#: [facility] 529-01 I Date of Admission: 5/8/2025   Date of Service: 5/10/2025 I Hospital Day: 2    Assessment & Plan  Pneumothorax  80-year-old male with history of COPD who presented with spontaneous pneumothorax on 5/3 with emergent placement of chest tube.  Interval dislodgment of chest tube with reaccumulation of pneumothorax requiring IR chest tube placement on 5/4.  In the interim persistent airleak and evidence of pneumothorax on chest x-rays.     Right IR chest tube in place to -20 suction, minimal airleak, 54 cc recorded serous output    - Plan for OR for VATS bleb resection and pleurodesis today  -NPO since midnight; IV fluids  - Last Eliquis dose a.m. of 5/8; continue to hold  - Maintain right chest tube to -20 suction  Chronic atrial fibrillation (HCC)    Centrilobular emphysema (HCC)    GERD (gastroesophageal reflux disease)    Lung nodule    Positive blood culture    Elevated liver enzymes    Acute respiratory failure with hypoxia (HCC)          24 Hour Events : No acute events overnight  Subjective : Patient has no new complaints and is feeling well, excited for surgery today    Objective :  Temp:  [96.9 °F (36.1 °C)-97.7 °F (36.5 °C)] 96.9 °F (36.1 °C)  HR:  [] 78  BP: (116-144)/(62-85) 122/85  Resp:  [18-22] 22  SpO2:  [91 %-96 %] 95 %  O2 Device: None (Room air)    I/O         05/08 0701 05/09 0700 05/09 0701  05/10 0700    P.O. 50 480    Total Intake(mL/kg) 50 (0.5) 480 (5.2)    Urine (mL/kg/hr) 425 550 (0.2)    Chest Tube 17 54    Total Output 442 604    Net -392 -124                Lines/Drains/Airways       Active Status       Name Placement date Placement time Site Days    Chest Tube 2 Right Anterior 14 Fr. 05/04/25  1223  Anterior  5                  Physical Exam  General: NAD  Skin: Warm, dry, anicteric  HEENT: Normocephalic, atraumatic  CV: RRR  Pulm:  Nonlabored breathing on room air; chest tube  in place as above  Abd: Soft, ND/NT  MSK: Symmetric, no edema, no tenderness, no deformity  Neuro: AOx3, GCS 15     Lab Results: I have reviewed the following results:  Recent Labs     05/09/25  0511   WBC 11.86*   HGB 12.4   HCT 38.5      SODIUM 136   K 4.5      CO2 29   BUN 25   CREATININE 0.89   GLUC 98   MG 2.2   AST 45*   *   ALB 3.5   TBILI 1.14*   ALKPHOS 90   PTT 30   INR 1.23*

## 2025-05-10 NOTE — ANESTHESIA POSTPROCEDURE EVALUATION
Post-Op Assessment Note    CV Status:  Stable  Pain Score: 0    Pain management: adequate       Mental Status:  Sleepy and arousable   Hydration Status:  Euvolemic and stable   PONV Controlled:  None   Airway Patency:  Patent     Post Op Vitals Reviewed: Yes    No anethesia notable event occurred.    Staff: CRNA, Anesthesiologist           Last Filed PACU Vitals:  Vitals Value Taken Time   Temp 96.6    Pulse 100 05/10/25 1113   /66 05/10/25 1111   Resp 20 05/10/25 1113   SpO2 95 % 05/10/25 1113   Vitals shown include unfiled device data.

## 2025-05-10 NOTE — ASSESSMENT & PLAN NOTE
Presented to [facility] ER on 5/3 with shortness of breath and found to have a spontaneous pneumothorax for which a chest tube was initially placed which was dislodged and then a second chest tube was placed by the ER.  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

## 2025-05-10 NOTE — ASSESSMENT & PLAN NOTE
80-year-old male with history of COPD who presented with spontaneous pneumothorax on 5/3 with emergent placement of chest tube.  Interval dislodgment of chest tube with reaccumulation of pneumothorax requiring IR chest tube placement on 5/4.  In the interim persistent airleak and evidence of pneumothorax on chest x-rays.     Right IR chest tube in place to -20 suction, minimal airleak, 54 cc recorded serous output    - Plan for OR for VATS bleb resection and pleurodesis today  -NPO since midnight; IV fluids  - Last Eliquis dose a.m. of 5/8; continue to hold  - Maintain right chest tube to -20 suction

## 2025-05-10 NOTE — PROGRESS NOTES
Progress Note - Hospitalist   Name: Rosalio Anguiano 80 y.o. male I MRN: [id number]  Unit/Bed#: [facility] 529-01 I Date of Admission: 5/8/2025   Date of Service: 5/10/2025 I Hospital Day: 2    Assessment & Plan  Acute respiratory failure with hypoxia (HCC)  Initially had presented to [facility] requiring O2, but that reportedly had resolved on 5/8 on day of transfer to [facility] as reportedly on Room Air with sats > 88%  On Room air at [facility]  See Pneumothorax section below for details  Pneumothorax  Presented to [facility] ER on 5/3 with shortness of breath and found to have a spontaneous pneumothorax for which a chest tube was initially placed which was dislodged and then a second chest tube was placed by the ER.  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; thoracic surgery requesting [facility] admission with Thoracics consult  S/p   FIBEROPTIC BRONCHOSCOPY  Right - THORACOSCOPY VIDEO ASSISTED SURGERY (VATS)   RIGHT THERAPEUTIC UPPER LOBE WEDGE RESECTION;   MEDIASTINAL LYMPH NODE DISSECTION   TALC PLEURODESIS  Post op care, chest tube management per thoracic surgery    Chronic atrial fibrillation (HCC)  Rate controlled with Cardizem  Holding Eliquis until cleared by thoracic surgery  Centrilobular emphysema (HCC)  Currently not in exacerbation  Completed 5 days of steroid therapy from 5/3-->5/8  Holding off additional steroids for now   Continue pta inhalers  GERD (gastroesophageal reflux disease)  Continue Protonix  Lung nodule  S/p wedge resection, follow up  pathology  Positive blood culture  Patient hemodynamically stable and afebrile  WBC thought elevated in setting of steroids   Suspected to be contaminant  Monitor off antibiotics  Elevated liver enzymes  Continue to monitor  Reviewed right upper quadrant ultrasound no acute findings    VTE Pharmacologic Prophylaxis:   Moderate Risk (Score 3-4) - Pharmacological DVT Prophylaxis Ordered: heparin.    Mobility:   Basic Mobility Inpatient Raw Score: 24  JH-HLM Goal: 8: Walk 250 feet or more  JH-HLM Achieved: 7: Walk 25 feet or more  JH-HLM Goal NOT achieved. Continue with multidisciplinary rounding and encourage appropriate mobility to improve upon JH-HLM goals.    Patient Centered Rounds: I performed bedside rounds with nursing staff today.   Discussions with Specialists or Other Care Team Provider: [email or web address]    Current Length of Stay: 2 day(s)  Current Patient Status: Inpatient   Certification Statement: The patient will continue to require additional inpatient hospital stay due to post op care  Discharge Plan: Anticipate discharge in >72 hrs to rehab facility.    Code Status: Level 1 - Full Code    Subjective   Limited due to sedation    Objective :  Temp:  [95.3 °F (35.2 °C)-97.7 °F (36.5 °C)] 96.5 °F (35.8 °C)  HR:  [] 86  BP: (119-136)/(59-85) 119/81  Resp:  [13-22] 13  SpO2:  [92 %-98 %] 96 %  O2 Device: Nasal cannula  Nasal Cannula O2 Flow Rate (L/min):  [4 L/min] 4 L/min    Body mass index is 27.8 kg/m².     Input and Output Summary (last 24 hours):     Intake/Output Summary (Last 24 hours) at 5/10/2025 1514  Last data filed at 5/10/2025 1303  Gross per 24 hour   Intake 1980 ml   Output 854 ml   Net 1126 ml       Physical Exam  Constitutional:       Comments: Sedated but arousal to voice   HENT:      Head: Normocephalic and atraumatic.      Nose: Nose normal.   Eyes:      Extraocular Movements: Extraocular movements intact.   Cardiovascular:      Rate and Rhythm: Normal rate and regular rhythm.    Pulmonary:      Effort: Pulmonary effort is normal.      Breath sounds: No wheezing or rales.      Comments: Chest tube  Musculoskeletal:      Right lower leg: No edema.      Left lower leg: No edema.   Neurological:      Mental Status: He is oriented to person, place, and time.   Psychiatric:         Mood and Affect: Mood normal.           Lines/Drains:  Lines/Drains/Airways       Active Status       Name Placement date Placement time Site Days    Chest Tube 2 Right Anterior 14 Fr. 05/04/25  1223  Anterior  6    Chest Tube 1 Right 24 Fr. 05/10/25  1052  --  less than 1                            Lab Results: I have reviewed the following results:   Results from last 7 days   Lab Units 05/10/25  0612   WBC Thousand/uL 10.46*   HEMOGLOBIN g/dL 13.2   HEMATOCRIT % 40.8   PLATELETS Thousands/uL 206   SEGS PCT % 68   LYMPHO PCT % 14   MONO PCT % 12   EOS PCT % 4     Results from last 7 days   Lab Units 05/10/25  0612 05/09/25  0511   SODIUM mmol/L 138 136   POTASSIUM mmol/L 4.4 4.5   CHLORIDE mmol/L 103 103   CO2 mmol/L 30 29   BUN mg/dL 21 25   CREATININE mg/dL 0.99 0.89   ANION GAP mmol/L 5 4   CALCIUM mg/dL 8.7 8.7   ALBUMIN g/dL  --  3.5   TOTAL BILIRUBIN mg/dL  --  1.14*   ALK PHOS U/L  --  90   ALT U/L  --  117*   AST U/L  --  45*   GLUCOSE RANDOM mg/dL 105 98     Results from last 7 days   Lab Units 05/09/25  0511   INR  1.23*             Results from last 7 days   Lab Units 05/04/25  1330   LACTIC ACID mmol/L 1.2       Recent Cultures (last 7 days):         Imaging Results Review: No pertinent imaging studies reviewed.  Other Study Results Review: No additional pertinent studies reviewed.    Last 24 Hours Medication List:     Current Facility-Administered Medications:     albuterol (PROVENTIL HFA,VENTOLIN HFA) inhaler 2 puff, Q4H PRN    [Held by provider] apixaban (ELIQUIS) tablet 5 mg, BID    Cholecalciferol (VITAMIN D3) tablet 2,000 Units, Daily    diltiazem (CARDIZEM CD) 24 hr capsule 120 mg, Daily     fluticasone-vilanterol 200-25 mcg/actuation 1 puff, Daily    lactated ringers infusion, Continuous, Last Rate: Stopped (05/10/25 1219)    levothyroxine tablet 25 mcg, Early Morning    pantoprazole (PROTONIX) EC tablet 20 mg, Early Morning    tamsulosin (FLOMAX) capsule 0.4 mg, Daily With Dinner    Administrative Statements   Today, Patient Was Seen By: Yovanny Giles MD  I have spent a total time of 40 minutes in caring for this patient on the day of the visit/encounter including Diagnostic results, Instructions for management, Patient and family education, Impressions, Counseling / Coordination of care, Documenting in the medical record, Reviewing/placing orders in the medical record (including tests, medications, and/or procedures), Obtaining or reviewing history  , and Communicating with other healthcare professionals .    **Please Note: This note may have been constructed using a voice recognition system.**

## 2025-05-10 NOTE — ASSESSMENT & PLAN NOTE
Initially had presented to [facility] requiring O2, but that reportedly had resolved on 5/8 on day of transfer to [facility] as reportedly on Room Air with sats > 88%  On Room air at [facility]  See Pneumothorax section below for details

## 2025-05-11 LAB
ANION GAP SERPL CALCULATED.3IONS-SCNC: 5 MMOL/L (ref 4–13)
BUN SERPL-MCNC: 23 MG/DL (ref 5–25)
CALCIUM SERPL-MCNC: 8.4 MG/DL (ref 8.4–10.2)
CHLORIDE SERPL-SCNC: 102 MMOL/L (ref 96–108)
CO2 SERPL-SCNC: 29 MMOL/L (ref 21–32)
CREAT SERPL-MCNC: 0.97 MG/DL (ref 0.6–1.3)
ERYTHROCYTE [DISTWIDTH] IN BLOOD BY AUTOMATED COUNT: 26.1 % (ref 11.6–15.1)
GFR SERPL CREATININE-BSD FRML MDRD: 73 ML/MIN/1.73SQ M
GLUCOSE SERPL-MCNC: 125 MG/DL (ref 65–140)
HCT VFR BLD AUTO: 38.9 % (ref 36.5–49.3)
HGB BLD-MCNC: 12.9 G/DL (ref 12–17)
MAGNESIUM SERPL-MCNC: 2.1 MG/DL (ref 1.9–2.7)
MCH RBC QN AUTO: 33.7 PG (ref 26.8–34.3)
MCHC RBC AUTO-ENTMCNC: 33.2 G/DL (ref 31.4–37.4)
MCV RBC AUTO: 102 FL (ref 82–98)
PHOSPHATE SERPL-MCNC: 3 MG/DL (ref 2.3–4.1)
PLATELET # BLD AUTO: 210 THOUSANDS/UL (ref 149–390)
PMV BLD AUTO: 10.9 FL (ref 8.9–12.7)
POTASSIUM SERPL-SCNC: 5.4 MMOL/L (ref 3.5–5.3)
RBC # BLD AUTO: 3.83 MILLION/UL (ref 3.88–5.62)
SODIUM SERPL-SCNC: 136 MMOL/L (ref 135–147)
WBC # BLD AUTO: 19.83 THOUSAND/UL (ref 4.31–10.16)

## 2025-05-11 PROCEDURE — 85027 COMPLETE CBC AUTOMATED: CPT | Performed by: INTERNAL MEDICINE

## 2025-05-11 PROCEDURE — 83735 ASSAY OF MAGNESIUM: CPT | Performed by: INTERNAL MEDICINE

## 2025-05-11 PROCEDURE — 99024 POSTOP FOLLOW-UP VISIT: CPT | Performed by: THORACIC SURGERY (CARDIOTHORACIC VASCULAR SURGERY)

## 2025-05-11 PROCEDURE — 97167 OT EVAL HIGH COMPLEX 60 MIN: CPT

## 2025-05-11 PROCEDURE — 84100 ASSAY OF PHOSPHORUS: CPT | Performed by: INTERNAL MEDICINE

## 2025-05-11 PROCEDURE — 99232 SBSQ HOSP IP/OBS MODERATE 35: CPT | Performed by: INTERNAL MEDICINE

## 2025-05-11 PROCEDURE — 97163 PT EVAL HIGH COMPLEX 45 MIN: CPT

## 2025-05-11 PROCEDURE — 80048 BASIC METABOLIC PNL TOTAL CA: CPT | Performed by: INTERNAL MEDICINE

## 2025-05-11 RX ORDER — IBUPROFEN 400 MG/1
400 TABLET, FILM COATED ORAL EVERY 6 HOURS PRN
Status: DISPENSED | OUTPATIENT
Start: 2025-05-11 | End: 2025-05-12

## 2025-05-11 RX ORDER — OXYCODONE HYDROCHLORIDE 5 MG/1
5 TABLET ORAL EVERY 4 HOURS PRN
Refills: 0 | Status: DISCONTINUED | OUTPATIENT
Start: 2025-05-11 | End: 2025-05-11

## 2025-05-11 RX ADMIN — Medication 2000 UNITS: at 08:00

## 2025-05-11 RX ADMIN — TAMSULOSIN HYDROCHLORIDE 0.4 MG: 0.4 CAPSULE ORAL at 16:51

## 2025-05-11 RX ADMIN — FLUTICASONE FUROATE AND VILANTEROL TRIFENATATE 1 PUFF: 200; 25 POWDER RESPIRATORY (INHALATION) at 08:00

## 2025-05-11 RX ADMIN — APIXABAN 5 MG: 5 TABLET, FILM COATED ORAL at 10:19

## 2025-05-11 RX ADMIN — DILTIAZEM HYDROCHLORIDE 120 MG: 120 CAPSULE, COATED, EXTENDED RELEASE ORAL at 08:00

## 2025-05-11 RX ADMIN — PANTOPRAZOLE SODIUM 20 MG: 20 TABLET, DELAYED RELEASE ORAL at 05:41

## 2025-05-11 RX ADMIN — APIXABAN 5 MG: 5 TABLET, FILM COATED ORAL at 17:01

## 2025-05-11 RX ADMIN — LEVOTHYROXINE SODIUM 25 MCG: 0.03 TABLET ORAL at 05:41

## 2025-05-11 RX ADMIN — IBUPROFEN 400 MG: 400 TABLET, FILM COATED ORAL at 20:16

## 2025-05-11 NOTE — OCCUPATIONAL THERAPY NOTE
Occupational Therapy Evaluation     Patient Name: Rosalio Anguiano  Today's Date: 5/11/2025  Problem List  Principal Problem:    Pneumothorax  Active Problems:    Chronic atrial fibrillation (HCC)    Centrilobular emphysema (HCC)    GERD (gastroesophageal reflux disease)    Lung nodule    Positive blood culture    Elevated liver enzymes    Acute respiratory failure with hypoxia (HCC)    Past Medical History  Past Medical History:   Diagnosis Date    A-fib (HCC)     BPH with obstruction/lower urinary tract symptoms     Colon polyp     COPD (chronic obstructive pulmonary disease) (HCC)     Frequency of urination     GERD (gastroesophageal reflux disease)     History of cardioversion 12/27/2011    Inital Rhythm AFIB, Final Rhythm Sinus, Max Joules 75    History of echocardiogram 06/01/2015    Normal LV systolic function, mild concentric LV hypertrophy, mild mitral and tricuspid regurg, right atrial enlargement. EF 55%    Irregular heart beat     AF    Other male erectile dysfunction     Personal history of irradiation     Personal history of malignant neoplasm of prostate     Prostate cancer (HCC)      Past Surgical History  Past Surgical History:   Procedure Laterality Date    BACK SURGERY      x 3    CATARACT EXTRACTION, BILATERAL Bilateral 01/2024    INTRAOPERATIVE RADIATION THERAPY (IORT)  2011    IR CHEST TUBE PLACEMENT  5/4/2025    JOINT REPLACEMENT Bilateral     hips    LAMINECTOMY      three levels L3 - S1 - all at different times    PATELLA SURGERY      most of this removed after injury    PROSTATE BIOPSY      TOTAL HIP ARTHROPLASTY Bilateral     VASECTOMY  1973         05/11/25 0951   OT Last Visit   OT Visit Date 05/11/25   Note Type   Note type Evaluation   Pain Assessment   Pain Assessment Tool 0-10   Pain Score No Pain   Restrictions/Precautions   Weight Bearing Precautions Per Order No   Other Precautions Multiple lines;Fall Risk  (CTx1)   Home Living   Type of Home House   Home Layout One  "level;Stairs to enter with rails  (1STE)   Bathroom Shower/Tub Tub/shower unit   Bathroom Toilet Raised   Bathroom Equipment Grab bars in shower;Commode   Home Equipment Walker;Cane  (not used PTA)   Additional Comments Pt reports living w/ spouse in 1SH w/ 1STE. Pt denies use of AD PTA   Prior Function   Level of [place] Independent with ADLs;Independent with functional mobility;Independent with IADLS   Lives With Spouse   Receives Help From Family   IADLs Independent with driving;Independent with meal prep;Independent with medication management   Falls in the last 6 months 0   Vocational Full time employment   Comments Pt reports being completely independent PTA. (+) . Works full time. Supportive spouse able to assist prn   Lifestyle   Autonomy PTA, Pt reports I w/ ADL, IADL, functional mobility w/out AD. (+) .   Reciprocal Relationships Spouse   Service to Others FTE aircraft maintenance   Intrinsic Gratification Enjoys working   General   Family/Caregiver Present No   Subjective   Subjective \"As long as I can walk with the railings I'm good\"   ADL   Where Assessed Chair   Eating Assistance 6  Modified independent   Grooming Assistance 5  Supervision/Setup   UB Bathing Assistance 5  Supervision/Setup   LB Bathing Assistance 4  Minimal Assistance   UB Dressing Assistance 5  Supervision/Setup   LB Dressing Assistance 4  Minimal Assistance   Toileting Assistance  5  Supervision/Setup   Functional Assistance 5  Supervision/Setup   Bed Mobility   Supine to Sit Unable to assess   Sit to Supine Unable to assess   Additional Comments Pt greeted and left OOB in chair w/ all needs in reach   Transfers   Sit to Stand 5  Supervision   Additional items Verbal cues   Stand to Sit 5  Supervision   Additional items Verbal cues   Additional Comments no AD   Functional Mobility   Functional Mobility 5  Supervision   Additional Comments Pt completes short household distance mobility w/ Supervision no AD but does " use handrail along wall   Balance   Static Sitting Good   Dynamic Sitting Fair +   Static Standing Fair   Dynamic Standing Fair -   Ambulatory Fair -   Activity Tolerance   Activity Tolerance Patient limited by fatigue   Medical Staff Made Aware PT due to medical complexity and multiple comorbidities   Nurse Made Aware RN cleared   RUE Assessment   RUE Assessment WFL   LUE Assessment   LUE Assessment WFL   Hand Function   Gross Motor Coordination Functional   Fine Motor Coordination Functional   Sensation   Light Touch No apparent deficits   Cognition   Overall Cognitive Status WFL   Arousal/Participation Alert;Cooperative   Attention Within functional limits   Orientation Level Oriented X4   Memory Within functional limits   Following Commands Follows one step commands without difficulty   Comments Pt is pleasant and cooperative. Overall fair safety awareness and insight. Very particular.   Assessment   Limitation Decreased ADL status;Decreased endurance;Decreased self-care trans;Decreased high-level ADLs   Prognosis Good   Assessment Pt is a 80 y.o. male seen for OT evaluation s/p admission to [facility] on 5/8/2025 with SOB found to have Pneumothorax, s/p right thoracoscopic video assisted surgery (VATS), right therapeutic wedge resection, mediastinal resection, and talc pleurodesis on 5/10/25.  Pt  has a past medical history of A-fib (HCC), BPH with obstruction/lower urinary tract symptoms, Colon polyp, COPD (chronic obstructive pulmonary disease) (HCC), Frequency of urination, GERD (gastroesophageal reflux disease), History of cardioversion, History of echocardiogram, Irregular heart beat, Other male erectile dysfunction, Personal history of irradiation, Personal history of malignant neoplasm of prostate, and Prostate cancer (HCC).  Pt with active OT evaluation/treatment and activity orders. Pt reports living w/ spouse in 1SH w/ 1STE.  PTA, Pt was I w/ ADL/IADL and functional mobility, was driving and  was not using DME at baseline. Pt agreeable and willing to participate in OT evaluation. Pt was greeted and left OOB in chair w/ alarm activated and all needs within reach. During evaluation, pt is Supervision UB ADL, transfers, functional mobility; Min A LB ADL. Pt currently presents with impairments in the following categories -steps to enter environment, difficulty performing ADLS, and difficulty performing IADLS  activity tolerance, endurance, and standing balance/tolerance. These impairments, as well as pt's fatigue, SOB, and risk for falls  limit pt's ability to safely engage in all baseline areas of occupation, includinggrooming, bathing, dressing, toileting, functional mobility/transfers, community mobility, driving, meal prep, cleaning, and work/volunteer work .  Pt would benefit from continued acute OT services throughout hospital course in order to maximize Pt's independence and overall occupational performance. Plan for OT interventions 2-3x per week. From OT standpoint,  recommend Level III (Minimum Resource Intensity) upon d/c when pt medically stable to d/c from acute care. Will continue to follow.   Goals   Patient Goals to go home   LTG Time Frame 10-14   Long Term Goal See goals below   Plan   Treatment Interventions ADL retraining;Functional transfer training;Endurance training;Patient/family training;Compensatory technique education;Continued evaluation;Activityengagement;Energy conservation   Goal Expiration Date 05/25/25   OT Treatment Day 0   OT Frequency 2-3x/wk   Discharge Recommendation   Rehab Resource Intensity Level, OT III (Minimum Resource Intensity)   Additional Comments  The patient's raw score on the -PAC Daily Activity Inpatient Short Form is 19. A raw score of greater than or equal to 19 suggests the patient may benefit from discharge to home. Please refer to the recommendation of the Occupational Therapist for safe discharge planning.   AM-PAC Daily Activity Inpatient   Lower  Body Dressing 3   Bathing 3   Toileting 3   Upper Body Dressing 3   Grooming 3   Eating 4   Daily Activity Raw Score 19   Daily Activity Standardized Score (Calc for Raw Score >=11) 40.22   AM-PAC Applied Cognition Inpatient   Following a Speech/Presentation 4   Understanding Ordinary Conversation 4   Taking Medications 4   Remembering Where Things Are Placed or Put Away 4   Remembering List of 4-5 Errands 4   Taking Care of Complicated Tasks 4   Applied Cognition Raw Score 24   Applied Cognition Standardized Score 62.21   End of Consult   Education Provided Yes   Patient Position at End of Consult Bedside chair;All needs within reach   Nurse Communication Nurse aware of consult     OT Goals:     - Pt will complete LB ADLs w/ Mod I using appropriate AD/DME as needed to maximize functional independence.    - Pt will complete UB ADLs w/ Mod I using appropriate AD/DME  as needed to maximize functional independence.    - Pt will complete toileting routine (transfers, hygiene, and clothing management) with Mod I  to maximize functional independence.    - Pt will complete bed mobility supine >< sit w/ Mod I using appropriate AD/DME as needed.    - Pt will transfer to bed, chair, and toilet w/ Mod I using appropriate AD/DME as needed.    - Pt will be Mod I with functional mobility for household distances using appropriate AD/DME as needed.     - Pt will increase activity tolerance (and sitting tolerance) by eating all meals OOB in the chair.     - Pt will increase standing tolerance to 10-15 minutes to maximize independence w/ functional standing activities.      - Pt will tolerate therapeutic activities for greater than 30 minutes in order to increase tolerance for functional activities.     - Pt will independently integrate pacing/energy conservation strategies into functional activities.     - Pt will participate in ongoing OT assessment of cognitive skills to assist with safe d/c planning/recommendations.       Sandy  Valuntas, MOT, OTR/L

## 2025-05-11 NOTE — ASSESSMENT & PLAN NOTE
Initially had presented to [facility] requiring O2, but that reportedly had resolved on 5/8 on day of transfer to [facility] as reportedly on Room Air with sats > 88%  On Room air at [facility]  See Pneumothorax section below for details

## 2025-05-11 NOTE — PLAN OF CARE
Problem: OCCUPATIONAL THERAPY ADULT  Goal: Performs self-care activities at highest level of function for planned discharge setting.  See evaluation for individualized goals.  Description: Treatment Interventions: ADL retraining, Functional transfer training, Endurance training, Patient/family training, Compensatory technique education, Continued evaluation, Activityengagement, Energy conservation          See flowsheet documentation for full assessment, interventions and recommendations.   Outcome: Progressing  Note: Limitation: Decreased ADL status, Decreased endurance, Decreased self-care trans, Decreased high-level ADLs  Prognosis: Good  Assessment: Pt is a 80 y.o. male seen for OT evaluation s/p admission to [facility] on 5/8/2025 with SOB found to have Pneumothorax, s/p right thoracoscopic video assisted surgery (VATS), right therapeutic wedge resection, mediastinal resection, and talc pleurodesis on 5/10/25.  Pt  has a past medical history of A-fib (HCC), BPH with obstruction/lower urinary tract symptoms, Colon polyp, COPD (chronic obstructive pulmonary disease) (HCC), Frequency of urination, GERD (gastroesophageal reflux disease), History of cardioversion, History of echocardiogram, Irregular heart beat, Other male erectile dysfunction, Personal history of irradiation, Personal history of malignant neoplasm of prostate, and Prostate cancer (HCC).  Pt with active OT evaluation/treatment and activity orders. Pt reports living w/ spouse in 1SH w/ 1STE.  PTA, Pt was I w/ ADL/IADL and functional mobility, was driving and was not using DME at baseline. Pt agreeable and willing to participate in OT evaluation. Pt was greeted and left OOB in chair w/ alarm activated and all needs within reach. During evaluation, pt is Supervision UB ADL, transfers, functional mobility; Min A LB ADL. Pt currently presents with impairments in the following categories -steps to enter environment, difficulty performing ADLS,  and difficulty performing IADLS  activity tolerance, endurance, and standing balance/tolerance. These impairments, as well as pt's fatigue, SOB, and risk for falls  limit pt's ability to safely engage in all baseline areas of occupation, includinggrooming, bathing, dressing, toileting, functional mobility/transfers, community mobility, driving, meal prep, cleaning, and work/volunteer work .  Pt would benefit from continued acute OT services throughout hospital course in order to maximize Pt's independence and overall occupational performance. Plan for OT interventions 2-3x per week. From OT standpoint,  recommend Level III (Minimum Resource Intensity) upon d/c when pt medically stable to d/c from acute care. Will continue to follow.     Rehab Resource Intensity Level, OT: III (Minimum Resource Intensity)

## 2025-05-11 NOTE — DISCHARGE INSTR - AVS FIRST PAGE
VATS (Video-assisted Thoracoscopic Surgery)     You underwent a minimally invasive thoracic surgery. Below are your discharge instructions.     Incision Care:  - Your incisions were closed with stitches underneath of the skin which will dissolve. There is purple surgical glue on top of the incisions. The surgical glue will flake off over the next few weeks. There is a non-dissolvable stitch at your chest tube site which will be removed in the office.   - After your chest tube was removed, a gauze dressing was placed over the incision. This gauze can be removed in 24 hours. After this time you may place some gauze over your chest tube site if it is leaking some fluid.  - You do not need dressings over your other incisions.  Do not apply any cream or ointments to the incisions unless instructed by your surgeon.  - You may shower daily - no soaking in a tub bath. Wash your incisions gently with soap and water and pat dry. Do not scrub the incisions.  - Bruising at your incisions is normal. This will resolve over the next few weeks.     Activity  - No lifting greater than 10lbs until you are evaluated in the office.   - Walking and light activity is encouraged. Recommend you are active during the day and using your Incentive Spirometer when you are sitting for a prolonged period.   - No driving while you are using narcotic pain medications. If you are no longer taking narcotics and considering driving, you must make sure you can quickly react to changes on the road. This can be challenging in the first few weeks after surgery.     Pain:  - Some degree of pain or discomfort after surgery is expected. This pain may become more noticeable after discharge as you start moving around more.   - Your pain regiment includes the following:   - Tylenol 650 mg tablet. Take 1 tablet, every 6 hours for 1 week.     Medications:  - Continue on your home medications including any blood thinner and aspirin, as instructed.     Diet:  -  You should continue on your normal diet.     Bowel Regiment:  - Constipation after surgery while on narcotic pain medications is normal.  - You should continue taking a bowel regiment while on a narcotic pain medication to keep your bowel movements soft. Your discharge bowel regiment:   - Docusate (Colace) 100mg tablet. Take 1 tablet, twice a day.    - Senna 8.6mg tablet. Take 1 tablet daily.   - If your bowel movements become lose, stop taking the bowel regiment above.     Follow-up:  - You have a scheduled follow-up appointment on: 6/3/25 at 9:20 am  - Obtain your Chest X-ray prior to your scheduled post-operative appointment.   - A couple of days after discharge our office will call you to check in with how you are recovering at home.     Concerns:  - Call the office for any questions or concerns. Many postoperative issues can be sorted out over the phone.   - Call the office or go to the Emergency Department if you develop a fever greater than 101, persistent chills, persistent nausea/vomiting, worsening or uncontrolled pain, and/or increasing redness or foul smelling drainage from an incision.     Thoracic Surgery Office: [phone]    Dr. William Burfeind, MD Rachael Hart, PA-C Dr. Meredith Harrison, MD Amylyn Mortimer, PA-C Dr. Dustin Manchester, MD Dr. Stephen Dingley, DO         (0) independent

## 2025-05-11 NOTE — ASSESSMENT & PLAN NOTE
80-year-old male with history of COPD who presented with spontaneous pneumothorax on 5/3 with emergent placement of chest tube.  Interval dislodgment of chest tube with reaccumulation of pneumothorax requiring IR chest tube placement on 5/4.  In the interim persistent airleak and evidence of pneumothorax on chest x-rays.     S/p right thoracoscopic video assisted surgery (VATS), right therapeutic wedge resection, mediastinal resection, and talc pleurodesis on 5/10    Chest tube in place to -20 suction, no airleak, 525 cc recorded serosanguinous output    - Diet as tolerated  - Keep chest tube to suction x 72 hours  - Can resume Eliquis today  - Pain and nausea control as needed  - Rest of care per primary

## 2025-05-11 NOTE — PROGRESS NOTES
Progress Note - Hospitalist   Name: Rosalio Anguiano 80 y.o. male I MRN: [id number]  Unit/Bed#: [facility] 529-01 I Date of Admission: 5/8/2025   Date of Service: 5/11/2025 I Hospital Day: 3    Assessment & Plan  Acute respiratory failure with hypoxia (HCC)  Initially had presented to [facility] requiring O2, but that reportedly had resolved on 5/8 on day of transfer to \[facility]\"" as reportedly on Room Air with sats > 88%  On Room air at \[facility]\""  See Pneumothorax section below for details  Pneumothorax  Presented to [facility] ER on 5/3 with shortness of breath and found to have a spontaneous pneumothorax for which a chest tube was initially placed which was dislodged and then a second chest tube was placed by the ER.  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 admission with Thoracics consult  S/p   FIBEROPTIC BRONCHOSCOPY  Right - THORACOSCOPY VIDEO ASSISTED SURGERY (VATS)   RIGHT THERAPEUTIC UPPER LOBE WEDGE RESECTION;   MEDIASTINAL LYMPH NODE DISSECTION   TALC PLEURODESIS  Post op care, chest tube management per thoracic surgery    Chronic atrial fibrillation (HCC)  Rate controlled with Cardizem  Resume Eliquis today  Centrilobular emphysema (HCC)  Currently not in exacerbation  Completed 5 days of steroid therapy from 5/3-->5/8  Holding off additional steroids for now   Continue pta inhalers  GERD (gastroesophageal reflux disease)  Continue Protonix  Lung nodule  S/p wedge resection, follow up pathology  Positive blood  culture  Patient hemodynamically stable and afebrile  WBC thought elevated in setting of steroids   Suspected to be contaminant  Monitor off antibiotics  Elevated liver enzymes  Continue to monitor  Reviewed right upper quadrant ultrasound no acute findings    VTE Pharmacologic Prophylaxis: VTE Score: 4 Moderate Risk (Score 3-4) - Pharmacological DVT Prophylaxis Ordered: apixaban (Eliquis).    Mobility:   Basic Mobility Inpatient Raw Score: 18  JH-HLM Goal: 6: Walk 10 steps or more  JH-HLM Achieved: 7: Walk 25 feet or more  JH-HLM Goal NOT achieved. Continue with multidisciplinary rounding and encourage appropriate mobility to improve upon JH-HLM goals.    Patient Centered Rounds: I performed bedside rounds with nursing staff today.   Discussions with Specialists or Other Care Team Provider: [email or web address]    Current Length of Stay: 3 day(s)  Current Patient Status: Inpatient   Certification Statement: The patient will continue to require additional inpatient hospital stay due to post op management  Discharge Plan: Anticipate discharge in >72 hrs to home.    Code Status: Level 1 - Full Code    Subjective   Denies any new complaints. Has pain at the chest tube site but does not need analgesics    Objective :  Temp:  [95.3 °F (35.2 °C)-99.4 °F (37.4 °C)] 99 °F (37.2 °C)  HR:  [] 92  BP: (105-136)/() 121/75  Resp:  [13-20] 20  SpO2:  [89 %-98 %] 96 %  O2 Device: None (Room air)  Nasal Cannula O2 Flow Rate (L/min):  [4 L/min] 4 L/min    Body mass index is 27.8 kg/m².     Input and Output Summary (last 24 hours):     Intake/Output Summary (Last 24 hours) at 5/11/2025 1220  Last data filed at 5/11/2025 1000  Gross per 24 hour   Intake 1560 ml   Output 1925 ml   Net -365 ml       Physical Exam  Constitutional:       Appearance: Normal appearance.   HENT:      Head: Normocephalic and atraumatic.      Nose: Nose normal.   Eyes:      Extraocular Movements: Extraocular movements intact.   Cardiovascular:      Rate and  Rhythm: Normal rate and regular rhythm.   Pulmonary:      Breath sounds: No wheezing or rales.      Comments: Right sided chest tube  Musculoskeletal:      Right lower leg: No edema.      Left lower leg: No edema.   Skin:     General: Skin is warm and dry.   Neurological:      Mental Status: He is alert and oriented to person, place, and time.   Psychiatric:         Mood and Affect: Mood normal.         Behavior: Behavior normal.           Lines/Drains:  Lines/Drains/Airways       Active Status       Name Placement date Placement time Site Days    Chest Tube 2 Right Anterior 14 Fr. 05/04/25  1223  Anterior  6    Chest Tube 1 Right 24 Fr. 05/10/25  1052  --  1                            Lab Results: I have reviewed the following results:   Results from last 7 days   Lab Units 05/11/25  0522 05/10/25  0612   WBC Thousand/uL 19.83* 10.46*   HEMOGLOBIN g/dL 12.9 13.2   HEMATOCRIT % 38.9 40.8   PLATELETS Thousands/uL 210 206   SEGS PCT %  --  68   LYMPHO PCT %  --  14   MONO PCT %  --  12   EOS PCT %  --  4     Results from last 7 days   Lab Units 05/11/25 0522 05/10/25  0612 05/09/25  0511   SODIUM mmol/L 136   < > 136   POTASSIUM mmol/L 5.4*   < > 4.5   CHLORIDE mmol/L 102   < > 103   CO2 mmol/L 29   < > 29   BUN mg/dL 23   < > 25   CREATININE mg/dL 0.97   < > 0.89   ANION GAP mmol/L 5   < > 4   CALCIUM mg/dL 8.4   < > 8.7   ALBUMIN g/dL  --   --  3.5   TOTAL BILIRUBIN mg/dL  --   --  1.14*   ALK PHOS U/L  --   --  90   ALT U/L  --   --  117*   AST U/L  --   --  45*   GLUCOSE RANDOM mg/dL 125   < > 98    < > = values in this interval not displayed.     Results from last 7 days   Lab Units 05/09/25  0511   INR  1.23*             Results from last 7 days   Lab Units 05/04/25  1330   LACTIC ACID mmol/L 1.2       Recent Cultures (last 7 days):         Imaging Results Review: No pertinent imaging studies reviewed.  Other Study Results Review: No additional pertinent studies reviewed.    Last 24 Hours Medication List:      Current Facility-Administered Medications:     albuterol (PROVENTIL HFA,VENTOLIN HFA) inhaler 2 puff, Q4H PRN    apixaban (ELIQUIS) tablet 5 mg, BID    Cholecalciferol (VITAMIN D3) tablet 2,000 Units, Daily    diltiazem (CARDIZEM CD) 24 hr capsule 120 mg, Daily    fluticasone-vilanterol 200-25 mcg/actuation 1 puff, Daily    HYDROmorphone (DILAUDID) injection 0.5 mg, Q4H PRN    ibuprofen (MOTRIN) tablet 400 mg, Q6H PRN    levothyroxine tablet 25 mcg, Early Morning    pantoprazole (PROTONIX) EC tablet 20 mg, Early Morning    tamsulosin (FLOMAX) capsule 0.4 mg, Daily With Dinner    Administrative Statements   Today, Patient Was Seen By: Yovanny Giles MD  I have spent a total time of 40 minutes in caring for this patient on the day of the visit/encounter including Diagnostic results, Instructions for management, Patient and family education, Impressions, Counseling / Coordination of care, Documenting in the medical record, Reviewing/placing orders in the medical record (including tests, medications, and/or procedures), Obtaining or reviewing history  , and Communicating with other healthcare professionals .    **Please Note: This note may have been constructed using a voice recognition system.**

## 2025-05-11 NOTE — ASSESSMENT & PLAN NOTE
Presented to [facility] ER on 5/3 with shortness of breath and found to have a spontaneous pneumothorax for which a chest tube was initially placed which was dislodged and then a second chest tube was placed by the ER.  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

## 2025-05-11 NOTE — QUICK NOTE
POST-OP CHECK NOTE:      Subjective:  80 y.o. male. Day of Surgery s/p right thoracoscopic video assisted surgery (VATS), right therapeutic wedge resection, mediastinal resection, and talc pleurodesis w/ Dr. Mayen.  Patients pain is well controlled. He denies any nausea, chest pain, or shortness of breath.      Objective:  Vitals:    05/10/25 1630 05/10/25 1645 05/10/25 1717 05/10/25 1745   BP: 121/88 121/86 105/68    BP Location:       Pulse: (!) 107 (!) 118 105    Resp:       Temp:  97.9 °F (36.6 °C)  98.8 °F (37.1 °C)   TempSrc:       SpO2: 90% 93% 91%    Weight:       Height:            Physical Exam:  General: well-developed, well-nourished male lying in bed in NAD.  Cardiovascular: grossly well perfused perfused, regular rate.  Chest: Incisions c/d/i, CT to -20 suction, no air leak. Minimal serosanguinous output.  Lungs: normal WOB on RA. No increased respiratory effort.  Abdomen: Soft, appropriately tender, non-distended.  Extremities: No edema, clubbing or cyanosis  Skin: Warm, dry. No rashes, ecchymoses, or abrasions.    Post-op Studies:  CXR: shows right chest tube insertion with small-moderate right pneumothorax, likely atelectasis in right upper lung, and emphysema.      Assessment & Plan  Pneumothorax  80-year-old male with history of COPD who presented with spontaneous pneumothorax on 5/3 with emergent placement of chest tube.  Interval dislodgment of chest tube with reaccumulation of pneumothorax requiring IR chest tube placement on 5/4.  In the interim persistent airleak and evidence of pneumothorax on chest x-rays.     Right IR chest tube in place to -20 suction, minimal airleak, 54 cc recorded serous output    - Plan for OR for VATS bleb resection and pleurodesis today  -NPO since midnight; IV fluids  - Last Eliquis dose a.m. of 5/8; continue to hold  - Maintain right chest tube to -20 suction  Chronic atrial fibrillation (HCC)    Centrilobular emphysema (HCC)    GERD (gastroesophageal reflux  disease)    Lung nodule    Positive blood culture    Elevated liver enzymes    Acute respiratory failure with hypoxia (HCC)      Plan:  - Pain and nausea control PRN  - Fluids  - Diet: advance as tolerated  - Encourage IS, OOB, ambulation  - DVT ppx  - Remove chest tube at 24 hours  - Write letter about back to work      Doris Emmanuel  General Surgery  05/10/25  8:13 PM

## 2025-05-11 NOTE — PROGRESS NOTES
Progress Note - Oncology-Surgical   Name: Rosalio Anguiano 80 y.o. male I MRN: [id number]  Unit/Bed#: [facility] 529-01 I Date of Admission: 5/8/2025   Date of Service: 5/11/2025 I Hospital Day: 3    Assessment & Plan  Pneumothorax  80-year-old male with history of COPD who presented with spontaneous pneumothorax on 5/3 with emergent placement of chest tube.  Interval dislodgment of chest tube with reaccumulation of pneumothorax requiring IR chest tube placement on 5/4.  In the interim persistent airleak and evidence of pneumothorax on chest x-rays.     S/p right thoracoscopic video assisted surgery (VATS), right therapeutic wedge resection, mediastinal resection, and talc pleurodesis on 5/10    Chest tube in place to -20 suction, no airleak, 525 cc recorded serosanguinous output    - Diet as tolerated  - Keep chest tube to suction x 72 hours  - Can resume Eliquis today  - Pain and nausea control as needed  - Rest of care per primary    Chronic atrial fibrillation (HCC)    Centrilobular emphysema (HCC)    GERD (gastroesophageal reflux disease)    Lung nodule    Positive blood culture    Elevated liver enzymes    Acute respiratory failure with hypoxia (HCC)          24 Hour Events : No acute events overnight  Subjective : Patient has no new complaints and is feeling well, reports no pain.    Objective :  Temp:  [95.3 °F (35.2 °C)-99.4 °F (37.4 °C)] 99 °F (37.2 °C)  HR:  [] 92  BP: (105-136)/() 121/75  Resp:  [13-22] 20  SpO2:  [89 %-98 %] 96 %  O2 Device: None (Room air)  Nasal Cannula O2 Flow Rate (L/min):  [4 L/min] 4 L/min    I/O         05/08 0701  05/09 0700 05/09 0701  05/10 0700    P.O. 50 480    Total Intake(mL/kg) 50 (0.5) 480 (5.2)    Urine (mL/kg/hr) 425 550 (0.2)    Chest Tube 17 54    Total Output 442 604    Net -392 -124                Lines/Drains/Airways       Active Status       Name Placement date Placement time Site Days    Chest Tube 2 Right Anterior 14 Fr. 05/04/25  1223  Anterior  6     Chest Tube 1 Right 24 Fr. 05/10/25  1052  --  less than 1                  Physical Exam  General: NAD  Skin: Warm, dry, anicteric  HEENT: Normocephalic, atraumatic  CV: RRR  Pulm:  Nonlabored breathing on room air; chest tube in place as above  Abd: Soft, ND/NT  MSK: Symmetric, no edema, no tenderness, no deformity  Neuro: AOx3, GCS 15     Lab Results: I have reviewed the following results:  Recent Labs     05/09/25  0511 05/10/25  0612 05/11/25  0522   WBC 11.86*   < > 19.83*   HGB 12.4   < > 12.9   HCT 38.5   < > 38.9      < > 210   SODIUM 136   < > 136   K 4.5   < > 5.4*      < > 102   CO2 29   < > 29   BUN 25   < > 23   CREATININE 0.89   < > 0.97   GLUC 98   < > 125   MG 2.2  --  2.1   PHOS  --   --  3.0   AST 45*  --   --    *  --   --    ALB 3.5  --   --    TBILI 1.14*  --   --    ALKPHOS 90  --   --    PTT 30  --   --    INR 1.23*  --   --     < > = values in this interval not displayed.

## 2025-05-11 NOTE — PHYSICAL THERAPY NOTE
Physical Therapy Evaluation     Patient's Name: Rosalio Anguiano    Admitting Diagnosis  Pneumothorax    Problem List  Patient Active Problem List   Diagnosis    Chronic atrial fibrillation (HCC)    Bilateral leg edema    Adenocarcinoma of prostate (HCC)    Centrilobular emphysema (HCC)    GERD (gastroesophageal reflux disease)    Tinnitus    COPD (chronic obstructive pulmonary disease) (HCC)    Right groin pain    Bradycardia    Neck pain    Hypothyroidism    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    Hearing loss    History of colonic polyps    Osteoarthritis of hip    Sensorineural hearing loss, bilateral    Simple chronic bronchitis (HCC)    LAUREL (obstructive sleep apnea)    Vertigo    Osteoarthritis of lumbar spine with myelopathy    Pain and swelling of right lower leg    Preoperative examination    Combined forms of age-related cataract of both eyes    Annual physical exam    Labyrinthitis of both ears    Chronic pain of both shoulders    Seborrheic keratosis    Lung nodule    Keratosis    Pneumothorax    Positive blood culture    Elevated liver enzymes    Acute respiratory failure with hypoxia (HCC)       Past Medical History  Past Medical History:   Diagnosis Date    A-fib (HCC)     BPH with obstruction/lower urinary tract symptoms     Colon polyp     COPD (chronic obstructive pulmonary disease) (HCC)     Frequency of urination     GERD (gastroesophageal reflux disease)     History of cardioversion 12/27/2011    Inital Rhythm AFIB, Final Rhythm Sinus, Max Joules 75    History of echocardiogram 06/01/2015    Normal LV systolic function, mild concentric LV hypertrophy, mild mitral and tricuspid regurg, right atrial enlargement. EF 55%    Irregular heart beat     AF    Other male erectile dysfunction     Personal history of irradiation     Personal history of malignant neoplasm of prostate     Prostate cancer (HCC)        Past Surgical History  Past Surgical History:   Procedure Laterality Date    BACK  SURGERY      x 3    CATARACT EXTRACTION, BILATERAL Bilateral 01/2024    INTRAOPERATIVE RADIATION THERAPY (IORT)  2011    IR CHEST TUBE PLACEMENT  5/4/2025    JOINT REPLACEMENT Bilateral     hips    LAMINECTOMY      three levels L3 - S1 - all at different times    PATELLA SURGERY      most of this removed after injury    PROSTATE BIOPSY      TOTAL HIP ARTHROPLASTY Bilateral     VASECTOMY  1973          05/11/25 0950   PT Last Visit   PT Visit Date 05/11/25   Note Type   Note type Evaluation   Pain Assessment   Pain Assessment Tool 0-10   Pain Score No Pain   Restrictions/Precautions   Weight Bearing Precautions Per Order No   Other Precautions Fall Risk;Multiple lines  (CTx1)   Home Living   Type of Home House   Home Layout One level;Access  (1STE)   Bathroom Shower/Tub Tub/shower unit   Bathroom Toilet Raised   Bathroom Equipment Grab bars in shower;Commode   Bathroom Accessibility Accessible   Home Equipment Walker;Cane   Prior Function   Level of [place] Independent with ADLs;Independent with functional mobility;Independent with IADLS   Lives With Spouse   Receives Help From Family   IADLs Independent with driving;Independent with medication management;Independent with meal prep   Falls in the last 6 months 0   Vocational Full time employment   General   Family/Caregiver Present No   Cognition   Overall Cognitive Status WFL   Arousal/Participation Alert   Orientation Level Oriented X4   Memory Within functional limits   Following Commands Follows one step commands without difficulty   Comments Patient pleasant and cooperative. Very particular.   Subjective   Subjective Patient agreeable to PT eval   RUE Assessment   RUE Assessment WFL   LUE Assessment   LUE Assessment WFL   RLE Assessment   RLE Assessment X   Strength RLE   RLE Overall Strength 4-/5   LLE Assessment   LLE Assessment X   Strength LLE   LLE Overall Strength 4-/5   Bed Mobility   Additional Comments OOB in chair on arrival   Transfers   Sit to  Stand 5  Supervision   Additional items Verbal cues   Stand to Sit 5  Supervision   Additional items Verbal cues   Additional Comments no AD   Ambulation/Elevation   Gait pattern Decreased foot clearance;Shuffling;Short stride;Excessively slow   Gait Assistance 4  Minimal assist   Additional items Assist x 1;Verbal cues   Assistive Device   (single hand support on HR)   Distance 40'   Ambulation/Elevation Additional Comments r/o RW vs SPC next session as able   Balance   Static Sitting Good   Dynamic Sitting Fair +   Static Standing Fair   Dynamic Standing Fair -   Ambulatory Fair -   Endurance Deficit   Endurance Deficit Yes   Endurance Deficit Description fatigue, weakness   Activity Tolerance   Activity Tolerance Patient limited by fatigue   Medical Staff Made Aware OT   Nurse Made Aware RN cleared   Assessment   Prognosis Good   Problem List Decreased strength;Decreased endurance;Impaired balance;Decreased mobility;Pain   Assessment Pt is a 80 y.o. male seen for a high complexity PT evaluation due to Ongoing medical management for primary dx, Increased reliance on more restrictive AD compared to baseline, Decreased activity tolerance compared to baseline, Fall risk, Increased assistance needed from caregiver at current time, s/p surgical intervention. Patient is s/p admit to [facility] on 5/8/2025 for Pneumothorax. Patient  has a past medical history of A-fib ([facility]), BPH with obstruction/lower urinary tract symptoms, Colon polyp, COPD (chronic obstructive pulmonary disease) ([facility]), Frequency of urination, GERD (gastroesophageal reflux disease), History of cardioversion, History of echocardiogram, Irregular heart beat, Other male erectile dysfunction, Personal history of irradiation, Personal history of malignant neoplasm of prostate, and Prostate cancer ([facility])..     PT now consulted to assess functional mobility and needs for safe d/c planning. Prior to admission, pt independent with functional mobility,  independent ADLs, and independent IADLs. Personal factors affecting status include fall risk, steps to enter home, and medical status     Currently pt requires supervision for functional transfers with no AD ; minimal assistance x1 for ambulation with  single hand support on HR . Plan to r/o RW vs SPC next session as able. Pt presents functioning below baseline and w/ overall mobility deficits 2* to: decreased strength, decreased endurance, decreased mobility, impaired balance. These impairments place pt at risk for falls.     Pt will continue to benefit from skilled PT interventions to address stated impairments; to maximize functional potential; for ongoing pt/family education; and DME needs. The patient's AM-PAC Basic Mobility Inpatient Short Form Raw Score Is 18. PT is currently recommending Level 3 - Minimum Resource Intensity on d/c from hospital. Will continue to follow as able.   Goals   Patient Goals to go home   STG Expiration Date 05/25/25   Short Term Goal #1 In 14 days, patient will 1) increase strength in BUE/BLE by 1/2 to 1 full grade for increased strength and stability needed for functional mobility 2) improve bed mobility to MI for improved mobility and decreased need for assist 3) sit EOB x30' with MI to facilitate trunk stability and safety for completion of ADL tasks 4) increase functional transfers to MI for improved safety and functional mobility 5) ambulate 250ft with MI using LRAD for increased endurance and safety ambulating home and community environments 6) improve balance by 1 grade for improved safety and stability and decreased risk for falls. 7) ascend/descend at least 1 stair using HR with MI in order to safely access home environment   PT Treatment Day 0   Plan   Treatment/Interventions ADL retraining;Functional transfer training;LE strengthening/ROM;Therapeutic exercise;Endurance training;Patient/family training;Equipment eval/education;Bed mobility;Gait training;Spoke to  nursing;Spoke to case management;OT;Elevations   PT Frequency 3-5x/wk   Discharge Recommendation   Rehab Resource Intensity Level, PT III (Minimum Resource Intensity)   Equipment Recommended   (owns)   AM-PAC Basic Mobility Inpatient   Turning in Flat Bed Without Bedrails 3   Lying on Back to Sitting on Edge of Flat Bed Without Bedrails 3   Moving Bed to Chair 3   Standing Up From Chair Using Arms 3   Walk in Room 3   Climb 3-5 Stairs With Railing 3   Basic Mobility Inpatient Raw Score 18   Basic Mobility Standardized Score 41.05   [facility] Highest Level Of Mobility   -HLM Goal 6: Walk 10 steps or more   JH-HLM Achieved 7: Walk 25 feet or more   Modified [place] Scale   Modified [place] Scale 4   End of Consult   Patient Position at End of Consult All needs within reach;Bedside chair         Fabby Guillaume, PT, DPT

## 2025-05-11 NOTE — PLAN OF CARE
Problem: PHYSICAL THERAPY ADULT  Goal: Performs mobility at highest level of function for planned discharge setting.  See evaluation for individualized goals.  Description: Treatment/Interventions: ADL retraining, Functional transfer training, LE strengthening/ROM, Therapeutic exercise, Endurance training, Patient/family training, Equipment eval/education, Bed mobility, Gait training, Spoke to nursing, Spoke to case management, OT, Elevations  Equipment Recommended:  (owns)       See flowsheet documentation for full assessment, interventions and recommendations.  Outcome: Progressing  Note: Prognosis: Good  Problem List: Decreased strength, Decreased endurance, Impaired balance, Decreased mobility, Pain  Assessment: Pt is a 80 y.o. male seen for a high complexity PT evaluation due to Ongoing medical management for primary dx, Increased reliance on more restrictive AD compared to baseline, Decreased activity tolerance compared to baseline, Fall risk, Increased assistance needed from caregiver at current time, s/p surgical intervention. Patient is s/p admit to [facility] on 5/8/2025 for Pneumothorax. Patient  has a past medical history of A-fib ([facility]), BPH with obstruction/lower urinary tract symptoms, Colon polyp, COPD (chronic obstructive pulmonary disease) ([facility]), Frequency of urination, GERD (gastroesophageal reflux disease), History of cardioversion, History of echocardiogram, Irregular heart beat, Other male erectile dysfunction, Personal history of irradiation, Personal history of malignant neoplasm of prostate, and Prostate cancer ([facility])..     PT now consulted to assess functional mobility and needs for safe d/c planning. Prior to admission, pt independent with functional mobility, independent ADLs, and independent IADLs. Personal factors affecting status include fall risk, steps to enter home, and medical status     Currently pt requires supervision for functional transfers with no AD ; minimal  assistance x1 for ambulation with  single hand support on HR . Plan to r/o RW vs SPC next session as able. Pt presents functioning below baseline and w/ overall mobility deficits 2* to: decreased strength, decreased endurance, decreased mobility, impaired balance. These impairments place pt at risk for falls.     Pt will continue to benefit from skilled PT interventions to address stated impairments; to maximize functional potential; for ongoing pt/family education; and DME needs. The patient's AM-PAC Basic Mobility Inpatient Short Form Raw Score Is 18. PT is currently recommending Level 3 - Minimum Resource Intensity on d/c from hospital. Will continue to follow as able.        Rehab Resource Intensity Level, PT: III (Minimum Resource Intensity)    See flowsheet documentation for full assessment.

## 2025-05-12 LAB
ABO GROUP BLD BPU: NORMAL
ABO GROUP BLD BPU: NORMAL
BPU ID: NORMAL
BPU ID: NORMAL
CROSSMATCH: NORMAL
CROSSMATCH: NORMAL
UNIT DISPENSE STATUS: NORMAL
UNIT DISPENSE STATUS: NORMAL
UNIT PRODUCT CODE: NORMAL
UNIT PRODUCT CODE: NORMAL
UNIT PRODUCT VOLUME: 350 ML
UNIT PRODUCT VOLUME: 350 ML
UNIT RH: NORMAL
UNIT RH: NORMAL

## 2025-05-12 PROCEDURE — 99024 POSTOP FOLLOW-UP VISIT: CPT | Performed by: THORACIC SURGERY (CARDIOTHORACIC VASCULAR SURGERY)

## 2025-05-12 PROCEDURE — 99232 SBSQ HOSP IP/OBS MODERATE 35: CPT | Performed by: INTERNAL MEDICINE

## 2025-05-12 RX ADMIN — IBUPROFEN 400 MG: 400 TABLET, FILM COATED ORAL at 21:24

## 2025-05-12 RX ADMIN — PANTOPRAZOLE SODIUM 20 MG: 20 TABLET, DELAYED RELEASE ORAL at 05:54

## 2025-05-12 RX ADMIN — FLUTICASONE FUROATE AND VILANTEROL TRIFENATATE 1 PUFF: 200; 25 POWDER RESPIRATORY (INHALATION) at 09:16

## 2025-05-12 RX ADMIN — APIXABAN 5 MG: 5 TABLET, FILM COATED ORAL at 09:15

## 2025-05-12 RX ADMIN — TAMSULOSIN HYDROCHLORIDE 0.4 MG: 0.4 CAPSULE ORAL at 16:24

## 2025-05-12 RX ADMIN — APIXABAN 5 MG: 5 TABLET, FILM COATED ORAL at 17:48

## 2025-05-12 RX ADMIN — LEVOTHYROXINE SODIUM 25 MCG: 0.03 TABLET ORAL at 05:54

## 2025-05-12 RX ADMIN — Medication 2000 UNITS: at 09:15

## 2025-05-12 RX ADMIN — DILTIAZEM HYDROCHLORIDE 120 MG: 120 CAPSULE, COATED, EXTENDED RELEASE ORAL at 09:15

## 2025-05-12 RX ADMIN — IBUPROFEN 400 MG: 400 TABLET, FILM COATED ORAL at 03:08

## 2025-05-12 NOTE — RESTORATIVE TECHNICIAN NOTE
Restorative Technician Note      Patient Name: Rosalio Anguiano     Restorative Tech Visit Date: 05/12/25  Note Type: Mobility  Patient Position Upon Consult: Bedside chair  Activity Performed: Ambulated  Assistive Device: Other (Comment) (none)  Patient Position at End of Consult: Bedside chair; All needs within reach

## 2025-05-12 NOTE — UTILIZATION REVIEW
Continued Stay Review    Date: 05/10                          Current Patient Class: Inpatient  Current Level of Care: MS    HPI:80 y.o. male initially admitted on 05/08   Current Diagnosis: Pneumothorax    Assessment/Plan:   05/10 No acute ovn events. Right IR chest tube in place to -20 suction, minimal airleak, 54 cc recorded serous output   Plan: Plan for OR for VATS bleb resection and pleurodesis today.NPO since midnight; IV fluids. Last Eliquis dose a.m. of 5/8; continue to hold. Maintain right chest tube to -20 suction.    OP Note:   SURGERY DATE: 5/10/2025   Procedure(s):  FIBEROPTIC BRONCHOSCOPY  Right - THORACOSCOPY VIDEO ASSISTED SURGERY (VATS)   RIGHT THERAPEUTIC UPPER LOBE WEDGE RESECTION;   MEDIASTINAL LYMPH NODE DISSECTION   TALC PLEURODESIS  Anesthesia Type: Choice  Operative Findings:  Upper lobe wedge resection performed to encompass the mass as well as his apical blebs.    Thoracic Quick Notes: Pt s/p right thoracoscopic video assisted surgery (VATS), right therapeutic wedge resection, mediastinal resection, and talc pleurodesis. Pain well controlled.   Post-op Studies:  CXR: shows right chest tube insertion with small-moderate right pneumothorax, likely atelectasis in right upper lung, and emphysema.  Plan: Pain and nausea control PRN. Fluids. Diet: advance as tolerated. Encourage IS, OOB, ambulation. DVT ppx. Remove chest tube at 24 hours    Medications:   Scheduled Medications:  apixaban, 5 mg, Oral, BID  Cholecalciferol, 2,000 Units, Oral, Daily  diltiazem, 120 mg, Oral, Daily  fluticasone-vilanterol, 1 puff, Inhalation, Daily  levothyroxine, 25 mcg, Oral, Early Morning  pantoprazole, 20 mg, Oral, Early Morning  tamsulosin, 0.4 mg, Oral, Daily With Dinner      Continuous IV Infusions:  lactated ringers infusion  Rate: 100 mL/hr Dose: 100 mL/hr  Freq: Continuous Route: IV  Indications of Use: IV Hydration  Last Dose: Stopped (05/10/25 1219)  Start: 05/10/25 1030 End: 05/10/25 1750       PRN  Meds:  albuterol, 2 puff, Inhalation, Q4H PRN  HYDROmorphone, 0.5 mg, Intravenous, Q4H PRN  ibuprofen, 400 mg, Oral, Q6H PRN       Discharge Plan: TBD    Vital Signs (last 3 days)       Date/Time Temp Pulse Resp BP MAP (mmHg) SpO2 Calculated FIO2 (%) - Nasal Cannula O2 Flow Rate (L/min) Nasal Cannula O2 Flow Rate (L/min) O2 Device Cardiac (WDL) Patient Position - Orthostatic VS [place] Coma Scale Score Pain    05/12/25 15:19:11 97.8 °F (36.6 °C) 105 20 142/79 100 94 % -- -- -- -- -- Sitting -- --    05/12/25 0934 -- -- -- -- -- -- -- -- -- -- -- -- 15 No Pain    05/12/25 07:04:19 97.1 °F (36.2 °C) -- 18 115/73 87 97 % -- -- -- -- -- -- -- --    05/12/25 0308 -- -- -- -- -- -- -- -- -- -- -- -- -- 3    05/11/25 22:54:06 98.4 °F (36.9 °C) 94 20 121/79 93 91 % -- -- -- -- -- Sitting -- --    05/11/25 2016 -- -- -- -- -- -- -- -- -- -- -- -- -- 8    05/11/25 2000 -- -- -- -- -- -- -- -- -- -- -- -- 15 No Pain    05/11/25 15:51:27 97.9 °F (36.6 °C) 113 20 118/77 91 92 % -- -- -- None (Room air) -- Sitting -- --    05/11/25 15:49:51 97.9 °F (36.6 °C) -- -- 118/77 91 -- -- -- -- -- -- -- -- --    05/11/25 1130 -- -- -- -- -- -- -- -- -- -- -- -- 15 No Pain    05/11/25 0951 -- -- -- -- -- -- -- -- -- -- -- -- -- No Pain    05/11/25 0950 -- -- -- -- -- -- -- -- -- -- -- -- -- No Pain    05/11/25 0800 -- -- -- -- -- -- -- -- -- -- -- -- 15 No Pain    05/11/25 07:48:14 99 °F (37.2 °C) -- -- 121/75 90 96 % -- -- -- -- -- -- -- --    05/10/25 23:38:56 99.4 °F (37.4 °C) 92 20 136/85 102 92 % -- -- -- None (Room air) -- Sitting -- --    05/10/25 2212 -- -- -- -- -- -- -- -- -- -- -- -- -- 3    05/10/25 1745 98.8 °F (37.1 °C) -- -- -- -- -- -- -- -- -- -- -- -- --    05/10/25 17:17:22 -- 105 -- 105/68 80 91 % -- -- -- -- -- -- -- --    05/10/25 1645 97.9 °F (36.6 °C) 118 -- 121/86 98 93 % -- -- -- -- -- -- -- --    05/10/25 1630 -- 107 -- 121/88 99 90 % -- -- -- -- -- -- -- --    05/10/25 1615 97.5 °F (36.4 °C) 109 -- 119/85 96 92  % -- -- -- -- -- -- -- --    05/10/25 1600 -- 93 -- 121/86 98 89 % -- -- -- -- -- -- -- --    05/10/25 1545 -- 98 -- 120/87 98 91 % -- -- -- -- -- -- -- --    05/10/25 15:31:21 97 °F (36.1 °C) 94 16 121/86 98 96 % -- -- -- -- -- Lying -- --    05/10/25 1530 -- 100 -- 121/86 98 98 % -- -- -- -- -- -- -- --    05/10/25 1515 -- 90 -- 128/100 109 97 % -- -- -- -- -- -- -- --    05/10/25 1500 96.4 °F (35.8 °C) 89 -- 124/81 95 95 % -- -- -- -- -- -- -- --    05/10/25 1445 96.1 °F (35.6 °C) 100 -- 125/83 97 97 % -- -- -- -- -- -- -- --    05/10/25 1430 -- 91 -- 127/82 97 97 % -- -- -- -- -- -- -- --    05/10/25 1415 96.1 °F (35.6 °C) -- -- -- -- -- -- -- -- -- -- -- -- --    05/10/25 1400 95.7 °F (35.4 °C) 86 -- 119/81 94 96 % -- -- -- -- -- -- -- --    05/10/25 1345 96.5 °F (35.8 °C) 89 -- 123/84 97 97 % -- -- -- -- -- -- -- --    05/10/25 1330 95.3 °F (35.2 °C) 81 -- 123/84 97 96 % -- -- -- -- -- -- -- --    05/10/25 1315 -- 99 -- 124/84 97 95 % -- -- -- -- -- -- -- --    05/10/25 1245 97.1 °F (36.2 °C) 89 13 134/74 97 94 % 36 -- 4 L/min Nasal cannula X -- -- --    05/10/25 1230 -- 87 13 126/66 90 94 % -- -- -- -- -- -- -- --    05/10/25 1215 -- 84 16 127/73 95 93 % -- -- -- -- -- -- -- --    05/10/25 1200 -- 84 13 136/75 100 93 % -- -- -- -- -- -- -- --    05/10/25 1145 -- 88 13 127/82 100 93 % 36 -- 4 L/min Nasal cannula X -- -- --    05/10/25 1130 97.1 °F (36.2 °C) 97 20 126/59 85 92 % -- -- -- -- X -- -- --    05/10/25 1115 -- 107 22 131/67 90 95 % -- -- -- -- -- -- -- --    05/10/25 1109 96.6 °F (35.9 °C) 96 16 119/66 88 98 % -- 6 L/min -- Simple mask X -- -- --    05/10/25 06:58:41 97.3 °F (36.3 °C) 85 20 123/85 98 96 % -- -- -- -- -- Sitting -- --    05/10/25 03:08:51 96.9 °F (36.1 °C) 78 22 122/85 97 95 % -- -- -- -- -- -- -- --    05/09/25 2130 -- 68 -- -- -- 93 % -- -- -- -- -- -- -- --    05/09/25 2006 -- -- -- -- -- -- -- -- -- None (Room air) -- -- -- No Pain    05/09/25 15:26:48 97.7 °F (36.5 °C) 94 18  121/62 82 96 % -- -- -- -- -- -- -- --    05/09/25 11:21:33 97.3 °F (36.3 °C) 101 18 116/73 87 95 % -- -- -- -- -- -- -- --    05/09/25 0900 -- -- -- -- -- 91 % -- -- -- None (Room air) -- -- -- No Pain    05/09/25 07:15:49 97.7 °F (36.5 °C) 67 18 144/85 105 96 % -- -- -- -- -- Sitting -- --    05/09/25 07:15:33 97.7 °F (36.5 °C) 92 20 144/85 105 96 % -- -- -- -- -- -- -- --          Weight (last 2 days)       None            Pertinent Labs/Diagnostic Results:   Radiology:  XR chest portable   Final Interpretation by Kendra White MD (05/10 1508)      Right chest tube insertion with small to moderate right pneumothorax.      Increased opacity in the right upper lung, likely atelectasis.      Persistent subcutaneous emphysema.            Workstation performed: [id number]         XR chest pa and lateral    (Results Pending)     Cardiology:  No orders to display     GI:  No orders to display           Results from last 7 days   Lab Units 05/11/25  0522 05/10/25  0612 05/09/25  0511 05/08/25  0453 05/07/25  0530   WBC Thousand/uL 19.83* 10.46* 11.86* 14.05* 13.49*   HEMOGLOBIN g/dL 12.9 13.2 12.4 12.8 12.6   HEMATOCRIT % 38.9 40.8 38.5 37.6 37.2   PLATELETS Thousands/uL 210 206 202 178 201   TOTAL NEUT ABS Thousands/µL  --  7.13 9.14* 11.03* 11.07*         Results from last 7 days   Lab Units 05/11/25  0522 05/10/25  0612 05/09/25  0511 05/08/25  0453 05/06/25  0541   SODIUM mmol/L 136 138 136 137 138   POTASSIUM mmol/L 5.4* 4.4 4.5 3.9 4.5   CHLORIDE mmol/L 102 103 103 105 109*   CO2 mmol/L 29 30 29 26 25   ANION GAP mmol/L 5 5 4 6 4   BUN mg/dL 23 21 25 21 31*   CREATININE mg/dL 0.97 0.99 0.89 0.77 0.88   EGFR ml/min/1.73sq m 73 72 81 86 81   CALCIUM mg/dL 8.4 8.7 8.7 9.0 8.6   MAGNESIUM mg/dL 2.1  --  2.2  --   --    PHOSPHORUS mg/dL 3.0  --   --   --   --      Results from last 7 days   Lab Units 05/09/25  0511 05/08/25  0453 05/06/25  0541   AST U/L 45* 35 61*   ALT U/L 117* 99* 91*   ALK PHOS U/L 90 85 76  "  TOTAL PROTEIN g/dL 6.4 6.6 6.2*   ALBUMIN g/dL 3.5 3.6 3.4*   TOTAL BILIRUBIN mg/dL 1.14* 1.17* 1.08*         Results from last 7 days   Lab Units 05/11/25  0522 05/10/25  0612 05/09/25  0511 05/08/25  0453 05/06/25  0541   GLUCOSE RANDOM mg/dL 125 105 98 108 117             No results found for: \"BETA-HYDROXYBUTYRATE\"                           Results from last 7 days   Lab Units 05/09/25  0511   PROTIME seconds 15.8*   INR  1.23*   PTT seconds 30                                     Results from last 7 days   Lab Units 05/12/25  0655   UNIT PRODUCT CODE  [id number]  [id number]   UNIT NUMBER  [id number]  [id number]   UNITABO  O  O   UNITRH  NEG  NEG   CROSSMATCH  Compatible  Compatible   UNIT DISPENSE STATUS  Return to Inv  Return to Inv   UNIT PRODUCT VOL mL 350  350                                                                           Network Utilization Review Department  ATTENTION: Please call with any questions or concerns to [phone] and carefully listen to the prompts so that you are directed to the right person. All voicemails are confidential.   For Discharge needs, contact Care Management DC Support Team at [phone] opt. [phone]  Send all requests for admission clinical reviews, approved or denied determinations and any other requests to dedicated fax number below belonging to the campus where the patient is receiving treatment. List of dedicated fax numbers for the Facilities:  FACILITY NAME UR FAX NUMBER   ADMISSION DENIALS (Administrative/Medical Necessity) [phone]   DISCHARGE SUPPORT TEAM (NETWORK) [phone]   PARENT CHILD HEALTH (Maternity/NICU/Pediatrics) [phone]   [facility] [phone]   [facility] [phone]   [facility] [phone]   [facility] [phone]   [facility] [phone]   [facility] " [phone]   [facility] [phone]   [facility] [phone]   [facility] [phone]   [facility] [phone]   [facility] [phone]   [facility] [phone]

## 2025-05-12 NOTE — PROGRESS NOTES
Progress Note - Hospitalist   Name: Rosalio Anguiano 80 y.o. male I MRN: [id number]  Unit/Bed#: [facility] 529-01 I Date of Admission: 5/8/2025   Date of Service: 5/12/2025 I Hospital Day: 4    Assessment & Plan  Acute respiratory failure with hypoxia (HCC)  Initially had presented to [facility] requiring O2, but that reportedly had resolved on 5/8 on day of transfer to [facility] as reportedly on Room Air with sats > 88%  On Room air at [facility]  See Pneumothorax section below for details  Pneumothorax  Presented to [facility] ER on 5/3 with shortness of breath and found to have a spontaneous pneumothorax for which a chest tube was initially placed which was dislodged and then a second chest tube was placed by the ER.  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; thoracic surgery requesting [facility] admission with Thoracics consult  S/p   FIBEROPTIC BRONCHOSCOPY  Right - THORACOSCOPY VIDEO ASSISTED SURGERY (VATS)   RIGHT THERAPEUTIC UPPER LOBE WEDGE RESECTION;   MEDIASTINAL LYMPH NODE DISSECTION   TALC PLEURODESIS  Post op care, chest tube management per thoracic surgery  Plan to remove chest tube tomorrow    Chronic atrial fibrillation (HCC)  Rate controlled with Cardizem  Continue Eliquis for AC  Centrilobular emphysema (HCC)  Currently not in exacerbation  Completed 5 days of steroid therapy from 5/3-->5/8  Holding off additional steroids for now   Continue pta inhalers  GERD (gastroesophageal reflux disease)  Continue Protonix  Lung nodule  S/p wedge resection,  follow up pathology  Positive blood culture  Patient hemodynamically stable and afebrile  WBC thought elevated in setting of steroids   Suspected to be contaminant  Monitor off antibiotics  Elevated liver enzymes  Continue to monitor  Reviewed right upper quadrant ultrasound no acute findings    VTE Pharmacologic Prophylaxis: VTE Score: 4 Moderate Risk (Score 3-4) - Pharmacological DVT Prophylaxis Ordered: apixaban (Eliquis).    Mobility:   Basic Mobility Inpatient Raw Score: 20  JH-HLM Goal: 6: Walk 10 steps or more  JH-HLM Achieved: 7: Walk 25 feet or more  JH-HLM Goal NOT achieved. Continue with multidisciplinary rounding and encourage appropriate mobility to improve upon JH-HLM goals.    Patient Centered Rounds: I performed bedside rounds with nursing staff today.   Discussions with Specialists or Other Care Team Provider: [email or web address]    Current Length of Stay: 4 day(s)  Current Patient Status: Inpatient   Certification Statement: The patient will continue to require additional inpatient hospital stay due to post op management  Discharge Plan: Anticipate discharge in >72 hrs to home.    Code Status: Level 1 - Full Code    Subjective   Denies any new complaints. Reports adequate pain relief with ibuprofen.    Objective :  Temp:  [97.1 °F (36.2 °C)-98.4 °F (36.9 °C)] 97.1 °F (36.2 °C)  HR:  [] 94  BP: (115-121)/(73-79) 115/73  Resp:  [18-20] 18  SpO2:  [91 %-97 %] 97 %  O2 Device: None (Room air)    Body mass index is 27.8 kg/m².     Input and Output Summary (last 24 hours):     Intake/Output Summary (Last 24 hours) at 5/12/2025 1329  Last data filed at 5/12/2025 1230  Gross per 24 hour   Intake 240 ml   Output 1675 ml   Net -1435 ml       Physical Exam  Constitutional:       Appearance: Normal appearance.   HENT:      Head: Normocephalic and atraumatic.      Nose: Nose normal.   Eyes:      Extraocular Movements: Extraocular movements intact.   Cardiovascular:      Rate and Rhythm: Normal rate and regular  rhythm.   Pulmonary:      Breath sounds: No wheezing or rales.      Comments: Right sided chest tube  Musculoskeletal:      Right lower leg: No edema.      Left lower leg: No edema.   Skin:     General: Skin is warm and dry.   Neurological:      Mental Status: He is alert and oriented to person, place, and time.   Psychiatric:         Mood and Affect: Mood normal.         Behavior: Behavior normal.           Lines/Drains:  Lines/Drains/Airways       Active Status       Name Placement date Placement time Site Days    Chest Tube 1 Right 24 Fr. 05/10/25  1052  --  2                            Lab Results: I have reviewed the following results:   Results from last 7 days   Lab Units 05/11/25  0522 05/10/25  0612   WBC Thousand/uL 19.83* 10.46*   HEMOGLOBIN g/dL 12.9 13.2   HEMATOCRIT % 38.9 40.8   PLATELETS Thousands/uL 210 206   SEGS PCT %  --  68   LYMPHO PCT %  --  14   MONO PCT %  --  12   EOS PCT %  --  4     Results from last 7 days   Lab Units 05/11/25  0522 05/10/25  0612 05/09/25  0511   SODIUM mmol/L 136   < > 136   POTASSIUM mmol/L 5.4*   < > 4.5   CHLORIDE mmol/L 102   < > 103   CO2 mmol/L 29   < > 29   BUN mg/dL 23   < > 25   CREATININE mg/dL 0.97   < > 0.89   ANION GAP mmol/L 5   < > 4   CALCIUM mg/dL 8.4   < > 8.7   ALBUMIN g/dL  --   --  3.5   TOTAL BILIRUBIN mg/dL  --   --  1.14*   ALK PHOS U/L  --   --  90   ALT U/L  --   --  117*   AST U/L  --   --  45*   GLUCOSE RANDOM mg/dL 125   < > 98    < > = values in this interval not displayed.     Results from last 7 days   Lab Units 05/09/25  0511   INR  1.23*                     Recent Cultures (last 7 days):         Imaging Results Review: No pertinent imaging studies reviewed.  Other Study Results Review: No additional pertinent studies reviewed.    Last 24 Hours Medication List:     Current Facility-Administered Medications:     albuterol (PROVENTIL HFA,VENTOLIN HFA) inhaler 2 puff, Q4H PRN    apixaban (ELIQUIS) tablet 5 mg, BID    Cholecalciferol  (VITAMIN D3) tablet 2,000 Units, Daily    diltiazem (CARDIZEM CD) 24 hr capsule 120 mg, Daily    fluticasone-vilanterol 200-25 mcg/actuation 1 puff, Daily    HYDROmorphone (DILAUDID) injection 0.5 mg, Q4H PRN    ibuprofen (MOTRIN) tablet 400 mg, Q6H PRN    levothyroxine tablet 25 mcg, Early Morning    pantoprazole (PROTONIX) EC tablet 20 mg, Early Morning    tamsulosin (FLOMAX) capsule 0.4 mg, Daily With Dinner    Administrative Statements   Today, Patient Was Seen By: Yovanny Giles MD  I have spent a total time of 40 minutes in caring for this patient on the day of the visit/encounter including Diagnostic results, Instructions for management, Patient and family education, Impressions, Counseling / Coordination of care, Documenting in the medical record, Reviewing/placing orders in the medical record (including tests, medications, and/or procedures), Obtaining or reviewing history  , and Communicating with other healthcare professionals .    **Please Note: This note may have been constructed using a voice recognition system.**

## 2025-05-12 NOTE — ASSESSMENT & PLAN NOTE
80-year-old male with history of COPD who presented with spontaneous pneumothorax on 5/3 with emergent placement of chest tube.  Interval dislodgment of chest tube with reaccumulation of pneumothorax requiring IR chest tube placement on 5/4.  In the interim persistent airleak and evidence of pneumothorax on chest x-rays.     S/p right thoracoscopic video assisted surgery (VATS), right therapeutic wedge resection, mediastinal resection, and talc pleurodesis on 5/10.      - Diet as tolerated  - Keep chest tube to suction until 5/13, then transition to waterseal  - Can continue Eliquis   - Pain and nausea control as needed  - Rest of care per primary  - Follow-up WBC for leukocytosis

## 2025-05-12 NOTE — ASSESSMENT & PLAN NOTE
Presented to [facility] ER on 5/3 with shortness of breath and found to have a spontaneous pneumothorax for which a chest tube was initially placed which was dislodged and then a second chest tube was placed by the ER.  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

## 2025-05-12 NOTE — PROGRESS NOTES
Progress Note - Thoracic    Name: Rosalio Anguiano 80 y.o. male I MRN: [id number]  Unit/Bed#: [facility] 529-01 I Date of Admission: 5/8/2025   Date of Service: 5/12/2025 I Hospital Day: 4    Assessment & Plan  Pneumothorax  80-year-old male with history of COPD who presented with spontaneous pneumothorax on 5/3 with emergent placement of chest tube.  Interval dislodgment of chest tube with reaccumulation of pneumothorax requiring IR chest tube placement on 5/4.  In the interim persistent airleak and evidence of pneumothorax on chest x-rays.     S/p right thoracoscopic video assisted surgery (VATS), right therapeutic wedge resection, mediastinal resection, and talc pleurodesis on 5/10.      - Diet as tolerated  - Keep chest tube to suction until 5/13, then transition to waterseal  - Can continue Eliquis   - Pain and nausea control as needed  - Rest of care per primary  - Follow-up WBC for leukocytosis    Chronic atrial fibrillation (HCC)    Centrilobular emphysema (HCC)    GERD (gastroesophageal reflux disease)    Lung nodule    Positive blood culture    Elevated liver enzymes    Acute respiratory failure with hypoxia (HCC)      Thoracic  service will follow.    Subjective   Patient feels well this morning.  Only endorses mild pain with movement.  Pulling 1500 on I-S.    Objective :  Temp:  [97.9 °F (36.6 °C)-99 °F (37.2 °C)] 98.4 °F (36.9 °C)  HR:  [] 94  BP: (118-121)/(75-79) 121/79  Resp:  [20] 20  SpO2:  [91 %-96 %] 91 %  O2 Device: None (Room air)    I/O         05/10 0701  05/11 0700 05/11 0701  05/12 0700    P.O. 960 600    I.V. (mL/kg) 1250 (13.4)     IV Piggyback 250     Total Intake(mL/kg) 2460 (26.5) 600 (6.5)    Urine (mL/kg/hr) 1475 (0.7) 1550 (0.7)    Blood 50     Chest Tube 450 225    Total Output 1975 1775    Net +485 -1175                Lines/Drains/Airways       Active Status       Name Placement date Placement time Site Days    Chest Tube 2 Right Anterior 14 Fr. 05/04/25  1223  Anterior  7     Chest Tube 1 Right 24 Fr. 05/10/25  1052  --  1                  Physical Exam  Vitals reviewed.   HENT:      Head: Normocephalic and atraumatic.      Mouth/Throat:      Mouth: Mucous membranes are moist.   Eyes:      Pupils: Pupils are equal, round, and reactive to light.   Cardiovascular:      Rate and Rhythm: Normal rate and regular rhythm.      Pulses: Normal pulses.   Pulmonary:      Effort: Pulmonary effort is normal. No respiratory distress.      Comments: Right chest tube in place with 225 cc serosanguineous output, no airleak, -20 suction.  Surgical site is clean, dry, and intact.  Abdominal:      General: Abdomen is flat. There is no distension.      Palpations: Abdomen is soft.      Tenderness: There is no abdominal tenderness.   Musculoskeletal:         General: No swelling or tenderness. Normal range of motion.      Cervical back: Normal range of motion.   Skin:     General: Skin is warm.   Neurological:      General: No focal deficit present.      Mental Status: He is alert and oriented to person, place, and time. Mental status is at baseline.           Lab Results: I have reviewed the following results:  Recent Labs     05/11/25  0522   WBC 19.83*   HGB 12.9   HCT 38.9      SODIUM 136   K 5.4*      CO2 29   BUN 23   CREATININE 0.97   GLUC 125   MG 2.1   PHOS 3.0       Imaging Results Review: No pertinent imaging studies reviewed.  Other Study Results Review: No additional pertinent studies reviewed.    VTE Pharmacologic Prophylaxis: VTE covered by:  apixaban, Oral, 5 mg at 05/11/25 1701     VTE Mechanical Prophylaxis: sequential compression device

## 2025-05-12 NOTE — ASSESSMENT & PLAN NOTE
Initially had presented to [facility] requiring O2, but that reportedly had resolved on 5/8 on day of transfer to [facility] as reportedly on Room Air with sats > 88%  On Room air at [facility]  See Pneumothorax section below for details

## 2025-05-13 ENCOUNTER — TELEPHONE (OUTPATIENT)
Age: 80
End: 2025-05-13

## 2025-05-13 ENCOUNTER — APPOINTMENT (INPATIENT)
Dept: RADIOLOGY | Facility: HOSPITAL | Age: 80
DRG: 166 | End: 2025-05-13
Payer: COMMERCIAL

## 2025-05-13 LAB
ANION GAP SERPL CALCULATED.3IONS-SCNC: 6 MMOL/L (ref 4–13)
BUN SERPL-MCNC: 18 MG/DL (ref 5–25)
CALCIUM SERPL-MCNC: 8.5 MG/DL (ref 8.4–10.2)
CHLORIDE SERPL-SCNC: 100 MMOL/L (ref 96–108)
CO2 SERPL-SCNC: 27 MMOL/L (ref 21–32)
CREAT SERPL-MCNC: 0.91 MG/DL (ref 0.6–1.3)
ERYTHROCYTE [DISTWIDTH] IN BLOOD BY AUTOMATED COUNT: 25.8 % (ref 11.6–15.1)
GFR SERPL CREATININE-BSD FRML MDRD: 79 ML/MIN/1.73SQ M
GLUCOSE SERPL-MCNC: 129 MG/DL (ref 65–140)
HCT VFR BLD AUTO: 38.8 % (ref 36.5–49.3)
HGB BLD-MCNC: 12.9 G/DL (ref 12–17)
MAGNESIUM SERPL-MCNC: 1.8 MG/DL (ref 1.9–2.7)
MCH RBC QN AUTO: 33.7 PG (ref 26.8–34.3)
MCHC RBC AUTO-ENTMCNC: 33.2 G/DL (ref 31.4–37.4)
MCV RBC AUTO: 101 FL (ref 82–98)
PHOSPHATE SERPL-MCNC: 2.7 MG/DL (ref 2.3–4.1)
PLATELET # BLD AUTO: 183 THOUSANDS/UL (ref 149–390)
POTASSIUM SERPL-SCNC: 4 MMOL/L (ref 3.5–5.3)
RBC # BLD AUTO: 3.83 MILLION/UL (ref 3.88–5.62)
SODIUM SERPL-SCNC: 133 MMOL/L (ref 135–147)
WBC # BLD AUTO: 14.73 THOUSAND/UL (ref 4.31–10.16)

## 2025-05-13 PROCEDURE — 71046 X-RAY EXAM CHEST 2 VIEWS: CPT

## 2025-05-13 PROCEDURE — 83735 ASSAY OF MAGNESIUM: CPT | Performed by: INTERNAL MEDICINE

## 2025-05-13 PROCEDURE — 80048 BASIC METABOLIC PNL TOTAL CA: CPT | Performed by: INTERNAL MEDICINE

## 2025-05-13 PROCEDURE — NC001 PR NO CHARGE: Performed by: THORACIC SURGERY (CARDIOTHORACIC VASCULAR SURGERY)

## 2025-05-13 PROCEDURE — 97116 GAIT TRAINING THERAPY: CPT

## 2025-05-13 PROCEDURE — 84100 ASSAY OF PHOSPHORUS: CPT | Performed by: INTERNAL MEDICINE

## 2025-05-13 PROCEDURE — 85027 COMPLETE CBC AUTOMATED: CPT | Performed by: INTERNAL MEDICINE

## 2025-05-13 PROCEDURE — 99232 SBSQ HOSP IP/OBS MODERATE 35: CPT | Performed by: INTERNAL MEDICINE

## 2025-05-13 PROCEDURE — 97530 THERAPEUTIC ACTIVITIES: CPT

## 2025-05-13 RX ORDER — IBUPROFEN 400 MG/1
400 TABLET, FILM COATED ORAL ONCE
Status: COMPLETED | OUTPATIENT
Start: 2025-05-13 | End: 2025-05-13

## 2025-05-13 RX ORDER — IBUPROFEN 400 MG/1
400 TABLET, FILM COATED ORAL
Status: COMPLETED | OUTPATIENT
Start: 2025-05-13 | End: 2025-05-14

## 2025-05-13 RX ADMIN — APIXABAN 5 MG: 5 TABLET, FILM COATED ORAL at 17:00

## 2025-05-13 RX ADMIN — TAMSULOSIN HYDROCHLORIDE 0.4 MG: 0.4 CAPSULE ORAL at 16:52

## 2025-05-13 RX ADMIN — IBUPROFEN 400 MG: 400 TABLET, FILM COATED ORAL at 03:42

## 2025-05-13 RX ADMIN — DILTIAZEM HYDROCHLORIDE 120 MG: 120 CAPSULE, COATED, EXTENDED RELEASE ORAL at 09:15

## 2025-05-13 RX ADMIN — Medication 2000 UNITS: at 09:15

## 2025-05-13 RX ADMIN — LEVOTHYROXINE SODIUM 25 MCG: 0.03 TABLET ORAL at 03:45

## 2025-05-13 RX ADMIN — IBUPROFEN 400 MG: 400 TABLET, FILM COATED ORAL at 21:02

## 2025-05-13 RX ADMIN — APIXABAN 5 MG: 5 TABLET, FILM COATED ORAL at 09:15

## 2025-05-13 RX ADMIN — PANTOPRAZOLE SODIUM 20 MG: 20 TABLET, DELAYED RELEASE ORAL at 03:45

## 2025-05-13 RX ADMIN — FLUTICASONE FUROATE AND VILANTEROL TRIFENATATE 1 PUFF: 200; 25 POWDER RESPIRATORY (INHALATION) at 09:15

## 2025-05-13 NOTE — PROGRESS NOTES
Progress Note - Thoracic    Name: Rosalio Anguiano 80 y.o. male I MRN: [id number]  Unit/Bed#: [facility] 529-01 I Date of Admission: 5/8/2025   Date of Service: 5/13/2025 I Hospital Day: 5    Assessment & Plan  Pneumothorax  80-year-old male with history of COPD who presented with spontaneous pneumothorax on 5/3 with emergent placement of chest tube.  Interval dislodgment of chest tube with reaccumulation of pneumothorax requiring IR chest tube placement on 5/4.  In the interim persistent airleak and evidence of pneumothorax on chest x-rays.     S/p right thoracoscopic video assisted surgery (VATS), right therapeutic wedge resection, mediastinal resection, and talc pleurodesis on 5/10.      - Diet as tolerated  - Keep chest tube to suction until 5/13, transition to water seal today, CXR after water seal. Likely CT removal tomorrow.  - Can continue Eliquis   - Encourage OOB, ambulation, IS  - Pain and nausea control as needed  - Rest of care per primary    Thoracic  service will follow.    Subjective : No acute events overnight. Afebrile, hemodynamically stable. Tolerating diet. No nausea, or vomiting, fevers or chills. Pain controlled.       Objective :  Temp:  [97.1 °F (36.2 °C)-98.8 °F (37.1 °C)] 98.8 °F (37.1 °C)  HR:  [] 77  BP: (115-142)/(73-79) 126/75  Resp:  [18-20] 20  SpO2:  [93 %-97 %] 93 %  O2 Device: None (Room air)    I/O         05/11 0701  05/12 0700 05/12 0701  05/13 0700    P.O. 600 1260    Total Intake(mL/kg) 600 (6.5) 1260 (13.5)    Urine (mL/kg/hr) 1550 (0.7) 525 (0.2)    Chest Tube 225 225    Total Output 1775 750    Net -1175 +510                Lines/Drains/Airways       Active Status       Name Placement date Placement time Site Days    Chest Tube 1 Right 24 Fr. 05/10/25  1052  --  2                  Physical Exam:  General: No acute distress, alert and oriented  CV: Well perfused, regular rate  Lungs: Normal work of breathing, no increased respiratory effort, right chest tube in place on  suction, no air leak  Abdomen: Soft, non-tender, non-distended  Extremities: No edema, clubbing or cyanosis  Skin: Warm, dry      Lab Results: I have reviewed the following results:  Recent Labs     05/13/25  0350   WBC 14.73*   HGB 12.9   HCT 38.8      SODIUM 133*   K 4.0      CO2 27   BUN 18   CREATININE 0.91   GLUC 129   MG 1.8*   PHOS 2.7       Imaging Results Review: No pertinent imaging studies reviewed.  Other Study Results Review: No additional pertinent studies reviewed.    VTE Pharmacologic Prophylaxis: VTE covered by:  apixaban, Oral, 5 mg at 05/12/25 7346      VTE Mechanical Prophylaxis: sequential compression device

## 2025-05-13 NOTE — UTILIZATION REVIEW
Continued Stay Review    Date: 05/10-05/12                          Current Patient Class: Inpatient  Current Level of Care: MS    HPI:80 y.o. male initially admitted on 05/08   Current Diagnosis: Pneumothorax  IR chest tube placement on 5/4.   S/p right thoracoscopic video assisted surgery (VATS), right therapeutic wedge resection, mediastinal resection, and talc pleurodesis on 5/10.     Assessment/Plan:   05/11 Pt reports feeling well. Chest tube in place to -20 suction, no airleak, 525 cc recorded serosanguinous output.  Plan: Diet as tolerated. Keep chest tube to suction x 72 hours. Can resume Eliquis today.  Pain and nausea control as needed      05/12 Pt stable ovn. Pain well controlled. Without airleak, minimal serous drainage.  Continue chest tube to suction for 3 days. Cont Eliquis. Pain and nausea control prn. Aggressive pulmonary toilet. Nutrition. Out of bed to chair, ambulate in the schumacher    05/13 No acute events overnight. Afebrile, hemodynamically stable. Tolerating diet. Pain controlled. Keep chest tube to suction until 5/13, transition to water seal today, CXR after water seal. Likely CT removal tomorrow.  Can continue Eliquis.  Encourage OOB, ambulation, IS.  Pain and nausea control as needed    Medications:   Scheduled Medications:  apixaban, 5 mg, Oral, BID  Cholecalciferol, 2,000 Units, Oral, Daily  diltiazem, 120 mg, Oral, Daily  fluticasone-vilanterol, 1 puff, Inhalation, Daily  ibuprofen, 400 mg, Oral, HS  levothyroxine, 25 mcg, Oral, Early Morning  pantoprazole, 20 mg, Oral, Early Morning  tamsulosin, 0.4 mg, Oral, Daily With Dinner      Continuous IV Infusions: none     PRN Meds:  albuterol, 2 puff, Inhalation, Q4H PRN  HYDROmorphone, 0.5 mg, Intravenous, Q4H PRN  ibuprofen (MOTRIN) tablet 400 mg q6h po prn 05/11 x 1, 05/12 x 1    Discharge Plan: TBD    Vital Signs (last 3 days)       Date/Time Temp Pulse Resp BP MAP (mmHg) SpO2 Calculated FIO2 (%) - Nasal Cannula O2 Flow Rate (L/min) Nasal  Cannula O2 Flow Rate (L/min) O2 Device Cardiac (WDL) Patient Position - Orthostatic VS Gretchen Coma Scale Score Pain    05/13/25 15:08:40 97.7 °F (36.5 °C) 105 16 131/83 99 94 % -- -- -- -- -- Sitting -- --    05/13/25 1320 -- -- -- -- -- -- -- -- -- -- -- -- -- No Pain    05/13/25 0915 -- -- -- -- -- -- -- -- -- -- -- -- 15 No Pain    05/13/25 09:14:11 -- -- -- 131/79 96 -- -- -- -- -- -- -- -- --    05/13/25 07:15:55 97 °F (36.1 °C) -- 19 110/74 86 96 % -- -- -- -- -- -- -- --    05/13/25 0342 -- -- -- -- -- -- -- -- -- -- -- -- -- 4    05/12/25 22:12:13 98.8 °F (37.1 °C) 77 20 126/75 92 93 % -- -- -- -- -- Sitting -- --    05/12/25 2124 -- -- -- -- -- -- -- -- -- -- -- -- -- Med Not Given for Pain - for MAR use only    05/12/25 1915 -- -- -- -- -- 95 % -- -- -- None (Room air) -- -- 15 5    05/12/25 15:19:11 97.8 °F (36.6 °C) 105 20 142/79 100 94 % -- -- -- -- -- Sitting -- --    05/12/25 0934 -- -- -- -- -- -- -- -- -- -- -- -- 15 No Pain    05/12/25 07:04:19 97.1 °F (36.2 °C) -- 18 115/73 87 97 % -- -- -- -- -- -- -- --    05/12/25 0308 -- -- -- -- -- -- -- -- -- -- -- -- -- 3    05/11/25 22:54:06 98.4 °F (36.9 °C) 94 20 121/79 93 91 % -- -- -- -- -- Sitting -- --    05/11/25 2016 -- -- -- -- -- -- -- -- -- -- -- -- -- 8    05/11/25 2000 -- -- -- -- -- -- -- -- -- -- -- -- 15 No Pain    05/11/25 15:51:27 97.9 °F (36.6 °C) 113 20 118/77 91 92 % -- -- -- None (Room air) -- Sitting -- --    05/11/25 15:49:51 97.9 °F (36.6 °C) -- -- 118/77 91 -- -- -- -- -- -- -- -- --    05/11/25 1130 -- -- -- -- -- -- -- -- -- -- -- -- 15 No Pain    05/11/25 0951 -- -- -- -- -- -- -- -- -- -- -- -- -- No Pain    05/11/25 0950 -- -- -- -- -- -- -- -- -- -- -- -- -- No Pain    05/11/25 0800 -- -- -- -- -- -- -- -- -- -- -- -- 15 No Pain    05/11/25 07:48:14 99 °F (37.2 °C) -- -- 121/75 90 96 % -- -- -- -- -- -- -- --    05/10/25 23:38:56 99.4 °F (37.4 °C) 92 20 136/85 102 92 % -- -- -- None (Room air) -- Sitting -- --    05/10/25  2212 -- -- -- -- -- -- -- -- -- -- -- -- -- 3    05/10/25 1745 98.8 °F (37.1 °C) -- -- -- -- -- -- -- -- -- -- -- -- --    05/10/25 17:17:22 -- 105 -- 105/68 80 91 % -- -- -- -- -- -- -- --    05/10/25 1645 97.9 °F (36.6 °C) 118 -- 121/86 98 93 % -- -- -- -- -- -- -- --    05/10/25 1630 -- 107 -- 121/88 99 90 % -- -- -- -- -- -- -- --    05/10/25 1615 97.5 °F (36.4 °C) 109 -- 119/85 96 92 % -- -- -- -- -- -- -- --    05/10/25 1600 -- 93 -- 121/86 98 89 % -- -- -- -- -- -- -- --    05/10/25 1545 -- 98 -- 120/87 98 91 % -- -- -- -- -- -- -- --    05/10/25 15:31:21 97 °F (36.1 °C) 94 16 121/86 98 96 % -- -- -- -- -- Lying -- --    05/10/25 1530 -- 100 -- 121/86 98 98 % -- -- -- -- -- -- -- --    05/10/25 1515 -- 90 -- 128/100 109 97 % -- -- -- -- -- -- -- --    05/10/25 1500 96.4 °F (35.8 °C) 89 -- 124/81 95 95 % -- -- -- -- -- -- -- --    05/10/25 1445 96.1 °F (35.6 °C) 100 -- 125/83 97 97 % -- -- -- -- -- -- -- --    05/10/25 1430 -- 91 -- 127/82 97 97 % -- -- -- -- -- -- -- --    05/10/25 1415 96.1 °F (35.6 °C) -- -- -- -- -- -- -- -- -- -- -- -- --    05/10/25 1400 95.7 °F (35.4 °C) 86 -- 119/81 94 96 % -- -- -- -- -- -- -- --    05/10/25 1345 96.5 °F (35.8 °C) 89 -- 123/84 97 97 % -- -- -- -- -- -- -- --    05/10/25 1330 95.3 °F (35.2 °C) 81 -- 123/84 97 96 % -- -- -- -- -- -- -- --    05/10/25 1315 -- 99 -- 124/84 97 95 % -- -- -- -- -- -- -- --    05/10/25 1245 97.1 °F (36.2 °C) 89 13 134/74 97 94 % 36 -- 4 L/min Nasal cannula X -- -- --    05/10/25 1230 -- 87 13 126/66 90 94 % -- -- -- -- -- -- -- --    05/10/25 1215 -- 84 16 127/73 95 93 % -- -- -- -- -- -- -- --    05/10/25 1200 -- 84 13 136/75 100 93 % -- -- -- -- -- -- -- --    05/10/25 1145 -- 88 13 127/82 100 93 % 36 -- 4 L/min Nasal cannula X -- -- --    05/10/25 1130 97.1 °F (36.2 °C) 97 20 126/59 85 92 % -- -- -- -- X -- -- --    05/10/25 1115 -- 107 22 131/67 90 95 % -- -- -- -- -- -- -- --    05/10/25 1109 96.6 °F (35.9 °C) 96 16 119/66 88 98 % -- 6  L/min -- Simple mask X -- -- --    05/10/25 06:58:41 97.3 °F (36.3 °C) 85 20 123/85 98 96 % -- -- -- -- -- Sitting -- --    05/10/25 03:08:51 96.9 °F (36.1 °C) 78 22 122/85 97 95 % -- -- -- -- -- -- -- --          Weight (last 2 days)       None            Pertinent Labs/Diagnostic Results:   Radiology:  XR chest pa & lateral   Final Interpretation by Zohreh Blankenship MD (05/13 1430)   Development of a small hydropneumothorax or partially fluid-filled cavity at the right apex.   Other findings, as per report.         I personally discussed this study with Dr. Ocampo on 5/13/2025 2:25 PM.                        Workstation performed: [id number]         XR chest portable   Final Interpretation by Kendra White MD (05/10 1508)      Right chest tube insertion with small to moderate right pneumothorax.      Increased opacity in the right upper lung, likely atelectasis.      Persistent subcutaneous emphysema.            Workstation performed: [id number]         XR chest pa and lateral    (Results Pending)     Cardiology:  No orders to display     GI:  No orders to display           Results from last 7 days   Lab Units 05/13/25  0350 05/11/25  0522 05/10/25  0612 05/09/25  0511 05/08/25  0453   WBC Thousand/uL 14.73* 19.83* 10.46* 11.86* 14.05*   HEMOGLOBIN g/dL 12.9 12.9 13.2 12.4 12.8   HEMATOCRIT % 38.8 38.9 40.8 38.5 37.6   PLATELETS Thousands/uL 183 210 206 202 178   TOTAL NEUT ABS Thousands/µL  --   --  7.13 9.14* 11.03*         Results from last 7 days   Lab Units 05/13/25  0350 05/11/25  0522 05/10/25  0612 05/09/25  0511 05/08/25  0453   SODIUM mmol/L 133* 136 138 136 137   POTASSIUM mmol/L 4.0 5.4* 4.4 4.5 3.9   CHLORIDE mmol/L 100 102 103 103 105   CO2 mmol/L 27 29 30 29 26   ANION GAP mmol/L 6 5 5 4 6   BUN mg/dL 18 23 21 25 21   CREATININE mg/dL 0.91 0.97 0.99 0.89 0.77   EGFR ml/min/1.73sq m 79 73 72 81 86   CALCIUM mg/dL 8.5 8.4 8.7 8.7 9.0   MAGNESIUM mg/dL 1.8* 2.1  --  2.2  --    PHOSPHORUS mg/dL 2.7  "3.0  --   --   --      Results from last 7 days   Lab Units 05/09/25  0511 05/08/25  0453   AST U/L 45* 35   ALT U/L 117* 99*   ALK PHOS U/L 90 85   TOTAL PROTEIN g/dL 6.4 6.6   ALBUMIN g/dL 3.5 3.6   TOTAL BILIRUBIN mg/dL 1.14* 1.17*         Results from last 7 days   Lab Units 05/13/25  0350 05/11/25  0522 05/10/25  0612 05/09/25  0511 05/08/25  0453   GLUCOSE RANDOM mg/dL 129 125 105 98 108             No results found for: \"BETA-HYDROXYBUTYRATE\"                           Results from last 7 days   Lab Units 05/09/25  0511   PROTIME seconds 15.8*   INR  1.23*   PTT seconds 30                                     Results from last 7 days   Lab Units 05/12/25  0655   UNIT PRODUCT CODE  [id number]  [id number]   UNIT NUMBER  [id number]  [id number]   UNITABO  O  O   UNITRH  NEG  NEG   CROSSMATCH  Compatible  Compatible   UNIT DISPENSE STATUS  Return to Inv  Return to Inv   UNIT PRODUCT VOL mL 350  350                                                                           Network Utilization Review Department  ATTENTION: Please call with any questions or concerns to [phone] and carefully listen to the prompts so that you are directed to the right person. All voicemails are confidential.   For Discharge needs, contact Care Management DC Support Team at [phone] opt. 2  Send all requests for admission clinical reviews, approved or denied determinations and any other requests to dedicated fax number below belonging to the [facility] where the patient is receiving treatment. List of dedicated fax numbers for the Facilities:  FACILITY NAME UR FAX NUMBER   ADMISSION DENIALS (Administrative/Medical Necessity) [phone]   DISCHARGE SUPPORT TEAM (NETWORK) [phone]   PARENT CHILD HEALTH (Maternity/NICU/Pediatrics) [phone]   [facility] [phone]   [facility] [phone]   [facility] [phone]   [facility]" [facility] [phone]   [facility] [phone]   [facility] [phone]   [facility] [phone]   [facility] [phone]   [facility] [phone]   [facility] [phone]   [facility] [phone]   [facility] [phone]

## 2025-05-13 NOTE — CASE MANAGEMENT
Case Management Discharge Planning Note    Patient name Rosalio Anguiano  Location [facility] 529/[facility] 529-01 MRN [id number]  : 1945 Date 2025       Current Admission Date: 2025  Current Admission Diagnosis:Pneumothorax   Patient Active Problem List    Diagnosis Date Noted Date Diagnosed    Elevated liver enzymes 2025     Acute respiratory failure with hypoxia (HCC) 2025     Positive blood culture 2025     Pneumothorax 2025     Lung nodule 2025     Keratosis 2025     Annual physical exam 10/08/2024     Labyrinthitis of both ears 10/08/2024     Chronic pain of both shoulders 10/08/2024     Seborrheic keratosis 10/08/2024     Preoperative examination 2023     Combined forms of age-related cataract of both eyes 2023     Pain and swelling of right lower leg 2023     Vertigo 2022     Osteoarthritis of lumbar spine with myelopathy 2022     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 10/22/2022     Hearing loss 10/22/2022     History of colonic polyps 10/22/2022     Osteoarthritis of hip 10/22/2022     Sensorineural hearing loss, bilateral 10/22/2022     Hypothyroidism 2022     Neck pain 2019     Bradycardia 2019     Simple chronic bronchitis (HCC) 2019     LAUREL (obstructive sleep apnea) 2019     COPD (chronic obstructive pulmonary disease) (HCC) 04/15/2019     Right groin pain 04/15/2019     Chronic atrial fibrillation (HCC) 2018     Bilateral leg edema 2018     Adenocarcinoma of prostate (HCC) 2017     Centrilobular emphysema (HCC) 2017     GERD (gastroesophageal reflux disease) 2017     Tinnitus 2017       LOS (days): 5  Geometric Mean LOS (GMLOS) (days): 7.9  Days to GMLOS:3.2     OBJECTIVE:  Risk of Unplanned Readmission Score: 9.68         Current admission status: Inpatient   Preferred Pharmacy:   [id number] [place] [address]  "[facility] [place] [address]  Phone: [phone] Fax: [phone]    [facility]  [address] [place] [address]  [place] [address]  Phone: [phone] Fax: [phone]    Primary Care Provider: Jona Calzada DO    Primary Insurance: BLUE CROSS  Secondary Insurance: MEDICARE    DISCHARGE DETAILS:    Discharge planning discussed with:: Patient  Freedom of Choice: Yes  Comments - Freedom of Choice: Does not want HHC     Treatment Team Recommendation: Home  Discharge Destination Plan:: Home  Transport at Discharge : Family     IMM Given (Date):: 05/13/25  IMM Given to:: Patient       CM met with Pt to discuss HHC vs OP. Pt declining HHC at this time and states that \"they have been through this many times\".     CM reviewed IMM with pt; No questions or concerns. Pt in agreement with rights, and is aware of the right to appeal d/c once medically stable if desired. IMM signed.     CM will continue to follow for DC planning needs  "

## 2025-05-13 NOTE — PROGRESS NOTES
Progress Note - Thoracic    Name: Rosalio Anguiano 80 y.o. male I MRN: [id number]  Unit/Bed#: [facility] 529-01 I Date of Admission: 5/8/2025   Date of Service: 5/14/2025 I Hospital Day: 6    Assessment & Plan  Pneumothorax  80-year-old male with history of COPD who presented with spontaneous pneumothorax on 5/3 with emergent placement of chest tube.  Interval dislodgment of chest tube with reaccumulation of pneumothorax requiring IR chest tube placement on 5/4.  In the interim persistent airleak and evidence of pneumothorax on chest x-rays.     S/p right thoracoscopic video assisted surgery (VATS), right therapeutic wedge resection, mediastinal resection, and talc pleurodesis on 5/10.    5/13 CXR: Development of a small hydropneumothorax or partially fluid-filled cavity at the right apex.    AVSS on room air     cc serous, to thteena  WS, -AL with cough or valsalva     IS 1500 cc    Plan:   - Diet as tolerated  - Follow up AM CXR   - Maintain CT to waterseal   - Potential dc CT pending resolution of ptx  - Can continue Eliquis   - Encourage OOB, ambulation, IS  - Pain and nausea control as needed  - Rest of care per primary    Thoracic  service will follow.    Subjective : Denies pain.  Tolerating p.o. without nausea or vomiting.  Some stable shortness of breath.  No chest pain.  Ambulating.    Objective :  Temp:  [97 °F (36.1 °C)-97.7 °F (36.5 °C)] 97.7 °F (36.5 °C)  HR:  [105] 105  BP: (110-131)/(74-83) 131/83  Resp:  [16-19] 16  SpO2:  [94 %-96 %] 94 %  O2 Device: None (Room air)    I/O         05/11 0701 05/12 0700 05/12 0701  05/13 0700    P.O. 600 1260    Total Intake(mL/kg) 600 (6.5) 1260 (13.5)    Urine (mL/kg/hr) 1550 (0.7) 525 (0.2)    Chest Tube 225 225    Total Output 1775 750    Net -1175 +510                Lines/Drains/Airways       Active Status       Name Placement date Placement time Site Days    Chest Tube 1 Right 24 Fr. 05/10/25  1052  --  3                  Physical Exam:   Gen: NAD, sitting  in chair  Neuro: A&O  Head: Normal Cephalic, Atraumatic  CV: Regular rate  Pulm: Normal work of breathing, No respiratory distress on room air; right CT in place   Abd: Soft, Non-tender  Ext: Edema bilateral lower extremities, Non-tender  Skin: Warm, Dry, Intact; Incisions intact       Lab Results: I have reviewed the following results:  Recent Labs     05/13/25  0350 05/14/25  0541   WBC 14.73* 12.95*   HGB 12.9 12.3   HCT 38.8 38.4    198   SODIUM 133*  --    K 4.0  --      --    CO2 27  --    BUN 18  --    CREATININE 0.91  --    GLUC 129  --    MG 1.8*  --    PHOS 2.7  --      VTE Pharmacologic Prophylaxis: VTE covered by:  apixaban, Oral, 5 mg at 05/13/25 1700    VTE Mechanical Prophylaxis: sequential compression device    ---  Key Sellers MD  General Surgery PGY-II

## 2025-05-13 NOTE — ASSESSMENT & PLAN NOTE
Presented to [facility] ER on 5/3 with shortness of breath and found to have a spontaneous pneumothorax for which a chest tube was initially placed which was dislodged and then a second chest tube was placed by the ER.  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

## 2025-05-13 NOTE — PLAN OF CARE
Problem: PHYSICAL THERAPY ADULT  Goal: Performs mobility at highest level of function for planned discharge setting.  See evaluation for individualized goals.  Description: Treatment/Interventions: ADL retraining, Functional transfer training, LE strengthening/ROM, Therapeutic exercise, Endurance training, Patient/family training, Equipment eval/education, Bed mobility, Gait training, Spoke to nursing, Spoke to case management, OT, Elevations  Equipment Recommended:  (owns)       See flowsheet documentation for full assessment, interventions and recommendations.  Outcome: Progressing  Note: Prognosis: Good  Problem List: Decreased strength, Decreased endurance, Impaired balance, Decreased mobility, Pain  Assessment: PT initiated treatment session in order to assist patient in achieving goals to improve transfers, gait training, and overall activity tolerance. Patient demonstrated progress toward achieving functional mobility goals as evidenced by improving activity tolerance, ambulation, and overall mobility. Patient pleasant, cooperative, and agreeable to today's treatment session. Patient received OOB in bedside chair. Patient is currently supervision bed mobility, transfers, and CGA ambulation. Throughout treatment session patient required both verbal and tactile cuing to improve safety, efficiency, and mechanics of mobility in addition to hands on assistance for all aspects of functional mobility. Additionally, pt required increased time to execute specific mobility tasks with rest breaks in between secondary to gross fatigue and weakness. Patient demonstrated the ability to ambulate 80'x4 with use of hand rail in the schumacher for added balance and support, requiring intermittent verbal cues for hallway navigation to improve patient safety and reduce risk of falls. Patient required intermittent standing rest breaks due to generalized LE weakness and decreased activity tolerance. X1 seated rest break due to SOB and  fatigue, required instructed breathing mechanics to improve WOB. Patient ambulates with excessively slowed gait speed and mild sway, educated on use of AD for added balance and support; however patient adamantly declines need at this time despite continued education. Patient left OOB in bedside chair with alarm and all needs met. Patient will benefit from continued skilled physical therapy to address gait / balance dysfunction, decreased activity tolerance, and generalized weakness. The patients AM-PAC Basic Mobility Inpatient Short From Raw Score is 17 .  Based on AM-PAC scoring and patient presentation, PT currently recommending Level III (Minimum Resource Intensity). Please also refer to the recommendation of the Physical Therapist for safe discharge planning.        Rehab Resource Intensity Level, PT: III (Minimum Resource Intensity)    See flowsheet documentation for full assessment.

## 2025-05-13 NOTE — PHYSICAL THERAPY NOTE
"                                                                                  PHYSICAL THERAPY NOTE          Patient Name: Rosalio Anguiano  Today's Date: 5/13/2025 05/13/25 1320   PT Last Visit   PT Visit Date 05/13/25   Note Type   Note Type Treatment   End of Consult   Patient Position at End of Consult Bedside chair;Bed/Chair alarm activated;All needs within reach   Pain Assessment   Pain Assessment Tool 0-10   Pain Score No Pain   Restrictions/Precautions   Weight Bearing Precautions Per Order No   Other Precautions Fall Risk;Multiple lines  (CT)   General   Chart Reviewed Yes   Response to Previous Treatment Patient with no complaints from previous session.   Family/Caregiver Present No   Cognition   Overall Cognitive Status WFL   Arousal/Participation Alert;Cooperative   Attention Within functional limits   Orientation Level Oriented X4   Memory Within functional limits   Following Commands Follows one step commands without difficulty   Comments patient pleasant and cooperative, vc for safety and re-direction to task at hand   Subjective   Subjective \"what, that's why I use the hand rail, I don't need a walker\"   Bed Mobility   Supine to Sit Unable to assess   Sit to Supine Unable to assess   Additional Comments patient found and left OOB in bedside chair with alarm and all needs met   Transfers   Sit to Stand 5  Supervision   Additional items Armrests;Increased time required;Verbal cues   Stand to Sit 5  Supervision   Additional items Armrests;Increased time required;Verbal cues   Additional Comments x3 STS with no AD   Ambulation/Elevation   Gait pattern Improper Weight shift;Forward Flexion;Wide JASPER;Decreased foot clearance;Inconsistent curt;Short stride;Excessively slow;Decreased heel strike   Gait Assistance 4  Minimal assist  (CGA)   Additional items Assist x 1;Verbal cues;Tactile cues   Assistive Device Other (Comment)  (hand rail)   Distance 80'x4   Stair Management Assistance Not tested "   Ambulation/Elevation Additional Comments Patient demonstrated the ability to ambulate 80'x4 with use of hand rail in the schumacher for added balance and support, requiring intermittent verbal cues for hallway navigation to improve patient safety and reduce risk of falls. Patient required intermittent standing rest breaks due to generalized LE weakness and decreased activity tolerance. X1 seated rest break due to SOB and fatigue, required instructed breathing mechanics to improve WOB. Patient ambulates with excessively slowed gait speed and mild sway, educated on use of AD for added balance and support; however patient adamantly declines need at this time despite continued education.   Balance   Static Sitting Good   Dynamic Sitting Fair +   Static Standing Fair   Dynamic Standing Fair -   Ambulatory Poor +   Endurance Deficit   Endurance Deficit Yes   Endurance Deficit Description generalized weakness, fatigue, SOB   Activity Tolerance   Activity Tolerance Patient limited by fatigue;Patient tolerated treatment well   Nurse Made Aware RN cleared and updated   Assessment   Prognosis Good   Problem List Decreased strength;Decreased endurance;Impaired balance;Decreased mobility;Pain   Assessment PT initiated treatment session in order to assist patient in achieving goals to improve transfers, gait training, and overall activity tolerance. Patient demonstrated progress toward achieving functional mobility goals as evidenced by improving activity tolerance, ambulation, and overall mobility. Patient pleasant, cooperative, and agreeable to today's treatment session. Patient received OOB in bedside chair. Patient is currently supervision bed mobility, transfers, and CGA ambulation. Throughout treatment session patient required both verbal and tactile cuing to improve safety, efficiency, and mechanics of mobility in addition to hands on assistance for all aspects of functional mobility. Additionally, pt required increased time  to execute specific mobility tasks with rest breaks in between secondary to gross fatigue and weakness. Patient demonstrated the ability to ambulate 80'x4 with use of hand rail in the schumacher for added balance and support, requiring intermittent verbal cues for hallway navigation to improve patient safety and reduce risk of falls. Patient required intermittent standing rest breaks due to generalized LE weakness and decreased activity tolerance. X1 seated rest break due to SOB and fatigue, required instructed breathing mechanics to improve WOB. Patient ambulates with excessively slowed gait speed and mild sway, educated on use of AD for added balance and support; however patient adamantly declines need at this time despite continued education. Patient left OOB in bedside chair with alarm and all needs met.       Patient will benefit from continued skilled physical therapy to address gait / balance dysfunction, decreased activity tolerance, and generalized weakness. The patients -[facility] Basic Mobility Inpatient Short From Raw Score is 17 .  Based on -PAC scoring and patient presentation, PT currently recommending Level III (Minimum Resource Intensity). Please also refer to the recommendation of the Physical Therapist for safe discharge planning.   Goals   Patient Goals to rest   STG Expiration Date 05/25/25   PT Treatment Day 1   Plan   Treatment/Interventions ADL retraining;Functional transfer training;LE strengthening/ROM;Elevations;Therapeutic exercise;Endurance training;Patient/family training;Bed mobility;Gait training;Spoke to nursing;Spoke to case management;OT   Progress Progressing toward goals   PT Frequency 3-5x/wk   Discharge Recommendation   Rehab Resource Intensity Level, PT III (Minimum Resource Intensity)   AM-PAC Basic Mobility Inpatient   Turning in Flat Bed Without Bedrails 3   Lying on Back to Sitting on Edge of Flat Bed Without Bedrails 3   Moving Bed to Chair 3   Standing Up From Chair Using Arms 3    Walk in Room 3   Climb 3-5 Stairs With Railing 2   Basic Mobility Inpatient Raw Score 17   Basic Mobility Standardized Score 39.67   [facility] Highest Level Of Mobility   -HLM Goal 5: Stand one or more mins   -HLM Achieved 7: Walk 25 feet or more   Education   Education Provided Mobility training   Patient Demonstrates acceptance/verbal understanding   End of Consult   Patient Position at End of Consult Bedside chair;All needs within reach     Brittany Toussaint PT, DPT

## 2025-05-13 NOTE — ASSESSMENT & PLAN NOTE
80-year-old male with history of COPD who presented with spontaneous pneumothorax on 5/3 with emergent placement of chest tube.  Interval dislodgment of chest tube with reaccumulation of pneumothorax requiring IR chest tube placement on 5/4.  In the interim persistent airleak and evidence of pneumothorax on chest x-rays.     S/p right thoracoscopic video assisted surgery (VATS), right therapeutic wedge resection, mediastinal resection, and talc pleurodesis on 5/10.    5/13 CXR: Development of a small hydropneumothorax or partially fluid-filled cavity at the right apex.    AVSS on room air     cc serous, to fletcher SULLIV[facility], -AL with cough or valsalva     IS 1500 cc    Plan:   - Diet as tolerated  - Follow up AM CXR   - Maintain CT to waterseal   - Potential dc CT pending resolution of ptx  - Can continue Eliquis   - Encourage OOB, ambulation, IS  - Pain and nausea control as needed  - Rest of care per primary

## 2025-05-13 NOTE — ASSESSMENT & PLAN NOTE
80-year-old male with history of COPD who presented with spontaneous pneumothorax on 5/3 with emergent placement of chest tube.  Interval dislodgment of chest tube with reaccumulation of pneumothorax requiring IR chest tube placement on 5/4.  In the interim persistent airleak and evidence of pneumothorax on chest x-rays.     S/p right thoracoscopic video assisted surgery (VATS), right therapeutic wedge resection, mediastinal resection, and talc pleurodesis on 5/10.      - Diet as tolerated  - Keep chest tube to suction until 5/13, transition to water seal today, CXR after water seal. Likely CT removal tomorrow.  - Can continue Eliquis   - Encourage OOB, ambulation, IS  - Pain and nausea control as needed  - Rest of care per primary

## 2025-05-13 NOTE — PROGRESS NOTES
Progress Note - Hospitalist   Name: Rosalio Anguiano 80 y.o. male I MRN: [id number]  Unit/Bed#: [facility] 529-01 I Date of Admission: 5/8/2025   Date of Service: 5/13/2025 I Hospital Day: 5    Assessment & Plan  Acute respiratory failure with hypoxia (HCC)  Initially had presented to [facility] requiring O2, but that reportedly had resolved on 5/8 on day of transfer to [facility] as reportedly on Room Air with sats > 88%  On Room air at [facility]  See Pneumothorax section below for details  Pneumothorax  Presented to [facility] ER on 5/3 with shortness of breath and found to have a spontaneous pneumothorax for which a chest tube was initially placed which was dislodged and then a second chest tube was placed by the ER.  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; thoracic surgery requesting [facility] admission with Thoracics consult  S/p   FIBEROPTIC BRONCHOSCOPY  Right - THORACOSCOPY VIDEO ASSISTED SURGERY (VATS)   RIGHT THERAPEUTIC UPPER LOBE WEDGE RESECTION;   MEDIASTINAL LYMPH NODE DISSECTION   TALC PLEURODESIS  Post op care, chest tube management per thoracic surgery  Plan to remove chest tube tomorrow    Chronic atrial fibrillation (HCC)  Rate controlled with Cardizem  Continue Eliquis for AC  Centrilobular emphysema (HCC)  Currently not in exacerbation  Completed 5 days of steroid therapy from 5/3-->5/8  Holding off additional steroids for now   Continue pta inhalers  GERD (gastroesophageal reflux disease)  Continue Protonix  Lung nodule  S/p wedge resection,  follow up pathology  Positive blood culture  Patient hemodynamically stable and afebrile  WBC thought elevated in setting of steroids   Suspected to be contaminant  Monitor off antibiotics  Elevated liver enzymes  Continue to monitor  Reviewed right upper quadrant ultrasound no acute findings    VTE Pharmacologic Prophylaxis: VTE Score: 4 Moderate Risk (Score 3-4) - Pharmacological DVT Prophylaxis Ordered: apixaban (Eliquis).    Mobility:   Basic Mobility Inpatient Raw Score: 17  JH-HLM Goal: 5: Stand one or more mins  JH-HLM Achieved: 7: Walk 25 feet or more  JH-HLM Goal achieved. Continue to encourage appropriate mobility.    Patient Centered Rounds: I performed bedside rounds with nursing staff today.   Discussions with Specialists or Other Care Team Provider:     Education and Discussions with Family / Patient: Patient declined call to .     Current Length of Stay: 5 day(s)  Current Patient Status: Inpatient   Certification Statement: The patient will continue to require additional inpatient hospital stay due to chest tube in place  Discharge Plan: Anticipate discharge in 48 hrs to home.    Code Status: Level 1 - Full Code    Subjective   Patient currently denies any acute complaints    Objective :  Temp:  [97 °F (36.1 °C)-98.8 °F (37.1 °C)] 97 °F (36.1 °C)  HR:  [] 77  BP: (110-142)/(74-79) 131/79  Resp:  [19-20] 19  SpO2:  [93 %-96 %] 96 %  O2 Device: None (Room air)    Body mass index is 27.8 kg/m².     Input and Output Summary (last 24 hours):     Intake/Output Summary (Last 24 hours) at 5/13/2025 1501  Last data filed at 5/13/2025 0916  Gross per 24 hour   Intake 1020 ml   Output 1050 ml   Net -30 ml       Physical Exam  Vitals and nursing note reviewed.   Constitutional:       General: He is not in acute distress.     Appearance: He is well-developed. He is not toxic-appearing or diaphoretic.   HENT:      Head: Normocephalic and atraumatic.   Eyes:      General: No scleral icterus.      Conjunctiva/sclera: Conjunctivae normal.   Cardiovascular:      Rate and Rhythm: Normal rate and regular rhythm.      Heart sounds: No murmur heard.     No friction rub. No gallop.   Pulmonary:      Effort: Pulmonary effort is normal. No respiratory distress.      Breath sounds: Normal breath sounds. No stridor. No wheezing, rhonchi or rales.      Comments: Right sided chest tube  Chest:      Chest wall: No tenderness.   Abdominal:      General: There is no distension.      Palpations: Abdomen is soft. There is no mass.      Tenderness: There is no abdominal tenderness. There is no guarding or rebound.      Hernia: No hernia is present.   Musculoskeletal:         General: No swelling.      Cervical back: Neck supple.   Skin:     General: Skin is warm and dry.      Capillary Refill: Capillary refill takes less than 2 seconds.   Neurological:      Mental Status: He is alert and oriented to person, place, and time.   Psychiatric:         Mood and Affect: Mood normal.           Lines/Drains:  Lines/Drains/Airways       Active Status       Name Placement date Placement time Site Days    Chest Tube 1 Right 24 Fr. 05/10/25  1052  --  3                            Lab Results: I have reviewed the following results:   Results from last 7 days   Lab Units 05/13/25  0350 05/11/25  0522 05/10/25  0612   WBC Thousand/uL 14.73*   < > 10.46*   HEMOGLOBIN g/dL 12.9   < > 13.2   HEMATOCRIT % 38.8   < > 40.8   PLATELETS Thousands/uL 183   < > 206   SEGS PCT %  --   --  68   LYMPHO PCT %  --   --  14   MONO PCT %  --   --  12   EOS PCT %  --   --  4    < > = values in this interval not displayed.     Results from last 7 days   Lab Units 05/13/25  0350 05/10/25  0612 05/09/25  0511   SODIUM mmol/L 133*   < > 136   POTASSIUM mmol/L 4.0   < > 4.5   CHLORIDE mmol/L 100   < > 103   CO2 mmol/L 27   < > 29   BUN mg/dL 18   < > 25   CREATININE mg/dL 0.91   < > 0.89   ANION GAP mmol/L 6   < > 4   CALCIUM mg/dL 8.5   < > 8.7   ALBUMIN g/dL  --    --  3.5   TOTAL BILIRUBIN mg/dL  --   --  1.14*   ALK PHOS U/L  --   --  90   ALT U/L  --   --  117*   AST U/L  --   --  45*   GLUCOSE RANDOM mg/dL 129   < > 98    < > = values in this interval not displayed.     Results from last 7 days   Lab Units 05/09/25  0511   INR  1.23*                   Recent Cultures (last 7 days):         Imaging Results Review: No pertinent imaging studies reviewed.  Other Study Results Review: No additional pertinent studies reviewed.    Last 24 Hours Medication List:     Current Facility-Administered Medications:     albuterol (PROVENTIL HFA,VENTOLIN HFA) inhaler 2 puff, Q4H PRN    apixaban (ELIQUIS) tablet 5 mg, BID    Cholecalciferol (VITAMIN D3) tablet 2,000 Units, Daily    diltiazem (CARDIZEM CD) 24 hr capsule 120 mg, Daily    fluticasone-vilanterol 200-25 mcg/actuation 1 puff, Daily    HYDROmorphone (DILAUDID) injection 0.5 mg, Q4H PRN    levothyroxine tablet 25 mcg, Early Morning    pantoprazole (PROTONIX) EC tablet 20 mg, Early Morning    tamsulosin (FLOMAX) capsule 0.4 mg, Daily With Dinner    Administrative Statements   Today, Patient Was Seen By: Mario Ocampo DO      **Please Note: This note may have been constructed using a voice recognition system.**

## 2025-05-13 NOTE — RESTORATIVE TECHNICIAN NOTE
Restorative Technician Note      Patient Name: Rosalio Anguiano     Restorative Tech Visit Date: 05/13/25  Note Type: Mobility  Patient Position Upon Consult: Bedside chair  Activity Performed: Ambulated  Assistive Device: Other (Comment) (none)  Patient Position at End of Consult: Bedside chair; All needs within reach

## 2025-05-13 NOTE — ASSESSMENT & PLAN NOTE
Initially had presented to [facility] requiring O2, but that reportedly had resolved on 5/8 on day of transfer to [facility] as reportedly on Room Air with sats > 88%  On Room air at [facility]  See Pneumothorax section below for details

## 2025-05-14 ENCOUNTER — APPOINTMENT (INPATIENT)
Dept: RADIOLOGY | Facility: HOSPITAL | Age: 80
DRG: 166 | End: 2025-05-14
Payer: COMMERCIAL

## 2025-05-14 LAB
ANION GAP SERPL CALCULATED.3IONS-SCNC: 7 MMOL/L (ref 4–13)
ANISOCYTOSIS BLD QL SMEAR: PRESENT
BASOPHILS # BLD MANUAL: 0 THOUSAND/UL (ref 0–0.1)
BASOPHILS NFR MAR MANUAL: 0 % (ref 0–1)
BUN SERPL-MCNC: 17 MG/DL (ref 5–25)
CALCIUM SERPL-MCNC: 8.7 MG/DL (ref 8.4–10.2)
CHLORIDE SERPL-SCNC: 101 MMOL/L (ref 96–108)
CO2 SERPL-SCNC: 28 MMOL/L (ref 21–32)
CREAT SERPL-MCNC: 0.86 MG/DL (ref 0.6–1.3)
EOSINOPHIL # BLD MANUAL: 0.26 THOUSAND/UL (ref 0–0.4)
EOSINOPHIL NFR BLD MANUAL: 2 % (ref 0–6)
ERYTHROCYTE [DISTWIDTH] IN BLOOD BY AUTOMATED COUNT: 26.1 % (ref 11.6–15.1)
GFR SERPL CREATININE-BSD FRML MDRD: 82 ML/MIN/1.73SQ M
GIANT PLATELETS BLD QL SMEAR: PRESENT
GLUCOSE SERPL-MCNC: 118 MG/DL (ref 65–140)
HCT VFR BLD AUTO: 38.4 % (ref 36.5–49.3)
HGB BLD-MCNC: 12.3 G/DL (ref 12–17)
LYMPHOCYTES # BLD AUTO: 1.17 THOUSAND/UL (ref 0.6–4.47)
LYMPHOCYTES # BLD AUTO: 7 % (ref 14–44)
MACROCYTES BLD QL AUTO: PRESENT
MCH RBC QN AUTO: 33 PG (ref 26.8–34.3)
MCHC RBC AUTO-ENTMCNC: 32 G/DL (ref 31.4–37.4)
MCV RBC AUTO: 103 FL (ref 82–98)
MONOCYTES # BLD AUTO: 0.91 THOUSAND/UL (ref 0–1.22)
MONOCYTES NFR BLD: 7 % (ref 4–12)
NEUTROPHILS # BLD MANUAL: 10.62 THOUSAND/UL (ref 1.85–7.62)
NEUTS SEG NFR BLD AUTO: 82 % (ref 43–75)
OVALOCYTES BLD QL SMEAR: PRESENT
PLATELET # BLD AUTO: 198 THOUSANDS/UL (ref 149–390)
PLATELET BLD QL SMEAR: ADEQUATE
POIKILOCYTOSIS BLD QL SMEAR: PRESENT
POLYCHROMASIA BLD QL SMEAR: PRESENT
POTASSIUM SERPL-SCNC: 4.1 MMOL/L (ref 3.5–5.3)
RBC # BLD AUTO: 3.73 MILLION/UL (ref 3.88–5.62)
RBC MORPH BLD: PRESENT
SODIUM SERPL-SCNC: 136 MMOL/L (ref 135–147)
VARIANT LYMPHS # BLD AUTO: 2 %
WBC # BLD AUTO: 12.95 THOUSAND/UL (ref 4.31–10.16)

## 2025-05-14 PROCEDURE — NC001 PR NO CHARGE: Performed by: THORACIC SURGERY (CARDIOTHORACIC VASCULAR SURGERY)

## 2025-05-14 PROCEDURE — 80048 BASIC METABOLIC PNL TOTAL CA: CPT | Performed by: INTERNAL MEDICINE

## 2025-05-14 PROCEDURE — 99232 SBSQ HOSP IP/OBS MODERATE 35: CPT | Performed by: INTERNAL MEDICINE

## 2025-05-14 PROCEDURE — 71046 X-RAY EXAM CHEST 2 VIEWS: CPT

## 2025-05-14 PROCEDURE — 85027 COMPLETE CBC AUTOMATED: CPT | Performed by: INTERNAL MEDICINE

## 2025-05-14 PROCEDURE — 85007 BL SMEAR W/DIFF WBC COUNT: CPT | Performed by: INTERNAL MEDICINE

## 2025-05-14 RX ADMIN — APIXABAN 5 MG: 5 TABLET, FILM COATED ORAL at 17:06

## 2025-05-14 RX ADMIN — IBUPROFEN 400 MG: 400 TABLET, FILM COATED ORAL at 21:08

## 2025-05-14 RX ADMIN — Medication 2000 UNITS: at 08:33

## 2025-05-14 RX ADMIN — TAMSULOSIN HYDROCHLORIDE 0.4 MG: 0.4 CAPSULE ORAL at 17:06

## 2025-05-14 RX ADMIN — FLUTICASONE FUROATE AND VILANTEROL TRIFENATATE 1 PUFF: 200; 25 POWDER RESPIRATORY (INHALATION) at 08:34

## 2025-05-14 RX ADMIN — APIXABAN 5 MG: 5 TABLET, FILM COATED ORAL at 08:33

## 2025-05-14 RX ADMIN — PANTOPRAZOLE SODIUM 20 MG: 20 TABLET, DELAYED RELEASE ORAL at 05:28

## 2025-05-14 RX ADMIN — DILTIAZEM HYDROCHLORIDE 120 MG: 120 CAPSULE, COATED, EXTENDED RELEASE ORAL at 08:33

## 2025-05-14 RX ADMIN — LEVOTHYROXINE SODIUM 25 MCG: 0.03 TABLET ORAL at 05:28

## 2025-05-14 NOTE — RESTORATIVE TECHNICIAN NOTE
Restorative Technician Note      Patient Name: Rosalio Anguiano     Restorative Tech Visit Date: 05/14/25  Note Type: Mobility  Patient Position Upon Consult: Bedside chair  Activity Performed: Ambulated  Assistive Device: Other (Comment) (none)  Patient Position at End of Consult: Bedside chair; All needs within reach

## 2025-05-14 NOTE — PROGRESS NOTES
Progress Note - Hospitalist   Name: Rosalio Anguiano 80 y.o. male I MRN: [id number]  Unit/Bed#: [facility] 529-01 I Date of Admission: 5/8/2025   Date of Service: 5/14/2025 I Hospital Day: 6    Assessment & Plan  Acute respiratory failure with hypoxia (HCC)  Initially had presented to [facility] requiring O2, but that reportedly had resolved on 5/8 on day of transfer to [facility] as reportedly on Room Air with sats > 88%  On Room air at [facility]  See Pneumothorax section below for details  Pneumothorax  Presented to [facility] ER on 5/3 with shortness of breath and found to have a spontaneous pneumothorax for which a chest tube was initially placed which was dislodged and then a second chest tube was placed by the ER.  Despite this over the next 2 days patient's pneumothorax reportedly expanded requiring emergent IR consultation and for which she underwent a large chest tube placement on 5/4/2025  Has had small airleak on forced exhalation since 5/5  Pulmonology was following at [facility]  Given 5 days of persistent air leak without improvement as well as patient with underlying history of COPD, pulmonology at 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; thoracic surgery requesting [facility] admission with Thoracics consult  S/p   FIBEROPTIC BRONCHOSCOPY  Right - THORACOSCOPY VIDEO ASSISTED SURGERY (VATS)   RIGHT THERAPEUTIC UPPER LOBE WEDGE RESECTION;   MEDIASTINAL LYMPH NODE DISSECTION   TALC PLEURODESIS  Post op care, chest tube management per thoracic surgery  Plan to remove chest tube today    Chronic atrial fibrillation (HCC)  Rate controlled with Cardizem  Continue Eliquis for AC  Centrilobular emphysema (HCC)  Currently not in exacerbation  Completed 5 days of steroid therapy from 5/3-->5/8  Holding off additional steroids for now   Continue pta inhalers  GERD (gastroesophageal reflux disease)  Continue Protonix  Lung nodule  S/p wedge resection, follow  up pathology  Positive blood culture  Patient hemodynamically stable and afebrile  WBC thought elevated in setting of steroids   Suspected to be contaminant  Monitor off antibiotics  Elevated liver enzymes  Continue to monitor  Reviewed right upper quadrant ultrasound no acute findings    VTE Pharmacologic Prophylaxis: VTE Score: 4 High Risk (Score >/= 5) - Pharmacological DVT Prophylaxis Ordered: apixaban (Eliquis). Sequential Compression Devices Ordered.    Mobility:   Basic Mobility Inpatient Raw Score: 17  JH-HLM Goal: 5: Stand one or more mins  JH-HLM Achieved: 6: Walk 10 steps or more  JH-HLM Goal achieved. Continue to encourage appropriate mobility.    Patient Centered Rounds: I performed bedside rounds with nursing staff today.   Discussions with Specialists or Other Care Team Provider:     Education and Discussions with Family / Patient: Patient declined call to .     Current Length of Stay: 6 day(s)  Current Patient Status: Inpatient   Certification Statement: The patient will continue to require additional inpatient hospital stay due to chest tube removal today  Discharge Plan: Anticipate discharge tomorrow to home.    Code Status: Level 1 - Full Code    Subjective   Patient denies any acute complaints    Objective :  Temp:  [97.4 °F (36.3 °C)-97.7 °F (36.5 °C)] 97.4 °F (36.3 °C)  HR:  [99] 99  BP: (115-130)/(74) 130/74  Resp:  [18-20] 20  SpO2:  [95 %] 95 %  O2 Device: None (Room air)    Body mass index is 27.8 kg/m².     Input and Output Summary (last 24 hours):     Intake/Output Summary (Last 24 hours) at 5/14/2025 1722  Last data filed at 5/14/2025 1634  Gross per 24 hour   Intake 240 ml   Output 950 ml   Net -710 ml       Physical Exam  Vitals and nursing note reviewed.   Constitutional:       General: He is not in acute distress.     Appearance: He is well-developed. He is not toxic-appearing or diaphoretic.   HENT:      Head: Normocephalic and atraumatic.     Eyes:      General: No  scleral icterus.     Conjunctiva/sclera: Conjunctivae normal.       Cardiovascular:      Rate and Rhythm: Normal rate and regular rhythm.      Heart sounds: No murmur heard.     No friction rub. No gallop.   Pulmonary:      Effort: Pulmonary effort is normal. No respiratory distress.      Breath sounds: Normal breath sounds. No stridor. No wheezing, rhonchi or rales.   Chest:      Chest wall: No tenderness.   Abdominal:      General: There is no distension.      Palpations: Abdomen is soft. There is no mass.      Tenderness: There is no abdominal tenderness. There is no guarding or rebound.      Hernia: No hernia is present.     Musculoskeletal:         General: No swelling or tenderness.      Cervical back: Neck supple.     Skin:     General: Skin is warm and dry.      Capillary Refill: Capillary refill takes less than 2 seconds.     Neurological:      Mental Status: He is alert and oriented to person, place, and time.     Psychiatric:         Mood and Affect: Mood normal.           Lines/Drains:              Lab Results: I have reviewed the following results:   Results from last 7 days   Lab Units 05/14/25  0541 05/11/25  0522 05/10/25  0612   WBC Thousand/uL 12.95*   < > 10.46*   HEMOGLOBIN g/dL 12.3   < > 13.2   HEMATOCRIT % 38.4   < > 40.8   PLATELETS Thousands/uL 198   < > 206   SEGS PCT %  --   --  68   LYMPHO PCT % 7*  --  14   MONO PCT % 7  --  12   EOS PCT % 2  --  4    < > = values in this interval not displayed.     Results from last 7 days   Lab Units 05/14/25  0541 05/10/25  0612 05/09/25  0511   SODIUM mmol/L 136   < > 136   POTASSIUM mmol/L 4.1   < > 4.5   CHLORIDE mmol/L 101   < > 103   CO2 mmol/L 28   < > 29   BUN mg/dL 17   < > 25   CREATININE mg/dL 0.86   < > 0.89   ANION GAP mmol/L 7   < > 4   CALCIUM mg/dL 8.7   < > 8.7   ALBUMIN g/dL  --   --  3.5   TOTAL BILIRUBIN mg/dL  --   --  1.14*   ALK PHOS U/L  --   --  90   ALT U/L  --   --  117*   AST U/L  --   --  45*   GLUCOSE RANDOM mg/dL 118   <  > 98    < > = values in this interval not displayed.     Results from last 7 days   Lab Units 05/09/25  0511   INR  1.23*                   Recent Cultures (last 7 days):         Imaging Results Review: No pertinent imaging studies reviewed.  Other Study Results Review: No additional pertinent studies reviewed.    Last 24 Hours Medication List:     Current Facility-Administered Medications:     albuterol (PROVENTIL HFA,VENTOLIN HFA) inhaler 2 puff, Q4H PRN    apixaban (ELIQUIS) tablet 5 mg, BID    Cholecalciferol (VITAMIN D3) tablet 2,000 Units, Daily    diltiazem (CARDIZEM CD) 24 hr capsule 120 mg, Daily    fluticasone-vilanterol 200-25 mcg/actuation 1 puff, Daily    HYDROmorphone (DILAUDID) injection 0.5 mg, Q4H PRN    ibuprofen (MOTRIN) tablet 400 mg, HS    levothyroxine tablet 25 mcg, Early Morning    pantoprazole (PROTONIX) EC tablet 20 mg, Early Morning    tamsulosin (FLOMAX) capsule 0.4 mg, Daily With Dinner    Administrative Statements   Today, Patient Was Seen By: Mario Ocampo DO      **Please Note: This note may have been constructed using a voice recognition system.**

## 2025-05-14 NOTE — QUICK NOTE
05/14/25    Procedure: Chest tube removal    Right chest tube removed in routine fashion without incident. The patient tolerated the procedure well. A dry, sterile dressing was placed. Will check a PA/lateral chest x-ray.       Ab Wade MD  General Surgery   05/14/25

## 2025-05-14 NOTE — ASSESSMENT & PLAN NOTE
Presented to [facility] ER on 5/3 with shortness of breath and found to have a spontaneous pneumothorax for which a chest tube was initially placed which was dislodged and then a second chest tube was placed by the ER.  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

## 2025-05-14 NOTE — ASSESSMENT & PLAN NOTE
Initially had presented to [facility] requiring O2, but that reportedly had resolved on 5/8 on day of transfer to [facility] as reportedly on Room Air with sats > 88%  On Room air at [facility]  See Pneumothorax section below for details

## 2025-05-14 NOTE — RESTORATIVE TECHNICIAN NOTE
Restorative Technician Note      Patient Name: Rosalio Anguiano     Restorative Tech Visit Date: 05/14/25  Note Type: Mobility  Patient Position Upon Consult: Bedside chair  Activity Performed: Ambulated  Assistive Device: Other (Comment) (none)  Patient Position at End of Consult: Bedside chair; All needs within reach; Bed/Chair alarm activated

## 2025-05-15 ENCOUNTER — TRANSITIONAL CARE MANAGEMENT (OUTPATIENT)
Dept: FAMILY MEDICINE CLINIC | Facility: CLINIC | Age: 80
End: 2025-05-15

## 2025-05-15 VITALS
TEMPERATURE: 98.2 F | HEIGHT: 72 IN | SYSTOLIC BLOOD PRESSURE: 115 MMHG | BODY MASS INDEX: 27.77 KG/M2 | HEART RATE: 100 BPM | RESPIRATION RATE: 22 BRPM | DIASTOLIC BLOOD PRESSURE: 75 MMHG | OXYGEN SATURATION: 98 % | WEIGHT: 205 LBS

## 2025-05-15 LAB
BASOPHILS # BLD AUTO: 0.03 THOUSANDS/ÂΜL (ref 0–0.1)
BASOPHILS NFR BLD AUTO: 0 % (ref 0–1)
EOSINOPHIL # BLD AUTO: 0.29 THOUSAND/ÂΜL (ref 0–0.61)
EOSINOPHIL NFR BLD AUTO: 2 % (ref 0–6)
ERYTHROCYTE [DISTWIDTH] IN BLOOD BY AUTOMATED COUNT: 26 % (ref 11.6–15.1)
HCT VFR BLD AUTO: 36.7 % (ref 36.5–49.3)
HGB BLD-MCNC: 12.3 G/DL (ref 12–17)
IMM GRANULOCYTES # BLD AUTO: 0.29 THOUSAND/UL (ref 0–0.2)
IMM GRANULOCYTES NFR BLD AUTO: 2 % (ref 0–2)
LYMPHOCYTES # BLD AUTO: 1.32 THOUSANDS/ÂΜL (ref 0.6–4.47)
LYMPHOCYTES NFR BLD AUTO: 11 % (ref 14–44)
MCH RBC QN AUTO: 33.6 PG (ref 26.8–34.3)
MCHC RBC AUTO-ENTMCNC: 33.5 G/DL (ref 31.4–37.4)
MCV RBC AUTO: 100 FL (ref 82–98)
MONOCYTES # BLD AUTO: 1.61 THOUSAND/ÂΜL (ref 0.17–1.22)
MONOCYTES NFR BLD AUTO: 13 % (ref 4–12)
NEUTROPHILS # BLD AUTO: 8.95 THOUSANDS/ÂΜL (ref 1.85–7.62)
NEUTS SEG NFR BLD AUTO: 72 % (ref 43–75)
NRBC BLD AUTO-RTO: 0 /100 WBCS
PLATELET # BLD AUTO: 212 THOUSANDS/UL (ref 149–390)
RBC # BLD AUTO: 3.66 MILLION/UL (ref 3.88–5.62)
WBC # BLD AUTO: 12.49 THOUSAND/UL (ref 4.31–10.16)

## 2025-05-15 PROCEDURE — 85025 COMPLETE CBC W/AUTO DIFF WBC: CPT | Performed by: INTERNAL MEDICINE

## 2025-05-15 PROCEDURE — 99024 POSTOP FOLLOW-UP VISIT: CPT | Performed by: THORACIC SURGERY (CARDIOTHORACIC VASCULAR SURGERY)

## 2025-05-15 PROCEDURE — 99239 HOSP IP/OBS DSCHRG MGMT >30: CPT

## 2025-05-15 PROCEDURE — 0WP930Z REMOVAL OF DRAINAGE DEVICE FROM RIGHT PLEURAL CAVITY, PERCUTANEOUS APPROACH: ICD-10-PCS | Performed by: THORACIC SURGERY (CARDIOTHORACIC VASCULAR SURGERY)

## 2025-05-15 RX ORDER — ACETAMINOPHEN 325 MG/1
975 TABLET ORAL EVERY 8 HOURS PRN
Status: DISCONTINUED | OUTPATIENT
Start: 2025-05-15 | End: 2025-05-15 | Stop reason: HOSPADM

## 2025-05-15 RX ORDER — FLUTICASONE FUROATE AND VILANTEROL 200; 25 UG/1; UG/1
1 POWDER RESPIRATORY (INHALATION) DAILY
Qty: 60 BLISTER | Refills: 0 | Status: SHIPPED | OUTPATIENT
Start: 2025-05-16 | End: 2025-05-15

## 2025-05-15 RX ADMIN — PANTOPRAZOLE SODIUM 20 MG: 20 TABLET, DELAYED RELEASE ORAL at 05:47

## 2025-05-15 RX ADMIN — Medication 2000 UNITS: at 08:17

## 2025-05-15 RX ADMIN — LEVOTHYROXINE SODIUM 25 MCG: 0.03 TABLET ORAL at 05:47

## 2025-05-15 RX ADMIN — DILTIAZEM HYDROCHLORIDE 120 MG: 120 CAPSULE, COATED, EXTENDED RELEASE ORAL at 08:17

## 2025-05-15 RX ADMIN — APIXABAN 5 MG: 5 TABLET, FILM COATED ORAL at 08:17

## 2025-05-15 RX ADMIN — ACETAMINOPHEN 975 MG: 325 TABLET ORAL at 05:47

## 2025-05-15 RX ADMIN — FLUTICASONE FUROATE AND VILANTEROL TRIFENATATE 1 PUFF: 200; 25 POWDER RESPIRATORY (INHALATION) at 08:19

## 2025-05-15 NOTE — ASSESSMENT & PLAN NOTE
Currently not in exacerbation  Completed 5 days of steroid therapy from 5/3-->5/8   Continue pta inhalers

## 2025-05-15 NOTE — ASSESSMENT & PLAN NOTE
80-year-old male with history of COPD who presented with spontaneous pneumothorax on 5/3 with emergent placement of chest tube.  Interval dislodgment of chest tube with reaccumulation of pneumothorax requiring IR chest tube placement on 5/4.  In the interim persistent airleak and evidence of pneumothorax on chest x-rays.     S/p right thoracoscopic video assisted surgery (VATS), right therapeutic wedge resection, mediastinal resection, and talc pleurodesis on 5/10.    5/13 CXR: Development of a small hydropneumothorax or partially fluid-filled cavity at the right apex.    AVSS on room air    IS 1500 cc    Plan:   - Diet as tolerated  - Can continue Eliquis   - Encourage OOB, ambulation, IS  - Pain and nausea control as needed  - Okay for discharge from thoracic surgery perspective  - Rest of care per primary

## 2025-05-15 NOTE — ASSESSMENT & PLAN NOTE
Initially had presented to [facility] requiring O2, but that reportedly had resolved on 5/8 on day of transfer to [facility] as reportedly on Room Air with sats > 88%  On Room air at [facility]  See Pneumothorax section below for details

## 2025-05-15 NOTE — PROGRESS NOTES
Progress Note - Surgery-General   Name: Rosalio Anguiano 80 y.o. male I MRN: [id number]  Unit/Bed#: [facility] 529-01 I Date of Admission: 5/8/2025   Date of Service: 5/15/2025 I Hospital Day: 7    Assessment & Plan  Pneumothorax  80-year-old male with history of COPD who presented with spontaneous pneumothorax on 5/3 with emergent placement of chest tube.  Interval dislodgment of chest tube with reaccumulation of pneumothorax requiring IR chest tube placement on 5/4.  In the interim persistent airleak and evidence of pneumothorax on chest x-rays.     S/p right thoracoscopic video assisted surgery (VATS), right therapeutic wedge resection, mediastinal resection, and talc pleurodesis on 5/10.    5/13 CXR: Development of a small hydropneumothorax or partially fluid-filled cavity at the right apex.    AVSS on room air    IS 1500 cc    Plan:   - Diet as tolerated  - Can continue Eliquis   - Encourage OOB, ambulation, IS  - Pain and nausea control as needed  - Okay for discharge from thoracic surgery perspective  - Rest of care per primary    Thoracic  service signing off.    Subjective   No acute events overnight. Afebrile, hemodynamically stable. Tolerating diet. No nausea, or vomiting, fevers or chills. Pain controlled. No SOB or CP.      Objective :  Temp:  [97.4 °F (36.3 °C)-98.2 °F (36.8 °C)] 98.2 °F (36.8 °C)  HR:  [] 100  BP: (115-131)/(73-77) 121/73  Resp:  [18-22] 22  SpO2:  [95 %-98 %] 98 %  O2 Device: None (Room air)    I/O         05/13 0701  05/14 0700 05/14 0701  05/15 0700 05/15 0701  05/16 0700    P.O. 480 840     Total Intake(mL/kg) 480 (5.2) 840 (9)     Urine (mL/kg/hr) 1125 (0.5) 550 (0.2)     Chest Tube 100      Total Output 1225 550     Net -745 +290                    Physical Exam:  General: No acute distress, alert and oriented  CV: Well perfused, regular rate  Lungs: Normal work of breathing, no increased respiratory effort  Abdomen: Soft, non-tender, non-distended  Extremities: No edema,  clubbing or cyanosis  Skin: Warm, dry      Lab Results: I have reviewed the following results:  Recent Labs     05/13/25  0350 05/14/25  0541 05/15/25  0527   WBC 14.73* 12.95* 12.49*   HGB 12.9 12.3 12.3   HCT 38.8 38.4 36.7    198 212   SODIUM 133* 136  --    K 4.0 4.1  --     101  --    CO2 27 28  --    BUN 18 17  --    CREATININE 0.91 0.86  --    GLUC 129 118  --    MG 1.8*  --   --    PHOS 2.7  --   --        Imaging Results Review: No pertinent imaging studies reviewed.  Other Study Results Review: No additional pertinent studies reviewed.    VTE Pharmacologic Prophylaxis: VTE covered by:  apixaban, Oral, 5 mg at 05/14/25 1706     VTE Mechanical Prophylaxis: sequential compression device

## 2025-05-15 NOTE — ASSESSMENT & PLAN NOTE
Presented to [facility] ER on 5/3 with shortness of breath and found to have a spontaneous pneumothorax for which a chest tube was initially placed which was dislodged and then a second chest tube was placed by the ER.  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  Prior to that we will get a repeat PA and lateral chest x-ray which is ordered by the thoracic team  Provided a return to work for Monday, May 26  Avoid heavy lifting and flying, Until cleared by thoracic surgery

## 2025-05-15 NOTE — DISCHARGE SUMMARY
Discharge Summary - Hospitalist   Name: Rosalio Anguiano 80 y.o. male I MRN: [id number]  Unit/Bed#: [facility] 529-01 I Date of Admission: 5/8/2025   Date of Service: 5/15/2025 I Hospital Day: 7     Assessment & Plan  Acute respiratory failure with hypoxia (HCC)  Initially had presented to [facility] requiring O2, but that reportedly had resolved on 5/8 on day of transfer to [facility] as reportedly on Room Air with sats > 88%  On Room air at [facility]  See Pneumothorax section below for details  Pneumothorax  Presented to [facility] ER on 5/3 with shortness of breath and found to have a spontaneous pneumothorax for which a chest tube was initially placed which was dislodged and then a second chest tube was placed by the ER.  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  Prior to that we will get a repeat PA and lateral chest x-ray which is ordered by the thoracic team  Provided a return to work for Monday, May 26  Avoid heavy lifting and flying, Until cleared by thoracic surgery      Chronic atrial fibrillation (HCC)  Rate controlled with Cardizem  Continue Eliquis for AC  Centrilobular emphysema (HCC)  Currently not  in exacerbation  Completed 5 days of steroid therapy from 5/3-->5/8   Continue pta inhalers  GERD (gastroesophageal reflux disease)  Continue Protonix  Lung nodule  S/p wedge resection, follow up pathology  Positive blood culture  Patient hemodynamically stable and afebrile  WBC thought elevated in setting of steroids   Suspected to be contaminant  Monitor off antibiotics  Elevated liver enzymes  Continue to monitor  Reviewed right upper quadrant ultrasound no acute findings     Medical Problems       Resolved Problems  Date Reviewed: 5/14/2025   None       Discharging Physician / Practitioner: Felipe Prince MD  PCP: Jona Calzada DO  Admission Date:   Admission Orders (From admission, onward)       Ordered        05/08/25 2250  INPATIENT ADMISSION  Once                          Discharge Date: 05/15/25    Consultations During Hospital Stay:  Pulmonology      Procedures Performed:   VATS  Significant Findings / Test Results:   XR chest pa & lateral  Result Date: 5/13/2025  Impression: Development of a small hydropneumothorax or partially fluid-filled cavity at the right apex. Other findings, as per report. I personally discussed this study with Dr. Ocampo on 5/13/2025 2:25 PM. Workstation performed: [id number]     XR chest portable  Result Date: 5/10/2025  Impression: Right chest tube insertion with small to moderate right pneumothorax. Increased opacity in the right upper lung, likely atelectasis. Persistent subcutaneous emphysema. Workstation performed: [id number]     XR chest portable  Result Date: 5/8/2025  Impression: Stable small right apical pneumothorax. Workstation performed: [id number]       XR chest pa & lateral  Result Date: 5/14/2025  Impression 1. Status post right thoracostomy tube removal with persistent moderate loculated right apical hydropneumothorax. Workstation performed: [id number]     XR chest pa & lateral  Result Date: 5/14/2025  Impression Right chest tube with stable moderate  loculated right apical hydropneumothorax. Workstation performed: [id number]          Incidental Findings:   None       Test Results Pending at Discharge (will require follow up):   none     Outpatient Tests Requested:  none    Complications:  none    Reason for Admission: SOB    Hospital Course:   Rosalio Anguiano is a 80 y.o. male patient who originally presented to the hospital on 5/8/2025 due to acute shortness of breath.  Patient was found to have a large spontaneous pneumothorax.  In the setting of COPD.  Thoracic surgery was on board, patient was status post VATS while inpatient.  Also had a chest tube.  Chest tube was eventually removed and patient was deemed fit for discharge.  Advised close follow-up with thoracic surgery        Please see above list of diagnoses and related plan for additional information.     Condition at Discharge: good    Discharge Day Visit / Exam:   Subjective:  Patient does not report and headaches, shortness of breath, chest pain, abdominal pain, nausea vomiting, lower leg edema. Also reports good sleep    Vitals: Blood Pressure: 115/75 (05/15/25 0817)  Pulse: 100 (05/14/25 2236)  Temperature: 98.2 °F (36.8 °C) (05/14/25 2236)  Temp Source: Oral (05/14/25 2236)  Respirations: 22 (05/14/25 2236)  Height: 6' (182.9 cm) (05/08/25 2257)  Weight - Scale: 93 kg (205 lb) (05/08/25 2257)  SpO2: 98 % (05/14/25 2236)  Physical Exam  Vitals and nursing note reviewed.   Constitutional:       General: He is not in acute distress.     Appearance: He is well-developed.   HENT:      Head: Normocephalic and atraumatic.     Eyes:      Conjunctiva/sclera: Conjunctivae normal.       Cardiovascular:      Rate and Rhythm: Normal rate and regular rhythm.      Heart sounds: No murmur heard.  Pulmonary:      Effort: Pulmonary effort is normal. No respiratory distress.      Breath sounds: Normal breath sounds.   Abdominal:      Palpations: Abdomen is soft.      Tenderness: There is no abdominal tenderness.      Musculoskeletal:         General: No swelling.      Cervical back: Neck supple.     Skin:     General: Skin is warm and dry.      Capillary Refill: Capillary refill takes less than 2 seconds.     Neurological:      Mental Status: He is alert.     Psychiatric:         Mood and Affect: Mood normal.          Discussion with Family: Updated  (wife) at bedside.    Discharge instructions/Information to patient and family:   See after visit summary for information provided to patient and family.      Provisions for Follow-Up Care:  See after visit summary for information related to follow-up care and any pertinent home health orders.      Mobility at time of Discharge:   Basic Mobility Inpatient Raw Score: 24  JH-HLM Goal: 8: Walk 250 feet or more  JH-HLM Achieved: 8: Walk 250 feet ot more  HLM Goal achieved. Continue to encourage appropriate mobility.     Disposition:   Home    Planned Readmission: None    Discharge Medications:  See after visit summary for reconciled discharge medications provided to patient and/or family.      Administrative Statements   Discharge Statement:  I have spent a total time of 45 minutes in caring for this patient on the day of the visit/encounter. >30 minutes of time was spent on: Diagnostic results, Prognosis, Risks and benefits of tx options, Instructions for management, Patient and family education, Importance of tx compliance, Risk factor reductions, Impressions, Counseling / Coordination of care, Documenting in the medical record, Reviewing / ordering tests, medicine, procedures  , and Communicating with other healthcare professionals .    **Please Note: This note may have been constructed using a voice recognition system**

## 2025-05-15 NOTE — RESTORATIVE TECHNICIAN NOTE
Restorative Technician Note      Patient Name: Rosalio Anguiano     Restorative Tech Visit Date: 05/15/25  Note Type: Mobility  Patient Position Upon Consult: Bedside chair  Activity Performed: Ambulated  Assistive Device: Other (Comment) (none)  Patient Position at End of Consult: Bedside chair; All needs within reach

## 2025-05-16 NOTE — UTILIZATION REVIEW
NOTIFICATION OF ADMISSION DISCHARGE   This is a Notification of Discharge from [facility]. Please be advised that this patient has been discharge from our facility. Below you will find the admission and discharge date and time including the patient’s disposition.   UTILIZATION REVIEW CONTACT:  Utilization Review Assistants  Network Utilization Review Department  Phone: [phone] x carefully listen to the prompts. All voicemails are confidential.  Email: [email or web address]     ADMISSION INFORMATION  PRESENTATION DATE: 5/8/2025 10:37 PM  OBERVATION ADMISSION DATE: N/A  INPATIENT ADMISSION DATE: 5/8/25 10:37 PM   DISCHARGE DATE: 5/15/2025  3:10 PM   DISPOSITION:Home/Self Care    Network Utilization Review Department  ATTENTION: Please call with any questions or concerns to [phone] and carefully listen to the prompts so that you are directed to the right person. All voicemails are confidential.   For Discharge needs, contact Care Management DC Support Team at [phone] opt. 2  Send all requests for admission clinical reviews, approved or denied determinations and any other requests to dedicated fax number below belonging to the campus where the patient is receiving treatment. List of dedicated fax numbers for the Facilities:  FACILITY NAME UR FAX NUMBER   ADMISSION DENIALS (Administrative/Medical Necessity) [phone]   DISCHARGE SUPPORT TEAM ([facility]) [phone]   PARENT CHILD HEALTH (Maternity/NICU/Pediatrics) [phone]   [facility] [phone]   [facility] [phone]   [facility] [phone]   [facility] [phone]   [facility] [phone]   [facility] [phone]   [facility] [phone]   [facility] [phone]   [facility]  [facility] [phone]   [facility] [phone]   [facility] [phone]   [facility] [phone]

## 2025-05-19 PROCEDURE — 88305 TISSUE EXAM BY PATHOLOGIST: CPT | Performed by: PATHOLOGY

## 2025-05-19 PROCEDURE — 88307 TISSUE EXAM BY PATHOLOGIST: CPT | Performed by: PATHOLOGY

## 2025-05-19 PROCEDURE — 88341 IMHCHEM/IMCYTCHM EA ADD ANTB: CPT | Performed by: PATHOLOGY

## 2025-05-19 PROCEDURE — 88342 IMHCHEM/IMCYTCHM 1ST ANTB: CPT | Performed by: PATHOLOGY

## 2025-05-19 PROCEDURE — 88312 SPECIAL STAINS GROUP 1: CPT | Performed by: PATHOLOGY

## 2025-05-28 ENCOUNTER — OFFICE VISIT (OUTPATIENT)
Dept: FAMILY MEDICINE CLINIC | Facility: CLINIC | Age: 80
End: 2025-05-28
Payer: COMMERCIAL

## 2025-05-28 VITALS
SYSTOLIC BLOOD PRESSURE: 122 MMHG | BODY MASS INDEX: 27.06 KG/M2 | HEART RATE: 85 BPM | OXYGEN SATURATION: 92 % | TEMPERATURE: 97.3 F | RESPIRATION RATE: 22 BRPM | DIASTOLIC BLOOD PRESSURE: 78 MMHG | HEIGHT: 72 IN | WEIGHT: 199.8 LBS

## 2025-05-28 DIAGNOSIS — R60.0 BILATERAL LEG EDEMA: Primary | ICD-10-CM

## 2025-05-28 DIAGNOSIS — M47.16 OSTEOARTHRITIS OF LUMBAR SPINE WITH MYELOPATHY: ICD-10-CM

## 2025-05-28 DIAGNOSIS — J93.11 PRIMARY SPONTANEOUS PNEUMOTHORAX: ICD-10-CM

## 2025-05-28 DIAGNOSIS — J43.2 CENTRILOBULAR EMPHYSEMA (HCC): ICD-10-CM

## 2025-05-28 PROCEDURE — 99495 TRANSJ CARE MGMT MOD F2F 14D: CPT | Performed by: FAMILY MEDICINE

## 2025-05-28 NOTE — ASSESSMENT & PLAN NOTE
Limited mobility due to arthritis and lower extremity edema but patient is walking daily and I encouraged him to continue this for his recovery

## 2025-05-28 NOTE — ASSESSMENT & PLAN NOTE
Continue same medication regimen patient's breathing has improved he notes over the last 5 days at the first week after hospitalization he was significantly short of breath but is showing improvement using the incentive spirometer

## 2025-05-28 NOTE — ASSESSMENT & PLAN NOTE
Posthospitalization for pneumothorax with complications due to chest tube insertion with air leak and transferred to [place] for thoracic surgery intervention and patient did well post procedure.  Hospital records reviewed at this time and discussed with patient.  He will follow-up with Dr. Mayen next week.  He will remain out of work currently until further instructions and clearance through thoracic surgery at that appointment next week.  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  Currently not recommended.

## 2025-05-28 NOTE — ASSESSMENT & PLAN NOTE
Posthospitalization transition of care management patient notes that the bilateral leg edema has been improving since discharge now.  He does have notable edema worse on the left lower extremity than right.  I recommend elevation also ambulation daily and compression socks when tolerable he recently has been keeping the socks off due to discomfort

## 2025-05-28 NOTE — PROGRESS NOTES
Name: Rosalio Anguiano      : 1945      MRN: [id number]  Encounter Provider: Jona Calzada DO  Encounter Date: 2025   [facility]  :  Assessment & Plan  Bilateral leg edema  Posthospitalization transition of care management patient notes that the bilateral leg edema has been improving since discharge now.  He does have notable edema worse on the left lower extremity than right.  I recommend elevation also ambulation daily and compression socks when tolerable he recently has been keeping the socks off due to discomfort         Primary spontaneous pneumothorax  Posthospitalization for pneumothorax with complications due to chest tube insertion with air leak and transferred to [place] for thoracic surgery intervention and patient did well post procedure.  Hospital records reviewed at this time and discussed with patient.  He will follow-up with Dr. Mayen next week.  He will remain out of work currently until further instructions and clearance through thoracic surgery at that appointment next week.  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  Currently not recommended.         Centrilobular emphysema (HCC)  Continue same medication regimen patient's breathing has improved he notes over the last 5 days at the first week after hospitalization he was significantly short of breath but is showing improvement using the incentive spirometer         Osteoarthritis of lumbar spine with myelopathy  Limited mobility due to arthritis and lower extremity edema but patient is walking daily and I encouraged him to continue this for his recovery                History of Present Illness   Patient here for transition of care management      Review of Systems   Constitutional:  Negative for chills, fatigue and fever.   HENT:  Negative for congestion, nosebleeds, rhinorrhea, sinus pressure and sore  throat.    Eyes:  Negative for discharge and redness.   Respiratory:  Positive for cough and shortness of breath.    Cardiovascular:  Positive for chest pain and leg swelling. Negative for palpitations.   Gastrointestinal:  Negative for abdominal pain, blood in stool and nausea.   Endocrine: Negative for cold intolerance, heat intolerance and polyuria.   Genitourinary:  Negative for dysuria and frequency.   Musculoskeletal:  Negative for arthralgias, back pain and myalgias.   Skin:  Negative for rash.   Neurological:  Negative for dizziness, weakness and headaches.   Hematological:  Negative for adenopathy.   Psychiatric/Behavioral:  Negative for behavioral problems and sleep disturbance. The patient is not nervous/anxious.        Objective   /78 (BP Location: Left arm, Patient Position: Sitting, Cuff Size: Standard)   Pulse 85   Temp (!) 97.3 °F (36.3 °C) (Temporal)   Resp 22   Ht 6' (1.829 m)   Wt 90.6 kg (199 lb 12.8 oz)   SpO2 92%   BMI 27.10 kg/m²      Physical Exam  Vitals and nursing note reviewed.   Constitutional:       Appearance: Normal appearance. He is well-developed.   HENT:      Head: Normocephalic and atraumatic.      Right Ear: External ear normal.      Left Ear: External ear normal.      Nose: Nose normal.     Eyes:      General: No scleral icterus.     Conjunctiva/sclera: Conjunctivae normal.      Pupils: Pupils are equal, round, and reactive to light.     Neck:      Thyroid: No thyromegaly.      Vascular: No JVD.     Cardiovascular:      Rate and Rhythm: Normal rate and regular rhythm.      Heart sounds: Normal heart sounds. No murmur heard.  Pulmonary:      Effort: Pulmonary effort is normal.      Breath sounds: Normal breath sounds. No wheezing or rales.   Chest:      Chest wall: No tenderness.   Abdominal:      General: Bowel sounds are normal. There is no distension.      Palpations: Abdomen is soft. There is no mass.      Tenderness: There is no abdominal tenderness. There is no  guarding or rebound.     Musculoskeletal:         General: No tenderness or deformity. Normal range of motion.      Cervical back: Normal range of motion and neck supple.      Right lower leg: Edema present.      Left lower leg: Edema present.   Lymphadenopathy:      Cervical: No cervical adenopathy.     Skin:     General: Skin is warm and dry.      Findings: No erythema or rash.     Neurological:      Mental Status: He is alert and oriented to person, place, and time.      Cranial Nerves: No cranial nerve deficit.      Deep Tendon Reflexes: Reflexes are normal and symmetric. Reflexes normal.     Psychiatric:         Behavior: Behavior normal.         Thought Content: Thought content normal.         Judgment: Judgment normal.

## 2025-05-29 ENCOUNTER — APPOINTMENT (OUTPATIENT)
Dept: RADIOLOGY | Facility: CLINIC | Age: 80
End: 2025-05-29
Payer: COMMERCIAL

## 2025-05-29 DIAGNOSIS — J93.11 PRIMARY SPONTANEOUS PNEUMOTHORAX: ICD-10-CM

## 2025-05-29 PROCEDURE — 71046 X-RAY EXAM CHEST 2 VIEWS: CPT

## 2025-06-03 ENCOUNTER — OFFICE VISIT (OUTPATIENT)
Dept: CARDIAC SURGERY | Facility: CLINIC | Age: 80
End: 2025-06-03

## 2025-06-03 VITALS
WEIGHT: 196.65 LBS | RESPIRATION RATE: 16 BRPM | BODY MASS INDEX: 26.64 KG/M2 | DIASTOLIC BLOOD PRESSURE: 80 MMHG | OXYGEN SATURATION: 96 % | HEART RATE: 88 BPM | SYSTOLIC BLOOD PRESSURE: 134 MMHG | HEIGHT: 72 IN | TEMPERATURE: 97.8 F

## 2025-06-03 DIAGNOSIS — J93.11 PRIMARY SPONTANEOUS PNEUMOTHORAX: ICD-10-CM

## 2025-06-03 DIAGNOSIS — R91.1 LUNG NODULE: Primary | ICD-10-CM

## 2025-06-03 PROCEDURE — 99024 POSTOP FOLLOW-UP VISIT: CPT | Performed by: THORACIC SURGERY (CARDIOTHORACIC VASCULAR SURGERY)

## 2025-06-03 NOTE — ASSESSMENT & PLAN NOTE
This nodule was resected on 5/10. Surgical pathology resulted as focal aggregate of fungal organisms compatible with aspergillus species, no evidence of malignancy. Patient did not have any cavitary lesion and this was an isolated area of consolidation, he therefore does not require additional treatment with antifungals as the area is resected.  His mediastinal lymph nodes were negative for malignancy.  Patient should continue to follow-up with his pulmonologist as discussed.

## 2025-06-03 NOTE — PROGRESS NOTES
Name: Rosalio Anguiano      : 1945      MRN: [id number]  Encounter Provider: Zachary Mayen MD  Encounter Date: 6/3/2025   Encounter department: [facility]  :  Assessment & Plan  Lung nodule  This nodule was resected on 5/10. Surgical pathology resulted as focal aggregate of fungal organisms compatible with aspergillus species, no evidence of malignancy. Patient did not have any cavitary lesion and this was an isolated area of consolidation, he therefore does not require additional treatment with antifungals as the area is resected.  His mediastinal lymph nodes were negative for malignancy.  Patient should continue to follow-up with his pulmonologist as discussed.       Primary spontaneous pneumothorax  Patient is status post right VATS upper lobe wedge resection, MLND and talc pleurodesis on 5/10/2025.  He had a spontaneous pneumothorax that did not improve with conservative management.  He did well postoperatively and was discharged on 5/15.  Patient denies any major concerns.  He reports difficulty with obtaining a deep inhalation due to a dry cough.  His surgical incisions are healing well and his chest tube sutures were removed today.  His chest x-ray is stable and does not show any enlarging pneumothorax or large effusions.  We recommend continuing use of incentive spirometer and using OTC cough suppressants as needed.  He should continue follow-up with his pulmonologist for treatment of his COPD as well as for follow-up of multiple small bilateral pulmonary nodules on previous CT scan.  Will send a letter to Dr. Lama regarding this.  From a thoracic surgery standpoint, patient does not require any additional follow-up.         Thoracic History     No problems updated.     History of Present Illness     HPI Rosalio Anguiano is a 80 y.o. male who presents today for a post-operative visit. Patient was admitted for spontaneous pneumothorax that did not  improve with chest tube management and is now s/p right VATS upper lobe wedge resection, MLND, and talc pleurodesis on 5/10/2025.On imaging prior to hospital admission, patient had a 10mm spiculated right upper lobe nodule, this was therefore resected at the time of surgery as well as apical blebs.     Reports doing well since discharge.  He denies experiencing any postsurgical pain.  He denies SOB, CP, F/C, N/V or productive cough.  He reports difficulty with a deep inhalation due to onset of dry cough.  He is using his incentive spirometer daily and typically gets up to between 4907-8500.    Prior smoker 20 year 2ppd, quit 35 year. Patient works as a supervisor .    We reviewed his CXR from 5/29 which shows stable right apical pneumothorax stable right tiny effusion and some basilar densities on the right.     Review of Systems   Constitutional:  Negative for chills, diaphoresis and unexpected weight change.   HENT: Negative.     Respiratory:  Negative for shortness of breath and wheezing.         Difficulty obtaining a deep inhalation, non-productive cough.   Cardiovascular:  Negative for chest pain and leg swelling.   Gastrointestinal:  Negative for abdominal pain, diarrhea, nausea and vomiting.   All other systems reviewed and are negative.     Medical History Reviewed by provider this encounter:     .  Past Medical History   Past Medical History[1]  Past Surgical History[2]  Family History[3]   reports that he quit smoking about 26 years ago. His smoking use included cigarettes. He started smoking about 60 years ago. He has a 67 pack-year smoking history. He has never used smokeless tobacco. He reports that he does not currently use alcohol. He reports that he does not use drugs.  Current Outpatient Medications   Medication Instructions    albuterol (PROVENTIL HFA,VENTOLIN HFA) 90 mcg/act inhaler 2 puffs, Every 6 hours PRN    apixaban (ELIQUIS) 5 mg, Oral, 2 times daily    Cholecalciferol 50 mcg     diltiazem (CARDIZEM CD) 120 mg 24 hr capsule TAKE 1 CAPSULE BY MOUTH  DAILY    fluticasone-salmeterol (Advair) 500-50 mcg/dose inhaler 1 puff, 2 times daily    levothyroxine (SYNTHROID) 50 mcg, Daily    omeprazole (PriLOSEC OTC) 20 MG tablet 1 tablet, Daily    tamsulosin (FLOMAX) 0.4 mg    Allergies[4]   Medications Ordered Prior to Encounter[5]   Social History[6]     Objective   /80 (Patient Position: Sitting, Cuff Size: Large)   Pulse 88   Temp 97.8 °F (36.6 °C) (Temporal)   Resp 16   Ht 6' (1.829 m)   Wt 89.2 kg (196 lb 10.4 oz)   SpO2 96%   BMI 26.67 kg/m²     Pain Screening:     ECOG    Physical Exam  Vitals reviewed.   Constitutional:       General: He is not in acute distress.     Appearance: Normal appearance. He is not ill-appearing.   HENT:      Head: Normocephalic.      Nose: Nose normal.      Mouth/Throat:      Mouth: Mucous membranes are moist.     Cardiovascular:      Rate and Rhythm: Normal rate.   Pulmonary:      Effort: Pulmonary effort is normal.      Breath sounds: Normal breath sounds. No wheezing, rhonchi or rales.      Comments: Right chest wall incisions healing well. No erythema, induration or drainage. Chest tube sutures removed.   Abdominal:      Palpations: Abdomen is soft.     Musculoskeletal:         General: No swelling. Normal range of motion.     Skin:     General: Skin is warm.     Neurological:      General: No focal deficit present.      Mental Status: He is alert and oriented to person, place, and time.     Psychiatric:         Mood and Affect: Mood normal.              [1]   Past Medical History:  Diagnosis Date    A-fib (HCC)     BPH with obstruction/lower urinary tract symptoms     Colon polyp     COPD (chronic obstructive pulmonary disease) ([facility])     Frequency of urination     GERD (gastroesophageal reflux disease)     History of cardioversion 12/27/2011    Inital Rhythm AFIB, Final Rhythm Sinus, Max Joules 75    History of echocardiogram 06/01/2015    Normal LV  systolic function, mild concentric LV hypertrophy, mild mitral and tricuspid regurg, right atrial enlargement. EF 55%    Irregular heart beat     AF    Other male erectile dysfunction     Personal history of irradiation     Personal history of malignant neoplasm of prostate     Prostate cancer (HCC)    [2]   Past Surgical History:  Procedure Laterality Date    BACK SURGERY      x 3    CATARACT EXTRACTION, BILATERAL Bilateral 01/2024    INTRAOPERATIVE RADIATION THERAPY ([facility])  2011    IR CHEST TUBE PLACEMENT  5/4/2025    JOINT REPLACEMENT Bilateral     hips    LAMINECTOMY      three levels L3 - S1 - all at different times    PATELLA SURGERY      most of this removed after injury    PROSTATE BIOPSY      THORACOSCOPY VIDEO ASSISTED SURGERY (VATS) Right 5/10/2025    Procedure: THORACOSCOPY VIDEO ASSISTED SURGERY (VATS) RIGHT; RIGHT THERAPEUTIC WEDGE RESECTION; MEDIASTINAL RESECTION; TALC PLEURODESIS;  Surgeon: Zachary Mayen MD;  Location: [facility];  Service: Thoracic    TOTAL HIP ARTHROPLASTY Bilateral     VASECTOMY  1973   [3]   Family History  Problem Relation Name Age of Onset    Heart disease Mother      No Known Problems Brother     [4] No Known Allergies  [5]   Current Outpatient Medications on File Prior to Visit   Medication Sig Dispense Refill    albuterol (PROVENTIL HFA,VENTOLIN HFA) 90 mcg/act inhaler Inhale 2 puffs every 6 (six) hours as needed for wheezing      apixaban (Eliquis) 5 mg Take 1 tablet (5 mg total) by mouth 2 (two) times a day 180 tablet 3    Cholecalciferol 50 MCG (2000 UT) TABS Take 50 mcg by mouth      diltiazem (CARDIZEM CD) 120 mg 24 hr capsule TAKE 1 CAPSULE BY MOUTH  DAILY 90 capsule 3    fluticasone-salmeterol (Advair) 500-50 mcg/dose inhaler Inhale 1 puff in the morning and 1 puff in the evening. Rinse mouth after use. .      levothyroxine (Synthroid) 25 mcg tablet Take 50 mcg by mouth in the morning.      omeprazole (PriLOSEC OTC) 20 MG tablet Take 1 tablet by mouth in  the morning.      tamsulosin (FLOMAX) 0.4 mg        No current facility-administered medications on file prior to visit.   [6]   Social History  Tobacco Use    Smoking status: Former     Current packs/day: 0.00     Average packs/day: 2.0 packs/day for 33.5 years (67.0 ttl pk-yrs)     Types: Cigarettes     Start date:      Quit date: 1998     Years since quittin.9    Smokeless tobacco: Never   Vaping Use    Vaping status: Never Used   Substance and Sexual Activity    Alcohol use: Not Currently     Comment: 1/2 glass of wine daily    Drug use: No

## 2025-06-03 NOTE — ASSESSMENT & PLAN NOTE
Patient is status post right VATS upper lobe wedge resection, MLND and talc pleurodesis on 5/10/2025.  He had a spontaneous pneumothorax that did not improve with conservative management.  He did well postoperatively and was discharged on 5/15.  Patient denies any major concerns.  He reports difficulty with obtaining a deep inhalation due to a dry cough.  His surgical incisions are healing well and his chest tube sutures were removed today.  His chest x-ray is stable and does not show any enlarging pneumothorax or large effusions.  We recommend continuing use of incentive spirometer and using OTC cough suppressants as needed.  He should continue follow-up with his pulmonologist for treatment of his COPD as well as for follow-up of multiple small bilateral pulmonary nodules on previous CT scan.  Will send a letter to Dr. Lama regarding this.  From a thoracic surgery standpoint, patient does not require any additional follow-up.

## 2025-06-09 ENCOUNTER — OFFICE VISIT (OUTPATIENT)
Dept: OBGYN CLINIC | Facility: CLINIC | Age: 80
End: 2025-06-09
Payer: COMMERCIAL

## 2025-06-09 VITALS — HEIGHT: 72 IN | WEIGHT: 196 LBS | BODY MASS INDEX: 26.55 KG/M2

## 2025-06-09 DIAGNOSIS — Z96.643 STATUS POST TOTAL REPLACEMENT OF BOTH HIPS: Primary | ICD-10-CM

## 2025-06-09 PROCEDURE — 99213 OFFICE O/P EST LOW 20 MIN: CPT | Performed by: ORTHOPAEDIC SURGERY

## 2025-06-09 NOTE — PROGRESS NOTES
Name: Rosalio Anguiano      : 1945      MRN: [id number]  Encounter Provider: Peyman Castillo DO  Encounter Date: 2025   Encounter department[facility]  :  Assessment & Plan  Status post total replacement of both hips  Rosalio is doing well with regards to his bilateral total hip arthroplasties.  X-rays demonstrate stable alignment of both the left and right hip arthroplasties.  He may continue with activities of daily living as tolerated no specific restrictions.  I will see him back in 1 year for his annual evaluation.  X-rays of the left and right hips will be obtained at that time.  Orders:    XR hips bilateral 2 vw w pelvis if performed; Future    The patient is doing quite well from his bilateral total hip replacements.  Strength and motion intact.  No instability.  X-rays show anatomic placement of the prosthesis.  Continue home exercise program.  Follow-up 12 months for evaluation with new x-rays of bilateral hips-2 views each    No follow-ups on file.    I have personally reviewed pertinent imaging in PACS.  X-rays of bilateral hips and pelvis demonstrate stable alignment and well-seated total hip arthroplasties without evidence of implant loosening.    History: Rosalio Anguiano is a 80 y.o. male presenting for evaluation of his bilateral hips.  He has a history of bilateral hip replacements and is here for his annual evaluation.  He states that he is doing very well and has no complaints with regards to his hips.  He denies any pain and is able to partake in activities of daily living without restrictions.  Today he denies any distal paresthesias.      Estimated body mass index is 26.58 kg/m² as calculated from the following:    Height as of this encounter: 6' (1.829 m).    Weight as of this encounter: 88.9 kg (196 lb).    Lab Results   Component Value Date    HGBA1C 5.5 2023       Social History     Occupational History    Not on file   Tobacco  Use    Smoking status: Former     Current packs/day: 0.00     Average packs/day: 2.0 packs/day for 33.5 years (67.0 ttl pk-yrs)     Types: Cigarettes     Start date:      Quit date: 1998     Years since quittin.9    Smokeless tobacco: Never   Vaping Use    Vaping status: Never Used   Substance and Sexual Activity    Alcohol use: Not Currently     Comment: 1/2 glass of wine daily    Drug use: No    Sexual activity: Not on file       Objective:  Right Hip Exam     Tenderness   The patient is experiencing no tenderness.     Range of Motion   Abduction:  50   Flexion:  120     Muscle Strength   Abduction: 5/5   Adduction: 5/5   Flexion: 5/5     Other   Erythema: absent  Scars: present  Sensation: normal  Pulse: present      Left Hip Exam     Tenderness   The patient is experiencing no tenderness.     Range of Motion   Abduction:  50   Flexion:  120     Muscle Strength   Abduction: 5/5   Adduction: 5/5   Flexion: 5/5     Other   Erythema: absent  Scars: present  Sensation: normal  Pulse: present          There were no vitals filed for this visit.    Subjective:  Past Medical History[1]    Past Surgical History[2]    Family History[3]    Current Medications[4]    Allergies[5]    Review of Systems   Constitutional:  Positive for activity change. Negative for chills, fever and unexpected weight change.   HENT:  Negative for hearing loss, nosebleeds and sore throat.    Eyes:  Negative for pain, redness and visual disturbance.   Respiratory:  Negative for cough, shortness of breath and wheezing.    Cardiovascular:  Negative for chest pain, palpitations and leg swelling.   Gastrointestinal:  Negative for abdominal pain, nausea and vomiting.   Endocrine: Negative for polydipsia and polyuria.   Genitourinary:  Negative for dysuria and hematuria.   Musculoskeletal:  See HPI  Skin:  Negative for rash and wound.   Neurological:  Negative for dizziness, numbness and headaches.   Psychiatric/Behavioral:  Negative for  decreased concentration and suicidal ideas. The patient is not nervous/anxious.      Physical Exam  Vitals and nursing note reviewed.   Constitutional:       Appearance: Normal appearance. She is well-developed.   HENT:      Head: Normocephalic and atraumatic.      Right Ear: External ear normal.      Left Ear: External ear normal.   Eyes:      General: No scleral icterus.     Extraocular Movements: Extraocular movements intact.      Conjunctiva/sclera: Conjunctivae normal.   Cardiovascular:      Rate and Rhythm: Normal rate.   Pulmonary:      Effort: Pulmonary effort is normal. No respiratory distress.   Musculoskeletal:      Cervical back: Normal range of motion and neck supple.      Comments: See Ortho exam   Skin:     General: Skin is warm and dry.   Neurological:      General: No focal deficit present.      Mental Status: She is alert and oriented to person, place, and time.   Psychiatric:         Behavior: Behavior normal.     Scribe Attestation      I,:  John Osman am acting as a scribe while in the presence of the attending physician.:       I,:  Peyman Castillo, DO personally performed the services described in this documentation    as scribed in my presence.:           This document was created using speech voice recognition software.   Grammatical errors, random word insertions, pronoun errors, and incomplete sentences are an occasional consequence of this system due to software limitations, ambient noise, and hardware issues.   Any formal questions or concerns about content, text, or information contained within the body of this dictation should be directly addressed to the provider for clarification.         [1]   Past Medical History:  Diagnosis Date    A-fib (HCC)     BPH with obstruction/lower urinary tract symptoms     Colon polyp     COPD (chronic obstructive pulmonary disease) (HCC)     Frequency of urination     GERD (gastroesophageal reflux disease)     History of cardioversion 12/27/2011     Inital Rhythm AFIB, Final Rhythm Sinus, Max Joules 75    History of echocardiogram 06/01/2015    Normal LV systolic function, mild concentric LV hypertrophy, mild mitral and tricuspid regurg, right atrial enlargement. EF 55%    Irregular heart beat     AF    Other male erectile dysfunction     Personal history of irradiation     Personal history of malignant neoplasm of prostate     Prostate cancer (HCC)    [2]   Past Surgical History:  Procedure Laterality Date    BACK SURGERY      x 3    CATARACT EXTRACTION, BILATERAL Bilateral 01/2024    INTRAOPERATIVE RADIATION THERAPY (IORT)  2011    IR CHEST TUBE PLACEMENT  5/4/2025    JOINT REPLACEMENT Bilateral     hips    LAMINECTOMY      three levels L3 - S1 - all at different times    PATELLA SURGERY      most of this removed after injury    PROSTATE BIOPSY      THORACOSCOPY VIDEO ASSISTED SURGERY (VATS) Right 5/10/2025    Procedure: THORACOSCOPY VIDEO ASSISTED SURGERY (VATS) RIGHT; RIGHT THERAPEUTIC WEDGE RESECTION; MEDIASTINAL RESECTION; TALC PLEURODESIS;  Surgeon: Zachary Mayen MD;  Location: [facility];  Service: Thoracic    TOTAL HIP ARTHROPLASTY Bilateral     VASECTOMY  1973   [3]   Family History  Problem Relation Name Age of Onset    Heart disease Mother      No Known Problems Brother     [4]   Current Outpatient Medications:     albuterol (PROVENTIL HFA,VENTOLIN HFA) 90 mcg/act inhaler, Inhale 2 puffs every 6 (six) hours as needed for wheezing, Disp: , Rfl:     apixaban (Eliquis) 5 mg, Take 1 tablet (5 mg total) by mouth 2 (two) times a day, Disp: 180 tablet, Rfl: 3    Cholecalciferol 50 MCG (2000 UT) TABS, Take 50 mcg by mouth, Disp: , Rfl:     diltiazem (CARDIZEM CD) 120 mg 24 hr capsule, TAKE 1 CAPSULE BY MOUTH  DAILY, Disp: 90 capsule, Rfl: 3    fluticasone-salmeterol (Advair) 500-50 mcg/dose inhaler, Inhale 1 puff in the morning and 1 puff in the evening. Rinse mouth after use. ., Disp: , Rfl:     levothyroxine (Synthroid) 25 mcg tablet, Take  50 mcg by mouth in the morning., Disp: , Rfl:     omeprazole (PriLOSEC OTC) 20 MG tablet, Take 1 tablet by mouth in the morning., Disp: , Rfl:     tamsulosin (FLOMAX) 0.4 mg, , Disp: , Rfl:   [5] No Known Allergies

## 2025-07-04 ENCOUNTER — APPOINTMENT (EMERGENCY)
Dept: CT IMAGING | Facility: HOSPITAL | Age: 80
End: 2025-07-04
Payer: COMMERCIAL

## 2025-07-04 ENCOUNTER — HOSPITAL ENCOUNTER (EMERGENCY)
Facility: HOSPITAL | Age: 80
Discharge: HOME | End: 2025-07-04
Payer: COMMERCIAL

## 2025-07-04 VITALS
DIASTOLIC BLOOD PRESSURE: 68 MMHG | WEIGHT: 192.68 LBS | HEIGHT: 72 IN | TEMPERATURE: 97.6 F | HEART RATE: 91 BPM | RESPIRATION RATE: 18 BRPM | OXYGEN SATURATION: 93 % | SYSTOLIC BLOOD PRESSURE: 124 MMHG | BODY MASS INDEX: 26.1 KG/M2

## 2025-07-04 DIAGNOSIS — R31.9 PAINLESS HEMATURIA: Primary | ICD-10-CM

## 2025-07-04 LAB
ANION GAP SERPL CALCULATED.3IONS-SCNC: 7 MMOL/L (ref 4–13)
APTT PPP: 41 SECONDS (ref 23–34)
BACTERIA UR QL AUTO: ABNORMAL /HPF
BASOPHILS # BLD AUTO: 0.05 THOUSANDS/ÂΜL (ref 0–0.1)
BASOPHILS NFR BLD AUTO: 1 % (ref 0–1)
BILIRUB UR QL STRIP: NEGATIVE
BUN SERPL-MCNC: 13 MG/DL (ref 5–25)
CALCIUM SERPL-MCNC: 9.7 MG/DL (ref 8.4–10.2)
CHLORIDE SERPL-SCNC: 104 MMOL/L (ref 96–108)
CLARITY UR: ABNORMAL
CO2 SERPL-SCNC: 27 MMOL/L (ref 21–32)
COLOR UR: ABNORMAL
CREAT SERPL-MCNC: 0.94 MG/DL (ref 0.6–1.3)
EOSINOPHIL # BLD AUTO: 0.12 THOUSAND/ÂΜL (ref 0–0.61)
EOSINOPHIL NFR BLD AUTO: 2 % (ref 0–6)
ERYTHROCYTE [DISTWIDTH] IN BLOOD BY AUTOMATED COUNT: 24.2 % (ref 11.6–15.1)
GFR SERPL CREATININE-BSD FRML MDRD: 76 ML/MIN/1.73SQ M
GLUCOSE SERPL-MCNC: 145 MG/DL (ref 65–140)
GLUCOSE UR STRIP-MCNC: NEGATIVE MG/DL
HCT VFR BLD AUTO: 38.3 % (ref 36.5–49.3)
HGB BLD-MCNC: 13 G/DL (ref 12–17)
HGB UR QL STRIP.AUTO: ABNORMAL
IMM GRANULOCYTES # BLD AUTO: 0.03 THOUSAND/UL (ref 0–0.2)
IMM GRANULOCYTES NFR BLD AUTO: 0 % (ref 0–2)
INR PPP: 1.22 (ref 0.85–1.19)
KETONES UR STRIP-MCNC: NEGATIVE MG/DL
LEUKOCYTE ESTERASE UR QL STRIP: ABNORMAL
LYMPHOCYTES # BLD AUTO: 1.09 THOUSANDS/ÂΜL (ref 0.6–4.47)
LYMPHOCYTES NFR BLD AUTO: 15 % (ref 14–44)
MCH RBC QN AUTO: 33.9 PG (ref 26.8–34.3)
MCHC RBC AUTO-ENTMCNC: 33.9 G/DL (ref 31.4–37.4)
MCV RBC AUTO: 100 FL (ref 82–98)
MONOCYTES # BLD AUTO: 0.67 THOUSAND/ÂΜL (ref 0.17–1.22)
MONOCYTES NFR BLD AUTO: 9 % (ref 4–12)
MUCOUS THREADS UR QL AUTO: ABNORMAL
NEUTROPHILS # BLD AUTO: 5.33 THOUSANDS/ÂΜL (ref 1.85–7.62)
NEUTS SEG NFR BLD AUTO: 73 % (ref 43–75)
NITRITE UR QL STRIP: NEGATIVE
NON-SQ EPI CELLS URNS QL MICRO: ABNORMAL /HPF
NRBC BLD AUTO-RTO: 0 /100 WBCS
PH UR STRIP.AUTO: 5.5 [PH]
PLATELET # BLD AUTO: 186 THOUSANDS/UL (ref 149–390)
PMV BLD AUTO: 10.6 FL (ref 8.9–12.7)
POTASSIUM SERPL-SCNC: 4 MMOL/L (ref 3.5–5.3)
PROT UR STRIP-MCNC: ABNORMAL MG/DL
PROTHROMBIN TIME: 16.2 SECONDS (ref 12.3–15)
RBC # BLD AUTO: 3.84 MILLION/UL (ref 3.88–5.62)
RBC #/AREA URNS AUTO: ABNORMAL /HPF
SODIUM SERPL-SCNC: 138 MMOL/L (ref 135–147)
SP GR UR STRIP.AUTO: 1.01 (ref 1–1.03)
UROBILINOGEN UR STRIP-ACNC: <2 MG/DL
WBC # BLD AUTO: 7.29 THOUSAND/UL (ref 4.31–10.16)
WBC #/AREA URNS AUTO: ABNORMAL /HPF

## 2025-07-04 PROCEDURE — 99284 EMERGENCY DEPT VISIT MOD MDM: CPT

## 2025-07-04 PROCEDURE — 85730 THROMBOPLASTIN TIME PARTIAL: CPT

## 2025-07-04 PROCEDURE — 85025 COMPLETE CBC W/AUTO DIFF WBC: CPT

## 2025-07-04 PROCEDURE — 85610 PROTHROMBIN TIME: CPT

## 2025-07-04 PROCEDURE — 51798 US URINE CAPACITY MEASURE: CPT

## 2025-07-04 PROCEDURE — 96360 HYDRATION IV INFUSION INIT: CPT

## 2025-07-04 PROCEDURE — 81001 URINALYSIS AUTO W/SCOPE: CPT

## 2025-07-04 PROCEDURE — 87086 URINE CULTURE/COLONY COUNT: CPT

## 2025-07-04 PROCEDURE — 99285 EMERGENCY DEPT VISIT HI MDM: CPT

## 2025-07-04 PROCEDURE — 74177 CT ABD & PELVIS W/CONTRAST: CPT

## 2025-07-04 PROCEDURE — 36415 COLL VENOUS BLD VENIPUNCTURE: CPT

## 2025-07-04 PROCEDURE — 80048 BASIC METABOLIC PNL TOTAL CA: CPT

## 2025-07-04 RX ADMIN — IOHEXOL 100 ML: 350 INJECTION, SOLUTION INTRAVENOUS at 17:08

## 2025-07-04 RX ADMIN — SODIUM CHLORIDE 1000 ML: 0.9 INJECTION, SOLUTION INTRAVENOUS at 16:27

## 2025-07-04 NOTE — ED PROVIDER NOTES
Time reflects when diagnosis was documented in both MDM as applicable and the Disposition within this note       Time User Action Codes Description Comment    7/4/2025  6:50 PM Ricky Petty Add [R31.9] Painless hematuria           ED Disposition       ED Disposition   Discharge    Condition   Stable    Date/Time   Fri Jul 4, 2025  6:50 PM    Comment   AbhiZANDRA Anguiano discharge to home/self care.                   Assessment & Plan       Medical Decision Making  80-year-old male presenting to the ED for evaluation of painless hematuria beginning this afternoon.  History clinical exam documented below.  Differential diagnosis includes but is not limited to interstitial cystitis, hemorrhagic cystitis, UTI, kidney stone, malignancy.  Will treat with IV fluids, check labs for renal function, leukocytosis, anemia, as well as coags. Will also check post void residual r/o urinary retention as well as CT AP with IV contrast to r/o acute intraabdominal pathology.    Diagnostics reviewed. Renal function WNL. No evidence of UTI. No leukocytosis. Patient able to void in the ED without difficulty with no evidence of post void urinary retention. CT does not show any acute abdominal pathology. Reviewed all testing with the patient. Discussed it was imperative for him to follow up with urology. Urology referral placed. Patient verbalized understanding of return precautions.    I reviewed all testing with the patient:   I gave oral return precautions for what to return for in addition to the written return precautions.   The patient verbalized understanding of the discharge instructions and warnings that would necessitate return to the Emergency Department.  I specifically highlighted areas of special concern regarding the written and verbal discharge instructions and return precautions.    All questions were answered prior to discharge.      Amount and/or Complexity of Data Reviewed  Labs: ordered. Decision-making details  documented in ED Course.  Radiology: ordered.    Risk  Prescription drug management.        ED Course as of 07/04/25 1936   Fri Jul 04, 2025   1656 Hemoglobin: 13.0  WNL     1656 BUN: 13   1656 Creatinine: 0.94  Renal function WNL     1826 IMPRESSION:     No acute findings in the abdomen or pelvis.         Medications   sodium chloride 0.9 % bolus 1,000 mL (0 mL Intravenous Stopped 7/4/25 1727)   iohexol (OMNIPAQUE) 350 MG/ML injection (MULTI-DOSE) 100 mL (100 mL Intravenous Given 7/4/25 1708)       ED Risk Strat Scores                    (ISAR) Identification of Seniors at Risk  Before the illness or injury that brought you to the Emergency, did you need someone to help you on a regular basis?: 0  In the last 24 hours, have you needed more help than usual?: 0  Have you been hospitalized for one or more nights during the past 6 months?: 0  In general, do you see well?: 0  In general, do you have serious problems with your memory?: 0  Do you take more than three different medications every day?: 0  ISAR Score: 0            SBIRT 20yo+      Flowsheet Row Most Recent Value   Initial Alcohol Screen: US AUDIT-C     1. How often do you have a drink containing alcohol? 0 Filed at: 07/04/2025 1553   2. How many drinks containing alcohol do you have on a typical day you are drinking?  0 Filed at: 07/04/2025 1553   3a. Male UNDER 65: How often do you have five or more drinks on one occasion? 0 Filed at: 07/04/2025 1553   Audit-C Score 0 Filed at: 07/04/2025 1553   MANI: How many times in the past year have you...    Used an illegal drug or used a prescription medication for non-medical reasons? Never Filed at: 07/04/2025 7583                            History of Present Illness       Chief Complaint   Patient presents with    Blood in Urine     Pt started urinating bright red blood and blood clots today.        Past Medical History[1]   Past Surgical History[2]   Family History[3]   Social History[4]   E-Cigarette/Vaping     E-Cigarette Use Never User       E-Cigarette/Vaping Substances    Nicotine No     THC No     CBD No     Flavoring No     Other No     Unknown No       I have reviewed and agree with the history as documented.     HPI    Patient is a 80-year-old male with past medical history of atrial fibrillation currently on Eliquis therapy, prostate cancer, GERD, COPD, hypothyroidism, vertigo, osteoarthritis, presenting to the ED for evaluation of painless hematuria beginning this afternoon at noon.  Patient noticed that he was passing bright red urine, as well as some small clots in his urine.  Has no dysuria, urgency or frequency.  Feels that he is emptying his bladder to completion every time he urinates.  Patient denies any testicular pain or swelling.  He denies any flank pain, history of kidney stones.  Patient denies recent fevers, chills, cough or congestion, chest pain, shortness of breath, abdominal pain, nausea, vomiting.  Reports history of prostate cancer treated with radiation.  States that he takes Flomax daily to assist with his urination.        Review of Systems   All other systems reviewed and are negative.          Objective       ED Triage Vitals [07/04/25 1551]   Temperature Pulse Blood Pressure Respirations SpO2 Patient Position - Orthostatic VS   97.6 °F (36.4 °C) 96 122/68 18 92 % Sitting      Temp Source Heart Rate Source BP Location FiO2 (%) Pain Score    Temporal Monitor Left arm -- --      Vitals      Date and Time Temp Pulse SpO2 Resp BP Pain Score FACES Pain Rating User   07/04/25 1913 -- 91 93 % 18 124/68 -- -- BS   07/04/25 1551 97.6 °F (36.4 °C) 96 92 % 18 122/68 -- -- PS            Physical Exam  Vitals and nursing note reviewed.   Constitutional:       General: He is not in acute distress.     Appearance: Normal appearance. He is well-developed and normal weight. He is not ill-appearing, toxic-appearing or diaphoretic.   HENT:      Head: Normocephalic and atraumatic.      Right Ear: External  ear normal.      Left Ear: External ear normal.      Nose: Nose normal. No congestion.      Mouth/Throat:      Mouth: Mucous membranes are moist.      Pharynx: Oropharynx is clear.     Eyes:      Conjunctiva/sclera: Conjunctivae normal.       Cardiovascular:      Rate and Rhythm: Normal rate and regular rhythm.      Pulses: Normal pulses.      Heart sounds: Normal heart sounds. No murmur heard.  Pulmonary:      Effort: Pulmonary effort is normal. No respiratory distress.      Breath sounds: Normal breath sounds. No wheezing, rhonchi or rales.   Abdominal:      General: Abdomen is flat. There is no distension.      Palpations: Abdomen is soft. There is no mass.      Tenderness: There is no abdominal tenderness. There is no right CVA tenderness, left CVA tenderness, guarding or rebound.      Hernia: No hernia is present.     Musculoskeletal:         General: No swelling. Normal range of motion.      Cervical back: Normal range of motion and neck supple.      Right lower leg: No edema.      Left lower leg: No edema.     Skin:     General: Skin is warm and dry.      Capillary Refill: Capillary refill takes less than 2 seconds.      Findings: No erythema.     Neurological:      General: No focal deficit present.      Mental Status: He is alert and oriented to person, place, and time.      Motor: No weakness.     Psychiatric:         Mood and Affect: Mood normal.         Results Reviewed       Procedure Component Value Units Date/Time    Urine Microscopic [[id number]]  (Abnormal) Collected: 07/04/25 1636    Lab Status: Final result Specimen: Urine, Clean Catch Updated: 07/04/25 1710     RBC, UA Innumerable /hpf      WBC, UA Innumerable /hpf      Epithelial Cells Occasional /hpf      Bacteria, UA None Seen /hpf      MUCUS THREADS Occasional    Urine culture [[id number]] Collected: 07/04/25 1636    Lab Status: In process Specimen: Urine, Clean Catch Updated: 07/04/25 1710    UA w Reflex to Microscopic w Reflex to Culture  [[id number]]  (Abnormal) Collected: 07/04/25 1636    Lab Status: Final result Specimen: Urine, Clean Catch Updated: 07/04/25 1654     Color, UA Brown     Clarity, UA Turbid     Specific Gravity, UA 1.009     pH, UA 5.5     Leukocytes, UA Small     Nitrite, UA Negative     Protein, UA 50 (1+) mg/dl      Glucose, UA Negative mg/dl      Ketones, UA Negative mg/dl      Urobilinogen, UA <2.0 mg/dl      Bilirubin, UA Negative     Occult Blood, UA Large    Protime-INR [[id number]]  (Abnormal) Collected: 07/04/25 1625    Lab Status: Final result Specimen: Blood from Arm, Left Updated: 07/04/25 1652     Protime 16.2 seconds      INR 1.22    Narrative:      INR Therapeutic Range    Indication                                             INR Range      Atrial Fibrillation                                               2.0-3.0  Hypercoagulable State                                    2.0.2.3  Left Ventricular Asist Device                            2.0-3.0  Mechanical Heart Valve                                  -    Aortic(with afib, MI, embolism, HF, LA enlargement,    and/or coagulopathy)                                     2.0-3.0 (2.5-3.5)     Mitral                                                             2.5-3.5  Prosthetic/Bioprosthetic Heart Valve               2.0-3.0  Venous thromboembolism (VTE: VT, PE        2.0-3.0    APTT [[id number]]  (Abnormal) Collected: 07/04/25 1625    Lab Status: Final result Specimen: Blood from Arm, Left Updated: 07/04/25 1652     PTT 41 seconds     Basic metabolic panel [[id number]]  (Abnormal) Collected: 07/04/25 1625    Lab Status: Final result Specimen: Blood from Arm, Left Updated: 07/04/25 1648     Sodium 138 mmol/L      Potassium 4.0 mmol/L      Chloride 104 mmol/L      CO2 27 mmol/L      ANION GAP 7 mmol/L      BUN 13 mg/dL      Creatinine 0.94 mg/dL      Glucose 145 mg/dL      Calcium 9.7 mg/dL      eGFR 76 ml/min/1.73sq m     Narrative:      [employer]  guidelines for Chronic Kidney Disease (CKD):     Stage 1 with normal or high GFR (GFR > 90 mL/min/1.73 square meters)    Stage 2 Mild CKD (GFR = 60-89 mL/min/1.73 square meters)    Stage 3A Moderate CKD (GFR = 45-59 mL/min/1.73 square meters)    Stage 3B Moderate CKD (GFR = 30-44 mL/min/1.73 square meters)    Stage 4 Severe CKD (GFR = 15-29 mL/min/1.73 square meters)    Stage 5 End Stage CKD (GFR <15 mL/min/1.73 square meters)  Note: GFR calculation is accurate only with a steady state creatinine    CBC and differential [[id number]]  (Abnormal) Collected: 07/04/25 1625    Lab Status: Final result Specimen: Blood from Arm, Left Updated: 07/04/25 1634     WBC 7.29 Thousand/uL      RBC 3.84 Million/uL      Hemoglobin 13.0 g/dL      Hematocrit 38.3 %       fL      MCH 33.9 pg      MCHC 33.9 g/dL      RDW 24.2 %      MPV 10.6 fL      Platelets 186 Thousands/uL      nRBC 0 /100 WBCs      Segmented % 73 %      Immature Grans % 0 %      Lymphocytes % 15 %      Monocytes % 9 %      Eosinophils Relative 2 %      Basophils Relative 1 %      Absolute Neutrophils 5.33 Thousands/µL      Absolute Immature Grans 0.03 Thousand/uL      Absolute Lymphocytes 1.09 Thousands/µL      Absolute Monocytes 0.67 Thousand/µL      Eosinophils Absolute 0.12 Thousand/µL      Basophils Absolute 0.05 Thousands/µL             CT abdomen pelvis with contrast   Final Interpretation by Norman Kan MD (07/04 1752)      No acute findings in the abdomen or pelvis.               Workstation performed: [id number]             Procedures    ED Medication and Procedure Management   Prior to Admission Medications   Prescriptions Last Dose Informant Patient Reported? Taking?   Cholecalciferol 50 MCG (2000 UT) TABS   Yes No   Sig: Take 50 mcg by mouth   albuterol (PROVENTIL HFA,VENTOLIN HFA) 90 mcg/act inhaler  Self Yes No   Sig: Inhale 2 puffs every 6 (six) hours as needed for wheezing   apixaban (Eliquis) 5 mg   No No   Sig: Take 1 tablet (5 mg  total) by mouth 2 (two) times a day   diltiazem (CARDIZEM CD) 120 mg 24 hr capsule  Self No No   Sig: TAKE 1 CAPSULE BY MOUTH  DAILY   fluticasone-salmeterol (Advair) 500-50 mcg/dose inhaler   Yes No   Sig: Inhale 1 puff in the morning and 1 puff in the evening. Rinse mouth after use. .   levothyroxine (Synthroid) 25 mcg tablet   Yes No   Sig: Take 50 mcg by mouth in the morning.   omeprazole (PriLOSEC OTC) 20 MG tablet  Self Yes No   Sig: Take 1 tablet by mouth in the morning.   tamsulosin (FLOMAX) 0.4 mg  Self Yes No      Facility-Administered Medications: None     Discharge Medication List as of 7/4/2025  6:51 PM        CONTINUE these medications which have NOT CHANGED    Details   albuterol (PROVENTIL HFA,VENTOLIN HFA) 90 mcg/act inhaler Inhale 2 puffs every 6 (six) hours as needed for wheezing, Historical Med      apixaban (Eliquis) 5 mg Take 1 tablet (5 mg total) by mouth 2 (two) times a day, Starting Tue 1/14/2025, Print      Cholecalciferol 50 MCG (2000 UT) TABS Take 50 mcg by mouth, Starting Mon 9/9/2024, Historical Med      diltiazem (CARDIZEM CD) 120 mg 24 hr capsule TAKE 1 CAPSULE BY MOUTH  DAILY, Normal      fluticasone-salmeterol (Advair) 500-50 mcg/dose inhaler Inhale 1 puff in the morning and 1 puff in the evening. Rinse mouth after use. ., Historical Med      levothyroxine (Synthroid) 25 mcg tablet Take 50 mcg by mouth in the morning., Starting Thu 9/7/2023, Historical Med      omeprazole (PriLOSEC OTC) 20 MG tablet Take 1 tablet by mouth in the morning., Historical Med      tamsulosin (FLOMAX) 0.4 mg Historical Med             ED SEPSIS DOCUMENTATION   Time reflects when diagnosis was documented in both MDM as applicable and the Disposition within this note       Time User Action Codes Description Comment    7/4/2025  6:50 PM Ricky Petty Add [R31.9] Painless hematuria                      [1]   Past Medical History:  Diagnosis Date    A-fib (HCC)     BPH with obstruction/lower urinary tract  symptoms     Colon polyp     COPD (chronic obstructive pulmonary disease) (HCC)     Frequency of urination     GERD (gastroesophageal reflux disease)     History of cardioversion 2011    Inital Rhythm AFIB, Final Rhythm Sinus, Max Joules 75    History of echocardiogram 2015    Normal LV systolic function, mild concentric LV hypertrophy, mild mitral and tricuspid regurg, right atrial enlargement. EF 55%    Irregular heart beat     AF    Other male erectile dysfunction     Personal history of irradiation     Personal history of malignant neoplasm of prostate     Prostate cancer (HCC)    [2]   Past Surgical History:  Procedure Laterality Date    BACK SURGERY      x 3    CATARACT EXTRACTION, BILATERAL Bilateral 2024    INTRAOPERATIVE RADIATION THERAPY (IORT)      IR CHEST TUBE PLACEMENT  2025    JOINT REPLACEMENT Bilateral     hips    LAMINECTOMY      three levels L3 - S1 - all at different times    PATELLA SURGERY      most of this removed after injury    PROSTATE BIOPSY      THORACOSCOPY VIDEO ASSISTED SURGERY (VATS) Right 5/10/2025    Procedure: THORACOSCOPY VIDEO ASSISTED SURGERY (VATS) RIGHT; RIGHT THERAPEUTIC WEDGE RESECTION; MEDIASTINAL RESECTION; TALC PLEURODESIS;  Surgeon: Zachary Mayen MD;  Location: [facility];  Service: Thoracic    TOTAL HIP ARTHROPLASTY Bilateral     VASECTOMY     [3]   Family History  Problem Relation Name Age of Onset    Heart disease Mother      No Known Problems Brother     [4]   Social History  Tobacco Use    Smoking status: Former     Current packs/day: 0.00     Average packs/day: 2.0 packs/day for 33.5 years (67.0 ttl pk-yrs)     Types: Cigarettes     Start date:      Quit date: 1998     Years since quittin.0    Smokeless tobacco: Never   Vaping Use    Vaping status: Never Used   Substance Use Topics    Alcohol use: Not Currently     Comment: 1/2 glass of wine daily    Drug use: No        Ricky Petty DO  26

## 2025-07-04 NOTE — DISCHARGE INSTRUCTIONS
Please follow up with Urology as instructed.    Return to the ED with any new/concerning issues.     If unable to void, or having extreme pain, return to the ED for evaluation.

## 2025-07-05 LAB — BACTERIA UR CULT: NORMAL

## 2025-07-18 ENCOUNTER — PREP FOR PROCEDURE (OUTPATIENT)
Age: 80
End: 2025-07-18

## 2025-07-18 ENCOUNTER — OFFICE VISIT (OUTPATIENT)
Age: 80
End: 2025-07-18
Payer: COMMERCIAL

## 2025-07-18 ENCOUNTER — TELEPHONE (OUTPATIENT)
Age: 80
End: 2025-07-18

## 2025-07-18 VITALS
HEIGHT: 72 IN | BODY MASS INDEX: 26.28 KG/M2 | HEART RATE: 84 BPM | SYSTOLIC BLOOD PRESSURE: 112 MMHG | OXYGEN SATURATION: 93 % | DIASTOLIC BLOOD PRESSURE: 64 MMHG | WEIGHT: 194 LBS

## 2025-07-18 DIAGNOSIS — K22.70 BARRETT'S ESOPHAGUS WITHOUT DYSPLASIA: Primary | ICD-10-CM

## 2025-07-18 PROCEDURE — 99213 OFFICE O/P EST LOW 20 MIN: CPT | Performed by: INTERNAL MEDICINE

## 2025-07-18 NOTE — PROGRESS NOTES
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  Repeat EGD will be done for short segment Freed's.  Further recommendations will depend on study results    History of Present Illness   HPI  Rosalio Anguiano is a 80 y.o. male who presents.With a history of biopsy positive Freed's esophagus 3 years ago.  Patient denies any upper GI symptomatology whatsoever       Review of Systems   Constitutional:  Positive for unexpected weight change.        Mild weight loss following lung surgery and decreased appetite.   HENT: Negative.     Eyes: Negative.    Respiratory:  Positive for shortness of breath.    Cardiovascular: Negative.    Gastrointestinal: Negative.    Endocrine: Negative.    Genitourinary:  Positive for hematuria.        Single episode of hematuria, following up with urology   Musculoskeletal:  Positive for arthralgias.   Skin:         Bruises   Neurological:         Vertigo   Hematological:  Bruises/bleeds easily.   Psychiatric/Behavioral: Negative.          Objective   /64   Pulse 84   Ht 6' (1.829 m)   Wt 88 kg (194 lb)   SpO2 93%   BMI 26.31 kg/m²      Physical Exam  Constitutional:       Appearance: Normal appearance.   HENT:      Head: Normocephalic.     Eyes:      Pupils: Pupils are equal, round, and reactive to light.       Cardiovascular:      Rate and Rhythm: Normal rate and regular rhythm.      Pulses: Normal pulses.      Heart sounds: Normal heart sounds.   Pulmonary:      Effort: Pulmonary effort is normal.      Breath sounds: Normal breath sounds.   Abdominal:      General: Abdomen is flat.      Palpations: Abdomen is soft.     Skin:     General: Skin is warm and dry.       Comments: Multiple ecchymoses on the arms     Neurological:      General: No focal deficit present.      Mental Status: He is alert and oriented to person, place, and time.     Psychiatric:         Mood and Affect: Mood normal.         Behavior: Behavior normal.

## 2025-07-18 NOTE — TELEPHONE ENCOUNTER
Good Morning! Ptn saw Dr. Bonilla and he ordered an EGD scheduled for 8/11/25. Ptn is taking Eliquis prescribed by Dr. Edwards. Dr. Bonilla would like to know if the ptn can hold it prior to the procedure and if so for how long? Thank you!!

## 2025-07-18 NOTE — PATIENT INSTRUCTIONS
Scheduled date of EGD(as of today): 8/11/25  Physician performing EGD: Irene  Location of EGD: [place]  Instructions reviewed with patient by: Kamini BORREGO  Clearances:

## 2025-07-22 ENCOUNTER — TELEPHONE (OUTPATIENT)
Age: 80
End: 2025-07-22

## 2025-07-22 NOTE — TELEPHONE ENCOUNTER
NP Hematuria    Was in ED on 7/4/25, urology referral placed    Denies pain, fever. Describes urine as dark yellow, was having red urine with blood clots, denies difficulty with urination.     Advised if needs sooner assistance can reach out to PCP

## 2025-07-28 ENCOUNTER — OFFICE VISIT (OUTPATIENT)
Dept: UROLOGY | Facility: CLINIC | Age: 80
End: 2025-07-28
Payer: COMMERCIAL

## 2025-07-28 VITALS
SYSTOLIC BLOOD PRESSURE: 124 MMHG | HEART RATE: 57 BPM | TEMPERATURE: 98.5 F | HEIGHT: 72 IN | BODY MASS INDEX: 25.6 KG/M2 | DIASTOLIC BLOOD PRESSURE: 82 MMHG | WEIGHT: 189 LBS | OXYGEN SATURATION: 98 %

## 2025-07-28 DIAGNOSIS — C61 ADENOCARCINOMA OF PROSTATE (HCC): ICD-10-CM

## 2025-07-28 DIAGNOSIS — R31.0 GROSS HEMATURIA: Primary | ICD-10-CM

## 2025-07-28 DIAGNOSIS — N40.1 BENIGN PROSTATIC HYPERPLASIA WITH LOWER URINARY TRACT SYMPTOMS, SYMPTOM DETAILS UNSPECIFIED: ICD-10-CM

## 2025-07-28 LAB
POST-VOID RESIDUAL VOLUME, ML POC: 25 ML
SL AMB  POCT GLUCOSE, UA: NORMAL
SL AMB LEUKOCYTE ESTERASE,UA: NORMAL
SL AMB POCT BILIRUBIN,UA: NORMAL
SL AMB POCT BLOOD,UA: NORMAL
SL AMB POCT CLARITY,UA: CLEAR
SL AMB POCT COLOR,UA: YELLOW
SL AMB POCT KETONES,UA: NORMAL
SL AMB POCT NITRITE,UA: NORMAL
SL AMB POCT PH,UA: 5
SL AMB POCT SPECIFIC GRAVITY,UA: 1.01
SL AMB POCT URINE PROTEIN: NORMAL
SL AMB POCT UROBILINOGEN: 0.2

## 2025-07-28 PROCEDURE — 99204 OFFICE O/P NEW MOD 45 MIN: CPT | Performed by: PHYSICIAN ASSISTANT

## 2025-07-28 PROCEDURE — 88112 CYTOPATH CELL ENHANCE TECH: CPT | Performed by: STUDENT IN AN ORGANIZED HEALTH CARE EDUCATION/TRAINING PROGRAM

## 2025-07-28 PROCEDURE — 81002 URINALYSIS NONAUTO W/O SCOPE: CPT | Performed by: PHYSICIAN ASSISTANT

## 2025-07-28 PROCEDURE — 51798 US URINE CAPACITY MEASURE: CPT | Performed by: PHYSICIAN ASSISTANT

## 2025-07-30 PROCEDURE — 88112 CYTOPATH CELL ENHANCE TECH: CPT | Performed by: STUDENT IN AN ORGANIZED HEALTH CARE EDUCATION/TRAINING PROGRAM

## 2025-08-04 ENCOUNTER — HOSPITAL ENCOUNTER (EMERGENCY)
Facility: HOSPITAL | Age: 80
Discharge: HOME | End: 2025-08-04
Attending: EMERGENCY MEDICINE
Payer: COMMERCIAL

## 2025-08-04 ENCOUNTER — APPOINTMENT (EMERGENCY)
Dept: RADIOLOGY | Facility: HOSPITAL | Age: 80
End: 2025-08-04
Payer: COMMERCIAL

## 2025-08-04 VITALS
OXYGEN SATURATION: 97 % | BODY MASS INDEX: 25.44 KG/M2 | DIASTOLIC BLOOD PRESSURE: 85 MMHG | SYSTOLIC BLOOD PRESSURE: 120 MMHG | TEMPERATURE: 97.2 F | HEART RATE: 72 BPM | HEIGHT: 72 IN | WEIGHT: 187.83 LBS | RESPIRATION RATE: 20 BRPM

## 2025-08-04 DIAGNOSIS — M25.561 ACUTE PAIN OF RIGHT KNEE: Primary | ICD-10-CM

## 2025-08-04 PROCEDURE — 73564 X-RAY EXAM KNEE 4 OR MORE: CPT

## 2025-08-04 PROCEDURE — 99284 EMERGENCY DEPT VISIT MOD MDM: CPT | Performed by: EMERGENCY MEDICINE

## 2025-08-04 PROCEDURE — 99283 EMERGENCY DEPT VISIT LOW MDM: CPT

## 2025-08-04 RX ORDER — PREDNISONE 20 MG/1
40 TABLET ORAL DAILY
Qty: 10 TABLET | Refills: 0 | Status: SHIPPED | OUTPATIENT
Start: 2025-08-05

## 2025-08-05 ENCOUNTER — TELEPHONE (OUTPATIENT)
Age: 80
End: 2025-08-05

## 2025-08-11 ENCOUNTER — APPOINTMENT (OUTPATIENT)
Dept: LAB | Facility: CLINIC | Age: 80
End: 2025-08-11
Payer: COMMERCIAL

## (undated) DEVICE — SUT PROLENE 0 CT-1 30 IN 8424H

## (undated) DEVICE — NEEDLE 25G X 1 1/2

## (undated) DEVICE — 3M™ STERI-STRIP™ REINFORCED ADHESIVE SKIN CLOSURES, R1547, 1/2 IN X 4 IN (12 MM X 100 MM), 6 STRIPS/ENVELOPE: Brand: 3M™ STERI-STRIP™

## (undated) DEVICE — CANISTER FOR THORACIC SUCTION SYSTEM: Brand: THOPAZ DISPOSABLE CANISTER 0.8L

## (undated) DEVICE — PROGEL

## (undated) DEVICE — INTENDED FOR TISSUE SEPARATION, AND OTHER PROCEDURES THAT REQUIRE A SHARP SURGICAL BLADE TO PUNCTURE OR CUT.: Brand: BARD-PARKER SAFETY BLADES SIZE 10, STERILE

## (undated) DEVICE — ANTIBACTERIAL UNDYED BRAIDED (POLYGLACTIN 910), SYNTHETIC ABSORBABLE SUTURE: Brand: COATED VICRYL

## (undated) DEVICE — PACK CUSTOM THORACIC RF

## (undated) DEVICE — ELECTRODE EXTENDER STD 3/32 CONNECTOR 20 X 10MM STRL

## (undated) DEVICE — REINFORCED INTELLIGENT RELOAD, FOR USE WITH SIGNIA STAPLING SYSTEM: Brand: TRI-STAPLE 2.0

## (undated) DEVICE — SUT MONOCRYL 4-0 PS-2 18 IN Y496G

## (undated) DEVICE — ELECTRODE BLADE MOD  E-Z CLEAN 6.5IN -0014M

## (undated) DEVICE — SUT VICRYL 2-0 SH 27 IN UNDYED J417H

## (undated) DEVICE — EXOFIN PRECISION PEN HIGH VISCOSITY TOPICAL SKIN ADHESIVE: Brand: EXOFIN PRECISION PEN, 1G

## (undated) DEVICE — GLOVE INDICATOR PI UNDERGLOVE SZ 8 BLUE

## (undated) DEVICE — SUT VICRYL 0 CT-1 27 IN J260H

## (undated) DEVICE — SINGLE TUBING WITH LARGE CONNECTOR FOR THORACIC SUCTION SYSTEM PUMP: Brand: THOPAZ TUBING SINGLE

## (undated) DEVICE — INTENDED FOR TISSUE SEPARATION, AND OTHER PROCEDURES THAT REQUIRE A SHARP SURGICAL BLADE TO PUNCTURE OR CUT.: Brand: BARD-PARKER SAFETY BLADES SIZE 15, STERILE

## (undated) DEVICE — GAUZE SPONGES,16 PLY: Brand: CURITY

## (undated) DEVICE — WOUND RETRACTOR AND PROTECTOR: Brand: ALEXIS WOUND PROTECTOR-RETRACTOR

## (undated) DEVICE — GLOVE SRG BIOGEL ECLIPSE 7.5

## (undated) DEVICE — REM POLYHESIVE ADULT PATIENT RETURN ELECTRODE: Brand: VALLEYLAB

## (undated) DEVICE — 24 FR STRAIGHT – SOFT PVC CATHETER: Brand: PVC THORACIC CATHETERS

## (undated) DEVICE — UNIVERSAL STAPLER: Brand: ENDO GIA ULTRA